# Patient Record
Sex: FEMALE | Race: WHITE | NOT HISPANIC OR LATINO | Employment: OTHER | URBAN - METROPOLITAN AREA
[De-identification: names, ages, dates, MRNs, and addresses within clinical notes are randomized per-mention and may not be internally consistent; named-entity substitution may affect disease eponyms.]

---

## 2018-10-31 ENCOUNTER — OFFICE VISIT (OUTPATIENT)
Dept: OBGYN CLINIC | Age: 68
End: 2018-10-31
Payer: MEDICARE

## 2018-10-31 VITALS
WEIGHT: 254.13 LBS | SYSTOLIC BLOOD PRESSURE: 120 MMHG | DIASTOLIC BLOOD PRESSURE: 76 MMHG | HEIGHT: 71 IN | BODY MASS INDEX: 35.58 KG/M2

## 2018-10-31 DIAGNOSIS — Z12.31 ENCOUNTER FOR SCREENING MAMMOGRAM FOR MALIGNANT NEOPLASM OF BREAST: ICD-10-CM

## 2018-10-31 DIAGNOSIS — Z85.3 HISTORY OF BREAST CANCER: ICD-10-CM

## 2018-10-31 DIAGNOSIS — N76.2 ACUTE VULVITIS: ICD-10-CM

## 2018-10-31 DIAGNOSIS — Z01.419 ENCOUNTER FOR GYNECOLOGICAL EXAMINATION WITHOUT ABNORMAL FINDING: ICD-10-CM

## 2018-10-31 DIAGNOSIS — Z91.89 GYN EXAM FOR HIGH-RISK MEDICARE PATIENT: Primary | ICD-10-CM

## 2018-10-31 DIAGNOSIS — M81.0 OSTEOPOROSIS, UNSPECIFIED OSTEOPOROSIS TYPE, UNSPECIFIED PATHOLOGICAL FRACTURE PRESENCE: ICD-10-CM

## 2018-10-31 PROCEDURE — G0101 CA SCREEN;PELVIC/BREAST EXAM: HCPCS | Performed by: OBSTETRICS & GYNECOLOGY

## 2018-10-31 RX ORDER — VENLAFAXINE HYDROCHLORIDE 150 MG/1
150 TABLET, EXTENDED RELEASE ORAL
Refills: 0 | COMMUNITY
Start: 2018-10-16

## 2018-10-31 RX ORDER — TRIMETHOPRIM 100 MG/1
100 TABLET ORAL DAILY
Refills: 0 | COMMUNITY
Start: 2018-10-07

## 2018-10-31 RX ORDER — SIMVASTATIN 20 MG
20 TABLET ORAL DAILY
Refills: 0 | COMMUNITY
Start: 2018-10-23

## 2018-10-31 RX ORDER — ZOLPIDEM TARTRATE 10 MG/1
TABLET ORAL
COMMUNITY
Start: 2006-12-28 | End: 2019-12-30 | Stop reason: SDUPTHER

## 2018-10-31 RX ORDER — LOSARTAN POTASSIUM AND HYDROCHLOROTHIAZIDE 12.5; 5 MG/1; MG/1
1 TABLET ORAL DAILY
Refills: 0 | COMMUNITY
Start: 2018-10-27

## 2018-10-31 RX ORDER — CLOBETASOL PROPIONATE 0.5 MG/G
OINTMENT TOPICAL ONCE
Qty: 15 G | Refills: 0 | Status: SHIPPED | OUTPATIENT
Start: 2018-10-31 | End: 2020-01-10 | Stop reason: ALTCHOICE

## 2018-10-31 RX ORDER — ZOLPIDEM TARTRATE 12.5 MG/1
12.5 TABLET, FILM COATED, EXTENDED RELEASE ORAL
Refills: 0 | COMMUNITY
Start: 2018-10-09

## 2018-10-31 RX ORDER — ESTRADIOL 0.1 MG/G
CREAM VAGINAL
Refills: 0 | COMMUNITY
Start: 2018-09-07

## 2018-10-31 NOTE — PATIENT INSTRUCTIONS
Contact Dermatitis   AMBULATORY CARE:   Contact dermatitis  is a rash  It develops when you touch something that irritates your skin or causes an allergic reaction  Common signs and symptoms include the following:   · Red, swollen, painful rash           · Skin that itches, stings, or burns    · Dry, scaly, or crusty skin patches    · Bumps or blisters    · Fluid draining from blisters  Call 911 for any of the following:   · You have sudden trouble breathing  · Your throat swells and you have trouble eating  · Your face is swollen  Contact your healthcare provider if:   · You have a fever  · Your blisters are draining pus  · Your rash spreads or does not get better, even after treatment  · You have questions or concerns about your condition or care  Treatment for contact dermatitis  involves removing any irritants or allergens that cause your rash  You may also need medicines to decrease itching and swelling  They will be given as a topical medicine to apply to your rash or as a pill  Manage contact dermatitis:   · Take short baths or showers in cool water  Use mild soap or soap-free cleansers  Add oatmeal, baking soda, or cornstarch to the bath water to help decrease skin irritation  · Avoid skin irritants , such as makeup, hair products, soaps, and cleansers  Use products that do not contain perfume or dye  · Apply a cool compress to your rash  This will help soothe your skin  · Keep your skin moist   Rub unscented cream or lotion on your skin to prevent dryness and itching  Do this right after a bath or shower when your skin is still damp  Follow up with your healthcare provider as directed:  Write down your questions so you remember to ask them during your visits  © 2017 [employer] Information is for End User's use only and may not be sold, redistributed or otherwise used for commercial purposes   All illustrations and images included in CareNotes® are the copyrighted property of [employer]  or Durga Schmidt  The above information is an  only  It is not intended as medical advice for individual conditions or treatments  Talk to your doctor, nurse or pharmacist before following any medical regimen to see if it is safe and effective for you

## 2018-10-31 NOTE — ASSESSMENT & PLAN NOTE
Pap/HPV not indicated  Mammogram not indicated, h/o mastectomy  Colonoscopy current due 2028    DEXA scheduled for tomorrow  Encourage healthy diet, exercise, Calcium 1200mg per day and at least 800 iu Vitamin D daily

## 2018-10-31 NOTE — PROGRESS NOTES
Assessment/Plan:    Encounter for gynecological examination without abnormal finding  Pap/HPV not indicated  Mammogram not indicated, h/o mastectomy  Colonoscopy current due     DEXA scheduled for tomorrow  Encourage healthy diet, exercise, Calcium 1200mg per day and at least 800 iu Vitamin D daily  Diagnoses and all orders for this visit:    GYN exam for high-risk Medicare patient    Encounter for gynecological examination without abnormal finding    Encounter for screening mammogram for malignant neoplasm of breast    Osteoporosis, unspecified osteoporosis type, unspecified pathological fracture presence  -     DXA bone density spine hip and pelvis; Future    Acute vulvitis  -     clobetasol (TEMOVATE) 0 05 % ointment; Apply topically once for 1 dose    History of breast cancer    Other orders  -     zolpidem (AMBIEN) 10 mg tablet; Take by mouth  -     estradiol (ESTRACE) 0 1 mg/g vaginal cream; insert 1 gram vaginally two times a week  -     losartan-hydrochlorothiazide (HYZAAR) 50-12 5 mg per tablet;   -     metoprolol tartrate (LOPRESSOR) 25 mg tablet; Take by mouth  -     simvastatin (ZOCOR) 20 mg tablet;   -     trimethoprim (PROLOPRIM) 100 mg tablet;   -     venlafaxine 150 MG TB24;   -     zolpidem (AMBIEN CR) 12 5 MG CR tablet;           Subjective:      Patient ID: Piedad Eller is a 79 y o  female  Patient here for new patient yearly exam  No current complaints at this time  Age of first period: 17years old    Lmp: patient is   Last pap: 17 negative,hpv-  Last mammo: patient had bilateral mastectomy, follows up with Dorothy Brown  Colonoscopy: 2018 (due )  Dexa: has scheduled for 18 slip printed  Patient is a former smoker  Quit 31 years ago  Patient is a social drinker  Patient does water aerobics       Recent treatment with difulcan- still having some burning no discharge    On bactrim daily x few months due to recurrent UTI      Gynecologic Exam   The patient's primary symptoms include genital itching  The patient's pertinent negatives include no genital lesions, genital odor, genital rash, pelvic pain, vaginal bleeding or vaginal discharge  The patient is experiencing no pain  Associated symptoms include urgency  Pertinent negatives include no chills, constipation, diarrhea, fever, frequency, nausea, painful intercourse, sore throat or vomiting  She is not sexually active  She is postmenopausal        The following portions of the patient's history were reviewed and updated as appropriate:   She  has a past medical history of Breast cancer ([facility] [address] ); Chicken pox; Frequent UTI; Heart disease; High cholesterol; History of blood transfusion; and Hypertension  She   Patient Active Problem List    Diagnosis Date Noted    Encounter for gynecological examination without abnormal finding 10/31/2018    History of squamous cell carcinoma of skin 06/19/2014    GERD (gastroesophageal reflux disease) 09/21/2011    HTN, goal below 130/80 09/21/2011    Obesity, Class I, BMI 30 0-34 9 (see actual BMI) 09/21/2011    Obstructive sleep apnea 09/21/2011     She  has a past surgical history that includes Repair knee ligament (Bilateral); Rotator cuff repair; Mastectomy (Bilateral); Appendectomy; and Breast cyst excision  Her family history is not on file  She  reports that she quit smoking about 31 years ago  Her smoking use included Cigarettes  She has never used smokeless tobacco  She reports that she drinks alcohol  She reports that she does not use drugs    Current Outpatient Prescriptions   Medication Sig Dispense Refill    estradiol (ESTRACE) 0 1 mg/g vaginal cream insert 1 gram vaginally two times a week  0    losartan-hydrochlorothiazide (HYZAAR) 50-12 5 mg per tablet   0    metoprolol tartrate (LOPRESSOR) 25 mg tablet Take by mouth      simvastatin (ZOCOR) 20 mg tablet   0    trimethoprim (PROLOPRIM) 100 mg tablet   0    venlafaxine 150 MG TB24   0    zolpidem (AMBIEN CR) 12 5 MG CR tablet   0    zolpidem (AMBIEN) 10 mg tablet Take by mouth      clobetasol (TEMOVATE) 0 05 % ointment Apply topically once for 1 dose 15 g 0     No current facility-administered medications for this visit  No current outpatient prescriptions on file prior to visit  No current facility-administered medications on file prior to visit  She has No Known Allergies       Review of Systems   Constitutional: Negative for activity change, appetite change, chills, fatigue and fever  HENT: Negative for rhinorrhea, sneezing and sore throat  Eyes: Negative for visual disturbance  Respiratory: Negative for cough, shortness of breath and wheezing  Cardiovascular: Negative for chest pain, palpitations and leg swelling  Gastrointestinal: Negative for abdominal distention, constipation, diarrhea, nausea and vomiting  Genitourinary: Positive for urgency  Negative for difficulty urinating, frequency, pelvic pain and vaginal discharge  Neurological: Negative for syncope and light-headedness  Objective:      /76 (BP Location: Left arm, Patient Position: Sitting, Cuff Size: Standard)   Ht 5' 11" (1 803 m)   Wt 115 kg (254 lb 2 oz)   LMP  (LMP Unknown)   Breastfeeding? No   BMI 35 44 kg/m²          Physical Exam   Constitutional: She is oriented to person, place, and time  Genitourinary: Vagina normal and uterus normal  No breast swelling, tenderness, discharge or bleeding  There is no rash, tenderness, lesion or injury on the right labia  There is no rash, tenderness, lesion or injury on the left labia  Uterus is not deviated, not enlarged, not fixed and not tender  Cervix exhibits no motion tenderness, no discharge and no friability  Right adnexum displays no mass, no tenderness and no fullness  Left adnexum displays no mass, no tenderness and no fullness  No tenderness or bleeding in the vagina  No vaginal discharge found     Genitourinary Comments: Bilateral mastectomy        vulva- no erythema, vaginal mucosa slightly erythematous at the introitus no discharge noted   Neurological: She is alert and oriented to person, place, and time

## 2019-11-04 ENCOUNTER — ANNUAL EXAM (OUTPATIENT)
Dept: OBGYN CLINIC | Facility: CLINIC | Age: 69
End: 2019-11-04
Payer: MEDICARE

## 2019-11-04 VITALS — DIASTOLIC BLOOD PRESSURE: 84 MMHG | SYSTOLIC BLOOD PRESSURE: 126 MMHG

## 2019-11-04 DIAGNOSIS — Z91.89 GYN EXAM FOR HIGH-RISK MEDICARE PATIENT: Primary | ICD-10-CM

## 2019-11-04 DIAGNOSIS — Z12.31 ENCOUNTER FOR SCREENING MAMMOGRAM FOR MALIGNANT NEOPLASM OF BREAST: ICD-10-CM

## 2019-11-04 DIAGNOSIS — Z85.3 HISTORY OF BREAST CANCER: ICD-10-CM

## 2019-11-04 DIAGNOSIS — Z01.419 ENCOUNTER FOR GYNECOLOGICAL EXAMINATION WITHOUT ABNORMAL FINDING: ICD-10-CM

## 2019-11-04 PROBLEM — H52.13 MYOPIA, BILATERAL: Status: ACTIVE | Noted: 2017-12-18

## 2019-11-04 PROCEDURE — G0145 SCR C/V CYTO,THINLAYER,RESCR: HCPCS | Performed by: OBSTETRICS & GYNECOLOGY

## 2019-11-04 PROCEDURE — G0101 CA SCREEN;PELVIC/BREAST EXAM: HCPCS | Performed by: OBSTETRICS & GYNECOLOGY

## 2019-11-04 PROCEDURE — 87624 HPV HI-RISK TYP POOLED RSLT: CPT | Performed by: OBSTETRICS & GYNECOLOGY

## 2019-11-04 NOTE — PROGRESS NOTES
Assessment/Plan:    Encounter for gynecological examination without abnormal finding  Pap/HPV not indicated - requested by patient  Mammogram: h/o bilateral mastectomy, h/o breast cancer  Colonoscopy current  DEXA through PCP as per patient    Encourage healthy diet, exercise, Calcium 1200mg per day and at least 800 iu Vitamin D daily  Diagnoses and all orders for this visit:    GYN exam for high-risk Medicare patient    Encounter for gynecological examination without abnormal finding  -     Liquid-based pap, screening    Encounter for screening mammogram for malignant neoplasm of breast    History of breast cancer          Subjective:      Patient ID: Rakesh Wood is a 81 y o  female  Patient here for yearly exam  No current complaints at this time  Age of first period: 17years old    Lmp: patient is   Last mammo: patient had bilateral mastectomy, follows up with Danay Banerjee  Colonoscopy: 2018 (due )  Dexa: per pt  Had done   Patient is a former smoker  Quit 32 years ago  Patient is a social drinker  Patient does water aerobics  Doing well, no complaints  Would like a pap test to be done  Daughter lives in [place]  PT clinical coordinator position  Newly     Gynecologic Exam   The patient's pertinent negatives include no genital itching, genital lesions, genital odor, genital rash, pelvic pain, vaginal bleeding or vaginal discharge  The patient is experiencing no pain  Pertinent negatives include no chills, constipation, diarrhea, fever, frequency, nausea, painful intercourse, sore throat, urgency or vomiting  She is not sexually active  She is postmenopausal        The following portions of the patient's history were reviewed and updated as appropriate:   She  has a past medical history of Breast cancer ([address] ), Chicken pox, Frequent UTI, Heart disease, High cholesterol, History of blood transfusion, and Hypertension    She   Patient Active Problem List    Diagnosis Date Noted    Encounter for gynecological examination without abnormal finding 10/31/2018    Myopia, bilateral 12/18/2017    History of squamous cell carcinoma of skin 06/19/2014    GERD (gastroesophageal reflux disease) 09/21/2011    HTN, goal below 130/80 09/21/2011    Obesity, Class I, BMI 30 0-34 9 (see actual BMI) 09/21/2011    Obstructive sleep apnea 09/21/2011     She  has a past surgical history that includes Repair knee ligament (Bilateral); Rotator cuff repair; Mastectomy (Bilateral); Appendectomy; and Breast cyst excision  Her family history is not on file  She  reports that she quit smoking about 32 years ago  Her smoking use included cigarettes  She has never used smokeless tobacco  She reports that she drinks alcohol  She reports that she does not use drugs  Current Outpatient Medications   Medication Sig Dispense Refill    estradiol (ESTRACE) 0 1 mg/g vaginal cream insert 1 gram vaginally two times a week  0    losartan-hydrochlorothiazide (HYZAAR) 50-12 5 mg per tablet   0    metoprolol tartrate (LOPRESSOR) 25 mg tablet Take by mouth      simvastatin (ZOCOR) 20 mg tablet   0    trimethoprim (PROLOPRIM) 100 mg tablet   0    venlafaxine 150 MG TB24   0    zolpidem (AMBIEN CR) 12 5 MG CR tablet   0    zolpidem (AMBIEN) 10 mg tablet Take by mouth      clobetasol (TEMOVATE) 0 05 % ointment Apply topically once for 1 dose 15 g 0     No current facility-administered medications for this visit        Current Outpatient Medications on File Prior to Visit   Medication Sig    estradiol (ESTRACE) 0 1 mg/g vaginal cream insert 1 gram vaginally two times a week    losartan-hydrochlorothiazide (HYZAAR) 50-12 5 mg per tablet     metoprolol tartrate (LOPRESSOR) 25 mg tablet Take by mouth    simvastatin (ZOCOR) 20 mg tablet     trimethoprim (PROLOPRIM) 100 mg tablet     venlafaxine 150 MG TB24     zolpidem (AMBIEN CR) 12 5 MG CR tablet     zolpidem (AMBIEN) 10 mg tablet Take by mouth    clobetasol (TEMOVATE) 0 05 % ointment Apply topically once for 1 dose     No current facility-administered medications on file prior to visit  She has No Known Allergies       Review of Systems   Constitutional: Negative for activity change, appetite change, chills, fatigue and fever  HENT: Negative for rhinorrhea, sneezing and sore throat  Eyes: Negative for visual disturbance  Respiratory: Negative for cough, shortness of breath and wheezing  Cardiovascular: Negative for chest pain, palpitations and leg swelling  Gastrointestinal: Negative for abdominal distention, constipation, diarrhea, nausea and vomiting  Genitourinary: Negative for difficulty urinating, frequency, pelvic pain, urgency and vaginal discharge  Neurological: Negative for syncope and light-headedness  Objective:      /84 (BP Location: Right arm, Patient Position: Sitting, Cuff Size: Standard)   LMP  (LMP Unknown)   Breastfeeding? No          Physical Exam   Constitutional: She appears well-developed and well-nourished  No distress  Pulmonary/Chest: Right breast exhibits no inverted nipple, no mass, no nipple discharge, no skin change and no tenderness  Left breast exhibits no inverted nipple, no mass, no nipple discharge, no skin change and no tenderness  No breast tenderness, discharge or bleeding  Breasts are symmetrical    Bilateral mastectomy     Abdominal: Soft  Bowel sounds are normal  She exhibits no distension and no mass  There is no tenderness  There is no rebound and no guarding  Genitourinary: No breast tenderness, discharge or bleeding  There is no rash, tenderness, lesion or injury on the right labia  There is no rash, tenderness, lesion or injury on the left labia  Uterus is not deviated, not enlarged, not fixed and not tender  Cervix exhibits no motion tenderness, no discharge and no friability  Right adnexum displays no mass, no tenderness and no fullness   Left adnexum displays no mass, no tenderness and no fullness  No bleeding in the vagina  No vaginal discharge found  Genitourinary Comments: Urethral meatus- no lesions, non tender  Urethra non tender  Bladder non tender  Thin, atrophic vaginal mucosa   Skin: Skin is warm and dry  No rash noted  She is not diaphoretic  No erythema  No pallor  Psychiatric: She has a normal mood and affect

## 2019-11-04 NOTE — PATIENT INSTRUCTIONS
Osteoporosis   WHAT YOU NEED TO KNOW:   What is osteoporosis? Osteoporosis is a long-term medical condition that causes your bones to become weak, brittle, and more likely to fracture  Osteoporosis occurs when your body absorbs more bone than it makes  It is also caused by a lack of calcium and estrogen (female hormone)  What increases my risk for osteoporosis? · Age older than 28    · Low estrogen levels    · Female gender    · Alcohol, tobacco, or caffeine    · Lack of calcium and vitamin D in your foods    · Lack of exercise    · Some illnesses, such as thyroid diseases, bone cancer, and long-term lung diseases    · Certain medicines, such as steroids, anticonvulsants, and blood-thinners  What are the signs and symptoms of osteoporosis? You may not have any signs or symptoms  You may break a bone after a muscle strain, bump, or fall  A break usually occurs in the hip, spine, or wrist  A collapsed vertebra (bone in your spine) may cause severe back pain or loss of height from bent posture  How is osteoporosis diagnosed? · Blood and urine tests  measure your calcium, vitamin D, and estrogen levels  · An x-ray or CT  may show thinned bones or a fracture  You may be given contrast liquid to help the bones show up better in the pictures  Tell the healthcare provider if you have ever had an allergic reaction to contrast liquid  Do not enter the MRI room with anything metal  Metal can cause serious injury  Tell the healthcare provider if you have any metal in or on your body  · A bone density test  compares your bone thickness with what is expected for someone of your age, gender, and ethnicity  How is osteoporosis treated? Medicines may be given to prevent bone loss, build new bone, and increase estrogen  These medicines help prevent fractures and may be given as a pill or injection  Ask your healthcare provider for more information on these medicines  How can I help prevent bone loss?    · Eat healthy foods that are high in calcium  This helps keep your bones strong  Good sources of calcium are milk, cheese, broccoli, tofu, almonds, and canned salmon and sardines  · Increase your vitamin D intake  Vitamin D is in fish oils, some vegetables, and fortified milk, cereal, and bread  Vitamin D is also formed in the skin when it is exposed to the sun  Ask your healthcare provider how much sunlight is safe for you  · Drink liquids as directed  Ask your healthcare provider how much liquid to drink each day and which liquids are best for you  Do not have alcohol or caffeine  They decrease bone mineral density, which can weaken your bones  · Exercise regularly  Ask your healthcare provider about the best exercise plan for you  Weight bearing exercise for 30 minutes, 3 times a week can help build and strengthen bone  · Do not smoke  Nicotine and other chemicals in cigarettes and cigars can cause lung damage  Ask your healthcare provider for information if you currently smoke and need help to quit  E-cigarettes or smokeless tobacco still contain nicotine  Talk to your healthcare provider before you use these products  · Go to physical therapy as directed  A physical therapist teaches you exercises to help improve movement and muscle strength  When should I seek immediate care? · You have severe pain  When should I contact my healthcare provider? · You have increasing pain after a fall  · You have pain when you do your daily activities  · You have questions or concerns about your condition or care  CARE AGREEMENT:   You have the right to help plan your care  Learn about your health condition and how it may be treated  Discuss treatment options with your caregivers to decide what care you want to receive  You always have the right to refuse treatment  The above information is an  only  It is not intended as medical advice for individual conditions or treatments  Talk to your doctor, nurse or pharmacist before following any medical regimen to see if it is safe and effective for you  © 2017 [employer] [employer] Information is for End User's use only and may not be sold, redistributed or otherwise used for commercial purposes  All illustrations and images included in CareNotes® are the copyrighted property of [employer]  or Durga Schmidt

## 2019-11-04 NOTE — ASSESSMENT & PLAN NOTE
Pap/HPV not indicated - requested by patient  Mammogram: h/o bilateral mastectomy, h/o breast cancer  Colonoscopy current  DEXA through PCP as per patient    Encourage healthy diet, exercise, Calcium 1200mg per day and at least 800 iu Vitamin D daily

## 2019-11-05 LAB
HPV HR 12 DNA CVX QL NAA+PROBE: NEGATIVE
HPV16 DNA CVX QL NAA+PROBE: NEGATIVE
HPV18 DNA CVX QL NAA+PROBE: NEGATIVE

## 2019-11-07 LAB
LAB AP GYN PRIMARY INTERPRETATION: NORMAL
Lab: NORMAL

## 2019-11-12 ENCOUNTER — TELEPHONE (OUTPATIENT)
Dept: OBGYN CLINIC | Facility: CLINIC | Age: 69
End: 2019-11-12

## 2019-11-12 NOTE — TELEPHONE ENCOUNTER
----- Message from Jared Harris MD sent at 11/8/2019  2:11 PM EST -----  Please call patient  Pap and HPV are negative

## 2019-12-19 ENCOUNTER — TELEPHONE (OUTPATIENT)
Dept: OBGYN CLINIC | Facility: CLINIC | Age: 69
End: 2019-12-19

## 2019-12-19 NOTE — TELEPHONE ENCOUNTER
Patient is concerned because when she wipes there is brownish discharge  Patient was seen yesterday by her urologist for a possible UTI but thinks the discharge is unrelated  Patient also reports having cramps  Please advise

## 2019-12-19 NOTE — TELEPHONE ENCOUNTER
I spoke with [facility], She saw urology yesterday for a UTI, labs are pending  She has cramping and says a brown mucous discharge since Saturday She has put a pad on and nothing on it  I said it may be from the urine if she has an infection but she insists it is coming from her vagina  No odor, no irritation  I made her an appointment, she will monitor to see if it goes away after UTI treatment

## 2019-12-30 ENCOUNTER — OFFICE VISIT (OUTPATIENT)
Dept: OBGYN CLINIC | Age: 69
End: 2019-12-30
Payer: MEDICARE

## 2019-12-30 DIAGNOSIS — R10.2 PELVIC PAIN: Primary | ICD-10-CM

## 2019-12-30 DIAGNOSIS — N89.8 VAGINAL ITCHING: ICD-10-CM

## 2019-12-30 PROCEDURE — 99213 OFFICE O/P EST LOW 20 MIN: CPT | Performed by: NURSE PRACTITIONER

## 2019-12-30 RX ORDER — FLUCONAZOLE 150 MG/1
TABLET ORAL
Qty: 2 TABLET | Refills: 0 | Status: SHIPPED | OUTPATIENT
Start: 2019-12-30 | End: 2020-01-01

## 2019-12-30 NOTE — PROGRESS NOTES
Diagnoses and all orders for this visit:    1  Pelvic pain  -     US pelvis complete w transvaginal; Future  Will eval US, then will plan for EMB, reviewed with pt what to expect with procedure  Then may need to F/U with a physician based on results  2  Vaginal itching  -     fluconazole (DIFLUCAN) 150 mg tablet; Take one tablet today and in 3 days take 2nd tablet  Reviewed vaginal infections can occur due to recent ABX treatment  Continue with estrace as ordered  Watch symptoms and call if do not improve  All questions and concerns answered  Call with any questions  Pleasant 81 y o  female presents as a new patient today with c/o bright red vaginal spotting with cramps and brown discharge for 2 weeks  During that time she was treated for a UTI, just completed her medication  Now reports discharge/bleeding has stopped but continues to have pelvic pain and cramping  She is applying estrace vaginally twice weekly, to reduce occurrence of UTI and she states there is improvement along with taking abx daily, she is down to having 1-2 uti's a year  She is also in remission from having 2 occurrences of breast cancer  She is rarely sexually active about once every 3 months she states  Denies F/C/N/V  Vitals: There is no height or weight on file to calculate BMI  No Known Allergies    Past Medical History:   Diagnosis Date    Breast cancer ([facility] [address] )     Chicken pox     Frequent UTI     Heart disease     High cholesterol     History of blood transfusion     Hypertension      Past Surgical History:   Procedure Laterality Date    APPENDECTOMY      BREAST CYST EXCISION      MASTECTOMY Bilateral     REPAIR KNEE LIGAMENT Bilateral     ROTATOR CUFF REPAIR       History reviewed  No pertinent family history    Social History     Tobacco Use    Smoking status: Former Smoker     Types: Cigarettes     Last attempt to quit: 10/31/1987     Years since quittin 1    Smokeless tobacco: Never Used   Substance Use Topics    Alcohol use: Yes     Comment: socially     Drug use: No       Current Outpatient Medications:     losartan-hydrochlorothiazide (HYZAAR) 50-12 5 mg per tablet, , Disp: , Rfl: 0    metoprolol tartrate (LOPRESSOR) 25 mg tablet, Take by mouth, Disp: , Rfl:     simvastatin (ZOCOR) 20 mg tablet, , Disp: , Rfl: 0    trimethoprim (PROLOPRIM) 100 mg tablet, , Disp: , Rfl: 0    venlafaxine 150 MG TB24, , Disp: , Rfl: 0    zolpidem (AMBIEN CR) 12 5 MG CR tablet, , Disp: , Rfl: 0    clobetasol (TEMOVATE) 0 05 % ointment, Apply topically once for 1 dose, Disp: 15 g, Rfl: 0    estradiol (ESTRACE) 0 1 mg/g vaginal cream, insert 1 gram vaginally two times a week, Disp: , Rfl: 0    fluconazole (DIFLUCAN) 150 mg tablet, Take one tablet today and in 3 days take 2nd tablet , Disp: 2 tablet, Rfl: 0    OB History    Para Term  AB Living   2 2 0 0 0 0   SAB TAB Ectopic Multiple Live Births   0 0 0 0 0      # Outcome Date GA Lbr Marquez/2nd Weight Sex Delivery Anes PTL Lv   2 Para            1 Para               Obstetric Comments   Menarche: 17 y/o        Review of Systems     Review of Systems   Constitutional: Negative for chills, fatigue, fever and unexpected weight change  Respiratory: Negative for shortness of breath  Gastrointestinal: Negative for anal bleeding, blood in stool, constipation and diarrhea  Genitourinary: Negative for difficulty urinating, dysuria and hematuria  OBGyn Exam     Physical Exam   Constitutional: She appears well-developed and well-nourished  No distress  Head: Normocephalic  Neck: Normal range of motion  Neck supple  Pulmonary: Effort normal  Lung sounds clear  Abdominal: Soft  Non-tender  Pelvic exam was performed with patient supine  No labial fusion, thinning or leukoplakia  There is no rash, tenderness, lesion or injury on the right labia  There is no rash, tenderness, lesion or injury on the left labia   Uterus is not deviated, not enlarged, not fixed and ++ tender  Cervix exhibits no motion tenderness, no discharge and no friability  Right adnexum displays no mass, no tenderness and no fullness  Left adnexum displays no mass, no tenderness and no fullness  ++ erythema ++ tenderness in the vagina, R/T vaginal dryness  Mild signs of atrophy, w/o erosion, shortening and/or tightening of vaginal canal No foreign body in the vagina  No signs of injury around the vagina  Small amount of thick, white vaginal discharge found  Prolapse: None    Lymphadenopathy:          Right: No inguinal adenopathy present  Left: No inguinal adenopathy present

## 2019-12-30 NOTE — PATIENT INSTRUCTIONS
Dyspareunia in Women   WHAT YOU NEED TO KNOW:   What is dyspareunia? Dyspareunia is pain during intercourse (sex)  You may have pain before, during, or after sex  You may have pain every time you have sex, or only certain times  You may have had pain since the first time you had sex, or the pain might start suddenly  Dyspareunia may cause you to feel embarrassed or cause problems in your relationship with your partner  Many causes of dyspareunia can be treated  It is important for you to talk with your healthcare provider about your symptoms  What causes or increases my risk for dyspareunia? · Hormone changes that happen just before or during menopause, or because of breastfeeding    · Stress, anxiety, or emotional problems    · An episiotomy scar after childbirth, irritation in or around your vagina, or an abnormal vaginal structure    · Irritation from a lack of lubrication during sex    · An infection, trauma, or tumor    · Endometriosis, or surgery in your lower abdomen    · Pregnancy, or a recent vaginal delivery    · Constipation, or irritable bowel syndrome     · Use of drugs, alcohol, or medicines such as antihistamines  What are the signs and symptoms of dyspareunia? Signs and symptoms will depend on the cause  You may have any of the following:  · Pain anywhere from the opening of your vagina to your abdomen    · Pain with penetration, including when you insert a sex toy    · A feeling of pressure or burning anywhere in your vagina    · Less interest in having sex, trouble becoming aroused, or trouble having an orgasm    · A watery discharge from your vagina  How is dyspareunia diagnosed? Your healthcare provider will examine your vagina for possible causes of your pain  Your bladder, rectum, or other parts of your lower abdomen may also need to be examined  · Blood or urine tests  may be used to check for an infection       · A vaginal exam  will help check for problems at the opening or inside your vagina that can cause dyspareunia  Your healthcare provider may insert a finger into your vagina  A cotton swab may be used to check for pain or other symptoms  Talk to your healthcare provider if you have any anxiety or concerns about an exam      · A colposcopy  is a procedure used to check your vagina and cervix (opening to your uterus)  Your healthcare provider will insert a scope into your vagina for the exam  A sample of tissue may be taken during the colposcopy  · Laparoscopy  is surgery used to check the uterus, fallopian tubes, and ovaries  Your healthcare provider will make an incision in your lower abdomen  A thin tube with a light on the end is put through the incision  Laparoscopy is used to check for a tumor, fibroid, or other problem causing your pain  Your healthcare provider may also take a tissue sample  How is dyspareunia treated? · Medicine  may be given to treat an infection or to relieve pain  Pain medicine may be given as a pill or as a cream you can apply to your vagina  · Estrogen  may be given as a cream, a pill, or a ring that fits in your vagina  Estrogen may help relieve your symptoms if they are caused by low estrogen levels  · Lubricant  can help make sex more comfortable  Lubricants are available without a prescription  Talk to your healthcare provider about which kind to use if you are using condoms for birth control  Oil-based lubricants can damage condoms  Choose a silicone-based or water-based lubricant  · Surgery  may be needed to remove a tumor or fibroid  Surgery can also be used to remove extra tissue from your vagina, or to widen your vagina  You may need surgery to fix a problem with a structure in your lower abdomen  What can I do to manage dyspareunia? · A sitz bath  may help reduce inflammation  To make a sitz bath, fill the bathtub with warm water until it is at about the level of your belly button  Stay in the bath for about 15 to 20 minutes   A sitz bath is also available as a small tub that will fit under your toilet seat  You will fill the tub with water as directed once it is under the toilet seat  Your healthcare provider can tell you how often to use a sitz bath  · Kegel exercises  may be recommended to help strengthen your pelvic muscles  Pelvic muscles hold your pelvic organs, such as your bladder and uterus, in place  To do Kegel exercises, tighten your pelvic muscles slowly  It should feel like you are trying to hold back urine  Hold these muscles and count to 3  Relax, tighten them quickly, and release  Repeat the cycle 10 times  · A change of position  can help make sex more comfortable  You might also want to try having sex at different times of the month if you have monthly periods  This will help you find a time of the month that is most comfortable for you  · Therapy  with a mental health counselor may help you feel less anxious about sex  You can talk to a counselor by yourself or with your partner  When should I contact my healthcare provider? · You have new or worsening symptoms  · You have questions or concerns about your condition or care  CARE AGREEMENT:   You have the right to help plan your care  Learn about your health condition and how it may be treated  Discuss treatment options with your caregivers to decide what care you want to receive  You always have the right to refuse treatment  The above information is an  only  It is not intended as medical advice for individual conditions or treatments  Talk to your doctor, nurse or pharmacist before following any medical regimen to see if it is safe and effective for you  © 2017 [employer]  Information is for End User's use only and may not be sold, redistributed or otherwise used for commercial purposes  All illustrations and images included in CareNotes® are the copyrighted property of [employer]  or Durga Schmidt

## 2020-01-03 DIAGNOSIS — R93.89 ENDOMETRIAL THICKENING ON ULTRASOUND: Primary | ICD-10-CM

## 2020-01-03 DIAGNOSIS — N83.292 COMPLEX CYST OF LEFT OVARY: ICD-10-CM

## 2020-01-03 RX ORDER — MISOPROSTOL 200 UG/1
TABLET ORAL
Qty: 1 TABLET | Refills: 0 | Status: SHIPPED | OUTPATIENT
Start: 2020-01-03 | End: 2020-01-07 | Stop reason: ALTCHOICE

## 2020-01-06 ENCOUNTER — PROCEDURE VISIT (OUTPATIENT)
Dept: OBGYN CLINIC | Facility: CLINIC | Age: 70
End: 2020-01-06
Payer: MEDICARE

## 2020-01-06 ENCOUNTER — APPOINTMENT (OUTPATIENT)
Dept: LAB | Facility: HOSPITAL | Age: 70
End: 2020-01-06
Attending: OBSTETRICS & GYNECOLOGY
Payer: MEDICARE

## 2020-01-06 VITALS
HEIGHT: 71 IN | DIASTOLIC BLOOD PRESSURE: 96 MMHG | SYSTOLIC BLOOD PRESSURE: 154 MMHG | WEIGHT: 259 LBS | BODY MASS INDEX: 36.26 KG/M2 | HEART RATE: 82 BPM

## 2020-01-06 DIAGNOSIS — N83.202 OVARIAN CYST, LEFT: ICD-10-CM

## 2020-01-06 DIAGNOSIS — R93.89 ENDOMETRIAL THICKENING ON ULTRASOUND: Primary | ICD-10-CM

## 2020-01-06 DIAGNOSIS — Z01.419 ENCOUNTER FOR GYNECOLOGICAL EXAMINATION WITHOUT ABNORMAL FINDING: ICD-10-CM

## 2020-01-06 PROBLEM — N89.8 VAGINAL ITCHING: Status: RESOLVED | Noted: 2019-12-30 | Resolved: 2020-01-06

## 2020-01-06 PROCEDURE — 88305 TISSUE EXAM BY PATHOLOGIST: CPT | Performed by: PATHOLOGY

## 2020-01-06 PROCEDURE — 86304 IMMUNOASSAY TUMOR CA 125: CPT

## 2020-01-06 PROCEDURE — 36415 COLL VENOUS BLD VENIPUNCTURE: CPT

## 2020-01-06 PROCEDURE — 58100 BIOPSY OF UTERUS LINING: CPT | Performed by: OBSTETRICS & GYNECOLOGY

## 2020-01-06 PROCEDURE — 99214 OFFICE O/P EST MOD 30 MIN: CPT | Performed by: OBSTETRICS & GYNECOLOGY

## 2020-01-06 RX ORDER — MELATONIN
1000 DAILY
COMMUNITY
End: 2020-01-10 | Stop reason: ALTCHOICE

## 2020-01-06 NOTE — ASSESSMENT & PLAN NOTE
ordered will call with results    Options: if  is wnl - surgery or repeat ultrasound in 6 weeks  If elevated referral to gyn onc

## 2020-01-06 NOTE — PROGRESS NOTES
Assessment/Plan:    Complex cyst of left ovary   ordered will call with results    Options: if  is wnl - surgery or repeat ultrasound in 6 weeks  If elevated referral to gyn onc        Endometrial thickening on ultrasound  + vaginal bleeding  Lining 9mm    EB collected today will call with results       Diagnoses and all orders for this visit:    Endometrial thickening on ultrasound  -     Tissue Exam    Encounter for gynecological examination without abnormal finding    Ovarian cyst, left  -     ; Future    Other orders  -     cholecalciferol (VITAMIN D3) 1,000 units tablet; Take 1,000 Units by mouth daily  -     Endometrial biopsy          Subjective:      Patient ID: Renée Carbajal is a 81 y o  female  Here today for her EMB Procedure  Current complaints   Lynsey Brown        Bleeding started about one month ago - initially bright red with clots, now has decreased to light brown  Also had back pain, bloating with sudden onset during trip to [place]  Pain has lessened but is still present  Was treated for a UTI  Bowels: regular  Bladder  - no pain, no increase in frequency or urgency  Not sexually active    + bloating    H/o breast cancer x 2  Bilateral mastectomy, treated with Arimdex x 5 years  Gynecologic Exam   The patient's primary symptoms include pelvic pain and vaginal bleeding  This is a new problem  The current episode started 1 to 4 weeks ago  The problem occurs daily  The problem has been gradually improving  The pain is moderate  The problem affects both sides  She is not pregnant  Associated symptoms include back pain  Pertinent negatives include no chills, constipation, diarrhea, dysuria, fever, frequency, nausea, sore throat, urgency or vomiting  The vaginal discharge was brown  The vaginal bleeding is spotting  She has not been passing clots  She has not been passing tissue  The symptoms are aggravated by activity   She has tried acetaminophen and NSAIDs for the symptoms  The treatment provided mild relief  She is not sexually active  She is postmenopausal        The following portions of the patient's history were reviewed and updated as appropriate:   She  has a past medical history of Breast cancer ([address] ), Chicken pox, Frequent UTI, Heart disease, High cholesterol, History of blood transfusion, and Hypertension  She   Patient Active Problem List    Diagnosis Date Noted    Complex cyst of left ovary 01/03/2020    Endometrial thickening on ultrasound 01/03/2020    Pelvic pain 12/30/2019    Encounter for gynecological examination without abnormal finding 10/31/2018    Myopia, bilateral 12/18/2017    History of squamous cell carcinoma of skin 06/19/2014    GERD (gastroesophageal reflux disease) 09/21/2011    HTN, goal below 130/80 09/21/2011    Obesity, Class I, BMI 30 0-34 9 (see actual BMI) 09/21/2011    Obstructive sleep apnea 09/21/2011     She  has a past surgical history that includes Repair knee ligament (Bilateral); Rotator cuff repair; Appendectomy; Breast cyst excision; and Mastectomy (Bilateral, 2009)  Her family history includes Breast cancer (age of onset: 50) in her mother; Cancer in her father  She  reports that she quit smoking about 32 years ago  Her smoking use included cigarettes  She has never used smokeless tobacco  She reports that she drinks alcohol  She reports that she does not use drugs  Current Outpatient Medications   Medication Sig Dispense Refill    cholecalciferol (VITAMIN D3) 1,000 units tablet Take 1,000 Units by mouth daily      estradiol (ESTRACE) 0 1 mg/g vaginal cream insert 1 gram vaginally two times a week  0    losartan-hydrochlorothiazide (HYZAAR) 50-12 5 mg per tablet   0    metoprolol tartrate (LOPRESSOR) 25 mg tablet Take by mouth      misoprostol (CYTOTEC) 200 mcg tablet Take 1 tablet night before procedure   1 tablet 0    simvastatin (ZOCOR) 20 mg tablet   0    trimethoprim (PROLOPRIM) 100 mg tablet   0    venlafaxine 150 MG TB24   0    zolpidem (AMBIEN CR) 12 5 MG CR tablet   0    clobetasol (TEMOVATE) 0 05 % ointment Apply topically once for 1 dose 15 g 0     No current facility-administered medications for this visit  Current Outpatient Medications on File Prior to Visit   Medication Sig    cholecalciferol (VITAMIN D3) 1,000 units tablet Take 1,000 Units by mouth daily    estradiol (ESTRACE) 0 1 mg/g vaginal cream insert 1 gram vaginally two times a week    losartan-hydrochlorothiazide (HYZAAR) 50-12 5 mg per tablet     metoprolol tartrate (LOPRESSOR) 25 mg tablet Take by mouth    misoprostol (CYTOTEC) 200 mcg tablet Take 1 tablet night before procedure   simvastatin (ZOCOR) 20 mg tablet     trimethoprim (PROLOPRIM) 100 mg tablet     venlafaxine 150 MG TB24     zolpidem (AMBIEN CR) 12 5 MG CR tablet     clobetasol (TEMOVATE) 0 05 % ointment Apply topically once for 1 dose     No current facility-administered medications on file prior to visit  She has No Known Allergies       Review of Systems   Constitutional: Negative for activity change, appetite change, chills, fatigue and fever  HENT: Negative for rhinorrhea, sneezing and sore throat  Eyes: Negative for visual disturbance  Respiratory: Negative for cough, shortness of breath and wheezing  Cardiovascular: Negative for chest pain, palpitations and leg swelling  Gastrointestinal: Negative for abdominal distention, constipation, diarrhea, nausea and vomiting  Genitourinary: Positive for pelvic pain  Negative for difficulty urinating, dysuria, frequency and urgency  Musculoskeletal: Positive for back pain  Neurological: Negative for syncope and light-headedness           Objective:      /96 (BP Location: Right arm, Patient Position: Sitting, Cuff Size: Large)   Pulse 82   Ht 5' 11" (1 803 m)   Wt 117 kg (259 lb)   LMP  (LMP Unknown)   BMI 36 12 kg/m²            Physical Exam   Genitourinary: Vagina normal  There is no rash, tenderness or lesion on the right labia  There is no rash, tenderness or lesion on the left labia  Uterus is not deviated, not enlarged, not fixed and not tender  Cervix exhibits no motion tenderness, no discharge and no friability  Right adnexum displays no mass, no tenderness and no fullness  Left adnexum displays no mass, no tenderness and no fullness  No erythema or tenderness in the vagina  No vaginal discharge found  Endometrial biopsy  Date/Time: 1/6/2020 5:52 PM  Performed by: Suzi Pleitez MD  Authorized by: Suzi Pleitez MD     Consent:     Consent obtained:  Verbal    Consent given by:  Patient    Procedural risks discussed:  Bleeding, infection, possible continued pain and repeat procedure    Patient questions answered: yes      Patient agrees, verbalizes understanding, and wants to proceed: yes    Indication:     Indications: Post-menopausal bleeding      Chronicity of post-menopausal bleeding:  New    Progression of post-menopausal bleeding:  Partially resolved  Pre-procedure:     Premeds:  Acetaminophen  Procedure:     Procedure: endometrial biopsy with Pipelle      A bivalve speculum was placed in the vagina: yes      Cervix cleaned and prepped: yes      The cervix was dilated: yes      Uterus sounded: yes      Uterus sound depth (cm):  9    Specimen collected: specimen collected and sent to pathology    Comments:       Three passes to the fundus, minimal tissue obtained

## 2020-01-06 NOTE — PATIENT INSTRUCTIONS
Postmenopausal Bleeding   WHAT YOU NEED TO KNOW:   What do I need to know about postmenopausal bleeding? Postmenopausal bleeding is bleeding that occurs after menopause  A woman who has not had a period for a full year after the age of 36 is considered to be in menopause  Postmenopausal bleeding may range from spotting to very heavy bleeding  What causes postmenopausal bleeding? · Thinning of the endometrium (lining of the uterus) called endometrial atrophy    · Polyps (noncancerous growths) that develop on the inner wall of your uterus or cervix    · Hormone replacement therapy    · Abnormal thickening of the endometrium called endometrial hyperplasia    · Tamoxifen (medicine used to treat breast cancer)    · Cervical, endometrial, or uterine cancer  How is postmenopausal bleeding diagnosed? Your healthcare provider will ask about medical conditions that you have, and that run in your family  He will also do a pelvic exam to check for problems with your cervix, uterus, and ovaries  You may also need any of the following:  · An ultrasound  uses sound waves to show pictures of your uterus on a monitor  Your healthcare provider may place saline fluid into your uterus with a catheter  The fluid helps show more detail in the ultrasound pictures of your uterus  · Endometrial biopsy  is used to collect a sample of cells from the inside of your uterus to be tested for cancer  · Hysteroscopy  is a procedure that is done to look inside your uterus  A small scope with a light and camera is placed into your vagina and cervix  Liquid or gas may be put through the scope to help healthcare providers see better  · Dilation and curettage (D&C)  is a procedure to remove tissue from the lining of your uterus  The tissue is sent to a lab for tests  How is postmenopausal bleeding treated? Treatment depends on the cause of your postmenopausal bleeding  If you have polyps, you may need surgery to remove them  Endometrial atrophy can be treated with medicines  Endometrial hyperplasia may be treated with progestin hormone therapy  Surgery to remove your uterus will be needed if you have endometrial or uterine cancer  Your fallopian tubes, ovaries, and nearby lymph nodes may also be removed  When should I contact my healthcare provider? · You continue to have vaginal bleeding, even with treatment  · You have pain in your abdomen or pelvis  · You have questions or concerns about your condition or care  CARE AGREEMENT:   You have the right to help plan your care  Learn about your health condition and how it may be treated  Discuss treatment options with your caregivers to decide what care you want to receive  You always have the right to refuse treatment  The above information is an  only  It is not intended as medical advice for individual conditions or treatments  Talk to your doctor, nurse or pharmacist before following any medical regimen to see if it is safe and effective for you  © 2017 [employer] [employer] Information is for End User's use only and may not be sold, redistributed or otherwise used for commercial purposes  All illustrations and images included in CareNotes® are the copyrighted property of [employer]  or Durga Schmidt

## 2020-01-07 DIAGNOSIS — C56.9 MALIGNANT NEOPLASM OF OVARY, UNSPECIFIED LATERALITY (HCC): Primary | ICD-10-CM

## 2020-01-07 LAB — CANCER AG125 SERPL-ACNC: 4086.7 U/ML (ref 0–30)

## 2020-01-07 NOTE — PROGRESS NOTES
Able to talk with [facility]  High concern for cancer  Referral for gyn onc placed into [facility] was also given phone number if she wishes to call herself  Need referral to gyn onc for evaluation for possible ovarian cancer

## 2020-01-08 ENCOUNTER — HOSPITAL ENCOUNTER (OUTPATIENT)
Dept: RADIOLOGY | Facility: HOSPITAL | Age: 70
Discharge: HOME | End: 2020-01-08
Payer: MEDICARE

## 2020-01-08 ENCOUNTER — TRANSCRIBE ORDERS (OUTPATIENT)
Dept: RADIOLOGY | Facility: HOSPITAL | Age: 70
End: 2020-01-08

## 2020-01-08 ENCOUNTER — APPOINTMENT (OUTPATIENT)
Dept: LAB | Facility: HOSPITAL | Age: 70
End: 2020-01-08
Attending: OBSTETRICS & GYNECOLOGY
Payer: MEDICARE

## 2020-01-08 ENCOUNTER — OFFICE VISIT (OUTPATIENT)
Dept: GYNECOLOGIC ONCOLOGY | Facility: CLINIC | Age: 70
End: 2020-01-08
Payer: MEDICARE

## 2020-01-08 VITALS
SYSTOLIC BLOOD PRESSURE: 150 MMHG | BODY MASS INDEX: 36.26 KG/M2 | HEART RATE: 96 BPM | DIASTOLIC BLOOD PRESSURE: 90 MMHG | TEMPERATURE: 98.8 F | HEIGHT: 71 IN | WEIGHT: 259 LBS

## 2020-01-08 DIAGNOSIS — C56.9 MALIGNANT NEOPLASM OF OVARY, UNSPECIFIED LATERALITY (HCC): ICD-10-CM

## 2020-01-08 DIAGNOSIS — R97.1 ELEVATED CA-125: ICD-10-CM

## 2020-01-08 DIAGNOSIS — R10.2 PELVIC PAIN: ICD-10-CM

## 2020-01-08 DIAGNOSIS — Z85.3 HISTORY OF BREAST CANCER: ICD-10-CM

## 2020-01-08 DIAGNOSIS — C56.9 MALIGNANT NEOPLASM OF OVARY, UNSPECIFIED LATERALITY (HCC): Primary | ICD-10-CM

## 2020-01-08 DIAGNOSIS — N83.292 COMPLEX CYST OF LEFT OVARY: ICD-10-CM

## 2020-01-08 DIAGNOSIS — R93.89 ENDOMETRIAL THICKENING ON ULTRASOUND: ICD-10-CM

## 2020-01-08 DIAGNOSIS — R97.1 ELEVATED CA-125: Primary | ICD-10-CM

## 2020-01-08 PROBLEM — Z01.419 ENCOUNTER FOR GYNECOLOGICAL EXAMINATION WITHOUT ABNORMAL FINDING: Status: RESOLVED | Noted: 2018-10-31 | Resolved: 2020-01-08

## 2020-01-08 LAB
BUN SERPL-MCNC: 21 MG/DL (ref 5–25)
CREAT SERPL-MCNC: 1.23 MG/DL (ref 0.6–1.3)
GFR SERPL CREATININE-BSD FRML MDRD: 45 ML/MIN/1.73SQ M

## 2020-01-08 PROCEDURE — 88305 TISSUE EXAM BY PATHOLOGIST: CPT | Performed by: PATHOLOGY

## 2020-01-08 PROCEDURE — 71260 CT THORAX DX C+: CPT

## 2020-01-08 PROCEDURE — 74177 CT ABD & PELVIS W/CONTRAST: CPT

## 2020-01-08 PROCEDURE — 36415 COLL VENOUS BLD VENIPUNCTURE: CPT

## 2020-01-08 PROCEDURE — 99203 OFFICE O/P NEW LOW 30 MIN: CPT | Performed by: OBSTETRICS & GYNECOLOGY

## 2020-01-08 PROCEDURE — 84520 ASSAY OF UREA NITROGEN: CPT

## 2020-01-08 PROCEDURE — 58100 BIOPSY OF UTERUS LINING: CPT | Performed by: OBSTETRICS & GYNECOLOGY

## 2020-01-08 PROCEDURE — 82565 ASSAY OF CREATININE: CPT

## 2020-01-08 RX ADMIN — IOHEXOL 100 ML: 350 INJECTION, SOLUTION INTRAVENOUS at 17:38

## 2020-01-08 NOTE — LETTER
January 9, 2020     Rashmi Dumont, [address] [place] [address]    Patient: Camelia Momin   YOB: 1950   Date of Visit: 1/8/2020       Dear Dr Callie Tate: Thank you for referring Arya Cobos to me for evaluation  Below are my notes for this consultation  If you have questions, please do not hesitate to call me  I look forward to following your patient along with you  Sincerely,        Veda Lopes MD        CC: MD Pancho Enriquez CRNP Maurene Ceo, MD  1/9/2020  3:46 PM  Sign at close encounter  Assessment/Plan:    Problem List Items Addressed This Visit        Endocrine    Complex cyst of left ovary     I discussed with patient differential diagnosis  Given elevated  and concern about possibility of malignancy  CT scan ordered  She will return to the office to discuss results and treatment recommendations will follow  Relevant Orders    Tissue Exam    BUN (Completed)    Creatinine, serum (Completed)       Other    Pelvic pain    Endometrial thickening on ultrasound     Recent endometrial biopsy in office by Dr Callie Tate negative for malignancy  Scant amount of tissue obtained  Biopsy repeated today  Relevant Orders    Tissue Exam    BUN (Completed)    Creatinine, serum (Completed)    Elevated CA-125 - Primary     Greater than 4000  Highly concerning for gynecologic malignancy  Obtain CT scan to further evaluate  Further treatment recommendations to follow  Relevant Orders    Tissue Exam    BUN (Completed)    Creatinine, serum (Completed)    History of breast cancer     Now with concern for possible ovarian malignancy  I did review results of genetic testing obtained by her primary oncologist in that include only BRCA1/BRCA2  She will certainly benefit from panel testing once/if ovarian malignancy is diagnosed           Relevant Orders    Tissue Exam    BUN (Completed) Creatinine, serum (Completed)      Other Visit Diagnoses     Malignant neoplasm of ovary, unspecified laterality ([facility] [address] )                  CHIEF COMPLAINT:   Ovarian cyst, thickened endometrium,  greater than 4000  Patient ID: Geetha Medina is a 81 y o  female  HPI  68-year-old with hypertension, insomnia and menopausal symptoms and also remote history of metachronous breast cancers  First cancer was treated conservatively with lumpectomy, radiotherapy and chemotherapy  Second cancer in 2009 was hormone receptor positive she underwent bilateral mastectomies followed by 5 years of Arimidex  She was in her usual state of health until approximately 2-3 weeks ago  At that time, started experiencing rare episodes of scant vaginal spotting with associated pelvic pressure and dysuria suggestive of UTI  She contact her Urology as she has prior history of recurrent urinary tract infections  Given vaginal spotting she was referred to gynecology  She saw Dacia Early and then Dr Yoni Moore  Evaluation included endometrial biopsy, pelvic ultrasound and   Ultrasound demonstrated thickened endometrium and adnexal mass (images not available for review as this was obtained in [place])  was greater than 4000  Patient presents today for evaluation management recommendations  Review of Systems  Twelve point review of systems obtained and scanned into patient's chart    Current Outpatient Medications   Medication Sig Dispense Refill    cholecalciferol (VITAMIN D3) 1,000 units tablet Take 1,000 Units by mouth daily      estradiol (ESTRACE) 0 1 mg/g vaginal cream insert 1 gram vaginally two times a week  0    losartan-hydrochlorothiazide (HYZAAR) 50-12 5 mg per tablet   0    metoprolol tartrate (LOPRESSOR) 25 mg tablet Take by mouth      simvastatin (ZOCOR) 20 mg tablet   0    trimethoprim (PROLOPRIM) 100 mg tablet   0    venlafaxine 150 MG TB24   0    zolpidem (AMBIEN CR) 12 5 MG CR tablet   0    clobetasol (TEMOVATE) 0 05 % ointment Apply topically once for 1 dose 15 g 0     No current facility-administered medications for this visit  No Known Allergies    Past Medical History:   Diagnosis Date    Breast cancer ([address] )      and then     Chicken pox     Frequent UTI     Heart disease     High cholesterol     History of blood transfusion     Hypertension        Past Surgical History:   Procedure Laterality Date    APPENDECTOMY      BREAST CYST EXCISION      MASTECTOMY Bilateral 2009    REPAIR KNEE LIGAMENT Bilateral     ROTATOR CUFF REPAIR         OB History        2    Para   2    Term   0       0    AB   0    Living   0       SAB   0    TAB   0    Ectopic   0    Multiple   0    Live Births   0           Obstetric Comments   Menarche: 17 y/o   Both vag deliveries              Family History   Problem Relation Age of Onset    Breast cancer Mother 50    Lung cancer Father     Prostate cancer Brother        The following portions of the patient's history were reviewed and updated as appropriate: allergies, current medications, past family history, past medical history, past social history, past surgical history and problem list       Objective:    Blood pressure 150/90, pulse 96, temperature 98 8 °F (37 1 °C), temperature source Tympanic, height 5' 11" (1 803 m), weight 117 kg (259 lb), not currently breastfeeding  Body mass index is 36 12 kg/m²  Physical Exam   Constitutional: She is oriented to person, place, and time  She appears well-developed and well-nourished  HENT:   Head: Normocephalic and atraumatic  Neck: Normal range of motion  Neck supple  No thyromegaly present  Pulmonary/Chest: Effort normal    Abdominal: Soft  She exhibits no distension and no mass  There is no rebound  Genitourinary:   Genitourinary Comments:  The external female genitalia is normal  The bartholin's, uretheral and skenes glands are normal  The urethral meatus is normal (midline with no lesions)  Anus without fissure or lesion  Speculum exam reveals a grossly normal vagina  No masses, lesions, discharge or bleeding  No significant cystocele or rectocele noted  Unable to palpate adnexae given body habitus and patient's voluntary guarding  Bladder is without fullness, mass or tenderness  Musculoskeletal: Normal range of motion  She exhibits no edema  Lymphadenopathy:     She has no cervical adenopathy  Neurological: She is alert and oriented to person, place, and time  Skin: Skin is warm and dry  No rash noted  Psychiatric: She has a normal mood and affect  Her behavior is normal    Vitals reviewed  Lab Results   Component Value Date     4,086 7 (H) 01/06/2020     No results found for: WBC, HGB, HCT, MCV, PLT  Lab Results   Component Value Date    BUN 21 01/08/2020    CREATININE 1 23 01/08/2020    EGFR 45 01/08/2020     Late entry/completion of note    Patient seen on January 8, 2020 at approximately 2-2:30 p m     MD Abdelrahman, [facility]  1/9/2020  3:46 PM

## 2020-01-09 NOTE — ASSESSMENT & PLAN NOTE
Now with concern for possible ovarian malignancy  I did review results of genetic testing obtained by her primary oncologist in that include only BRCA1/BRCA2  She will certainly benefit from panel testing once/if ovarian malignancy is diagnosed

## 2020-01-09 NOTE — ASSESSMENT & PLAN NOTE
Greater than 4000  Highly concerning for gynecologic malignancy  Obtain CT scan to further evaluate  Further treatment recommendations to follow

## 2020-01-09 NOTE — PROGRESS NOTES
Assessment/Plan:    Problem List Items Addressed This Visit        Endocrine    Complex cyst of left ovary     I discussed with patient differential diagnosis  Given elevated  and concern about possibility of malignancy  CT scan ordered  She will return to the office to discuss results and treatment recommendations will follow  Relevant Orders    Tissue Exam    BUN (Completed)    Creatinine, serum (Completed)       Other    Pelvic pain    Endometrial thickening on ultrasound     Recent endometrial biopsy in office by Dr Ronald Lipscomb negative for malignancy  Scant amount of tissue obtained  Biopsy repeated today  Relevant Orders    Tissue Exam    BUN (Completed)    Creatinine, serum (Completed)    Elevated CA-125 - Primary     Greater than 4000  Highly concerning for gynecologic malignancy  Obtain CT scan to further evaluate  Further treatment recommendations to follow  Relevant Orders    Tissue Exam    BUN (Completed)    Creatinine, serum (Completed)    History of breast cancer     Now with concern for possible ovarian malignancy  I did review results of genetic testing obtained by her primary oncologist in that include only BRCA1/BRCA2  She will certainly benefit from panel testing once/if ovarian malignancy is diagnosed  Relevant Orders    Tissue Exam    BUN (Completed)    Creatinine, serum (Completed)      Other Visit Diagnoses     Malignant neoplasm of ovary, unspecified laterality ([facility] [address] )                  CHIEF COMPLAINT:   Ovarian cyst, thickened endometrium,  greater than 4000  Patient ID: Roxanna Ortiz is a 81 y o  female  HPI  79-year-old with hypertension, insomnia and menopausal symptoms and also remote history of metachronous breast cancers  First cancer was treated conservatively with lumpectomy, radiotherapy and chemotherapy    Second cancer in 2009 was hormone receptor positive she underwent bilateral mastectomies followed by 5 years of Arimidex  She was in her usual state of health until approximately 2-3 weeks ago  At that time, started experiencing rare episodes of scant vaginal spotting with associated pelvic pressure and dysuria suggestive of UTI  She contact her Urology as she has prior history of recurrent urinary tract infections  Given vaginal spotting she was referred to gynecology  She saw Marcy Summers and then Dr Rosemary Wharton  Evaluation included endometrial biopsy, pelvic ultrasound and   Ultrasound demonstrated thickened endometrium and adnexal mass (images not available for review as this was obtained in [place])  was greater than 4000  Patient presents today for evaluation management recommendations  Review of Systems  Twelve point review of systems obtained and scanned into patient's chart  Current Outpatient Medications   Medication Sig Dispense Refill    cholecalciferol (VITAMIN D3) 1,000 units tablet Take 1,000 Units by mouth daily      estradiol (ESTRACE) 0 1 mg/g vaginal cream insert 1 gram vaginally two times a week  0    losartan-hydrochlorothiazide (HYZAAR) 50-12 5 mg per tablet   0    metoprolol tartrate (LOPRESSOR) 25 mg tablet Take by mouth      simvastatin (ZOCOR) 20 mg tablet   0    trimethoprim (PROLOPRIM) 100 mg tablet   0    venlafaxine 150 MG TB24   0    zolpidem (AMBIEN CR) 12 5 MG CR tablet   0    clobetasol (TEMOVATE) 0 05 % ointment Apply topically once for 1 dose 15 g 0     No current facility-administered medications for this visit          No Known Allergies    Past Medical History:   Diagnosis Date    Breast cancer ([facility] [address] )     2004 and then 2009    Chicken pox     Frequent UTI     Heart disease     High cholesterol     History of blood transfusion     Hypertension        Past Surgical History:   Procedure Laterality Date    APPENDECTOMY      BREAST CYST EXCISION      MASTECTOMY Bilateral 2009    REPAIR KNEE LIGAMENT Bilateral     ROTATOR CUFF REPAIR OB History        2    Para   2    Term   0       0    AB   0    Living   0       SAB   0    TAB   0    Ectopic   0    Multiple   0    Live Births   0           Obstetric Comments   Menarche: 17 y/o   Both vag deliveries              Family History   Problem Relation Age of Onset    Breast cancer Mother 50    Lung cancer Father     Prostate cancer Brother        The following portions of the patient's history were reviewed and updated as appropriate: allergies, current medications, past family history, past medical history, past social history, past surgical history and problem list       Objective:    Blood pressure 150/90, pulse 96, temperature 98 8 °F (37 1 °C), temperature source Tympanic, height 5' 11" (1 803 m), weight 117 kg (259 lb), not currently breastfeeding  Body mass index is 36 12 kg/m²  Physical Exam   Constitutional: She is oriented to person, place, and time  She appears well-developed and well-nourished  HENT:   Head: Normocephalic and atraumatic  Neck: Normal range of motion  Neck supple  No thyromegaly present  Pulmonary/Chest: Effort normal    Abdominal: Soft  She exhibits no distension and no mass  There is no rebound  Genitourinary:   Genitourinary Comments: The external female genitalia is normal  The bartholin's, uretheral and skenes glands are normal  The urethral meatus is normal (midline with no lesions)  Anus without fissure or lesion  Speculum exam reveals a grossly normal vagina  No masses, lesions, discharge or bleeding  No significant cystocele or rectocele noted  Unable to palpate adnexae given body habitus and patient's voluntary guarding  Bladder is without fullness, mass or tenderness  Musculoskeletal: Normal range of motion  She exhibits no edema  Lymphadenopathy:     She has no cervical adenopathy  Neurological: She is alert and oriented to person, place, and time  Skin: Skin is warm and dry  No rash noted     Psychiatric: She has a normal mood and affect  Her behavior is normal    Vitals reviewed  Lab Results   Component Value Date     4,086 7 (H) 01/06/2020     No results found for: WBC, HGB, HCT, MCV, PLT  Lab Results   Component Value Date    BUN 21 01/08/2020    CREATININE 1 23 01/08/2020    EGFR 45 01/08/2020     Endometrial biopsy  Date/Time: 1/10/2020 11:00 AM  Performed by: Leonela Sandoval MD  Authorized by: Leonela Sandoval MD     Consent:     Consent obtained:  Verbal  Indication:     Indications: Post-menopausal bleeding    Procedure:     Procedure: endometrial biopsy with [place]      A bivalve speculum was placed in the vagina: yes      The cervix was dilated: no      Uterus sounded: yes      Uterus sound depth (cm):  9    Specimen collected: specimen collected and sent to pathology      Patient tolerated procedure well with no complications: yes    Findings:     Uterus size:  9-10 weeks    Cervix: normal            Late entry/completion of note    Patient seen on January 8, 2020 at approximately 2-2:30 p m     Leonela Sandoval MD, [facility]  1/9/2020  3:46 PM

## 2020-01-09 NOTE — ASSESSMENT & PLAN NOTE
I discussed with patient differential diagnosis  Given elevated  and concern about possibility of malignancy  CT scan ordered  She will return to the office to discuss results and treatment recommendations will follow

## 2020-01-09 NOTE — ASSESSMENT & PLAN NOTE
Recent endometrial biopsy in office by Dr Yuan Soni negative for malignancy  Scant amount of tissue obtained  Biopsy repeated today

## 2020-01-10 ENCOUNTER — APPOINTMENT (OUTPATIENT)
Dept: LAB | Facility: HOSPITAL | Age: 70
DRG: 737 | End: 2020-01-10
Attending: OBSTETRICS & GYNECOLOGY
Payer: MEDICARE

## 2020-01-10 ENCOUNTER — HOSPITAL ENCOUNTER (OUTPATIENT)
Dept: RADIOLOGY | Facility: HOSPITAL | Age: 70
Discharge: HOME | DRG: 737 | End: 2020-01-10
Attending: OBSTETRICS & GYNECOLOGY
Payer: MEDICARE

## 2020-01-10 ENCOUNTER — OFFICE VISIT (OUTPATIENT)
Dept: GYNECOLOGIC ONCOLOGY | Facility: CLINIC | Age: 70
End: 2020-01-10
Payer: MEDICARE

## 2020-01-10 VITALS
WEIGHT: 259 LBS | SYSTOLIC BLOOD PRESSURE: 130 MMHG | DIASTOLIC BLOOD PRESSURE: 78 MMHG | HEART RATE: 70 BPM | BODY MASS INDEX: 36.26 KG/M2 | HEIGHT: 71 IN

## 2020-01-10 DIAGNOSIS — R19.00 PELVIC MASS: Primary | ICD-10-CM

## 2020-01-10 DIAGNOSIS — Z71.83 ENCOUNTER FOR NONPROCREATIVE GENETIC COUNSELING: ICD-10-CM

## 2020-01-10 DIAGNOSIS — R19.00 PELVIC MASS: ICD-10-CM

## 2020-01-10 DIAGNOSIS — R97.1 ELEVATED CA-125: ICD-10-CM

## 2020-01-10 LAB
ABO GROUP BLD: NORMAL
ANION GAP SERPL CALCULATED.3IONS-SCNC: 9 MMOL/L (ref 4–13)
ATRIAL RATE: 78 BPM
BLD GP AB SCN SERPL QL: NEGATIVE
BUN SERPL-MCNC: 17 MG/DL (ref 5–25)
CALCIUM SERPL-MCNC: 9.3 MG/DL (ref 8.3–10.1)
CHLORIDE SERPL-SCNC: 104 MMOL/L (ref 100–108)
CO2 SERPL-SCNC: 25 MMOL/L (ref 21–32)
CREAT SERPL-MCNC: 0.91 MG/DL (ref 0.6–1.3)
ERYTHROCYTE [DISTWIDTH] IN BLOOD BY AUTOMATED COUNT: 13.4 % (ref 11.6–15.1)
EST. AVERAGE GLUCOSE BLD GHB EST-MCNC: 105 MG/DL
GFR SERPL CREATININE-BSD FRML MDRD: 65 ML/MIN/1.73SQ M
GLUCOSE SERPL-MCNC: 106 MG/DL (ref 65–140)
HBA1C MFR BLD: 5.3 % (ref 4.2–6.3)
HCT VFR BLD AUTO: 39.4 % (ref 34.8–46.1)
HGB BLD-MCNC: 12.4 G/DL (ref 11.5–15.4)
MCH RBC QN AUTO: 29.6 PG (ref 26.8–34.3)
MCHC RBC AUTO-ENTMCNC: 31.5 G/DL (ref 31.4–37.4)
MCV RBC AUTO: 94 FL (ref 82–98)
P AXIS: 58 DEGREES
PLATELET # BLD AUTO: 236 THOUSANDS/UL (ref 149–390)
PMV BLD AUTO: 9.6 FL (ref 8.9–12.7)
POTASSIUM SERPL-SCNC: 3.6 MMOL/L (ref 3.5–5.3)
PR INTERVAL: 174 MS
QRS AXIS: 25 DEGREES
QRSD INTERVAL: 86 MS
QT INTERVAL: 404 MS
QTC INTERVAL: 460 MS
RBC # BLD AUTO: 4.19 MILLION/UL (ref 3.81–5.12)
RH BLD: POSITIVE
SODIUM SERPL-SCNC: 138 MMOL/L (ref 136–145)
SPECIMEN EXPIRATION DATE: NORMAL
T WAVE AXIS: 42 DEGREES
VENTRICULAR RATE: 78 BPM
WBC # BLD AUTO: 6.04 THOUSAND/UL (ref 4.31–10.16)

## 2020-01-10 PROCEDURE — 83036 HEMOGLOBIN GLYCOSYLATED A1C: CPT

## 2020-01-10 PROCEDURE — 85027 COMPLETE CBC AUTOMATED: CPT

## 2020-01-10 PROCEDURE — 93005 ELECTROCARDIOGRAM TRACING: CPT

## 2020-01-10 PROCEDURE — 99215 OFFICE O/P EST HI 40 MIN: CPT | Performed by: OBSTETRICS & GYNECOLOGY

## 2020-01-10 PROCEDURE — 71046 X-RAY EXAM CHEST 2 VIEWS: CPT

## 2020-01-10 PROCEDURE — 1124F ACP DISCUSS-NO DSCNMKR DOCD: CPT | Performed by: SURGERY

## 2020-01-10 PROCEDURE — 93010 ELECTROCARDIOGRAM REPORT: CPT | Performed by: INTERNAL MEDICINE

## 2020-01-10 PROCEDURE — 86901 BLOOD TYPING SEROLOGIC RH(D): CPT

## 2020-01-10 PROCEDURE — 80048 BASIC METABOLIC PNL TOTAL CA: CPT

## 2020-01-10 PROCEDURE — 86900 BLOOD TYPING SEROLOGIC ABO: CPT

## 2020-01-10 PROCEDURE — 86850 RBC ANTIBODY SCREEN: CPT

## 2020-01-10 PROCEDURE — 36415 COLL VENOUS BLD VENIPUNCTURE: CPT

## 2020-01-10 RX ORDER — ACETAMINOPHEN 325 MG/1
975 TABLET ORAL ONCE
Status: CANCELLED | OUTPATIENT
Start: 2020-01-10 | End: 2020-01-10

## 2020-01-10 RX ORDER — GABAPENTIN 100 MG/1
100 CAPSULE ORAL ONCE
Status: CANCELLED | OUTPATIENT
Start: 2020-01-10 | End: 2020-01-10

## 2020-01-10 RX ORDER — CEFAZOLIN SODIUM 2 G/50ML
2000 SOLUTION INTRAVENOUS ONCE
Status: CANCELLED | OUTPATIENT
Start: 2020-01-10 | End: 2020-01-10

## 2020-01-10 NOTE — H&P
Assessment/Plan:    Problem List Items Addressed This Visit        Other    Elevated CA-125    Pelvic mass - Primary     CT scan images reviewed personally and with patient and   Findings in the context of elevated  greater than 4000 highly concerning for malignancy  I discussed with her differential diagnosis including benign, borderline and malignant pathology  Her case was discussed at multidisciplinary tumor conference this morning  Based on consensus, I have recommended and she has agreed to proceed with surgical intervention  We will proceed with cystoscopy with perioperative ureteral catheter/stents placement, diagnostic laparoscopy and in the absence of unresectable disease/carcinomatosis exploratory laparotomy, total hysterectomy, bilateral salpingo-oophorectomy, tumor debulking with possible colonic and or bowel resection and all other indicated procedures  She understands how carcinomatosis are obviously unresectable disease with lead us to obtain biopsies only and proceeding with neoadjuvant chemotherapy  Patient has excellent exercise tolerance  No additional cardiovascular workup is indicated  Will obtain chest x-ray, EKG, CBC, BMP, hemoglobin A1c as per surgical site infection reduction protocol  I discussed with patient indication, risks, benefits and alternatives of surgical exploration  We discussed possibility of bleeding requiring blood transfusion, life-threatening infections requiring additional procedures, injuries to surrounding organs such as bladder, ureters, gastrointestinal tract and or neurovascular structures  Additionally, we discussed general risks associated to stress of surgery such as venous thromboembolism, acute myocardial events and stroke  All questions answered to patient's satisfaction  We discussed the possibility of ileostomy/colostomy  She agrees and wants to proceed  Informed consent form signed              Encounter for nonprocreative genetic counseling     Patient with prior history of 2 breast cancers  Maternal history of early-onset breast cancer  She was previously tested only for BRCA1/2  Based on current findings and new technologies available, I have recommended proceeding with panel testing  I discussed with her the implications of abnormal results, normal results and the possibility of variant of undetermined significance  All questions were answered to her satisfaction  She agrees to proceed  Blood was collected for genotyping  Will proceed with insurance pre verification as per our office usual process  CHIEF COMPLAINT:   Here to discuss CT scan results and treatment for pelvic mass concerning for ovarian malignancy  Patient ID: Adrianna Castañeda is a 81 y o  female  HPI  56-year-old  with well controlled hypertension and menopausal atrophic vaginitis  She has remote history of gallstone pancreatitis with subsequent cholecystectomy as well as bilateral tubal ligation  Also history of with was interpreted as recurrent urinary tract infections for 45 years  Now with worsening pelvic pressure, discomfort, dysuria and episode of postmenopausal bleeding  Pelvic ultrasound at outside facility demonstrated pelvic mass and thickened endometrium  Primary gynecologist obtain endometrial biopsy which was unremarkable   was noted to be greater than 4000  I saw her the day before yesterday in order CT scan of the chest, abdomen and pelvis which demonstrates the presence of a complex pelvic mass  She is here to discuss results and treatment recommendations  Symptoms have not changed  Review of Systems  As above  Twelve point review of systems otherwise unremarkable    Current Outpatient Medications   Medication Sig Dispense Refill    cholecalciferol (VITAMIN D3) 1,000 units tablet Take 1,000 Units by mouth daily      estradiol (ESTRACE) 0 1 mg/g vaginal cream insert 1 gram vaginally two times a week  0    losartan-hydrochlorothiazide (HYZAAR) 50-12 5 mg per tablet   0    metoprolol tartrate (LOPRESSOR) 25 mg tablet Take by mouth      simvastatin (ZOCOR) 20 mg tablet   0    trimethoprim (PROLOPRIM) 100 mg tablet   0    venlafaxine 150 MG TB24   0    zolpidem (AMBIEN CR) 12 5 MG CR tablet   0    clobetasol (TEMOVATE) 0 05 % ointment Apply topically once for 1 dose 15 g 0     No current facility-administered medications for this visit  No Known Allergies    Past Medical History:   Diagnosis Date    Breast cancer ([facility] [address] )      and then     Chicken pox     Frequent UTI     Heart disease     High cholesterol     History of blood transfusion     Hypertension        Past Surgical History:   Procedure Laterality Date    APPENDECTOMY      BREAST CYST EXCISION      MASTECTOMY Bilateral 2009    REPAIR KNEE LIGAMENT Bilateral     ROTATOR CUFF REPAIR         OB History        2    Para   2    Term   0       0    AB   0    Living   0       SAB   0    TAB   0    Ectopic   0    Multiple   0    Live Births   0           Obstetric Comments   Menarche: 17 y/o   Both vag deliveries              Family History   Problem Relation Age of Onset    Breast cancer Mother 50    Lung cancer Father     Prostate cancer Brother        The following portions of the patient's history were reviewed and updated as appropriate: allergies, current medications, past family history, past medical history, past social history, past surgical history and problem list       Objective:    Blood pressure 130/78, pulse 70, height 5' 11" (1 803 m), weight 117 kg (259 lb), not currently breastfeeding  Body mass index is 36 12 kg/m²  Physical Exam   Constitutional: She is oriented to person, place, and time  She appears well-developed and well-nourished  No distress  HENT:   Head: Atraumatic  Mouth/Throat: No oropharyngeal exudate  Eyes: No scleral icterus  Neck: Normal range of motion   Neck supple  No thyromegaly present  Cardiovascular: Normal rate and regular rhythm  No murmur heard  Pulmonary/Chest: Effort normal and breath sounds normal  No respiratory distress  Abdominal: Soft  Bowel sounds are normal  She exhibits no distension  Genitourinary:   Genitourinary Comments: See pelvic exam from January 8, 2020  Lymphadenopathy:     She has no cervical adenopathy  Neurological: She is alert and oriented to person, place, and time  Skin: Skin is warm and dry  No rash noted  No erythema  Lab Results   Component Value Date     4,086 7 (H) 01/06/2020     No results found for: WBC, HGB, HCT, MCV, PLT  Lab Results   Component Value Date    BUN 21 01/08/2020    CREATININE 1 23 01/08/2020    EGFR 45 01/08/2020 1/8/2020 CT chest abdomen pelvis w contrast   Status: Final result   PACS Images      Show images for CT chest abdomen pelvis w contrast   Study Result     CT CHEST, ABDOMEN AND PELVIS WITH IV CONTRAST     INDICATION:   C56 9: Malignant neoplasm of unspecified ovary  R97 1: Elevated cancer antigen 125 ()      COMPARISON:  None      TECHNIQUE: CT examination of the chest, abdomen and pelvis was performed  Axial, sagittal, and coronal 2D reformatted images were created from the source data and submitted for interpretation      Radiation dose length product (DLP) for this visit:  1529 67 mGy-cm   This examination, like all CT scans performed in the [facility], was performed utilizing techniques to minimize radiation dose exposure, including the use of   iterative reconstruction and automated exposure control      IV Contrast:  100 mL of iohexol (OMNIPAQUE)  Enteric Contrast: Enteric contrast was administered      FINDINGS:     CHEST     LUNGS:  Lungs are clear  There is no tracheal or endobronchial lesion      PLEURA:  Unremarkable      HEART/GREAT VESSELS:  Normal heart size    Ascending thoracic aortic aneurysm measuring 4 0 cm      MEDIASTINUM AND LISA:  Unremarkable      CHEST WALL AND LOWER NECK:   Bilateral mastectomy  No axillary adenopathy      ABDOMEN     LIVER/BILIARY TREE:  Unremarkable      GALLBLADDER:  Removed      SPLEEN:  Unremarkable      PANCREAS:  Unremarkable      ADRENAL GLANDS:  Unremarkable      KIDNEYS/URETERS:  Mild right hydroureteronephrosis resulting from obstruction of the distal right ureter by a large pelvic mass  Left kidney within normal limits      STOMACH AND BOWEL:  There is colonic diverticulosis without evidence of acute diverticulitis      APPENDIX:  A normal appendix was visualized      ABDOMINOPELVIC CAVITY:  Minimal perihepatic ascites and free fluid in the pelvis      VESSELS:  Unremarkable for patient's age      PELVIS     REPRODUCTIVE ORGANS:  Large heterogeneous partially necrotic mass located above the uterus consistent with the provided history of ovarian cancer measuring 11 0 x 11 3 cm      URINARY BLADDER:  Unremarkable      ABDOMINAL WALL/INGUINAL REGIONS:  Unremarkable      OSSEOUS STRUCTURES: Old healed right rib fractures  Degenerative changes of the lumbar spine most severely at L4-L5  No acute fracture or destructive osseous lesion      IMPRESSION:     Large heterogeneous partially necrotic mass located above the uterus consistent with the provided history of ovarian cancer measuring 11 0 x 11 3 cm      The pelvic mass results in mild right hydroureteronephrosis      Minimal ascites in the perihepatic area and in the pelvis      Ascending thoracic aortic aneurysm measuring 4 0 cm    Follow-up recommended in one year      The study was marked in [facility] for immediate notification and follow-up      Workstation performed: [id number]       Jostin Shankar MD, [address] [place] [address]  1/10/2020  10:56 AM

## 2020-01-10 NOTE — LETTER
January 10, 2020     Magali Gardiner, [address] [facility]  [address] [place] [id number]    Patient: Enrique Sanchez   YOB: 1950   Date of Visit: 1/10/2020       Dear colleagues: Thank you for referring Martine Vides to me for evaluation  Below are the relevant portions of my H&P  If you have questions, please do not hesitate to call me  I look forward to following Khurram Tung along with you  Sincerely,        Ayesha Cordova MD        CC: Xochitl Orozco, MD Ayesha Gr MD  1/10/2020 10:56 AM  Signed  Assessment/Plan:    Problem List Items Addressed This Visit        Other    Elevated CA-125    Pelvic mass - Primary     CT scan images reviewed personally and with patient and   Findings in the context of elevated  greater than 4000 highly concerning for malignancy  I discussed with her differential diagnosis including benign, borderline and malignant pathology  Her case was discussed at multidisciplinary tumor conference this morning  Based on consensus, I have recommended and she has agreed to proceed with surgical intervention  We will proceed with cystoscopy with perioperative ureteral catheter/stents placement, diagnostic laparoscopy and in the absence of unresectable disease/carcinomatosis exploratory laparotomy, total hysterectomy, bilateral salpingo-oophorectomy, tumor debulking with possible colonic and or bowel resection and all other indicated procedures  She understands how carcinomatosis are obviously unresectable disease with lead us to obtain biopsies only and proceeding with neoadjuvant chemotherapy  Patient has excellent exercise tolerance  No additional cardiovascular workup is indicated  Will obtain chest x-ray, EKG, CBC, BMP, hemoglobin A1c as per surgical site infection reduction protocol  I discussed with patient indication, risks, benefits and alternatives of surgical exploration    We discussed possibility of bleeding requiring blood transfusion, life-threatening infections requiring additional procedures, injuries to surrounding organs such as bladder, ureters, gastrointestinal tract and or neurovascular structures  Additionally, we discussed general risks associated to stress of surgery such as venous thromboembolism, acute myocardial events and stroke  All questions answered to patient's satisfaction  We discussed the possibility of ileostomy/colostomy  She agrees and wants to proceed  Informed consent form signed  Encounter for nonprocreative genetic counseling     Patient with prior history of 2 breast cancers  Maternal history of early-onset breast cancer  She was previously tested only for BRCA1/2  Based on current findings and new technologies available, I have recommended proceeding with panel testing  I discussed with her the implications of abnormal results, normal results and the possibility of variant of undetermined significance  All questions were answered to her satisfaction  She agrees to proceed  Blood was collected for genotyping  Will proceed with insurance pre verification as per our office usual process  CHIEF COMPLAINT:   Here to discuss CT scan results and treatment for pelvic mass concerning for ovarian malignancy  Patient ID: Rakesh Wood is a 81 y o  female  HPI  44-year-old  with well controlled hypertension and menopausal atrophic vaginitis  She has remote history of gallstone pancreatitis with subsequent cholecystectomy as well as bilateral tubal ligation  Also history of with was interpreted as recurrent urinary tract infections for 45 years  Now with worsening pelvic pressure, discomfort, dysuria and episode of postmenopausal bleeding  Pelvic ultrasound at outside facility demonstrated pelvic mass and thickened endometrium  Primary gynecologist obtain endometrial biopsy which was unremarkable     was noted to be greater than 4000  I saw her the day before yesterday in order CT scan of the chest, abdomen and pelvis which demonstrates the presence of a complex pelvic mass  She is here to discuss results and treatment recommendations  Symptoms have not changed  Review of Systems  As above  Twelve point review of systems otherwise unremarkable  Current Outpatient Medications   Medication Sig Dispense Refill    cholecalciferol (VITAMIN D3) 1,000 units tablet Take 1,000 Units by mouth daily      estradiol (ESTRACE) 0 1 mg/g vaginal cream insert 1 gram vaginally two times a week  0    losartan-hydrochlorothiazide (HYZAAR) 50-12 5 mg per tablet   0    metoprolol tartrate (LOPRESSOR) 25 mg tablet Take by mouth      simvastatin (ZOCOR) 20 mg tablet   0    trimethoprim (PROLOPRIM) 100 mg tablet   0    venlafaxine 150 MG TB24   0    zolpidem (AMBIEN CR) 12 5 MG CR tablet   0    clobetasol (TEMOVATE) 0 05 % ointment Apply topically once for 1 dose 15 g 0     No current facility-administered medications for this visit          No Known Allergies    Past Medical History:   Diagnosis Date    Breast cancer ([facility] [address] )      and then     Chicken pox     Frequent UTI     Heart disease     High cholesterol     History of blood transfusion     Hypertension        Past Surgical History:   Procedure Laterality Date    APPENDECTOMY      BREAST CYST EXCISION      MASTECTOMY Bilateral 2009    REPAIR KNEE LIGAMENT Bilateral     ROTATOR CUFF REPAIR         OB History        2    Para   2    Term   0       0    AB   0    Living   0       SAB   0    TAB   0    Ectopic   0    Multiple   0    Live Births   0           Obstetric Comments   Menarche: 17 y/o   Both vag deliveries              Family History   Problem Relation Age of Onset   [place] Breast cancer Mother 50    Lung cancer Father     Prostate cancer Brother        The following portions of the patient's history were reviewed and updated as appropriate: allergies, current medications, past family history, past medical history, past social history, past surgical history and problem list       Objective:    Blood pressure 130/78, pulse 70, height 5' 11" (1 803 m), weight 117 kg (259 lb), not currently breastfeeding  Body mass index is 36 12 kg/m²  Physical Exam   Constitutional: She is oriented to person, place, and time  She appears well-developed and well-nourished  No distress  HENT:   Head: Atraumatic  Mouth/Throat: No oropharyngeal exudate  Eyes: No scleral icterus  Neck: Normal range of motion  Neck supple  No thyromegaly present  Cardiovascular: Normal rate and regular rhythm  No murmur heard  Pulmonary/Chest: Effort normal and breath sounds normal  No respiratory distress  Abdominal: Soft  Bowel sounds are normal  She exhibits no distension  Genitourinary:   Genitourinary Comments: See pelvic exam from January 8, 2020  Lymphadenopathy:     She has no cervical adenopathy  Neurological: She is alert and oriented to person, place, and time  Skin: Skin is warm and dry  No rash noted  No erythema  Lab Results   Component Value Date     4,086 7 (H) 01/06/2020     No results found for: WBC, HGB, HCT, MCV, PLT  Lab Results   Component Value Date    BUN 21 01/08/2020    CREATININE 1 23 01/08/2020    EGFR 45 01/08/2020 1/8/2020 CT chest abdomen pelvis w contrast   Status: Final result   PACS Images      Show images for CT chest abdomen pelvis w contrast   Study Result     CT CHEST, ABDOMEN AND PELVIS WITH IV CONTRAST     INDICATION:   C56 9: Malignant neoplasm of unspecified ovary  R97 1: Elevated cancer antigen 125 ()      COMPARISON:  None      TECHNIQUE: CT examination of the chest, abdomen and pelvis was performed  Axial, sagittal, and coronal 2D reformatted images were created from the source data and submitted for interpretation      Radiation dose length product (DLP) for this visit:  1529 67 mGy-cm     This examination, like all CT scans performed in the [facility], was performed utilizing techniques to minimize radiation dose exposure, including the use of   iterative reconstruction and automated exposure control      IV Contrast:  100 mL of iohexol (OMNIPAQUE)  Enteric Contrast: Enteric contrast was administered      FINDINGS:     CHEST     LUNGS:  Lungs are clear  There is no tracheal or endobronchial lesion      PLEURA:  Unremarkable      HEART/GREAT VESSELS:  Normal heart size  Ascending thoracic aortic aneurysm measuring 4 0 cm      MEDIASTINUM AND LISA:  Unremarkable      CHEST WALL AND LOWER NECK:   Bilateral mastectomy  No axillary adenopathy      ABDOMEN     LIVER/BILIARY TREE:  Unremarkable      GALLBLADDER:  Removed      SPLEEN:  Unremarkable      PANCREAS:  Unremarkable      ADRENAL GLANDS:  Unremarkable      KIDNEYS/URETERS:  Mild right hydroureteronephrosis resulting from obstruction of the distal right ureter by a large pelvic mass  Left kidney within normal limits      STOMACH AND BOWEL:  There is colonic diverticulosis without evidence of acute diverticulitis      APPENDIX:  A normal appendix was visualized      ABDOMINOPELVIC CAVITY:  Minimal perihepatic ascites and free fluid in the pelvis      VESSELS:  Unremarkable for patient's age      PELVIS     REPRODUCTIVE ORGANS:  Large heterogeneous partially necrotic mass located above the uterus consistent with the provided history of ovarian cancer measuring 11 0 x 11 3 cm      URINARY BLADDER:  Unremarkable      ABDOMINAL WALL/INGUINAL REGIONS:  Unremarkable      OSSEOUS STRUCTURES: Old healed right rib fractures  Degenerative changes of the lumbar spine most severely at L4-L5    No acute fracture or destructive osseous lesion      IMPRESSION:     Large heterogeneous partially necrotic mass located above the uterus consistent with the provided history of ovarian cancer measuring 11 0 x 11 3 cm      The pelvic mass results in mild right hydroureteronephrosis      Minimal ascites in the perihepatic area and in the pelvis      Ascending thoracic aortic aneurysm measuring 4 0 cm    Follow-up recommended in one year      The study was marked in [facility] for immediate notification and follow-up      Workstation performed: [id number]       Trinh Ro MD, [address] [place] [address]  1/10/2020  10:56 AM

## 2020-01-10 NOTE — ASSESSMENT & PLAN NOTE
Patient with prior history of 2 breast cancers  Maternal history of early-onset breast cancer  She was previously tested only for BRCA1/2  Based on current findings and new technologies available, I have recommended proceeding with panel testing  I discussed with her the implications of abnormal results, normal results and the possibility of variant of undetermined significance  All questions were answered to her satisfaction  She agrees to proceed  Blood was collected for genotyping  Will proceed with insurance pre verification as per our office usual process

## 2020-01-10 NOTE — H&P (VIEW-ONLY)
Assessment/Plan:    Problem List Items Addressed This Visit        Other    Elevated CA-125    Pelvic mass - Primary     CT scan images reviewed personally and with patient and   Findings in the context of elevated  greater than 4000 highly concerning for malignancy  I discussed with her differential diagnosis including benign, borderline and malignant pathology  Her case was discussed at multidisciplinary tumor conference this morning  Based on consensus, I have recommended and she has agreed to proceed with surgical intervention  We will proceed with cystoscopy with perioperative ureteral catheter/stents placement, diagnostic laparoscopy and in the absence of unresectable disease/carcinomatosis exploratory laparotomy, total hysterectomy, bilateral salpingo-oophorectomy, tumor debulking with possible colonic and or bowel resection and all other indicated procedures  She understands how carcinomatosis are obviously unresectable disease with lead us to obtain biopsies only and proceeding with neoadjuvant chemotherapy  Patient has excellent exercise tolerance  No additional cardiovascular workup is indicated  Will obtain chest x-ray, EKG, CBC, BMP, hemoglobin A1c as per surgical site infection reduction protocol  I discussed with patient indication, risks, benefits and alternatives of surgical exploration  We discussed possibility of bleeding requiring blood transfusion, life-threatening infections requiring additional procedures, injuries to surrounding organs such as bladder, ureters, gastrointestinal tract and or neurovascular structures  Additionally, we discussed general risks associated to stress of surgery such as venous thromboembolism, acute myocardial events and stroke  All questions answered to patient's satisfaction  We discussed the possibility of ileostomy/colostomy  She agrees and wants to proceed  Informed consent form signed              Encounter for nonprocreative genetic counseling     Patient with prior history of 2 breast cancers  Maternal history of early-onset breast cancer  She was previously tested only for BRCA1/2  Based on current findings and new technologies available, I have recommended proceeding with panel testing  I discussed with her the implications of abnormal results, normal results and the possibility of variant of undetermined significance  All questions were answered to her satisfaction  She agrees to proceed  Blood was collected for genotyping  Will proceed with insurance pre verification as per our office usual process  CHIEF COMPLAINT:   Here to discuss CT scan results and treatment for pelvic mass concerning for ovarian malignancy  Patient ID: Enrique Sanchez is a 81 y o  female  HPI  61-year-old  with well controlled hypertension and menopausal atrophic vaginitis  She has remote history of gallstone pancreatitis with subsequent cholecystectomy as well as bilateral tubal ligation  Also history of with was interpreted as recurrent urinary tract infections for 45 years  Now with worsening pelvic pressure, discomfort, dysuria and episode of postmenopausal bleeding  Pelvic ultrasound at outside facility demonstrated pelvic mass and thickened endometrium  Primary gynecologist obtain endometrial biopsy which was unremarkable   was noted to be greater than 4000  I saw her the day before yesterday in order CT scan of the chest, abdomen and pelvis which demonstrates the presence of a complex pelvic mass  She is here to discuss results and treatment recommendations  Symptoms have not changed  Review of Systems  As above  Twelve point review of systems otherwise unremarkable    Current Outpatient Medications   Medication Sig Dispense Refill    cholecalciferol (VITAMIN D3) 1,000 units tablet Take 1,000 Units by mouth daily      estradiol (ESTRACE) 0 1 mg/g vaginal cream insert 1 gram vaginally two times a week  0    losartan-hydrochlorothiazide (HYZAAR) 50-12 5 mg per tablet   0    metoprolol tartrate (LOPRESSOR) 25 mg tablet Take by mouth      simvastatin (ZOCOR) 20 mg tablet   0    trimethoprim (PROLOPRIM) 100 mg tablet   0    venlafaxine 150 MG TB24   0    zolpidem (AMBIEN CR) 12 5 MG CR tablet   0    clobetasol (TEMOVATE) 0 05 % ointment Apply topically once for 1 dose 15 g 0     No current facility-administered medications for this visit  No Known Allergies    Past Medical History:   Diagnosis Date    Breast cancer ([facility] [address] )      and then     Chicken pox     Frequent UTI     Heart disease     High cholesterol     History of blood transfusion     Hypertension        Past Surgical History:   Procedure Laterality Date    APPENDECTOMY      BREAST CYST EXCISION      MASTECTOMY Bilateral 2009    REPAIR KNEE LIGAMENT Bilateral     ROTATOR CUFF REPAIR         OB History        2    Para   2    Term   0       0    AB   0    Living   0       SAB   0    TAB   0    Ectopic   0    Multiple   0    Live Births   0           Obstetric Comments   Menarche: 17 y/o   Both vag deliveries              Family History   Problem Relation Age of Onset    Breast cancer Mother 50    Lung cancer Father     Prostate cancer Brother        The following portions of the patient's history were reviewed and updated as appropriate: allergies, current medications, past family history, past medical history, past social history, past surgical history and problem list       Objective:    Blood pressure 130/78, pulse 70, height 5' 11" (1 803 m), weight 117 kg (259 lb), not currently breastfeeding  Body mass index is 36 12 kg/m²  Physical Exam   Constitutional: She is oriented to person, place, and time  She appears well-developed and well-nourished  No distress  HENT:   Head: Atraumatic  Mouth/Throat: No oropharyngeal exudate  Eyes: No scleral icterus  Neck: Normal range of motion   Neck supple  No thyromegaly present  Cardiovascular: Normal rate and regular rhythm  No murmur heard  Pulmonary/Chest: Effort normal and breath sounds normal  No respiratory distress  Abdominal: Soft  Bowel sounds are normal  She exhibits no distension  Genitourinary:   Genitourinary Comments: See pelvic exam from January 8, 2020  Lymphadenopathy:     She has no cervical adenopathy  Neurological: She is alert and oriented to person, place, and time  Skin: Skin is warm and dry  No rash noted  No erythema  Lab Results   Component Value Date     4,086 7 (H) 01/06/2020     No results found for: WBC, HGB, HCT, MCV, PLT  Lab Results   Component Value Date    BUN 21 01/08/2020    CREATININE 1 23 01/08/2020    EGFR 45 01/08/2020 1/8/2020 CT chest abdomen pelvis w contrast   Status: Final result   PACS Images      Show images for CT chest abdomen pelvis w contrast   Study Result     CT CHEST, ABDOMEN AND PELVIS WITH IV CONTRAST     INDICATION:   C56 9: Malignant neoplasm of unspecified ovary  R97 1: Elevated cancer antigen 125 ()      COMPARISON:  None      TECHNIQUE: CT examination of the chest, abdomen and pelvis was performed  Axial, sagittal, and coronal 2D reformatted images were created from the source data and submitted for interpretation      Radiation dose length product (DLP) for this visit:  1529 67 mGy-cm   This examination, like all CT scans performed in the [facility], was performed utilizing techniques to minimize radiation dose exposure, including the use of   iterative reconstruction and automated exposure control      IV Contrast:  100 mL of iohexol (OMNIPAQUE)  Enteric Contrast: Enteric contrast was administered      FINDINGS:     CHEST     LUNGS:  Lungs are clear  There is no tracheal or endobronchial lesion      PLEURA:  Unremarkable      HEART/GREAT VESSELS:  Normal heart size    Ascending thoracic aortic aneurysm measuring 4 0 cm      MEDIASTINUM AND LISA:  Unremarkable      CHEST WALL AND LOWER NECK:   Bilateral mastectomy  No axillary adenopathy      ABDOMEN     LIVER/BILIARY TREE:  Unremarkable      GALLBLADDER:  Removed      SPLEEN:  Unremarkable      PANCREAS:  Unremarkable      ADRENAL GLANDS:  Unremarkable      KIDNEYS/URETERS:  Mild right hydroureteronephrosis resulting from obstruction of the distal right ureter by a large pelvic mass  Left kidney within normal limits      STOMACH AND BOWEL:  There is colonic diverticulosis without evidence of acute diverticulitis      APPENDIX:  A normal appendix was visualized      ABDOMINOPELVIC CAVITY:  Minimal perihepatic ascites and free fluid in the pelvis      VESSELS:  Unremarkable for patient's age      PELVIS     REPRODUCTIVE ORGANS:  Large heterogeneous partially necrotic mass located above the uterus consistent with the provided history of ovarian cancer measuring 11 0 x 11 3 cm      URINARY BLADDER:  Unremarkable      ABDOMINAL WALL/INGUINAL REGIONS:  Unremarkable      OSSEOUS STRUCTURES: Old healed right rib fractures  Degenerative changes of the lumbar spine most severely at L4-L5  No acute fracture or destructive osseous lesion      IMPRESSION:     Large heterogeneous partially necrotic mass located above the uterus consistent with the provided history of ovarian cancer measuring 11 0 x 11 3 cm      The pelvic mass results in mild right hydroureteronephrosis      Minimal ascites in the perihepatic area and in the pelvis      Ascending thoracic aortic aneurysm measuring 4 0 cm    Follow-up recommended in one year      The study was marked in [facility] for immediate notification and follow-up      Workstation performed: [id number]       Dana Mehta MD, [address]  1/10/2020  10:56 AM

## 2020-01-10 NOTE — ASSESSMENT & PLAN NOTE
CT scan images reviewed personally and with patient and   Findings in the context of elevated  greater than 4000 highly concerning for malignancy  I discussed with her differential diagnosis including benign, borderline and malignant pathology  Her case was discussed at multidisciplinary tumor conference this morning  Based on consensus, I have recommended and she has agreed to proceed with surgical intervention  We will proceed with cystoscopy with perioperative ureteral catheter/stents placement, diagnostic laparoscopy and in the absence of unresectable disease/carcinomatosis exploratory laparotomy, total hysterectomy, bilateral salpingo-oophorectomy, tumor debulking with possible colonic and or bowel resection and all other indicated procedures  She understands how carcinomatosis are obviously unresectable disease with lead us to obtain biopsies only and proceeding with neoadjuvant chemotherapy  Patient has excellent exercise tolerance  No additional cardiovascular workup is indicated  Will obtain chest x-ray, EKG, CBC, BMP, hemoglobin A1c as per surgical site infection reduction protocol  I discussed with patient indication, risks, benefits and alternatives of surgical exploration  We discussed possibility of bleeding requiring blood transfusion, life-threatening infections requiring additional procedures, injuries to surrounding organs such as bladder, ureters, gastrointestinal tract and or neurovascular structures  Additionally, we discussed general risks associated to stress of surgery such as venous thromboembolism, acute myocardial events and stroke  All questions answered to patient's satisfaction  We discussed the possibility of ileostomy/colostomy  She agrees and wants to proceed  Informed consent form signed

## 2020-01-10 NOTE — PRE-PROCEDURE INSTRUCTIONS
Pre-Surgery Instructions:   Medication Instructions    estradiol (ESTRACE) 0 1 mg/g vaginal cream Instructed patient per Anesthesia Guidelines   losartan-hydrochlorothiazide (HYZAAR) 50-12 5 mg per tablet Instructed patient per Anesthesia Guidelines   metoprolol tartrate (LOPRESSOR) 25 mg tablet Instructed patient per Anesthesia Guidelines   psyllium (METAMUCIL) 58 6 % packet Instructed patient per Anesthesia Guidelines   simvastatin (ZOCOR) 20 mg tablet Instructed patient per Anesthesia Guidelines   trimethoprim (PROLOPRIM) 100 mg tablet Instructed patient per Anesthesia Guidelines   venlafaxine 150 MG TB24 Instructed patient per Anesthesia Guidelines   zolpidem (AMBIEN CR) 12 5 MG CR tablet Instructed patient per Anesthesia Guidelines  ACE/ARB Med Class - FOR FRANK   Do not take this medication the day before and the morning of the day of surgery/procedure  Antidepressant Med Class   Continue to take this medication on your normal schedule  If this is an oral medication and you take it in the morning, then you may take this medicine with a sip of water  Beta blocker Med Class   Continue to take this heart medication on your normal schedule  If this is an oral medication and you take it in the morning, then you may take this medicine with a sip of water  HRT Med Class   Continue to take this medication on your normal schedule  If this is an oral medication and you take it in the morning, then you may take this medicine with a sip of water  This medication may need to be discontinued for a least 4 weeks prior to your surgery if the surgical procedure is associated with a high risk of blod clots as in hip or knee replacement for example  Please consult with your Surgeon to discuss discontinuing this medication before your surgery  Statin Med Class   Continue to take this medication on your normal schedule    If this is an oral medication and you take it in the morning, then you may take this medicine with a sip of water  Zolpidem Med Class   Continue this medication up to the evening before surgery    Reviewed with Pt medication insts, showering insts  Ambrose Fitzgerald called Pt already with final DOS insts

## 2020-01-12 ENCOUNTER — DOCUMENTATION (OUTPATIENT)
Dept: GYNECOLOGIC ONCOLOGY | Facility: CLINIC | Age: 70
End: 2020-01-12

## 2020-01-12 ENCOUNTER — ANESTHESIA EVENT (OUTPATIENT)
Dept: PERIOP | Facility: HOSPITAL | Age: 70
DRG: 737 | End: 2020-01-12
Payer: MEDICARE

## 2020-01-12 NOTE — PROGRESS NOTES
Her case was presented at the multidisciplinary Gynecologic Tumor Conference on 1/10/20  and the consensus recommendation is: cytoreductive surgery

## 2020-01-13 ENCOUNTER — ANESTHESIA (OUTPATIENT)
Dept: PERIOP | Facility: HOSPITAL | Age: 70
DRG: 737 | End: 2020-01-13
Payer: MEDICARE

## 2020-01-13 ENCOUNTER — HOSPITAL ENCOUNTER (INPATIENT)
Facility: HOSPITAL | Age: 70
LOS: 6 days | Discharge: HOME | DRG: 737 | End: 2020-01-19
Attending: OBSTETRICS & GYNECOLOGY | Admitting: OBSTETRICS & GYNECOLOGY
Payer: MEDICARE

## 2020-01-13 DIAGNOSIS — N28.9 RENAL INSUFFICIENCY: ICD-10-CM

## 2020-01-13 DIAGNOSIS — C56.2 OVARIAN CANCER ON LEFT (HCC): Primary | ICD-10-CM

## 2020-01-13 DIAGNOSIS — R19.00 PELVIC MASS: ICD-10-CM

## 2020-01-13 DIAGNOSIS — R97.1 ELEVATED CA-125: ICD-10-CM

## 2020-01-13 DIAGNOSIS — Z90.710 S/P ABDOMINAL HYSTERECTOMY: ICD-10-CM

## 2020-01-13 LAB
ABO GROUP BLD: NORMAL
INR PPP: 1.05 (ref 0.84–1.19)
PROTHROMBIN TIME: 13.3 SECONDS (ref 11.6–14.5)
RH BLD: POSITIVE

## 2020-01-13 PROCEDURE — 88307 TISSUE EXAM BY PATHOLOGIST: CPT | Performed by: PATHOLOGY

## 2020-01-13 PROCEDURE — 86901 BLOOD TYPING SEROLOGIC RH(D): CPT | Performed by: OBSTETRICS & GYNECOLOGY

## 2020-01-13 PROCEDURE — 88305 TISSUE EXAM BY PATHOLOGIST: CPT | Performed by: PATHOLOGY

## 2020-01-13 PROCEDURE — 0UT70ZZ RESECTION OF BILATERAL FALLOPIAN TUBES, OPEN APPROACH: ICD-10-PCS | Performed by: OBSTETRICS & GYNECOLOGY

## 2020-01-13 PROCEDURE — 86920 COMPATIBILITY TEST SPIN: CPT

## 2020-01-13 PROCEDURE — 0DTJ0ZZ RESECTION OF APPENDIX, OPEN APPROACH: ICD-10-PCS | Performed by: OBSTETRICS & GYNECOLOGY

## 2020-01-13 PROCEDURE — 86900 BLOOD TYPING SEROLOGIC ABO: CPT | Performed by: OBSTETRICS & GYNECOLOGY

## 2020-01-13 PROCEDURE — 0UT90ZZ RESECTION OF UTERUS, OPEN APPROACH: ICD-10-PCS | Performed by: OBSTETRICS & GYNECOLOGY

## 2020-01-13 PROCEDURE — 0UTC0ZZ RESECTION OF CERVIX, OPEN APPROACH: ICD-10-PCS | Performed by: OBSTETRICS & GYNECOLOGY

## 2020-01-13 PROCEDURE — 88302 TISSUE EXAM BY PATHOLOGIST: CPT | Performed by: PATHOLOGY

## 2020-01-13 PROCEDURE — 88341 IMHCHEM/IMCYTCHM EA ADD ANTB: CPT | Performed by: PATHOLOGY

## 2020-01-13 PROCEDURE — 88342 IMHCHEM/IMCYTCHM 1ST ANTB: CPT | Performed by: PATHOLOGY

## 2020-01-13 PROCEDURE — 88112 CYTOPATH CELL ENHANCE TECH: CPT | Performed by: PATHOLOGY

## 2020-01-13 PROCEDURE — 0UT20ZZ RESECTION OF BILATERAL OVARIES, OPEN APPROACH: ICD-10-PCS | Performed by: OBSTETRICS & GYNECOLOGY

## 2020-01-13 PROCEDURE — 44955 APPENDECTOMY ADD-ON: CPT | Performed by: OBSTETRICS & GYNECOLOGY

## 2020-01-13 PROCEDURE — 85610 PROTHROMBIN TIME: CPT | Performed by: ANESTHESIOLOGY

## 2020-01-13 PROCEDURE — 52005 CYSTO W/URTRL CATHJ: CPT | Performed by: OBSTETRICS & GYNECOLOGY

## 2020-01-13 PROCEDURE — 0WJF4ZZ INSPECTION OF ABDOMINAL WALL, PERCUTANEOUS ENDOSCOPIC APPROACH: ICD-10-PCS | Performed by: OBSTETRICS & GYNECOLOGY

## 2020-01-13 PROCEDURE — 0W9G3ZX DRAINAGE OF PERITONEAL CAVITY, PERCUTANEOUS APPROACH, DIAGNOSTIC: ICD-10-PCS | Performed by: OBSTETRICS & GYNECOLOGY

## 2020-01-13 PROCEDURE — 88331 PATH CONSLTJ SURG 1 BLK 1SPC: CPT | Performed by: PATHOLOGY

## 2020-01-13 PROCEDURE — 0T9880Z DRAINAGE OF BILATERAL URETERS WITH DRAINAGE DEVICE, VIA NATURAL OR ARTIFICIAL OPENING ENDOSCOPIC: ICD-10-PCS | Performed by: OBSTETRICS & GYNECOLOGY

## 2020-01-13 PROCEDURE — 0DBW0ZZ EXCISION OF PERITONEUM, OPEN APPROACH: ICD-10-PCS | Performed by: OBSTETRICS & GYNECOLOGY

## 2020-01-13 PROCEDURE — 58951 RESECT OVARIAN MALIGNANCY: CPT | Performed by: OBSTETRICS & GYNECOLOGY

## 2020-01-13 PROCEDURE — 07BC0ZX EXCISION OF PELVIS LYMPHATIC, OPEN APPROACH, DIAGNOSTIC: ICD-10-PCS | Performed by: OBSTETRICS & GYNECOLOGY

## 2020-01-13 PROCEDURE — 0DBU0ZZ EXCISION OF OMENTUM, OPEN APPROACH: ICD-10-PCS | Performed by: OBSTETRICS & GYNECOLOGY

## 2020-01-13 PROCEDURE — 07BD0ZX EXCISION OF AORTIC LYMPHATIC, OPEN APPROACH, DIAGNOSTIC: ICD-10-PCS | Performed by: OBSTETRICS & GYNECOLOGY

## 2020-01-13 RX ORDER — SODIUM CHLORIDE 9 MG/ML
INJECTION, SOLUTION INTRAVENOUS CONTINUOUS PRN
Status: DISCONTINUED | OUTPATIENT
Start: 2020-01-13 | End: 2020-01-13 | Stop reason: SURG

## 2020-01-13 RX ORDER — CEFAZOLIN SODIUM 2 G/50ML
2000 SOLUTION INTRAVENOUS ONCE
Status: DISCONTINUED | OUTPATIENT
Start: 2020-01-13 | End: 2020-01-13 | Stop reason: HOSPADM

## 2020-01-13 RX ORDER — OMEGA-3S/DHA/EPA/FISH OIL/D3 300MG-1000
400 CAPSULE ORAL DAILY
COMMUNITY

## 2020-01-13 RX ORDER — ONDANSETRON 2 MG/ML
4 INJECTION INTRAMUSCULAR; INTRAVENOUS ONCE AS NEEDED
Status: DISCONTINUED | OUTPATIENT
Start: 2020-01-13 | End: 2020-01-13 | Stop reason: HOSPADM

## 2020-01-13 RX ORDER — ZOLPIDEM TARTRATE 5 MG/1
5 TABLET ORAL
Status: DISCONTINUED | OUTPATIENT
Start: 2020-01-13 | End: 2020-01-19 | Stop reason: HOSPADM

## 2020-01-13 RX ORDER — ALBUMIN, HUMAN INJ 5% 5 %
SOLUTION INTRAVENOUS CONTINUOUS PRN
Status: DISCONTINUED | OUTPATIENT
Start: 2020-01-13 | End: 2020-01-13 | Stop reason: SURG

## 2020-01-13 RX ORDER — METOCLOPRAMIDE HYDROCHLORIDE 5 MG/ML
10 INJECTION INTRAMUSCULAR; INTRAVENOUS ONCE AS NEEDED
Status: DISCONTINUED | OUTPATIENT
Start: 2020-01-13 | End: 2020-01-13 | Stop reason: HOSPADM

## 2020-01-13 RX ORDER — ACETAMINOPHEN 325 MG/1
975 TABLET ORAL ONCE
Status: COMPLETED | OUTPATIENT
Start: 2020-01-13 | End: 2020-01-13

## 2020-01-13 RX ORDER — FENTANYL CITRATE 50 UG/ML
INJECTION, SOLUTION INTRAMUSCULAR; INTRAVENOUS AS NEEDED
Status: DISCONTINUED | OUTPATIENT
Start: 2020-01-13 | End: 2020-01-13 | Stop reason: SURG

## 2020-01-13 RX ORDER — FENTANYL CITRATE/PF 50 MCG/ML
50 SYRINGE (ML) INJECTION
Status: DISCONTINUED | OUTPATIENT
Start: 2020-01-13 | End: 2020-01-13 | Stop reason: HOSPADM

## 2020-01-13 RX ORDER — HEPARIN SODIUM 5000 [USP'U]/ML
INJECTION, SOLUTION INTRAVENOUS; SUBCUTANEOUS AS NEEDED
Status: DISCONTINUED | OUTPATIENT
Start: 2020-01-13 | End: 2020-01-13 | Stop reason: SURG

## 2020-01-13 RX ORDER — MIDAZOLAM HYDROCHLORIDE 2 MG/2ML
INJECTION, SOLUTION INTRAMUSCULAR; INTRAVENOUS AS NEEDED
Status: DISCONTINUED | OUTPATIENT
Start: 2020-01-13 | End: 2020-01-13 | Stop reason: SURG

## 2020-01-13 RX ORDER — ROCURONIUM BROMIDE 10 MG/ML
INJECTION, SOLUTION INTRAVENOUS AS NEEDED
Status: DISCONTINUED | OUTPATIENT
Start: 2020-01-13 | End: 2020-01-13 | Stop reason: SURG

## 2020-01-13 RX ORDER — HYDROMORPHONE HCL/PF 1 MG/ML
SYRINGE (ML) INJECTION AS NEEDED
Status: DISCONTINUED | OUTPATIENT
Start: 2020-01-13 | End: 2020-01-13 | Stop reason: SURG

## 2020-01-13 RX ORDER — HEPARIN SODIUM 5000 [USP'U]/ML
5000 INJECTION, SOLUTION INTRAVENOUS; SUBCUTANEOUS EVERY 8 HOURS SCHEDULED
Status: DISPENSED | OUTPATIENT
Start: 2020-01-13 | End: 2020-01-15

## 2020-01-13 RX ORDER — METOCLOPRAMIDE HYDROCHLORIDE 5 MG/ML
INJECTION INTRAMUSCULAR; INTRAVENOUS AS NEEDED
Status: DISCONTINUED | OUTPATIENT
Start: 2020-01-13 | End: 2020-01-13 | Stop reason: SURG

## 2020-01-13 RX ORDER — FENTANYL CITRATE 50 UG/ML
INJECTION, SOLUTION INTRAMUSCULAR; INTRAVENOUS
Status: COMPLETED | OUTPATIENT
Start: 2020-01-13 | End: 2020-01-13

## 2020-01-13 RX ORDER — ONDANSETRON 2 MG/ML
INJECTION INTRAMUSCULAR; INTRAVENOUS AS NEEDED
Status: DISCONTINUED | OUTPATIENT
Start: 2020-01-13 | End: 2020-01-13 | Stop reason: SURG

## 2020-01-13 RX ORDER — CEFAZOLIN SODIUM 2 G/50ML
SOLUTION INTRAVENOUS AS NEEDED
Status: DISCONTINUED | OUTPATIENT
Start: 2020-01-13 | End: 2020-01-13 | Stop reason: SURG

## 2020-01-13 RX ORDER — ONDANSETRON 2 MG/ML
4 INJECTION INTRAMUSCULAR; INTRAVENOUS EVERY 6 HOURS PRN
Status: DISCONTINUED | OUTPATIENT
Start: 2020-01-13 | End: 2020-01-19 | Stop reason: HOSPADM

## 2020-01-13 RX ORDER — EPHEDRINE SULFATE 50 MG/ML
INJECTION INTRAVENOUS AS NEEDED
Status: DISCONTINUED | OUTPATIENT
Start: 2020-01-13 | End: 2020-01-13 | Stop reason: SURG

## 2020-01-13 RX ORDER — SCOLOPAMINE TRANSDERMAL SYSTEM 1 MG/1
1 PATCH, EXTENDED RELEASE TRANSDERMAL ONCE
Status: DISCONTINUED | OUTPATIENT
Start: 2020-01-13 | End: 2020-01-13

## 2020-01-13 RX ORDER — LANSOPRAZOLE 15 MG/1
15 CAPSULE, DELAYED RELEASE ORAL DAILY
COMMUNITY
End: 2021-04-02

## 2020-01-13 RX ORDER — METOPROLOL TARTRATE 5 MG/5ML
5 INJECTION INTRAVENOUS EVERY 6 HOURS PRN
Status: DISCONTINUED | OUTPATIENT
Start: 2020-01-13 | End: 2020-01-19 | Stop reason: HOSPADM

## 2020-01-13 RX ORDER — SODIUM CHLORIDE, SODIUM LACTATE, POTASSIUM CHLORIDE, CALCIUM CHLORIDE 600; 310; 30; 20 MG/100ML; MG/100ML; MG/100ML; MG/100ML
50 INJECTION, SOLUTION INTRAVENOUS CONTINUOUS
Status: DISCONTINUED | OUTPATIENT
Start: 2020-01-13 | End: 2020-01-14

## 2020-01-13 RX ORDER — GLYCOPYRROLATE 0.2 MG/ML
INJECTION INTRAMUSCULAR; INTRAVENOUS AS NEEDED
Status: DISCONTINUED | OUTPATIENT
Start: 2020-01-13 | End: 2020-01-13 | Stop reason: SURG

## 2020-01-13 RX ORDER — ROPIVACAINE HYDROCHLORIDE 2 MG/ML
INJECTION, SOLUTION EPIDURAL; INFILTRATION; PERINEURAL CONTINUOUS PRN
Status: DISCONTINUED | OUTPATIENT
Start: 2020-01-13 | End: 2020-01-13 | Stop reason: SURG

## 2020-01-13 RX ORDER — CEFAZOLIN SODIUM 1 G/3ML
INJECTION, POWDER, FOR SOLUTION INTRAMUSCULAR; INTRAVENOUS AS NEEDED
Status: DISCONTINUED | OUTPATIENT
Start: 2020-01-13 | End: 2020-01-13 | Stop reason: SURG

## 2020-01-13 RX ORDER — DEXAMETHASONE SODIUM PHOSPHATE 10 MG/ML
INJECTION, SOLUTION INTRAMUSCULAR; INTRAVENOUS AS NEEDED
Status: DISCONTINUED | OUTPATIENT
Start: 2020-01-13 | End: 2020-01-13 | Stop reason: SURG

## 2020-01-13 RX ORDER — HYDROMORPHONE HCL/PF 1 MG/ML
0.5 SYRINGE (ML) INJECTION
Status: DISCONTINUED | OUTPATIENT
Start: 2020-01-13 | End: 2020-01-13 | Stop reason: HOSPADM

## 2020-01-13 RX ORDER — NEOSTIGMINE METHYLSULFATE 1 MG/ML
INJECTION INTRAVENOUS AS NEEDED
Status: DISCONTINUED | OUTPATIENT
Start: 2020-01-13 | End: 2020-01-13 | Stop reason: SURG

## 2020-01-13 RX ORDER — PROPOFOL 10 MG/ML
INJECTION, EMULSION INTRAVENOUS AS NEEDED
Status: DISCONTINUED | OUTPATIENT
Start: 2020-01-13 | End: 2020-01-13 | Stop reason: SURG

## 2020-01-13 RX ORDER — SODIUM CHLORIDE, SODIUM LACTATE, POTASSIUM CHLORIDE, CALCIUM CHLORIDE 600; 310; 30; 20 MG/100ML; MG/100ML; MG/100ML; MG/100ML
INJECTION, SOLUTION INTRAVENOUS CONTINUOUS PRN
Status: DISCONTINUED | OUTPATIENT
Start: 2020-01-13 | End: 2020-01-13

## 2020-01-13 RX ORDER — VENLAFAXINE HYDROCHLORIDE 150 MG/1
150 CAPSULE, EXTENDED RELEASE ORAL
Status: DISCONTINUED | OUTPATIENT
Start: 2020-01-14 | End: 2020-01-19 | Stop reason: HOSPADM

## 2020-01-13 RX ORDER — DEXTROSE, SODIUM CHLORIDE, AND POTASSIUM CHLORIDE 5; .45; .15 G/100ML; G/100ML; G/100ML
125 INJECTION INTRAVENOUS CONTINUOUS
Status: DISCONTINUED | OUTPATIENT
Start: 2020-01-13 | End: 2020-01-14

## 2020-01-13 RX ORDER — DIPHENHYDRAMINE HYDROCHLORIDE 50 MG/ML
INJECTION INTRAMUSCULAR; INTRAVENOUS AS NEEDED
Status: DISCONTINUED | OUTPATIENT
Start: 2020-01-13 | End: 2020-01-13 | Stop reason: SURG

## 2020-01-13 RX ORDER — MIDAZOLAM HYDROCHLORIDE 2 MG/2ML
INJECTION, SOLUTION INTRAMUSCULAR; INTRAVENOUS
Status: COMPLETED | OUTPATIENT
Start: 2020-01-13 | End: 2020-01-13

## 2020-01-13 RX ORDER — GABAPENTIN 100 MG/1
100 CAPSULE ORAL ONCE
Status: COMPLETED | OUTPATIENT
Start: 2020-01-13 | End: 2020-01-13

## 2020-01-13 RX ORDER — LIDOCAINE HYDROCHLORIDE 10 MG/ML
0.5 INJECTION, SOLUTION EPIDURAL; INFILTRATION; INTRACAUDAL; PERINEURAL ONCE AS NEEDED
Status: COMPLETED | OUTPATIENT
Start: 2020-01-13 | End: 2020-01-13

## 2020-01-13 RX ORDER — LIDOCAINE HYDROCHLORIDE AND EPINEPHRINE 15; 5 MG/ML; UG/ML
INJECTION, SOLUTION EPIDURAL
Status: COMPLETED | OUTPATIENT
Start: 2020-01-13 | End: 2020-01-13

## 2020-01-13 RX ORDER — LIDOCAINE HYDROCHLORIDE 10 MG/ML
INJECTION, SOLUTION EPIDURAL; INFILTRATION; INTRACAUDAL; PERINEURAL AS NEEDED
Status: DISCONTINUED | OUTPATIENT
Start: 2020-01-13 | End: 2020-01-13 | Stop reason: SURG

## 2020-01-13 RX ADMIN — ROCURONIUM BROMIDE 50 MG: 50 INJECTION, SOLUTION INTRAVENOUS at 12:06

## 2020-01-13 RX ADMIN — GABAPENTIN 100 MG: 100 CAPSULE ORAL at 09:49

## 2020-01-13 RX ADMIN — MIDAZOLAM 2 MG: 1 INJECTION INTRAMUSCULAR; INTRAVENOUS at 11:32

## 2020-01-13 RX ADMIN — ROCURONIUM BROMIDE 20 MG: 50 INJECTION, SOLUTION INTRAVENOUS at 13:21

## 2020-01-13 RX ADMIN — ROCURONIUM BROMIDE 20 MG: 50 INJECTION, SOLUTION INTRAVENOUS at 14:14

## 2020-01-13 RX ADMIN — MIDAZOLAM 2 MG: 1 INJECTION INTRAMUSCULAR; INTRAVENOUS at 11:19

## 2020-01-13 RX ADMIN — NEOSTIGMINE METHYLSULFATE 3 MG: 1 INJECTION INTRAVENOUS at 15:35

## 2020-01-13 RX ADMIN — ROCURONIUM BROMIDE 10 MG: 50 INJECTION, SOLUTION INTRAVENOUS at 15:01

## 2020-01-13 RX ADMIN — HYDROMORPHONE HYDROCHLORIDE 0.25 MG: 1 INJECTION, SOLUTION INTRAMUSCULAR; INTRAVENOUS; SUBCUTANEOUS at 13:11

## 2020-01-13 RX ADMIN — HYDROMORPHONE HYDROCHLORIDE 0.25 MG: 1 INJECTION, SOLUTION INTRAMUSCULAR; INTRAVENOUS; SUBCUTANEOUS at 13:25

## 2020-01-13 RX ADMIN — ROPIVACAINE HYDROCHLORIDE: 5 INJECTION, SOLUTION EPIDURAL; INFILTRATION; PERINEURAL at 16:15

## 2020-01-13 RX ADMIN — HYDROMORPHONE HYDROCHLORIDE 0.5 MG: 1 INJECTION, SOLUTION INTRAMUSCULAR; INTRAVENOUS; SUBCUTANEOUS at 16:14

## 2020-01-13 RX ADMIN — EPHEDRINE SULFATE 5 MG: 50 INJECTION, SOLUTION INTRAVENOUS at 12:43

## 2020-01-13 RX ADMIN — ROCURONIUM BROMIDE 10 MG: 50 INJECTION, SOLUTION INTRAVENOUS at 13:42

## 2020-01-13 RX ADMIN — HEPARIN SODIUM 5000 UNITS: 5000 INJECTION INTRAVENOUS; SUBCUTANEOUS at 21:25

## 2020-01-13 RX ADMIN — FENTANYL CITRATE 50 MCG: 50 INJECTION, SOLUTION INTRAMUSCULAR; INTRAVENOUS at 16:09

## 2020-01-13 RX ADMIN — HEPARIN SODIUM 5000 UNITS: 5000 INJECTION INTRAVENOUS; SUBCUTANEOUS at 13:02

## 2020-01-13 RX ADMIN — SODIUM CHLORIDE: 0.9 INJECTION, SOLUTION INTRAVENOUS at 12:20

## 2020-01-13 RX ADMIN — GLYCOPYRROLATE 0.6 MG: 0.2 INJECTION, SOLUTION INTRAMUSCULAR; INTRAVENOUS at 15:35

## 2020-01-13 RX ADMIN — ROPIVACAINE HYDROCHLORIDE 6 ML/HR: 2 INJECTION, SOLUTION EPIDURAL; INFILTRATION at 14:00

## 2020-01-13 RX ADMIN — FENTANYL CITRATE 25 MCG: 50 INJECTION, SOLUTION INTRAMUSCULAR; INTRAVENOUS at 11:32

## 2020-01-13 RX ADMIN — PROPOFOL 50 MG: 10 INJECTION, EMULSION INTRAVENOUS at 12:05

## 2020-01-13 RX ADMIN — ROCURONIUM BROMIDE 10 MG: 50 INJECTION, SOLUTION INTRAVENOUS at 14:44

## 2020-01-13 RX ADMIN — ROCURONIUM BROMIDE 30 MG: 50 INJECTION, SOLUTION INTRAVENOUS at 12:43

## 2020-01-13 RX ADMIN — ALBUMIN (HUMAN): 12.5 SOLUTION INTRAVENOUS at 13:11

## 2020-01-13 RX ADMIN — EPHEDRINE SULFATE 5 MG: 50 INJECTION, SOLUTION INTRAVENOUS at 12:34

## 2020-01-13 RX ADMIN — EPHEDRINE SULFATE 5 MG: 50 INJECTION, SOLUTION INTRAVENOUS at 12:29

## 2020-01-13 RX ADMIN — PHENYLEPHRINE HYDROCHLORIDE 30 MCG/MIN: 10 INJECTION INTRAVENOUS at 12:14

## 2020-01-13 RX ADMIN — DIPHENHYDRAMINE HYDROCHLORIDE 12.5 MG: 50 INJECTION, SOLUTION INTRAMUSCULAR; INTRAVENOUS at 12:38

## 2020-01-13 RX ADMIN — LIDOCAINE HYDROCHLORIDE 0.5 ML: 10 INJECTION, SOLUTION EPIDURAL; INFILTRATION; INTRACAUDAL; PERINEURAL at 10:10

## 2020-01-13 RX ADMIN — CEFAZOLIN SODIUM 2000 MG: 2 SOLUTION INTRAVENOUS at 11:50

## 2020-01-13 RX ADMIN — SODIUM CHLORIDE, SODIUM LACTATE, POTASSIUM CHLORIDE, AND CALCIUM CHLORIDE 50 ML/HR: .6; .31; .03; .02 INJECTION, SOLUTION INTRAVENOUS at 10:08

## 2020-01-13 RX ADMIN — METRONIDAZOLE 500 MG: 500 INJECTION, SOLUTION INTRAVENOUS at 11:55

## 2020-01-13 RX ADMIN — FENTANYL CITRATE 50 MCG: 50 INJECTION, SOLUTION INTRAMUSCULAR; INTRAVENOUS at 15:23

## 2020-01-13 RX ADMIN — ZOLPIDEM TARTRATE 5 MG: 5 TABLET, COATED ORAL at 21:25

## 2020-01-13 RX ADMIN — PROPOFOL 20 MG: 10 INJECTION, EMULSION INTRAVENOUS at 15:23

## 2020-01-13 RX ADMIN — EPHEDRINE SULFATE 5 MG: 50 INJECTION, SOLUTION INTRAVENOUS at 12:25

## 2020-01-13 RX ADMIN — SCOPALAMINE 1 PATCH: 1 PATCH, EXTENDED RELEASE TRANSDERMAL at 11:14

## 2020-01-13 RX ADMIN — ALBUMIN (HUMAN): 12.5 SOLUTION INTRAVENOUS at 12:28

## 2020-01-13 RX ADMIN — LIDOCAINE HYDROCHLORIDE 50 MG: 10 INJECTION, SOLUTION EPIDURAL; INFILTRATION; INTRACAUDAL; PERINEURAL at 12:04

## 2020-01-13 RX ADMIN — DEXAMETHASONE SODIUM PHOSPHATE 10 MG: 10 INJECTION, SOLUTION INTRAMUSCULAR; INTRAVENOUS at 12:59

## 2020-01-13 RX ADMIN — FENTANYL CITRATE 50 MCG: 50 INJECTION, SOLUTION INTRAMUSCULAR; INTRAVENOUS at 11:19

## 2020-01-13 RX ADMIN — LIDOCAINE HYDROCHLORIDE AND EPINEPHRINE 3 ML: 15; 5 INJECTION, SOLUTION EPIDURAL at 11:19

## 2020-01-13 RX ADMIN — ONDANSETRON 4 MG: 2 INJECTION INTRAMUSCULAR; INTRAVENOUS at 15:31

## 2020-01-13 RX ADMIN — SODIUM CHLORIDE, SODIUM LACTATE, POTASSIUM CHLORIDE, AND CALCIUM CHLORIDE: .6; .31; .03; .02 INJECTION, SOLUTION INTRAVENOUS at 12:45

## 2020-01-13 RX ADMIN — CEFAZOLIN 2000 MG: 1 INJECTION, POWDER, FOR SOLUTION INTRAVENOUS at 15:20

## 2020-01-13 RX ADMIN — FENTANYL CITRATE 75 MCG: 50 INJECTION, SOLUTION INTRAMUSCULAR; INTRAVENOUS at 12:02

## 2020-01-13 RX ADMIN — ALBUMIN (HUMAN): 12.5 SOLUTION INTRAVENOUS at 14:42

## 2020-01-13 RX ADMIN — PROPOFOL 250 MG: 10 INJECTION, EMULSION INTRAVENOUS at 12:04

## 2020-01-13 RX ADMIN — ACETAMINOPHEN 975 MG: 325 TABLET ORAL at 09:49

## 2020-01-13 RX ADMIN — FENTANYL CITRATE 50 MCG: 50 INJECTION, SOLUTION INTRAMUSCULAR; INTRAVENOUS at 16:28

## 2020-01-13 RX ADMIN — EPHEDRINE SULFATE 5 MG: 50 INJECTION, SOLUTION INTRAVENOUS at 12:38

## 2020-01-13 RX ADMIN — METOCLOPRAMIDE 10 MG: 5 INJECTION, SOLUTION INTRAMUSCULAR; INTRAVENOUS at 15:31

## 2020-01-13 NOTE — ANESTHESIA POSTPROCEDURE EVALUATION
Post-Op Assessment Note    CV Status:  Stable  Pain Score: 8    Pain management: adequate     Mental Status:  Awake and alert   Hydration Status:  Stable   PONV Controlled:  Controlled   Airway Patency:  Patent   Post Op Vitals Reviewed: Yes      Staff: CRNA           BP (P) 115/65 (01/13/20 1612)    Temp (!) (P) 97 3 °F (36 3 °C) (01/13/20 1612)    Pulse (P) 83 (01/13/20 1612)   Resp (P) 20 (01/13/20 1612)    SpO2   98        Patient c/o 8/10 in PACU  0 5mg Hydromorphone given  Javi Nichols RN aware  Patient asking for ice chips

## 2020-01-13 NOTE — OP NOTE
OPERATIVE REPORT  PATIENT NAME: Domenic Osborne    :  1950  MRN: [id number]  Pt Location: [facility] OR ROOM 05    SURGERY DATE: 2020    Surgeon(s) and Role:     * Catherine Fabian MD - Primary     * Jeanne Mahan MD - Assisting     * Jean Pierre Brower DO - Assisting    Preop Diagnosis:  Elevated CA-125 [R97 1]  Pelvic mass [R19 00]    Post-Op Diagnosis Codes:     * Elevated CA-125 [R97 1]     * Pelvic mass [R19 00]    Procedure(s) (LRB):  LAPAROSCOPY DIAGNOSTIC (N/A)  CYSTOSCOPY: CYSTOSCOPY WITH PERIOPERATIVE URETERAL CATHETERS  PLACEMENT (Bilateral)  EXPLORATORY LAPAROTOMY, OVARIAN CANCER STAGING WITH TOTAL HYSTERECTOMY, BILATERAL SALPINGO-OOPHORECTOMY, BILATERAL PELVIC AND PERIAORTIC LYMPHADENECTOMY, INDICATED APPENDECTOMY, OMENTECTOMY, STAGING PERITONEAL BIOPSIES   (N/A)    Specimen(s):  ID Type Source Tests Collected by Time Destination   1 : abdominal ascites Body Fluid Ascites NON-GYNECOLOGIC CYTOLOGY Catherine Fabian MD 2020 1253    2 : gastro colic omentum Tissue Abdominal TISSUE EXAM Catherine Fabian MD 2020 1316    3 : left ovarian mass Tissue Ovary, Left TISSUE EXAM Catherine Fabian MD 2020 1331    4 : uterus, cervix, right tube and ovary Tissue Uterus TISSUE Agatha Collins MD 2020 1403    5 : pelvic peritoneal biopsy Tissue Pelvic TISSUE EXAM Catherine Fabian MD 2020 1409    6 : pelvic peritoneal biopsy #2 Tissue Pelvic TISSUE EXAM Catherine Fabian MD 2020 1414    7 :  Tissue Appendix TISSUE EXAM Catherine Fabian MD 2020 1416    8 : left periaortic lymph node Tissue Lymph Node TISSUE EXAM Catherine Fabian MD 2020 1439    9 : left pelvic lymph node Tissue Lymph Node TISSUE EXAM Catherine Fabian MD 2020 1459    10 : right periaortic lymph node Tissue Lymph Node TISSUE EXAM Catherine Fabian MD 2020 1501    11 : right pelvic lymph node Tissue Lymph Node TISSUE EXAM Catherine Fabian MD 2020 1503    12 : upper abdominal peritoneal biopsy Tissue Abdominal TISSUE EXAM MD Abdelrahman 1/13/2020 1507        Estimated Blood Loss:   350 mL    Drains:  Urethral Catheter Non-latex 16 Fr  (Active)   Site Assessment Clean;Skin intact 1/13/2020  2:42 PM   Collection Container Standard drainage bag 1/13/2020  2:42 PM   Securement Method Securing device (Describe) 1/13/2020  2:42 PM   Number of days: 0       [REMOVED] Open Drain Right Other (Comment) (Removed)   Dressing Status Clean;Dry; Intact 1/13/2020  3:48 PM   Number of days: 0       [REMOVED] Open Drain Left  (Removed)   Dressing Status Clean;Dry; Intact 1/13/2020  3:48 PM   Number of days: 0       Anesthesia Type:   General w/ Epidural    Operative Indications:  Patient with approximately 10-12 cm complex pelvic mass and elevated  greater than 4000 units/milliliter  After counseling, she elected to undergo definitive surgical treatment  Operative Findings:  1  Approximately 200 mL of hemorrhagic ascites  2  Approximately 10-12 cm irregular complex left ovarian mass  Frozen section revealed at least borderline tumor with gross features highly concerning for invasive carcinoma  There was no evidence of extra ovarian disease and the mass was removed intact without rupture or spillage  3  Grossly normal right fallopian tube and ovary  4  Approximately 10-12 week size uterus  5  Matted but not enlarged pelvic and periaortic lymph nodes    6  Preoperative cystoscopy demonstrated normal bladder mucosa and easy retrograde passage of bilateral ureteral catheters to approximately 20 cm    7  Optimal cytoreductive surgery: At the end of procedure there was no evidence of any gross visible residual disease  Complications:   None    Procedure and Technique:  Prior to surgery, a thoracic epidural was placed by the anesthesia team for perioperative analgesia  The patient was taken to the operating room where general endotracheal anesthesia was induced without complications  Intravenous fluids were running  The patient received antibiotic prophylaxis and medical DVT prophylaxis per hospital protocol  Sequential compression devices were applied to the lower extremities and activated prior to induction of anesthesia  Single dose of 5000 units of subcutaneous heparin was given approximately 1 hour after epidural catheter placement  The patient was placed in the dorsal lithotomy position in [address] [place] [address] and her abdomen, perineum and vagina were prepped and draped in the usual sterile fashion  Cystoscopy was 1st performed using a 22 Ukrainian 30 degree cystoscope  Above-mentioned findings were noted  Bilateral ureteral catheters were advanced in a retrograde fashion to approximately 20 cm without difficulties  A Hogue catheter was placed and the ureteral catheters were connected to the drainage bag  Sponge stick was placed in the vagina  First, a diagnostic laparoscopy was performed  A 5 mm incision was made in the left upper quadrant  Under direct visualization a 5 mm laparoscopic was introduced under direct visualization and the above-mentioned findings were noted  There was no evidence of carcinomatosis to preclude optimal cytoreductive surgery  Therefore, I decided to proceed with laparotomy  A midline supra-and infraumbilical laparotomy was made sharply and the peritoneal cavity was entered without difficulties  An Jeremy retractor was placed to protect the abdominal wall and the Bookwalter retractor was placed with careful attention taken to prevent compression of neurovascular structures with the retractor blades  Ascites was collected for cytology  Given findings, I 1st performed a gastrocolic omentectomy  The lesser sac was entered without difficulties and the attachments from the omentum to the greater curvature of the stomach were taken down with an Ensure Super jaw device without difficulties    Then, attachments from the omentum to the transverse colon were divided sharply and the omentum was sent for permanent  Upper abdominal peritoneal biopsies were obtained sharply and sent for permanent  Moist laparotomy sponges were used to pack the bowel away and pelvic exposure was maximized  The uterus was grasped with Millie clamps and kept under traction throughout the procedure  The ovarian vessels where skeletonized clamped divided and doubly suture ligated using zero Vicryl after clear identification of the ureters bilaterally  On the left side, utero-ovarian ligament and fallopian tube were amputated at the level of the cornual region and the mass was sent for frozen section with the above-mentioned findings noted  The vesicouterine peritoneal reflection was divided using electrocautery and the bladder was mobilized anteriorly  The uterine vessels were skeletonized, clamped divided and suture ligated at the level of the internal os on both sides  The cardinal ligaments were serially clamped, divided and suture ligated using 0 Vicryl  Once below the level of the external cervical os curved clamps were applied and the specimen was amputated  Both vaginal angles were secured using Rosenda stitches of 0 Vicryl  The midportion of the vaginal cuff was closed using several figure-of-eight stitches of 0 Vicryl  Copious irrigation was used and excellent hemostasis was demonstrated  A bilateral pelvic and periaortic lymphadenectomy was performed  The pararectal and paravesical spaces were opened  All lymph node bearing tissue located between the level of the bifurcation of the common iliac vessels and the deep circumflex iliac vein distally in between the expected location of the genitofemoral nerves laterally and the superior vesical artery medially was excised using a combination of sharp and blunt dissection on both sides    Lymph nodes were matted and the genitofemoral nerves could not be readily identified within the rosio pads or in their expected anatomic position  Pelvic lymphadenectomy was performed on both sides  The bottom of the dissection was given by the obturator nerve which was clearly identified and avoided  Specimens were labeled right and left pelvic lymph nodes  Then, the lower most periaortic region was exposed in each side by retracting the colon medially  The ureters were also mobilized out of harm's ways  Lymph nodes overlying the distal aorta and inferior vena cava were excised sharply and sent for permanent labeled as right and left periaortic lymph nodes  The lymph nodes on the left periaortic area appeared slightly enlarged and matted to the underlying vascular structures  Inherently, small perforators needed to be divided and bleeding easily controlled using 2 figure-of-eight stitches of 5 0 Prolene at the side of expected perforators  Excellent hemostasis was noted throughout  An indicated appendectomy was performed  The base of the appendix was skeletonized double clamped and divided with the knife  The mesentery cauterized and divided with the EnSeal device  The appendiceal base was doubly ligated with 3 0 Vicryl and imbricated with a pursestring stitch of 3 O Vicryl  Excellent hemostasis was obtained and there was no evidence of leakage of any colonic contents  Peritoneal biopsies were obtained from bilateral pelvic sidewalls, anterior and posterior cul-de-sac peritoneum  Those were labeled and sent for permanent  The entire bowel was run from terminal ileum to the ligament of Treitz  No gross abnormalities were noted  Palpation of all surfaces demonstrated no evidence of extrapelvic tumor  At this point all operators changed gowns and gloves per hospital protocol and we proceeded with closure of the abdominal wall  All retractors and sponges were removed and first count was reported as correct    Using a separate closure tray per protocol, the abdominal wall was closed using a mass technique with two #1 looped PDS sutures  Sutures were started at the superior and inferior aspects of the incision and tied together in the midportion of the incision  The subcutaneous fat was copiously irrigated and excellent hemostasis was obtained with electrocautery  The subcutaneous fat was reapproximated in 2 layers with 2-0 plain gut  The skin was closed using a subcuticular stitch of 4-0 Monocryl Stratafix and histoacryl was applied  The laparoscopic port site was closed using a subcuticular suture of 4 Monocryl and  histoacryl was applied  The patient tolerated the procedure well, sponge, instrument and needle counts were all reported as correct ×2  At the time of this dictation the patient is awaiting extubation in the operating room and she has remained stable throughout the procedure  I was present for the entire procedure except completion of skin closure which was performed by Crow Lott (OBGYN R3) and Yehuda (R2 OBGYN)      Patient Disposition:  PACU     SIGNATURE: Debi Vale MD, Darnell Collazo  DATE: January 13, 2020  TIME: 4:08 PM

## 2020-01-13 NOTE — ANESTHESIA PREPROCEDURE EVALUATION
Review of Systems/Medical History  Patient summary reviewed  Chart reviewed  History of anesthetic complications PONV    Cardiovascular  EKG reviewed, Exercise tolerance (METS): >4,  Hyperlipidemia, Hypertension ,    Pulmonary  Smoker ex-smoker  , Sleep apnea CPAP,        GI/Hepatic    GERD well controlled,          Comment: Frequent UTIs     Endo/Other    Obesity    GYN    Breast cancer (s/p bilateral masectomy and chemo)   Comment: Complex pelvic mass     Hematology  Negative hematology ROS      Musculoskeletal  Negative musculoskeletal ROS        Neurology  Negative neurology ROS      Psychology   Depression ,              Physical Exam    Airway    Mallampati score: II  TM Distance: >3 FB  Neck ROM: full     Dental   Comment: Implants,     Cardiovascular  Cardiovascular exam normal    Pulmonary  Pulmonary exam normal     Other Findings        Anesthesia Plan  ASA Score- 3     Anesthesia Type- general and epidural with ASA Monitors  Additional Monitors:   Airway Plan: ETT  Plan Factors-  Patient did not smoke on day of surgery  Induction- intravenous  Postoperative Plan- Plan for postoperative opioid use  Planned trial extubation    Informed Consent- Anesthetic plan and risks discussed with patient and spouse  I personally reviewed this patient with the CRNA  Discussed and agreed on the Anesthesia Plan with the CRNA  Shaheed Clarke

## 2020-01-13 NOTE — INTERVAL H&P NOTE
H&P reviewed  After examining the patient I find no changes in the patients condition since the H&P was written  Vitals:    01/13/20 0908   BP: 129/70   Pulse: 88   Resp: 20   Temp: 98 6 °F (37 °C)   SpO2: 95%     Preop tests' results noted      Nohemy Hartmann MD, Lucita Herman 132  1/13/2020  11:25 AM

## 2020-01-13 NOTE — DISCHARGE INSTRUCTIONS
Abdominal Hysterectomy   WHAT YOU SHOULD KNOW:   An abdominal hysterectomy (AH) is surgery to remove your uterus  Your uterus will be removed through an incision in your abdomen  You may need an AH if you have a tumor in your uterus or other reproductive organs  You may also need an AH if you have an infection, pain, or bleeding  AFTER YOU LEAVE:   Medicines:   · Pain medicine: You may be given medicine to take away or decrease pain  Do not wait until the pain is severe before you take your medicine  · Antinausea medicine: This medicine may be given to calm your stomach and prevent vomiting  · Blood thinners  help prevent blood clots  Blood thinners may be given before, during, and after a surgery or procedure  Blood thinners make it more likely for you to bleed or bruise  · Take your medicine as directed  Call your healthcare provider if you think your medicine is not helping or if you have side effects  Tell him if you are allergic to any medicine  Keep a list of the medicines, vitamins, and herbs you take  Include the amounts, and when and why you take them  Bring the list or the pill bottles to follow-up visits  Carry your medicine list with you in case of an emergency  Follow up with your primary healthcare provider or gynecologist as directed:  Ask how to care for your wound  You may need blood tests, x-rays, or ultrasounds at your follow-up visits  Write down your questions so you remember to ask them during your visits  Limit activity until you have fully recovered from surgery:   · Ask when it is safe for you to drive, walk up stairs, lift heavy objects, and have sex  · Ask when it is okay to exercise, and what types of exercise to do  Start slowly and do more as you get stronger  Contact your primary healthcare provider or gynecologist if:   · You have heavy vaginal bleeding that fills 1 or more sanitary pads in 1 hour  · Your wound opens      · You have a fever, and your wound is red and swollen  · You have yellow, green, or bad-smelling discharge coming from your vagina  · You feel new pain or fullness in your vagina  · You have questions or concerns about your surgery, medicine, or care  Seek care immediately or call 911 if:   · You have new or more blood from your vagina or your wound  · Your arm or leg feels warm, tender, and painful  It may look swollen and red  · You suddenly feel lightheaded and have trouble breathing  · You have chest pain  You may have more pain when you take a deep breath or cough  You may cough up blood  © 2014 [address] is for End User's use only and may not be sold, redistributed or otherwise used for commercial purposes  All illustrations and images included in CareNotes® are the copyrighted property of [employer]  or Durga Schmidt  The above information is an  only  It is not intended as medical advice for individual conditions or treatments  Talk to your doctor, nurse or pharmacist before following any medical regimen to see if it is safe and effective for you

## 2020-01-13 NOTE — ANESTHESIA PROCEDURE NOTES
Epidural Block    Patient location during procedure: holding area  Start time: 1/13/2020 11:18 AM  Reason for block: at surgeon's request and post-op pain management  Staffing  Anesthesiologist: Sharon Galan MD  Performed: anesthesiologist   Preanesthetic Checklist  Completed: patient identified, site marked, surgical consent, pre-op evaluation, timeout performed, IV checked, risks and benefits discussed and monitors and equipment checked  Epidural  Patient position: sitting  Prep: ChloraPrep  Patient monitoring: heart rate, cardiac monitor, continuous pulse ox and frequent blood pressure checks  Approach: midline  Location: thoracic (1-12)  Injection technique: REECE saline  Needle  Needle type: Tuohy   Needle gauge: 18 G  Catheter type: end hole  Catheter size: 20 G  Catheter at skin depth: 15 cm  Test dose: negativefentanyl 50 mcg/mL IV, 50 mcg  midazolam (VERSED) 2 mg/2 mL IV, 2 mg  lidocaine 1 5% with epinephrine 1:200,000 test dose, 3 mLnegative aspiration for CSF, negative aspiration for heme and no paresthesia on injection  patient tolerated the procedure well with no immediate complications

## 2020-01-14 ENCOUNTER — APPOINTMENT (INPATIENT)
Dept: RADIOLOGY | Facility: HOSPITAL | Age: 70
DRG: 737 | End: 2020-01-14
Payer: MEDICARE

## 2020-01-14 LAB
ABO GROUP BLD BPU: NORMAL
ANION GAP SERPL CALCULATED.3IONS-SCNC: 5 MMOL/L (ref 4–13)
ANION GAP SERPL CALCULATED.3IONS-SCNC: 6 MMOL/L (ref 4–13)
BACTERIA UR QL AUTO: ABNORMAL /HPF
BASE EX.OXY STD BLDV CALC-SCNC: 87.7 % (ref 60–80)
BASE EXCESS BLDV CALC-SCNC: -3.1 MMOL/L
BASOPHILS # BLD AUTO: 0.04 THOUSANDS/ΜL (ref 0–0.1)
BASOPHILS NFR BLD AUTO: 0 % (ref 0–1)
BILIRUB UR QL STRIP: NEGATIVE
BPU ID: NORMAL
BUN SERPL-MCNC: 11 MG/DL (ref 5–25)
BUN SERPL-MCNC: 9 MG/DL (ref 5–25)
CALCIUM SERPL-MCNC: 7.7 MG/DL (ref 8.3–10.1)
CALCIUM SERPL-MCNC: 7.7 MG/DL (ref 8.3–10.1)
CHLORIDE SERPL-SCNC: 106 MMOL/L (ref 100–108)
CHLORIDE SERPL-SCNC: 109 MMOL/L (ref 100–108)
CLARITY UR: CLEAR
CO2 SERPL-SCNC: 23 MMOL/L (ref 21–32)
CO2 SERPL-SCNC: 23 MMOL/L (ref 21–32)
COLOR UR: YELLOW
CREAT SERPL-MCNC: 0.82 MG/DL (ref 0.6–1.3)
CREAT SERPL-MCNC: 1.18 MG/DL (ref 0.6–1.3)
CROSSMATCH: NORMAL
EOSINOPHIL # BLD AUTO: 0.06 THOUSAND/ΜL (ref 0–0.61)
EOSINOPHIL NFR BLD AUTO: 1 % (ref 0–6)
ERYTHROCYTE [DISTWIDTH] IN BLOOD BY AUTOMATED COUNT: 13.8 % (ref 11.6–15.1)
ERYTHROCYTE [DISTWIDTH] IN BLOOD BY AUTOMATED COUNT: 14 % (ref 11.6–15.1)
GFR SERPL CREATININE-BSD FRML MDRD: 48 ML/MIN/1.73SQ M
GFR SERPL CREATININE-BSD FRML MDRD: 74 ML/MIN/1.73SQ M
GLUCOSE SERPL-MCNC: 109 MG/DL (ref 65–140)
GLUCOSE SERPL-MCNC: 113 MG/DL (ref 65–140)
GLUCOSE SERPL-MCNC: 113 MG/DL (ref 65–140)
GLUCOSE UR STRIP-MCNC: NEGATIVE MG/DL
HCO3 BLDV-SCNC: 21.7 MMOL/L (ref 24–30)
HCT VFR BLD AUTO: 29.4 % (ref 34.8–46.1)
HCT VFR BLD AUTO: 30.3 % (ref 34.8–46.1)
HGB BLD-MCNC: 9.4 G/DL (ref 11.5–15.4)
HGB BLD-MCNC: 9.7 G/DL (ref 11.5–15.4)
HGB UR QL STRIP.AUTO: ABNORMAL
IMM GRANULOCYTES # BLD AUTO: 0.04 THOUSAND/UL (ref 0–0.2)
IMM GRANULOCYTES NFR BLD AUTO: 0 % (ref 0–2)
KETONES UR STRIP-MCNC: NEGATIVE MG/DL
LEUKOCYTE ESTERASE UR QL STRIP: ABNORMAL
LYMPHOCYTES # BLD AUTO: 1.41 THOUSANDS/ΜL (ref 0.6–4.47)
LYMPHOCYTES NFR BLD AUTO: 15 % (ref 14–44)
MCH RBC QN AUTO: 30.8 PG (ref 26.8–34.3)
MCH RBC QN AUTO: 30.8 PG (ref 26.8–34.3)
MCHC RBC AUTO-ENTMCNC: 32 G/DL (ref 31.4–37.4)
MCHC RBC AUTO-ENTMCNC: 32 G/DL (ref 31.4–37.4)
MCV RBC AUTO: 96 FL (ref 82–98)
MCV RBC AUTO: 96 FL (ref 82–98)
MONOCYTES # BLD AUTO: 0.77 THOUSAND/ΜL (ref 0.17–1.22)
MONOCYTES NFR BLD AUTO: 8 % (ref 4–12)
NEUTROPHILS # BLD AUTO: 6.83 THOUSANDS/ΜL (ref 1.85–7.62)
NEUTS SEG NFR BLD AUTO: 76 % (ref 43–75)
NITRITE UR QL STRIP: NEGATIVE
NON VENT ROOM AIR: ABNORMAL %
NON-SQ EPI CELLS URNS QL MICRO: ABNORMAL /HPF
NRBC BLD AUTO-RTO: 0 /100 WBCS
O2 CT BLDV-SCNC: 13.5 ML/DL
PCO2 BLDV: 37.9 MM HG (ref 42–50)
PH BLDV: 7.38 [PH] (ref 7.3–7.4)
PH UR STRIP.AUTO: 6 [PH]
PLATELET # BLD AUTO: 197 THOUSANDS/UL (ref 149–390)
PLATELET # BLD AUTO: 205 THOUSANDS/UL (ref 149–390)
PMV BLD AUTO: 9.4 FL (ref 8.9–12.7)
PMV BLD AUTO: 9.9 FL (ref 8.9–12.7)
PO2 BLDV: 55.9 MM HG (ref 35–45)
POTASSIUM SERPL-SCNC: 3.8 MMOL/L (ref 3.5–5.3)
POTASSIUM SERPL-SCNC: 3.9 MMOL/L (ref 3.5–5.3)
PROT UR STRIP-MCNC: NEGATIVE MG/DL
RBC # BLD AUTO: 3.05 MILLION/UL (ref 3.81–5.12)
RBC # BLD AUTO: 3.15 MILLION/UL (ref 3.81–5.12)
RBC #/AREA URNS AUTO: ABNORMAL /HPF
SODIUM SERPL-SCNC: 135 MMOL/L (ref 136–145)
SODIUM SERPL-SCNC: 137 MMOL/L (ref 136–145)
SP GR UR STRIP.AUTO: 1 (ref 1–1.03)
UNIT DISPENSE STATUS: NORMAL
UNIT PRODUCT CODE: NORMAL
UNIT RH: NORMAL
UROBILINOGEN UR QL STRIP.AUTO: 0.2 E.U./DL
WBC # BLD AUTO: 7.5 THOUSAND/UL (ref 4.31–10.16)
WBC # BLD AUTO: 9.15 THOUSAND/UL (ref 4.31–10.16)
WBC #/AREA URNS AUTO: ABNORMAL /HPF

## 2020-01-14 PROCEDURE — 81001 URINALYSIS AUTO W/SCOPE: CPT | Performed by: INTERNAL MEDICINE

## 2020-01-14 PROCEDURE — 82805 BLOOD GASES W/O2 SATURATION: CPT | Performed by: NURSE PRACTITIONER

## 2020-01-14 PROCEDURE — 99223 1ST HOSP IP/OBS HIGH 75: CPT | Performed by: INTERNAL MEDICINE

## 2020-01-14 PROCEDURE — 99231 SBSQ HOSP IP/OBS SF/LOW 25: CPT | Performed by: ANESTHESIOLOGY

## 2020-01-14 PROCEDURE — 85027 COMPLETE CBC AUTOMATED: CPT | Performed by: OBSTETRICS & GYNECOLOGY

## 2020-01-14 PROCEDURE — 71045 X-RAY EXAM CHEST 1 VIEW: CPT

## 2020-01-14 PROCEDURE — 80048 BASIC METABOLIC PNL TOTAL CA: CPT | Performed by: NURSE PRACTITIONER

## 2020-01-14 PROCEDURE — 99024 POSTOP FOLLOW-UP VISIT: CPT | Performed by: OBSTETRICS & GYNECOLOGY

## 2020-01-14 PROCEDURE — 85025 COMPLETE CBC W/AUTO DIFF WBC: CPT | Performed by: OBSTETRICS & GYNECOLOGY

## 2020-01-14 PROCEDURE — NC001 PR NO CHARGE: Performed by: ANESTHESIOLOGY

## 2020-01-14 PROCEDURE — 80048 BASIC METABOLIC PNL TOTAL CA: CPT | Performed by: OBSTETRICS & GYNECOLOGY

## 2020-01-14 PROCEDURE — 99223 1ST HOSP IP/OBS HIGH 75: CPT | Performed by: SURGERY

## 2020-01-14 PROCEDURE — 82948 REAGENT STRIP/BLOOD GLUCOSE: CPT

## 2020-01-14 RX ORDER — SODIUM CHLORIDE, SODIUM LACTATE, POTASSIUM CHLORIDE, CALCIUM CHLORIDE 600; 310; 30; 20 MG/100ML; MG/100ML; MG/100ML; MG/100ML
125 INJECTION, SOLUTION INTRAVENOUS CONTINUOUS
Status: DISCONTINUED | OUTPATIENT
Start: 2020-01-14 | End: 2020-01-15

## 2020-01-14 RX ORDER — OXYCODONE HYDROCHLORIDE 5 MG/1
5 TABLET ORAL EVERY 4 HOURS PRN
Status: DISCONTINUED | OUTPATIENT
Start: 2020-01-14 | End: 2020-01-14

## 2020-01-14 RX ORDER — POTASSIUM CHLORIDE 750 MG/1
10 TABLET, EXTENDED RELEASE ORAL ONCE
Status: DISCONTINUED | OUTPATIENT
Start: 2020-01-14 | End: 2020-01-14

## 2020-01-14 RX ORDER — HYDROMORPHONE HCL/PF 1 MG/ML
0.5 SYRINGE (ML) INJECTION
Status: DISPENSED | OUTPATIENT
Start: 2020-01-14 | End: 2020-01-18

## 2020-01-14 RX ORDER — METOCLOPRAMIDE HYDROCHLORIDE 5 MG/ML
10 INJECTION INTRAMUSCULAR; INTRAVENOUS ONCE
Status: COMPLETED | OUTPATIENT
Start: 2020-01-14 | End: 2020-01-14

## 2020-01-14 RX ORDER — NALBUPHINE HCL 10 MG/ML
5 AMPUL (ML) INJECTION
Status: DISPENSED | OUTPATIENT
Start: 2020-01-14 | End: 2020-01-17

## 2020-01-14 RX ORDER — OXYCODONE HYDROCHLORIDE 10 MG/1
10 TABLET ORAL EVERY 4 HOURS PRN
Status: DISCONTINUED | OUTPATIENT
Start: 2020-01-14 | End: 2020-01-14

## 2020-01-14 RX ORDER — SODIUM CHLORIDE, SODIUM LACTATE, POTASSIUM CHLORIDE, CALCIUM CHLORIDE 600; 310; 30; 20 MG/100ML; MG/100ML; MG/100ML; MG/100ML
100 INJECTION, SOLUTION INTRAVENOUS CONTINUOUS
Status: DISCONTINUED | OUTPATIENT
Start: 2020-01-14 | End: 2020-01-14

## 2020-01-14 RX ORDER — ACETAMINOPHEN 325 MG/1
975 TABLET ORAL EVERY 8 HOURS PRN
Status: DISCONTINUED | OUTPATIENT
Start: 2020-01-14 | End: 2020-01-16

## 2020-01-14 RX ORDER — POTASSIUM CHLORIDE 20 MEQ/1
40 TABLET, EXTENDED RELEASE ORAL ONCE
Status: COMPLETED | OUTPATIENT
Start: 2020-01-14 | End: 2020-01-14

## 2020-01-14 RX ADMIN — ONDANSETRON 4 MG: 2 INJECTION INTRAMUSCULAR; INTRAVENOUS at 15:38

## 2020-01-14 RX ADMIN — VENLAFAXINE HYDROCHLORIDE 150 MG: 150 CAPSULE, EXTENDED RELEASE ORAL at 11:08

## 2020-01-14 RX ADMIN — METOCLOPRAMIDE 10 MG: 5 INJECTION, SOLUTION INTRAMUSCULAR; INTRAVENOUS at 22:24

## 2020-01-14 RX ADMIN — ZOLPIDEM TARTRATE 5 MG: 5 TABLET, COATED ORAL at 23:30

## 2020-01-14 RX ADMIN — ROPIVACAINE HYDROCHLORIDE: 5 INJECTION, SOLUTION EPIDURAL; INFILTRATION; PERINEURAL at 16:30

## 2020-01-14 RX ADMIN — OXYCODONE HYDROCHLORIDE 5 MG: 5 TABLET ORAL at 14:41

## 2020-01-14 RX ADMIN — SODIUM CHLORIDE, SODIUM LACTATE, POTASSIUM CHLORIDE, AND CALCIUM CHLORIDE 125 ML/HR: .6; .31; .03; .02 INJECTION, SOLUTION INTRAVENOUS at 17:54

## 2020-01-14 RX ADMIN — SODIUM CHLORIDE, SODIUM LACTATE, POTASSIUM CHLORIDE, AND CALCIUM CHLORIDE 50 ML/HR: .6; .31; .03; .02 INJECTION, SOLUTION INTRAVENOUS at 02:53

## 2020-01-14 RX ADMIN — POTASSIUM CHLORIDE 40 MEQ: 1500 TABLET, EXTENDED RELEASE ORAL at 17:53

## 2020-01-14 RX ADMIN — SODIUM CHLORIDE, SODIUM LACTATE, POTASSIUM CHLORIDE, AND CALCIUM CHLORIDE 125 ML/HR: .6; .31; .03; .02 INJECTION, SOLUTION INTRAVENOUS at 23:33

## 2020-01-14 RX ADMIN — ROPIVACAINE HYDROCHLORIDE: 5 INJECTION, SOLUTION EPIDURAL; INFILTRATION; PERINEURAL at 09:02

## 2020-01-14 RX ADMIN — ONDANSETRON 4 MG: 2 INJECTION INTRAMUSCULAR; INTRAVENOUS at 21:05

## 2020-01-14 RX ADMIN — HEPARIN SODIUM 5000 UNITS: 5000 INJECTION INTRAVENOUS; SUBCUTANEOUS at 05:40

## 2020-01-14 RX ADMIN — SODIUM CHLORIDE, SODIUM LACTATE, POTASSIUM CHLORIDE, AND CALCIUM CHLORIDE 100 ML/HR: .6; .31; .03; .02 INJECTION, SOLUTION INTRAVENOUS at 15:33

## 2020-01-14 RX ADMIN — HYDROMORPHONE HYDROCHLORIDE 0.5 MG: 1 INJECTION, SOLUTION INTRAMUSCULAR; INTRAVENOUS; SUBCUTANEOUS at 15:38

## 2020-01-14 RX ADMIN — HEPARIN SODIUM 5000 UNITS: 5000 INJECTION INTRAVENOUS; SUBCUTANEOUS at 22:24

## 2020-01-14 RX ADMIN — LOSARTAN POTASSIUM: 50 TABLET, FILM COATED ORAL at 08:54

## 2020-01-14 RX ADMIN — SODIUM CHLORIDE 1000 ML: 0.9 INJECTION, SOLUTION INTRAVENOUS at 13:38

## 2020-01-14 RX ADMIN — METOPROLOL TARTRATE 25 MG: 25 TABLET ORAL at 08:55

## 2020-01-14 NOTE — CONSULTS
Consultation - Nephrology   Ekaterina Range 81 y o  female MRN: [id number]  Unit/Bed#: [facility] 820-01 Encounter: [id number]    ASSESSMENT and PLAN:  Perioperative optimization  -renal function is stable postop today at creatinine 0 8  Preop creatinine from 01/10 was 0 9  Most likely baseline renal function is at creatinine 0 9  Did have slight increase in creatinine on 01/08 to creatinine 1 23, EGFR 45  Not sure about her previous baseline as she follows with her PCP at [facility] and previous lab results are not available    -she is at risk of worsening renal function considering that she had EGFR of 45 on 01/08  At this time her renal function is stable at creatinine 0 8-0 9  Will continue to monitor renal function  Recommend avoiding hypotension  Patient was on Edgar-Synephrine on 01/13 which was stopped around 4:00 p m     -she did receive losartan hydrochlorothiazide today morning, I have stopped it as blood pressure is on the lower side  If she develops hypotension she can receive IV fluid bolus with normal saline   -will monitor blood pressure, renal function and decide if losartan hydrochlorothiazide and can be resumed tomorrow   -avoid nephrotoxins like NSAIDs and IV contrast  -check BMP tomorrow morning  Continue current IV fluid-D5W with half NS with KCl 20 mEq at 75 mL/hour  -check UA with urine microscopy    Primary hypertension  -blood pressure currently on lower side  Stop losartan hydrochlorothiazide  Okay to continue metoprolol with holding parameters  Recommend avoiding hypotension due to risk of worsening renal function    Pelvic mass status post exploratory laparotomy, total abdominal hysterectomy, bilateral salpingo-oophorectomy, appendectomy, peritoneal biopsy on 01/14-continue postop care per OBGYN    Anemia:  Hemoglobin dropped to 9 4 from 12 4  Could be a component of hemodilution   Continue to monitor per primary team     Discussed with primary team -dr Stacia Hernandez- tiger text sent       HISTORY OF PRESENT ILLNESS:  Requesting Physician: Aren Palmer MD  Reason for Consult:  Perioperative optimization    Roxanna Ortiz is a 81 y o  female with history of breast cancer, hypertension , elevated /pelvic mass who was admitted to [facility] and underwent surgery on 2020-had cystoscopy with bilateral ureteral catheter placement  Exploratory laparotomy, total hysterectomy, bilateral salpingo-oophorectomy, bilateral pelvic and periaortic lymphadenectomy, appendectomy, omentectomy and staging peritoneal biopsies  Nephrology consulted for perioperative optimization and decreasing the incidence of acute kidney injury  Cr 0 82 on   Creatinine was 0 9 on 1/10  Baseline creatinine likely around 0 9 repeat hemoglobin A1c 5 3  Creatinine from 2020 was 1 23  Patient has PCP at [facility]  Denies any history of kidney disease in the past   She denies any nausea vomiting postop    Denies any shortness of breath      PAST MEDICAL HISTORY:  Past Medical History:   Diagnosis Date    Breast cancer ([address] )      and then     Chicken pox     Frequent UTI     Heart disease     High cholesterol     History of blood transfusion     Hypertension     PONV (postoperative nausea and vomiting)        PAST SURGICAL HISTORY:  Past Surgical History:   Procedure Laterality Date    APPENDECTOMY      BREAST CYST EXCISION      COLONOSCOPY      MASTECTOMY Bilateral 2009    REPAIR KNEE LIGAMENT Bilateral     ROTATOR CUFF REPAIR         SOCIAL HISTORY:  Social History     Substance and Sexual Activity   Alcohol Use Not Currently    Frequency: 2-3 times a week    Comment: socially      Social History     Substance and Sexual Activity   Drug Use Never     Social History     Tobacco Use   Smoking Status Former Smoker    Types: Cigarettes    Last attempt to quit: 10/31/1987    Years since quittin 2   Smokeless Tobacco Never Used       FAMILY HISTORY:  Family History   Problem Relation Age of Onset    Breast cancer Mother 50    Lung cancer Father     Prostate cancer Brother        ALLERGIES:  No Known Allergies    MEDICATIONS:    Current Facility-Administered Medications:     dextrose 5 % and sodium chloride 0 45 % with KCl 20 mEq/L infusion, 75 mL/hr, Intravenous, Continuous, [facility] jarvis    heparin (porcine) subcutaneous injection 5,000 Units, 5,000 Units, Subcutaneous, Q8H [address], [facility]vis, , 5,000 Units at 01/14/20 0540    lactated ringers infusion, 50 mL/hr, Intravenous, Continuous, [facility], Last Rate: 50 mL/hr at 01/14/20 0253, 50 mL/hr at 01/14/20 0253    lactated ringers infusion, 50 mL/hr, Intravenous, Continuous, Lance Bhardwaj CRNA    losartan potassium-hydrochlorothiazide (HYZAAR 50/12  5) combination, , Oral, Daily, [facility],     metoprolol (LOPRESSOR) injection 5 mg, 5 mg, Intravenous, Q6H PRN, [facility],     metoprolol tartrate (LOPRESSOR) tablet 25 mg, 25 mg, Oral, Daily, [facility], , 25 mg at 01/14/20 0855    ondansetron (ZOFRAN) injection 4 mg, 4 mg, Intravenous, Q6H PRN, [facility],     ropivacaine 0 1% and fentaNYL 2 mcg/mL PCEA, , Epidural, Continuous, [facility],     venlafaxine (EFFEXOR-XR) 24 hr capsule 150 mg, 150 mg, Oral, Daily With Breakfast, [facility],     zolpidem (AMBIEN) tablet 5 mg, 5 mg, Oral, HS, Deborah Graham MD, 5 mg at 01/13/20 2125    REVIEW OF SYSTEMS:   Review of Systems   Constitutional: Negative for activity change, appetite change, chills, diaphoresis, fatigue and fever  HENT: Negative for congestion, facial swelling and nosebleeds  Eyes: Negative for pain and visual disturbance  Respiratory: Negative for cough, chest tightness and shortness of breath  Cardiovascular: Negative for chest pain and palpitations  Gastrointestinal: Negative for abdominal distention, abdominal pain, diarrhea, nausea and vomiting     Genitourinary: Negative for difficulty urinating, dysuria, flank pain, frequency and hematuria  Musculoskeletal: Negative for arthralgias, back pain and joint swelling  Skin: Negative for rash  Neurological: Negative for dizziness, seizures, syncope, weakness and headaches  Psychiatric/Behavioral: Negative for agitation and confusion  The patient is not nervous/anxious  All the systems were reviewed and were negative except as documented on the HPI  PHYSICAL EXAM:  Current Weight: Weight - Scale: 117 kg (259 lb)  First Weight: Weight - Scale: 117 kg (259 lb)  Vitals:    01/13/20 2105 01/13/20 2202 01/14/20 0245 01/14/20 0703   BP: 108/78  106/63 126/72   Pulse: 89 91 80 78   Resp: 16 18 18 18   Temp: 98 1 °F (36 7 °C) 97 6 °F (36 4 °C) 98 1 °F (36 7 °C) 98 °F (36 7 °C)   TempSrc:       SpO2: 97% 93% 94% 96%   Weight:       Height:           Intake/Output Summary (Last 24 hours) at 1/14/2020 5356  Last data filed at 1/14/2020 0703  Gross per 24 hour   Intake 3573 5 ml   Output 1950 ml   Net 1623 5 ml     Physical Exam   Constitutional: She is oriented to person, place, and time  Vital signs are normal  She appears well-developed  No distress  HENT:   Head: Normocephalic and atraumatic  Mouth/Throat: Oropharynx is clear and moist    Eyes: Pupils are equal, round, and reactive to light  Conjunctivae are normal  No scleral icterus  Neck: Neck supple  No thyromegaly present  Cardiovascular: Normal rate, regular rhythm and normal heart sounds  Exam reveals no friction rub  No murmur heard  Pulmonary/Chest: Effort normal and breath sounds normal  No respiratory distress  She has no wheezes  She has no rales  Abdominal: Soft  Bowel sounds are normal  She exhibits no distension  There is no tenderness  Surgical scars   Musculoskeletal: She exhibits no edema or deformity  Lymphadenopathy:     She has no cervical adenopathy  Neurological: She is alert and oriented to person, place, and time  She is not disoriented     Skin: She is not diaphoretic  No cyanosis  No pallor  Nails show no clubbing  Psychiatric: She has a normal mood and affect  Her mood appears not anxious  Her affect is not inappropriate  Invasive Devices:   Urethral Catheter Non-latex 16 Fr  (Active)   Site Assessment Clean;Skin intact 1/14/2020  7:10 AM   Collection Container Standard drainage bag 1/14/2020  7:10 AM   Securement Method Securing device (Describe) 1/14/2020  7:10 AM   Output (mL) 350 mL 1/14/2020  7:03 AM       Lab Results:   Results from last 7 days   Lab Units 01/14/20  0540 01/10/20  1122 01/08/20  1535   WBC Thousand/uL 7 50 6 04  --    HEMOGLOBIN g/dL 9 4* 12 4  --    HEMATOCRIT % 29 4* 39 4  --    PLATELETS Thousands/uL 205 236  --    POTASSIUM mmol/L 3 9 3 6  --    CHLORIDE mmol/L 109* 104  --    CO2 mmol/L 23 25  --    BUN mg/dL 9 17 21   CREATININE mg/dL 0 82 0 91 1 23   CALCIUM mg/dL 7 7* 9 3  --        Other Studies: CT CHEST, ABDOMEN AND PELVIS WITH IV CONTRAST     INDICATION:   C56 9: Malignant neoplasm of unspecified ovary  R97 1: Elevated cancer antigen 125 ()      COMPARISON:  None      TECHNIQUE: CT examination of the chest, abdomen and pelvis was performed  Axial, sagittal, and coronal 2D reformatted images were created from the source data and submitted for interpretation      Radiation dose length product (DLP) for this visit:  1529 67 mGy-cm   This examination, like all CT scans performed in the [facility], was performed utilizing techniques to minimize radiation dose exposure, including the use of   iterative reconstruction and automated exposure control      IV Contrast:  100 mL of iohexol (OMNIPAQUE)  Enteric Contrast: Enteric contrast was administered      FINDINGS:     CHEST     LUNGS:  Lungs are clear  There is no tracheal or endobronchial lesion      PLEURA:  Unremarkable      HEART/GREAT VESSELS:  Normal heart size    Ascending thoracic aortic aneurysm measuring 4 0 cm      MEDIASTINUM AND LISA: Unremarkable      CHEST WALL AND LOWER NECK:   Bilateral mastectomy  No axillary adenopathy      ABDOMEN     LIVER/BILIARY TREE:  Unremarkable      GALLBLADDER:  Removed      SPLEEN:  Unremarkable      PANCREAS:  Unremarkable      ADRENAL GLANDS:  Unremarkable      KIDNEYS/URETERS:  Mild right hydroureteronephrosis resulting from obstruction of the distal right ureter by a large pelvic mass  Left kidney within normal limits      STOMACH AND BOWEL:  There is colonic diverticulosis without evidence of acute diverticulitis      APPENDIX:  A normal appendix was visualized      ABDOMINOPELVIC CAVITY:  Minimal perihepatic ascites and free fluid in the pelvis      VESSELS:  Unremarkable for patient's age      PELVIS     REPRODUCTIVE ORGANS:  Large heterogeneous partially necrotic mass located above the uterus consistent with the provided history of ovarian cancer measuring 11 0 x 11 3 cm      URINARY BLADDER:  Unremarkable      ABDOMINAL WALL/INGUINAL REGIONS:  Unremarkable      OSSEOUS STRUCTURES: Old healed right rib fractures  Degenerative changes of the lumbar spine most severely at L4-L5  No acute fracture or destructive osseous lesion      IMPRESSION:     Large heterogeneous partially necrotic mass located above the uterus consistent with the provided history of ovarian cancer measuring 11 0 x 11 3 cm      The pelvic mass results in mild right hydroureteronephrosis      Minimal ascites in the perihepatic area and in the pelvis      Ascending thoracic aortic aneurysm measuring 4 0 cm  Follow-up recommended in one year      The study was marked in [facility] for immediate notification and follow-up      Workstation performed: [id number]    CHEST      INDICATION:   R19 00: Intra-abdominal and pelvic swelling, mass and lump, unspecified site    Preop      COMPARISON:  Chest CT from 1/8/2020      EXAM PERFORMED/VIEWS:  XR CHEST PA & LATERAL        FINDINGS:     Cardiomediastinal silhouette appears unremarkable      The lungs are clear  No pneumothorax or pleural effusion      Osseous structures appear within normal limits for patient age      IMPRESSION:     No acute cardiopulmonary disease      Portions of the record may have been created with voice recognition software  Occasional wrong word or "sound a like" substitutions may have occurred due to the inherent limitations of voice recognition software  Read the chart carefully and recognize, using context, where substitutions have occurred  If you have any questions, please contact the dictating provider

## 2020-01-14 NOTE — QUICK NOTE
Patient reports improvement in blurry vision now that she is supine  Endorsing some abdominal discomfort now that epidural turned off  Vitals:    01/14/20 1353   BP: (!) 87/51   Pulse:    Resp:    Temp:    SpO2:      CBC and differential   Order: [id number]   Status:  Final result   Visible to patient:  No (Not Released) Next appt:  01/28/2020 at 11:30 AM in Gynecologic Oncology Debo Leal MD)    Ref Range & Units 1/14/20 1331 1/14/20 0540   WBC 4 31 - 10 16 Thousand/uL 9 15  7 50    RBC 3 81 - 5 12 Million/uL 3 15Low   3 05Low     Hemoglobin 11 5 - 15 4 g/dL 9 7Low   9 4Low     Hematocrit 34 8 - 46 1 % 30 3Low   29 4Low     MCV 82 - 98 fL 96  96    MCH 26 8 - 34 3 pg 30 8  30 8    MCHC 31 4 - 37 4 g/dL 32 0  32 0    RDW 11 6 - 15 1 % 14 0  13 8    MPV 8 9 - 12 7 fL 9 4  9 9    Platelets 208 - 334 Thousands/uL 197  205    nRBC /100 WBCs 0     Neutrophils Relative 43 - 75 % 76High      Immat GRANS % 0 - 2 % 0     Lymphocytes Relative 14 - 44 % 15     Monocytes Relative 4 - 12 % 8     Eosinophils Relative 0 - 6 % 1     Basophils Relative 0 - 1 % 0     Neutrophils Absolute 1 85 - 7 62 Thousands/µL 6 83     Immature Grans Absolute 0 00 - 0 20 Thousand/uL 0 04     Lymphocytes Absolute 0 60 - 4 47 Thousands/µL 1 41     Monocytes Absolute 0 17 - 1 22 Thousand/µL 0 77     Eosinophils Absolute 0 00 - 0 61 Thousand/µL 0 06     Basophils Absolute 0 00 - 0 10 Thousands/µL 0 04               Hypotension improving with stable hemoglobin, continue fluid bolus, awaiting critical care evaluation       Dr Delia Horne aware     Kadeem Robertson,

## 2020-01-14 NOTE — PROGRESS NOTES
Epidural has been turned off at this time  Pt complained of blurry vision and BPs low  Currently receiving an IV fluid bolus with improvement of BPs  Discussed with patient plan  If pt starts to have pain at surgical site, will restart epidural but at a lower rate until BP improves

## 2020-01-14 NOTE — CONSULTS
Consultation - Acute Pain Service   Karina Aquino 81 y o  female MRN: [id number]  Unit/Bed#: [facility] 820-01 Encounter: [id number]               Assessment/Plan     Assessment:   Is 53-year-old female postop day 1 status post ex lap, with thoracic epidural in place for postop pain control     I have reviewed the patient's controlled substance dispensing history in the Prescription Drug Monitoring Program before prescribing any controlled substances :  Opioid naive patient    Pt is doing well from a pain standpoint  Her epidural is working, and she is aware that she may press the PCEA button for breakthrough pain  She did have pruritis, and nubain IV PRN will be ordered  Patient also requests tylenol for pain and HA symptoms  Upon inspection of epidural site, the area is clean, dry, intact, and well secured    Plan:   1  Continue epidural at current settings  May anticipate epidural placed for 3-5 days time, will discuss with [facility] service regarding epidural use while inpatient  2  Will order Dilaudid 0 5 mg IV q 3 hours p r n  severe breakthrough pain  3  Order nubain IV PRN pruritis  4  Will order tylenol scheduled (patient request)     recs communicated to [facility] service  [facility] will continue to follow; please contact [facility]PS ( [phone]) with any further questions    History of Present Illness    Admit Date:  1/13/2020  Hospital Day:  1 day  Primary Service:  GYN Oncology  Attending Provider:  Avel Ledesma MD  Reason for Consult / Principal Problem:  Acute postoperative pain, opioid naive patient, with thoracic epidural for postop pain control  Hx and PE limited by:  None  HPI: Karina Aquino is a 81y o  year old female who presents with (see assessment section above)  Inpatient consult to Acute Pain Service  Consult performed by:  Cat Urbano MD  Consult ordered by: Hansel Hester DO          Review of Systems    Historical Information   Past Medical History:   Diagnosis Date    [address] [address]      and then 2009    Chicken pox     Frequent UTI     Heart disease     High cholesterol     History of blood transfusion     Hypertension     PONV (postoperative nausea and vomiting)      Past Surgical History:   Procedure Laterality Date    APPENDECTOMY      BREAST CYST EXCISION      COLONOSCOPY      MASTECTOMY Bilateral 2009    REPAIR KNEE LIGAMENT Bilateral     ROTATOR CUFF REPAIR       Social History   Social History     Substance and Sexual Activity   Alcohol Use Not Currently    Frequency: 2-3 times a week    Comment: socially      Social History     Substance and Sexual Activity   Drug Use Never     Social History     Tobacco Use   Smoking Status Former Smoker    Types: Cigarettes    Last attempt to quit: 10/31/1987    Years since quittin 2   Smokeless Tobacco Never Used     Family History: non-contributory    Meds/Allergies   all current active meds have been reviewed, current meds:   Current Facility-Administered Medications   Medication Dose Route Frequency    dextrose 5 % and sodium chloride 0 45 % with KCl 20 mEq/L infusion  75 mL/hr Intravenous Continuous    heparin (porcine) subcutaneous injection 5,000 Units  5,000 Units Subcutaneous Q8H [facility]    lactated ringers infusion  50 mL/hr Intravenous Continuous    lactated ringers infusion  50 mL/hr Intravenous Continuous    losartan potassium-hydrochlorothiazide (HYZAAR 50/12  5) combination   Oral Daily    metoprolol (LOPRESSOR) injection 5 mg  5 mg Intravenous Q6H PRN    metoprolol tartrate (LOPRESSOR) tablet 25 mg  25 mg Oral Daily    ondansetron (ZOFRAN) injection 4 mg  4 mg Intravenous Q6H PRN    ropivacaine 0 1% and fentaNYL 2 mcg/mL PCEA   Epidural Continuous    venlafaxine (EFFEXOR-XR) 24 hr capsule 150 mg  150 mg Oral Daily With Breakfast    zolpidem (AMBIEN) tablet 5 mg  5 mg Oral HS    and PTA meds:   Prior to Admission Medications   Prescriptions Last Dose Informant Patient Reported? Taking? cholecalciferol (VITAMIN D3) 400 units tablet 1/6/2020  Yes Yes   Sig: Take 400 Units by mouth daily   estradiol (ESTRACE) 0 1 mg/g vaginal cream 1/10/2020 Self Yes Yes   Sig: insert 1 gram vaginally two times a week   lansoprazole (PREVACID) 15 mg capsule 1/8/2020  Yes Yes   Sig: Take 15 mg by mouth daily   losartan-hydrochlorothiazide (HYZAAR) 50-12 5 mg per tablet 1/11/2020 Self Yes Yes   metoprolol tartrate (LOPRESSOR) 25 mg tablet 1/12/2020 at Unknown time Self Yes Yes   Sig: Take by mouth   psyllium (METAMUCIL) 58 6 % packet 1/12/2020 at Unknown time  Yes Yes   Sig: Take 1 packet by mouth daily   simvastatin (ZOCOR) 20 mg tablet 1/11/2020 Self Yes Yes   trimethoprim (PROLOPRIM) 100 mg tablet 1/12/2020 at Unknown time Self Yes Yes   venlafaxine 150 MG TB24 1/12/2020 at Unknown time Self Yes Yes   zolpidem (AMBIEN CR) 12 5 MG CR tablet 1/12/2020 at Unknown time Self Yes Yes      Facility-Administered Medications: None       No Known Allergies    Objective   Temp:  [97 3 °F (36 3 °C)-98 6 °F (37 °C)] 98 °F (36 7 °C)  HR:  [78-91] 78  Resp:  [12-20] 18  BP: ()/(55-78) 126/72    Intake/Output Summary (Last 24 hours) at 1/14/2020 0810  Last data filed at 1/14/2020 0703  Gross per 24 hour   Intake 3573 5 ml   Output 1950 ml   Net 1623 5 ml       Physical Exam    Lab Results:   I have personally reviewed pertinent labs  , CBC:   Lab Results   Component Value Date    WBC 7 50 01/14/2020    HGB 9 4 (L) 01/14/2020    HCT 29 4 (L) 01/14/2020    MCV 96 01/14/2020     01/14/2020    MCH 30 8 01/14/2020    MCHC 32 0 01/14/2020    RDW 13 8 01/14/2020    MPV 9 9 01/14/2020   , CMP:   Lab Results   Component Value Date    SODIUM 137 01/14/2020    K 3 9 01/14/2020     (H) 01/14/2020    CO2 23 01/14/2020    BUN 9 01/14/2020    CREATININE 0 82 01/14/2020    CALCIUM 7 7 (L) 01/14/2020    EGFR 74 01/14/2020     Imaging Studies: I have personally reviewed pertinent reports      EKG, Pathology, and Other Studies: I have personally reviewed pertinent reports  Counseling / Coordination of Care  Total floor / unit time spent today Level 1 = 20 minutes  Greater than 50% of total time was spent with the patient and / or family counseling and / or coordination of care  A description of the counseling / coordination of care: visit with patient and  in room, discussed goals and expectations for pain and pain control

## 2020-01-14 NOTE — PROGRESS NOTES
Called by nursing staff, alerted regardign persistent hypotension, lowest 50/30s  Patient reported feeling lightheaded and dizzy, with blurry vision  Denies chest pain, shortness of breath, vaginal bleeding      Vitals:    01/14/20 1342   BP: (!) 82/47   Pulse: 75   Resp:    Temp:    SpO2:      Stat CBC  1L bolus  Stop oral antihypertensives  Escalate to critical care if hypotension unresolved for pressors    Ifeoma Garg, DO

## 2020-01-14 NOTE — RESTORATIVE TECHNICIAN NOTE
Restorative Specialist Mobility Note       Activity: Ambulate in lynch, Chair     Assistive Device: Other (Comment)(Pushed IV pole)        Repositioned: Up in chair, Sitting              Anti-Embolism Device On:  Bilateral, Sequential compression devices, below knee

## 2020-01-14 NOTE — NURSING NOTE
BP 80/52 manually  Pt asymptomatic sitting up in chair  OB/GYN pg'd and notified  Hyzaar discontinued after morning dose was administered per order, resident aware  Will continue to monitor

## 2020-01-14 NOTE — PROGRESS NOTES
Pt seen at bedside in ICU  She endorses pain at surgical site at this time, her BPs are 152A systolic, HR 33-39N  She received IVF bolus since her episode of hypotension  She is currently nauseus and flushed, having received oxycodone, dilaudid, and zofran to relieve nausea  Discuss[facility] with [facility] and critical care service  Given she had an extensive surgery, epidural can be restarted with hopes that it will provide pain control for next 3-5 days  Will restart epidural at a lower rate and modify settings  Will continue to monitor  Plan:  - restart epidural at this time given patient is in pain   0 1% ropiv with 2 mcg/cc fentanyl at 6 cc/hr, 3 cc bolus, 10 min lockout, 4 doses/max an hour    - please discontinue oxycodone at this time until epidural removed  - continue IV dilaudid PRN severe breakthrough pain  - continue tylenol PRN  - continue nubain PRN pruritis

## 2020-01-14 NOTE — DISCHARGE SUMMARY
Care:  See After Visit Summary for information related to follow-up care and any pertinent home health orders  Disposition: See After Visit Summary for discharge disposition information  Planned Readmission: No    Discharge Medications: For a complete list of the patient's medications, please refer to her med rec      Tanner Lr DO  OB/GYN, PGY3  1/20/2020, 10:41 AM

## 2020-01-14 NOTE — PLAN OF CARE
Problem: PAIN - ADULT  Goal: Verbalizes/displays adequate comfort level or baseline comfort level  Description  Interventions:  - Encourage patient to monitor pain and request assistance  - Assess pain using appropriate pain scale  - Administer analgesics based on type and severity of pain and evaluate response  - Implement non-pharmacological measures as appropriate and evaluate response  - Consider cultural and social influences on pain and pain management  - Notify physician/advanced practitioner if interventions unsuccessful or patient reports new pain  Outcome: Progressing     Problem: DISCHARGE PLANNING  Goal: Discharge to home or other facility with appropriate resources  Description  INTERVENTIONS:  - Identify barriers to discharge w/patient and caregiver  - Arrange for needed discharge resources and transportation as appropriate  - Identify discharge learning needs (meds, wound care, etc )  - Arrange for interpretive services to assist at discharge as needed  - Refer to Case Management Department for coordinating discharge planning if the patient needs post-hospital services based on physician/advanced practitioner order or complex needs related to functional status, cognitive ability, or social support system  Outcome: Progressing     Problem: Knowledge Deficit  Goal: Patient/family/caregiver demonstrates understanding of disease process, treatment plan, medications, and discharge instructions  Description  Complete learning assessment and assess knowledge base  Interventions:  - Provide teaching at level of understanding  - Provide teaching via preferred learning methods  Outcome: Progressing     Problem: Potential for Falls  Goal: Patient will remain free of falls  Description  INTERVENTIONS:  - Assess patient frequently for physical needs  -  Identify cognitive and physical deficits and behaviors that affect risk of falls    -  [place] fall precautions as indicated by assessment   - Educate patient/family on patient safety including physical limitations  - Instruct patient to call for assistance with activity based on assessment  - Modify environment to reduce risk of injury  - Consider OT/PT consult to assist with strengthening/mobility  Outcome: Progressing

## 2020-01-14 NOTE — CONSULTS
Michael [age] y o  female MRN: [id number]  Unit/Bed#: [facility] 820-01 Encounter: [id number]      -------------------------------------------------------------------------------------------------------------  Chief Complaint: hypotension     History of Present Illness     Lissa Post is a 81 y o  female who presents with a pelvic mass, now POD1 s/p radical hysterectomy with tumor staging/biospy  She has a past medical history of breast cancer in 2004 and 2009 s/p mastectomy, gallstone pancreatitis s/p cholecystectomy, tubal ligation, HTN and HL  She also has a history of abdominal/pelvic discomfort and pressure for which she was recurrently treated for UTI  She had an episode of post-menopausal bleeding which prompted ultrasound evaluation which revealed a pelvic mass  She was seen by Dr Pau Salazar in the office and elected to proceed with radial hysterectomy for tumor staging and biopsy  ICU is consulted for hypotension experienced today, POD1, which is likely multifactorial in the setting of mild hypovolemia, epidural analgesia, and home antihypertensive regimen  She received 1L IVF bolus today and epidural is on hold and her blood pressure is improved presently  History obtained from chart review and the patient   -------------------------------------------------------------------------------------------------------------  Assessment and Plan:    Neuro:    Diagnosis: acute post-op pain  o Plan: epidural on hold - was running at 8-10, plan to resume at low dose, likely half previous dose or less per anesthesia  o Will hold off on PO regimen per anesthesia  o Would recommend adjuncts to minimize narcotic use when able including scheduled tylenol, robaxin, gabapentin, motrin, etc   But will hold off for now     CV:    Diagnosis: hypotension  o Plan: likely multifactorial, hypovolemia, antihypertensives, and epidural analgesia contributing  o 1 L IVF received with adequate response  o Trend endpoints - q 2 hour I&O's  o VBG, may benefit from additional volume   o LR at 100 ml/hr for maintenance fluid  o DC home antihypertensives   o Goal MAP > 65    Pulm:   Diagnosis:   o Plan: pulm toilet, IS, OOB to chair  o O2 via NC PRN for saturations > 92%   o F/u CXR today    GI:    Diagnosis: s/p radical hysterectomy, bilateral salpingo-oophorectomy, appendectomy, omentectomy, peritoneal biopsies, pelvic and periaortic lymphadenectomy, and tumor staging of presumed ovarian cancer   o Plan: abdominal is distended, patient is passing flatus and denies nausea  o Cont diet per primary team but monitor tolerance  o High risk for ileus given surgery and epidural/narcotics  o No signs of peritonitis     :    Diagnosis: presumed ovarian cancer   o Plan: biopsy/staging per primary team  o Continue cuellar catheter for strict I&O q 2 hours  o Creat peaked at 1 23 pre-op, appears to be improved to baseline 0 82 now  Nephrology following as consulted by primary team   o Optimize volume status  o Follow-up BMP now     F/E/N:    Plan: regular diet   Maintenance D5 1/2 KCL changed to LR for resuscitation, rate 100 ml/hr   Follow-up BMP and VBG now   Replete lytes as needed      Heme/Onc:    Diagnosis:   o Plan: no evidence of active bleeding, HGB stable this afternoon at 9 7 from 9 4  o Can continue SQ Heparin for DVT prophylaxis    Diagnosis: history of breast cancer, s/p mastectomy  Presumed ovarian cancer    o Plan: tumor staging and lymph node biopsies taken during OR, follow-up pathology results       Endo:    Diagnosis: no issues  o Plan: no hypoglycemia or hyperglycemia  o No evidence of adrenal insufficiency       ID:    Diagnosis:   o Plan: prophylactic post-op antibiotics   o No evidence of acute infection       MSK/Skin:    Diagnosis:   o Plan: PT/OT, OOB as tolerates   o Local skin care     Disposition: Transfer to Stepdown Level 1   Code Status: Level 1 - Full Code  --------------------------------------------------------------------------------------------------------------  Review of Systems   Constitutional: Negative for chills and fever  HENT: Positive for sore throat  Negative for facial swelling and trouble swallowing  Eyes: Negative for visual disturbance  Admits to blurry vision when BP was low but none since BP has recovered    Respiratory: Negative for cough, choking, chest tightness, wheezing and stridor  Cardiovascular: Negative for chest pain and palpitations  Gastrointestinal: Positive for abdominal distention and abdominal pain (lower abdomen over incision site )  Negative for nausea and vomiting  Genitourinary: Negative for flank pain and hematuria  Musculoskeletal: Negative for arthralgias, back pain, myalgias and neck pain  Neurological: Positive for dizziness (when BP was low, denies dizziness presently )  Negative for facial asymmetry, speech difficulty, weakness, numbness and headaches  Psychiatric/Behavioral: Negative for confusion  The patient is not nervous/anxious  A 12-point, complete review of systems was reviewed and negative except as stated above     Physical Exam   Constitutional: She is oriented to person, place, and time  No distress  HENT:   Head: Normocephalic and atraumatic  Eyes: Pupils are equal, round, and reactive to light  Conjunctivae and EOM are normal  Right eye exhibits no discharge  Left eye exhibits no discharge  Neck: Normal range of motion  No JVD present  No tracheal deviation present  Cardiovascular: Normal rate, regular rhythm, normal heart sounds and intact distal pulses  Pulmonary/Chest: Effort normal and breath sounds normal  No stridor  No respiratory distress  She has no wheezes  She has no rales  She exhibits no tenderness  Abdominal: Bowel sounds are normal  She exhibits distension (tympanic, mostly upper quadrants )  She exhibits no mass   There is tenderness (around lower abdomen/incision site)  There is no rebound and no guarding  Lower abdomen incision CDI, well approximated   Laparotomy incisions CDI, closed with surgical glue    Genitourinary:   Genitourinary Comments: Indwelling cuellar with clear yellow urine    Musculoskeletal: She exhibits no edema or deformity  Neurological: She is alert and oriented to person, place, and time  Moves all extremities with equal strength    Skin: Skin is warm and dry  Capillary refill takes less than 2 seconds  She is not diaphoretic     Psychiatric: She has a normal mood and affect      --------------------------------------------------------------------------------------------------------------  Historical Information   Past Medical History:   Diagnosis Date    Breast cancer ([facility] [address] )     2004 and then 2009    Chicken pox     Frequent UTI     Heart disease     High cholesterol     History of blood transfusion     Hypertension     PONV (postoperative nausea and vomiting)      Past Surgical History:   Procedure Laterality Date    APPENDECTOMY      BREAST CYST EXCISION      COLONOSCOPY      CYSTOSCOPY Bilateral 1/13/2020    Procedure: CYSTOSCOPY: CYSTOSCOPY WITH PERIOPERATIVE URETERAL CATHETERS  PLACEMENT;  Surgeon: Parth Anderson MD;  [facility]: [facility];  Service: Gynecology Oncology    HYSTERECTOMY N/A 1/13/2020    Procedure: EXPLORATORY LAPAROTOMY, OVARIAN CANCER STAGING WITH TOTAL HYSTERECTOMY, BILATERAL SALPINGO-OOPHORECTOMY, BILATERAL PELVIC AND PERIAORTIC LYMPHADENECTOMY, INDICATED APPENDECTOMY, OMENTECTOMY, STAGING PERITONEAL BIOPSIES ;  Surgeon: Parth Anderson MD;  [facility]: [facility];  Service: Gynecology Oncology    MASTECTOMY Bilateral 2009    TX LAP,DIAGNOSTIC ABDOMEN N/A 1/13/2020    Procedure: LAPAROSCOPY DIAGNOSTIC;  Surgeon: Parth Anderson MD;  [facility];  Service: Gynecology Oncology    REPAIR KNEE LIGAMENT Bilateral     ROTATOR CUFF REPAIR       Social History   Social History Substance and Sexual Activity   Alcohol Use Not Currently    Frequency: 2-3 times a week    Comment: socially      Social History     Substance and Sexual Activity   Drug Use Never     Social History     Tobacco Use   Smoking Status Former Smoker    Types: Cigarettes    Last attempt to quit: 10/31/1987    Years since quittin 2   Smokeless Tobacco Never Used     Exercise History: independent   Family History:   Family History   Problem Relation Age of Onset    Breast cancer Mother 50    Lung cancer Father     Prostate cancer Brother      I have reviewed this patient's family history and commented on sigificant items within the HPI    Vitals:   Vitals:    20 1414 20 1424 20 1438 20 1500   BP: 92/52 90/60 109/59 116/62   BP Location: Left arm Left arm Left arm Left arm   Pulse: 70 68 75 81   Resp:       Temp:       TempSrc:       SpO2:       Weight:       Height:         Temp  Min: 97 3 °F (36 3 °C)  Max: 98 6 °F (37 °C)  IBW: 70 8 kg  Height: 5' 11" (180 3 cm)  Body mass index is 36 12 kg/m²    N/A    Medications:  Current Facility-Administered Medications   Medication Dose Route Frequency    acetaminophen (TYLENOL) tablet 975 mg  975 mg Oral Q8H PRN    heparin (porcine) subcutaneous injection 5,000 Units  5,000 Units Subcutaneous Q8H [address]    HYDROmorphone (DILAUDID) injection 0 5 mg  0 5 mg Intravenous Q3H PRN    lactated ringers infusion  100 mL/hr Intravenous Continuous    metoprolol (LOPRESSOR) injection 5 mg  5 mg Intravenous Q6H PRN    nalbuphine (NUBAIN) injection 5 mg  5 mg Intravenous Q3H PRN    ondansetron (ZOFRAN) injection 4 mg  4 mg Intravenous Q6H PRN    oxyCODONE (ROXICODONE) immediate release tablet 10 mg  10 mg Oral Q4H PRN    oxyCODONE (ROXICODONE) IR tablet 5 mg  5 mg Oral Q4H PRN    ropivacaine 0 1% and fentaNYL 2 mcg/mL PCEA   Epidural Continuous    venlafaxine (EFFEXOR-XR) 24 hr capsule 150 mg  150 mg Oral Daily With Breakfast    zolpidem (AMBIEN) tablet 5 mg  5 mg Oral HS     Home medications:  Prior to Admission Medications   Prescriptions Last Dose Informant Patient Reported? Taking? cholecalciferol (VITAMIN D3) 400 units tablet 1/6/2020  Yes Yes   Sig: Take 400 Units by mouth daily   estradiol (ESTRACE) 0 1 mg/g vaginal cream 1/10/2020 Self Yes Yes   Sig: insert 1 gram vaginally two times a week   lansoprazole (PREVACID) 15 mg capsule 1/8/2020  Yes Yes   Sig: Take 15 mg by mouth daily   losartan-hydrochlorothiazide (HYZAAR) 50-12 5 mg per tablet 1/11/2020 Self Yes Yes   metoprolol tartrate (LOPRESSOR) 25 mg tablet 1/12/2020 at Unknown time Self Yes Yes   Sig: Take by mouth   psyllium (METAMUCIL) 58 6 % packet 1/12/2020 at Unknown time  Yes Yes   Sig: Take 1 packet by mouth daily   simvastatin (ZOCOR) 20 mg tablet 1/11/2020 Self Yes Yes   trimethoprim (PROLOPRIM) 100 mg tablet 1/12/2020 at Unknown time Self Yes Yes   venlafaxine 150 MG TB24 1/12/2020 at Unknown time Self Yes Yes   zolpidem (AMBIEN CR) 12 5 MG CR tablet 1/12/2020 at Unknown time Self Yes Yes      Facility-Administered Medications: None     Allergies:  No Known Allergies      Laboratory and Diagnostics:  Results from last 7 days   Lab Units 01/14/20  1331 01/14/20  0540 01/10/20  1122   WBC Thousand/uL 9 15 7 50 6 04   HEMOGLOBIN g/dL 9 7* 9 4* 12 4   HEMATOCRIT % 30 3* 29 4* 39 4   PLATELETS Thousands/uL 197 205 236   NEUTROS PCT % 76*  --   --    MONOS PCT % 8  --   --      Results from last 7 days   Lab Units 01/14/20  0540 01/10/20  1122 01/08/20  1535   SODIUM mmol/L 137 138  --    POTASSIUM mmol/L 3 9 3 6  --    CHLORIDE mmol/L 109* 104  --    CO2 mmol/L 23 25  --    ANION GAP mmol/L 5 9  --    BUN mg/dL 9 17 21   CREATININE mg/dL 0 82 0 91 1 23   CALCIUM mg/dL 7 7* 9 3  --    GLUCOSE RANDOM mg/dL 113 106  --           Results from last 7 days   Lab Units 01/13/20  0922   INR  1 05              ABG:    VBG:          Micro:        Imaging: I have personally reviewed pertinent reports  ------------------------------------------------------------------------------------------------------------  Advance Directive and Living Will:      Power of :    POLST:    ------------------------------------------------------------------------------------------------------------  Anticipated Length of Stay is > 2 midnights    Counseling / Coordination of Care  Total Critical Care time spent 22 minutes excluding procedures, teaching and family updates  YOMAIRA Wang        Portions of the record may have been created with voice recognition software  Occasional wrong word or "sound a like" substitutions may have occurred due to the inherent limitations of voice recognition software    Read the chart carefully and recognize, using context, where substitutions have occurred

## 2020-01-14 NOTE — PROGRESS NOTES
GYN-ONC Progress Note  Margi Olsen  [id number]  PP[facility] 820/[facility]HP 820-01  1/14/2020  5:12 AM    ASSESS: 81year old with likely ovarian cancer, s/p cystoscopy with perioperative ureteral catheter placement, diagnostic laparoscopy, exploratory laparotomy, total abdominal hysterectomy, bilateral salpingoophorectomy, bilateral pelvic and periaortic lymphadenectomy, indicated appendectomy, omentectomy, staging peritoneal biopsies    PLAN:    1  Ovarian cancer: s/p surgery, awaiting final pathology    2  Hypertension: BP q24h /60-70s, to restart home losartan-hctz and metoprolol today    3  Depression: home effexor    4  Insomnia: home ambien    Dispo: inpatient     PPx: SCDs, Heparin 5000 units TID   FEN: regular diet, replete lytes prn, D5 1/2NS + 20KCL  Pain mgmnt: Epidural per APS  Invasive lines: cuellar catheter  Code status: full  ____________________    SUBJECTIVE  History of Present Illness:  Overnight: no acute events  Pain: most recently rated 4/10 focused at abdominal incision, using epidural  Tolerating Oral Intake: tolerated jello yesterday evening, drinking  Voiding: cuellar catheter in place, 0 96 cc/kg/hr urine output overnight   Flatus: yes  Bowel Movement: no  Ambulating: no as epidural remains in place  Vaginal Bleeding: no  Pelvic Pain: no  Chest Pain: no  Shortness of Breath: no  Leg Pain/Discomfort: no    OBJECTIVE  Vitals  Temp:  [97 3 °F (36 3 °C)-98 6 °F (37 °C)] 98 1 °F (36 7 °C)  HR:  [79-91] 80  Resp:  [12-20] 18  BP: ()/(55-78) 106/63       Intake/Output Summary (Last 24 hours) at 1/14/2020 0512  Last data filed at 1/14/2020 0300  Gross per 24 hour   Intake 3460 45 ml   Output 1600 ml   Net 1860 45 ml       Physical Exam:   Physical Exam   Constitutional: She is oriented to person, place, and time  She appears well-developed and well-nourished  No distress  Cardiovascular: Normal rate, regular rhythm, normal heart sounds and intact distal pulses     Pulmonary/Chest: Effort normal and breath sounds normal  No stridor  No respiratory distress  Abdominal: Soft  Bowel sounds are normal  She exhibits no distension  There is no tenderness  Abdominal incision clean, dry, and intact    Neurological: She is alert and oriented to person, place, and time  Skin: Skin is warm and dry  She is not diaphoretic  Psychiatric: She has a normal mood and affect  Her behavior is normal          Labs:   Recent Results (from the past 72 hour(s))   Protime-INR    Collection Time: 01/13/20  9:22 AM   Result Value Ref Range    Protime 13 3 11 6 - 14 5 seconds    INR 1 05 0 84 - 1 19   ABO/Rh    Collection Time: 01/13/20  9:22 AM   Result Value Ref Range    ABO Grouping O     Rh Factor Positive    Tissue Exam    Collection Time: 01/13/20  1:16 PM   Result Value Ref Range    Case Report       Surgical Pathology Report                         Case: [id number]                                   Authorizing Provider:  Rai Rivers MD      Collected:           01/13/2020 1316              Ordering Location:     [address] [place] [address]      Received:            01/13/2020 8050 [facility] Operating Room                                                      Pathologist:           Genna Sanchez MD                                                        Intraop:               Genna Sanchez MD                                                        Specimens:   A) - Abdominal, gastro colic omentum                                                                B) - Ovary, Left, left ovarian mass                                                        Intraoperative Consultation       BF1  Ovary, Left:  At least borderline tumor on representative section  Concerning for carcinoma on gross exam  Makenna LIRA  1/13/20 1410  Prepare Leukoreduced RBC:Has consent been obtained? Yes; Where is the Surgery Scheduled? [facility];  Date of Surgery: 1/13/2020; Date of Infusion: 1/13/2020, 3 Units    Collection Time: 01/13/20  3:49 PM   Result Value Ref Range    Unit Product Code S7760E39     Unit Number [id number]     Unit ABO O     Unit [facility] POS     Crossmatch Compatible     Unit Dispense Status Crossmatched     Unit Product Code I6052J44     Unit Number [id number]     Unit ABO O     Unit [facility] POS     Crossmatch Compatible     Unit Dispense Status Crossmatched     Unit Product Code D7951J96     Unit Number [id number]     Unit ABO O     Unit [facility] POS     Crossmatch Compatible     Unit Dispense Status Crossmatched        Meds:  Scheduled Meds:  Current Facility-Administered Medications:  dextrose 5 % and sodium chloride 0 45 % with KCl 20 mEq/L 75 mL/hr Intravenous Continuous [facility], DO    heparin (porcine) 5,000 Units Subcutaneous Q8H [address] [facility], DO    lactated ringers 50 mL/hr Intravenous Continuous [facility], DO Last Rate: 50 mL/hr (01/14/20 0253)   lactated ringers 50 mL/hr Intravenous Continuous Liya Cue, CRNA    losartan potassium-hydrochlorothiazide (HYZAAR 50/12  5) combination  Oral Daily [facility], DO    metoprolol 5 mg Intravenous Q6H PRN [facility], DO    metoprolol tartrate 25 mg Oral Daily [facility], DO    ondansetron 4 mg Intravenous Q6H PRN [facility], DO    ropivacaine 0 1% and fentaNYL 2 mcg/mL  Epidural Continuous [facility], DO    venlafaxine 150 mg Oral Daily With Breakfast [facility], DO    zolpidem 5 mg Oral HS Elmer Lagos MD      Continuous Infusions:  dextrose 5 % and sodium chloride 0 45 % with KCl 20 mEq/L 75 mL/hr    lactated ringers 50 mL/hr Last Rate: 50 mL/hr (01/14/20 0253)   lactated ringers 50 mL/hr    ropivacaine 0 1% and fentaNYL 2 mcg/mL       PRN Meds: metoprolol    ondansetron    Imaging Studies: I have personally reviewed pertinent reports  EKG, Pathology, Microbiology, and Other Studies: I have personally reviewed pertinent reports        __________________    Signature / Title: Catrina Boykin Eloisa Bobo, Ob/Gyn, PGY-3  Date: 1/14/2020  Time: 5:12 AM

## 2020-01-14 NOTE — PROGRESS NOTES
Gyn Oncology Progress note   Flavio Davalos 81 y o  female MRN: [id number]  Unit/Bed#: [facility] 820-01 Encounter: [id number]    Assessment: 81 y o  POD# 0 from diagnostic laparoscopy, exploratory laparotomy, total abdominal hysterectomy, bilateral salpingo-oophorectomy, bilateral pelvic and periaortic lymphadenectomy, appendectomy, omentectomy, peritoneal biopsies, cystoscopy, bilateral stent placement    Plan:    1) Postoperative care  Follow up final pathology  Analgesia: epidural in place, working well  Urinary output: 0 85cc/kg/hr over last 4 hours, blood tinged urine in cuellar  FEN: regular diet, D5 1/2NS + 20KCl at 75cc/hr  DVT ppx: heparin 5000U TID, SCDs    2) Hypertension  Metoprolol 5mg every 6 hours prn  Home meds to start in the morning    3) Depression  Continue home effexor      Subjective: Flavio Davalos has no current complaints  Pain is well controlled with her epidural   Patient is currently voiding through her cuellar  She is not ambulating  Patient is not currently passing flatus and has had no bowel movement  She is tolerating PO, and denies nausea or vomitting  Patient denies fever, chills, chest pain, shortness of breath, or calf tenderness  /55   Pulse 82   Temp 97 8 °F (36 6 °C)   Resp 16   Ht 5' 11" (1 803 m)   Wt 117 kg (259 lb)   LMP  (LMP Unknown)   SpO2 96%   BMI 36 12 kg/m²     I/O last 3 completed shifts: In: 2950 [I V :2200; IV Piggyback:750]  Out: 700 [Urine:350; Blood:350]  No intake/output data recorded  Lab Results   Component Value Date    WBC 6 04 01/10/2020    HGB 12 4 01/10/2020    HCT 39 4 01/10/2020    MCV 94 01/10/2020     01/10/2020       Lab Results   Component Value Date    CALCIUM 9 3 01/10/2020    K 3 6 01/10/2020    CO2 25 01/10/2020     01/10/2020    BUN 17 01/10/2020    CREATININE 0 91 01/10/2020           Physical Exam   Constitutional: She is oriented to person, place, and time  She appears well-developed and well-nourished  Cardiovascular: Normal rate, regular rhythm and normal heart sounds  Exam reveals no gallop and no friction rub  No murmur heard  Pulmonary/Chest: Effort normal and breath sounds normal  No stridor  No respiratory distress  She has no wheezes  She has no rales  She exhibits no tenderness  Abdominal: Soft  Bowel sounds are normal  She exhibits no distension and no mass  There is no tenderness  There is no guarding  Vertical incision clean, dry, intact    Genitourinary:   Genitourinary Comments: 400cc of blood tinged urine in cuellar   Neurological: She is alert and oriented to person, place, and time  Vitals reviewed               Zina Christina MD  1/13/2020  7:59 PM

## 2020-01-14 NOTE — NURSING NOTE
Patient complaining of blurry vision and lightheadedness  Sitting upright in chair  BP 58/37  OBGYN service messaged on Amion  Call received from [facility], DO  Proceeded to call anesthesia  Spoke with Dr Lynda Laurent now present at bedside  Stopped Epidural  [facility] DO now present at bedside  1L bolus ordered and started  Patient now in supine position  BPs slowly trending up, symptoms resolving  Assessing need for upgraded level of care  CBC drawn, Hg 9 7   present at bedside, updated on plan of care along with patient  Will continue to monitor  CC consult placed

## 2020-01-15 ENCOUNTER — APPOINTMENT (INPATIENT)
Dept: RADIOLOGY | Facility: HOSPITAL | Age: 70
DRG: 737 | End: 2020-01-15
Payer: MEDICARE

## 2020-01-15 LAB
ANION GAP SERPL CALCULATED.3IONS-SCNC: 5 MMOL/L (ref 4–13)
BASOPHILS # BLD AUTO: 0.02 THOUSANDS/ΜL (ref 0–0.1)
BASOPHILS NFR BLD AUTO: 0 % (ref 0–1)
BUN SERPL-MCNC: 9 MG/DL (ref 5–25)
CA-I BLD-SCNC: 1.04 MMOL/L (ref 1.12–1.32)
CALCIUM SERPL-MCNC: 8.2 MG/DL (ref 8.3–10.1)
CHLORIDE SERPL-SCNC: 104 MMOL/L (ref 100–108)
CO2 SERPL-SCNC: 26 MMOL/L (ref 21–32)
CREAT SERPL-MCNC: 0.68 MG/DL (ref 0.6–1.3)
EOSINOPHIL # BLD AUTO: 0.1 THOUSAND/ΜL (ref 0–0.61)
EOSINOPHIL NFR BLD AUTO: 1 % (ref 0–6)
ERYTHROCYTE [DISTWIDTH] IN BLOOD BY AUTOMATED COUNT: 13.7 % (ref 11.6–15.1)
GFR SERPL CREATININE-BSD FRML MDRD: 91 ML/MIN/1.73SQ M
GLUCOSE SERPL-MCNC: 126 MG/DL (ref 65–140)
HCT VFR BLD AUTO: 35.1 % (ref 34.8–46.1)
HCV AB SER QL: NORMAL
HGB BLD-MCNC: 11.1 G/DL (ref 11.5–15.4)
IMM GRANULOCYTES # BLD AUTO: 0.04 THOUSAND/UL (ref 0–0.2)
IMM GRANULOCYTES NFR BLD AUTO: 0 % (ref 0–2)
LYMPHOCYTES # BLD AUTO: 0.86 THOUSANDS/ΜL (ref 0.6–4.47)
LYMPHOCYTES NFR BLD AUTO: 9 % (ref 14–44)
MAGNESIUM SERPL-MCNC: 2.1 MG/DL (ref 1.6–2.6)
MCH RBC QN AUTO: 30.2 PG (ref 26.8–34.3)
MCHC RBC AUTO-ENTMCNC: 31.6 G/DL (ref 31.4–37.4)
MCV RBC AUTO: 95 FL (ref 82–98)
MONOCYTES # BLD AUTO: 0.87 THOUSAND/ΜL (ref 0.17–1.22)
MONOCYTES NFR BLD AUTO: 9 % (ref 4–12)
NEUTROPHILS # BLD AUTO: 7.99 THOUSANDS/ΜL (ref 1.85–7.62)
NEUTS SEG NFR BLD AUTO: 81 % (ref 43–75)
NRBC BLD AUTO-RTO: 0 /100 WBCS
PLATELET # BLD AUTO: 230 THOUSANDS/UL (ref 149–390)
PMV BLD AUTO: 9.8 FL (ref 8.9–12.7)
POTASSIUM SERPL-SCNC: 3.7 MMOL/L (ref 3.5–5.3)
RBC # BLD AUTO: 3.68 MILLION/UL (ref 3.81–5.12)
SODIUM SERPL-SCNC: 135 MMOL/L (ref 136–145)
WBC # BLD AUTO: 9.88 THOUSAND/UL (ref 4.31–10.16)

## 2020-01-15 PROCEDURE — 99232 SBSQ HOSP IP/OBS MODERATE 35: CPT | Performed by: INTERNAL MEDICINE

## 2020-01-15 PROCEDURE — 82330 ASSAY OF CALCIUM: CPT | Performed by: NURSE PRACTITIONER

## 2020-01-15 PROCEDURE — 80048 BASIC METABOLIC PNL TOTAL CA: CPT | Performed by: NURSE PRACTITIONER

## 2020-01-15 PROCEDURE — 83735 ASSAY OF MAGNESIUM: CPT | Performed by: NURSE PRACTITIONER

## 2020-01-15 PROCEDURE — 99232 SBSQ HOSP IP/OBS MODERATE 35: CPT | Performed by: ANESTHESIOLOGY

## 2020-01-15 PROCEDURE — 86803 HEPATITIS C AB TEST: CPT | Performed by: NURSE PRACTITIONER

## 2020-01-15 PROCEDURE — 74018 RADEX ABDOMEN 1 VIEW: CPT

## 2020-01-15 PROCEDURE — 99024 POSTOP FOLLOW-UP VISIT: CPT | Performed by: OBSTETRICS & GYNECOLOGY

## 2020-01-15 PROCEDURE — 99232 SBSQ HOSP IP/OBS MODERATE 35: CPT | Performed by: SURGERY

## 2020-01-15 PROCEDURE — 85025 COMPLETE CBC W/AUTO DIFF WBC: CPT | Performed by: NURSE PRACTITIONER

## 2020-01-15 RX ORDER — CALCIUM GLUCONATE 20 MG/ML
2 INJECTION, SOLUTION INTRAVENOUS ONCE
Status: COMPLETED | OUTPATIENT
Start: 2020-01-15 | End: 2020-01-15

## 2020-01-15 RX ORDER — DEXTROSE, SODIUM CHLORIDE, AND POTASSIUM CHLORIDE 5; .9; .15 G/100ML; G/100ML; G/100ML
50 INJECTION INTRAVENOUS CONTINUOUS
Status: DISCONTINUED | OUTPATIENT
Start: 2020-01-15 | End: 2020-01-19

## 2020-01-15 RX ORDER — METOCLOPRAMIDE HYDROCHLORIDE 5 MG/ML
10 INJECTION INTRAMUSCULAR; INTRAVENOUS EVERY 6 HOURS PRN
Status: DISCONTINUED | OUTPATIENT
Start: 2020-01-15 | End: 2020-01-15

## 2020-01-15 RX ORDER — LORAZEPAM 2 MG/ML
1 INJECTION INTRAMUSCULAR ONCE
Status: COMPLETED | OUTPATIENT
Start: 2020-01-15 | End: 2020-01-15

## 2020-01-15 RX ORDER — METOCLOPRAMIDE HYDROCHLORIDE 5 MG/ML
10 INJECTION INTRAMUSCULAR; INTRAVENOUS EVERY 6 HOURS PRN
Status: DISCONTINUED | OUTPATIENT
Start: 2020-01-15 | End: 2020-01-19 | Stop reason: HOSPADM

## 2020-01-15 RX ADMIN — DEXTROSE, SODIUM CHLORIDE, AND POTASSIUM CHLORIDE 125 ML/HR: 5; .9; .15 INJECTION INTRAVENOUS at 08:40

## 2020-01-15 RX ADMIN — METOCLOPRAMIDE 10 MG: 5 INJECTION, SOLUTION INTRAMUSCULAR; INTRAVENOUS at 12:58

## 2020-01-15 RX ADMIN — Medication 1 SPRAY: at 22:50

## 2020-01-15 RX ADMIN — ROPIVACAINE HYDROCHLORIDE: 5 INJECTION, SOLUTION EPIDURAL; INFILTRATION; PERINEURAL at 07:23

## 2020-01-15 RX ADMIN — DEXTROSE, SODIUM CHLORIDE, AND POTASSIUM CHLORIDE 125 ML/HR: 5; .9; .15 INJECTION INTRAVENOUS at 17:42

## 2020-01-15 RX ADMIN — ONDANSETRON 4 MG: 2 INJECTION INTRAMUSCULAR; INTRAVENOUS at 16:54

## 2020-01-15 RX ADMIN — HEPARIN SODIUM 5000 UNITS: 5000 INJECTION INTRAVENOUS; SUBCUTANEOUS at 14:14

## 2020-01-15 RX ADMIN — ONDANSETRON 4 MG: 2 INJECTION INTRAMUSCULAR; INTRAVENOUS at 03:02

## 2020-01-15 RX ADMIN — METOCLOPRAMIDE 10 MG: 5 INJECTION, SOLUTION INTRAMUSCULAR; INTRAVENOUS at 18:29

## 2020-01-15 RX ADMIN — CALCIUM GLUCONATE 2 G: 20 INJECTION, SOLUTION INTRAVENOUS at 08:43

## 2020-01-15 RX ADMIN — HEPARIN SODIUM 5000 UNITS: 5000 INJECTION INTRAVENOUS; SUBCUTANEOUS at 06:12

## 2020-01-15 RX ADMIN — ONDANSETRON 4 MG: 2 INJECTION INTRAMUSCULAR; INTRAVENOUS at 10:58

## 2020-01-15 RX ADMIN — VENLAFAXINE HYDROCHLORIDE 150 MG: 150 CAPSULE, EXTENDED RELEASE ORAL at 10:56

## 2020-01-15 RX ADMIN — METOCLOPRAMIDE 10 MG: 5 INJECTION, SOLUTION INTRAMUSCULAR; INTRAVENOUS at 06:12

## 2020-01-15 RX ADMIN — HEPARIN SODIUM 5000 UNITS: 5000 INJECTION INTRAVENOUS; SUBCUTANEOUS at 22:42

## 2020-01-15 RX ADMIN — LORAZEPAM 1 MG: 2 INJECTION INTRAMUSCULAR; INTRAVENOUS at 22:42

## 2020-01-15 NOTE — PROGRESS NOTES
Pt continually having c/o nausea, prn zofran given without any improvement  Elmer Lagos MD called and 10mg metoclopramide (reglan) was ordered and given  All po meds being held at this time 2/2 nausea  VSS, will continue to monitor

## 2020-01-15 NOTE — PROGRESS NOTES
YN-ONC Progress Note  Miki Scale  [id number]  PPHP 518/PPHP 431-02  1/15/2020  5:12 AM    ASSESS:  73-year-old with likely ovarian cancer, now POD #2 status post total abdominal hysterectomy, bilateral salpingo oophorectomy, staging procedure, stable     PLAN:    1  Ovarian cancer:  status post surgery, follow-up final pathology    2  Hypotension:  Improved since yesterday, likely due to combination of epidural anesthesia and oral antihypertensive mediations, blood pressures overnight 100-150/70-90s, continue to hold home antihypertensive medications    3  Acute kidney injury: in the setting of #2, Cr 0 82-->1 18-->follow-up AM BMP, avoid nephrotoxic medications, nephrology following, recommendations appreciated    4  Depression: home effexor    5   Insomnia: home effexor    Dispo: inpatient, anticipate transfer to [facility] surg floor from step-down 1    PPx: SCDs, Heparin 5000 units TID  FEN: regular diet, replete lytes prn, LR at 125 cc/hr   Pain mgmnt: epidural  Invasive lines: cuellar catheter  Code status: full   ____________________    SUBJECTIVE  History of Present Illness:  Overnight: reporting nausea, improved with dose of reglan and then able to sleep  Pain: most recently 4/10, focused at the abdomen  Tolerating Oral Intake: yes   Voiding: cuellar catheter remains in place, 1 40 cc/kg/hr noted overnight   Flatus: yes  Bowel Movement: no  Ambulating: walked around room yesterday, has not ambulated since hypotensive episode  Vaginal Bleeding: no  Pelvic Pain: no  Chest Pain: no  Shortness of Breath: no  Leg Pain/Discomfort: no    OBJECTIVE  Vitals  Temp:  [97 8 °F (36 6 °C)-98 6 °F (37 °C)] 97 8 °F (36 6 °C)  HR:  [68-94] 92  Resp:  [16-24] 17  BP: ()/(37-90) 154/90       Intake/Output Summary (Last 24 hours) at 1/15/2020 0512  Last data filed at 1/15/2020 0200  Gross per 24 hour   Intake 3027 65 ml   Output 2725 ml   Net 302 65 ml       Physical Exam:   Physical Exam   Constitutional: She is oriented to person, place, and time  She appears well-developed and well-nourished  No distress  Cardiovascular: Normal rate, regular rhythm, normal heart sounds and intact distal pulses  Pulmonary/Chest: Effort normal and breath sounds normal  No stridor  No respiratory distress  Abdominal: Soft  Bowel sounds are normal  She exhibits no distension  There is no tenderness  Incision clean, dry, and intact    Neurological: She is alert and oriented to person, place, and time  Skin: Skin is warm and dry  She is not diaphoretic  Psychiatric: She has a normal mood and affect  Her behavior is normal          Labs:   Recent Results (from the past 72 hour(s))   Protime-INR    Collection Time: 01/13/20  9:22 AM   Result Value Ref Range    Protime 13 3 11 6 - 14 5 seconds    INR 1 05 0 84 - 1 19   ABO/Rh    Collection Time: 01/13/20  9:22 AM   Result Value Ref Range    ABO Grouping O     Rh Factor Positive    Tissue Exam    Collection Time: 01/13/20  1:16 PM   Result Value Ref Range    Case Report       Surgical Pathology Report                         Case: [id number]                                   Authorizing Provider:  Brisa Aldana MD      Collected:           01/13/2020 1316              Ordering Location:     [address] [place] [address]      Received:            01/13/2020 [address] [facility] Operating Room                                                      Pathologist:           Darryl Redmond MD                                                        Intraop:               Darryl Redmond MD                                                        Specimens:   A) - Abdominal, gastro colic omentum                                                                B) - Ovary, Left, left ovarian mass                                                        Intraoperative Consultation       BF1  Ovary, Left:  At least borderline tumor on representative section   Concerning for carcinoma on gross exam  Fatessie Matt M D  1/13/20 1410  CBC (with platelets)    Collection Time: 01/14/20  5:40 AM   Result Value Ref Range    WBC 7 50 4 31 - 10 16 Thousand/uL    RBC 3 05 (L) 3 81 - 5 12 Million/uL    Hemoglobin 9 4 (L) 11 5 - 15 4 g/dL    Hematocrit 29 4 (L) 34 8 - 46 1 %    MCV 96 82 - 98 fL    MCH 30 8 26 8 - 34 3 pg    MCHC 32 0 31 4 - 37 4 g/dL    RDW 13 8 11 6 - 15 1 %    Platelets 558 337 - 998 Thousands/uL    MPV 9 9 8 9 - 12 7 fL   Basic metabolic panel    Collection Time: 01/14/20  5:40 AM   Result Value Ref Range    Sodium 137 136 - 145 mmol/L    Potassium 3 9 3 5 - 5 3 mmol/L    Chloride 109 (H) 100 - 108 mmol/L    CO2 23 21 - 32 mmol/L    ANION GAP 5 4 - 13 mmol/L    BUN 9 5 - 25 mg/dL    Creatinine 0 82 0 60 - 1 30 mg/dL    Glucose 113 65 - 140 mg/dL    Calcium 7 7 (L) 8 3 - 10 1 mg/dL    eGFR 74 ml/min/1 73sq m   Prepare Leukoreduced RBC:Has consent been obtained? Yes; Where is the Surgery Scheduled? [facility];  Date of Surgery: 1/13/2020; Date of Infusion: 1/13/2020, 3 Units    Collection Time: 01/14/20  7:12 AM   Result Value Ref Range    Unit Product Code W0996V55     Unit Number [id number]     Unit ABO O     Unit RH POS     Crossmatch Compatible     Unit Dispense Status Return to [facility]     Unit Product Code F1387N02     Unit Number [id number]     Unit ABO O     Unit RH POS     Crossmatch Compatible     Unit Dispense Status Return to [facility]     Unit Product Code B2293D35     Unit Number [id number]     Unit ABO O     Unit [facility] POS     Crossmatch Compatible     Unit Dispense Status Return to [facility]    CBC and differential    Collection Time: 01/14/20  1:31 PM   Result Value Ref Range    WBC 9 15 4 31 - 10 16 Thousand/uL    RBC 3 15 (L) 3 81 - 5 12 Million/uL    Hemoglobin 9 7 (L) 11 5 - 15 4 g/dL    Hematocrit 30 3 (L) 34 8 - 46 1 %    MCV 96 82 - 98 fL    MCH 30 8 26 8 - 34 3 pg    MCHC 32 0 31 4 - 37 4 g/dL    RDW 14 0 11 6 - 15 1 %    MPV 9 4 8 9 - 12 7 fL    Platelets 197 149 - 390 Thousands/uL    nRBC 0 /100 WBCs    Neutrophils Relative 76 (H) 43 - 75 %    Immat GRANS % 0 0 - 2 %    Lymphocytes Relative 15 14 - 44 %    Monocytes Relative 8 4 - 12 %    Eosinophils Relative 1 0 - 6 %    Basophils Relative 0 0 - 1 %    Neutrophils Absolute 6 83 1 85 - 7 62 Thousands/µL    Immature Grans Absolute 0 04 0 00 - 0 20 Thousand/uL    Lymphocytes Absolute 1 41 0 60 - 4 47 Thousands/µL    Monocytes Absolute 0 77 0 17 - 1 22 Thousand/µL    Eosinophils Absolute 0 06 0 00 - 0 61 Thousand/µL    Basophils Absolute 0 04 0 00 - 0 10 Thousands/µL   Urinalysis with microscopic    Collection Time: 01/14/20  1:35 PM   Result Value Ref Range    Clarity, UA Clear     Color, UA Yellow     Specific [place], UA 1 005 1 003 - 1 030    pH, UA 6 0 4 5, 5 0, 5 5, 6 0, 6 5, 7 0, 7 5, 8 0    Glucose, UA Negative Negative mg/dl    Ketones, UA Negative Negative mg/dl    Blood, UA Large (A) Negative    Protein, UA Negative Negative mg/dl    Nitrite, UA Negative Negative    Bilirubin, UA Negative Negative    Urobilinogen, UA 0 2 0 2, 1 0 E U /dl E U /dl    Leukocytes, UA Trace (A) Negative    WBC, UA 2-4 (A) None Seen, 0-5, 5-55, 5-65 /hpf    RBC, UA 2-4 (A) None Seen, 0-5 /hpf    Bacteria, UA Occasional None Seen, Occasional /hpf    Epithelial Cells Occasional None Seen, Occasional /hpf   Blood gas, venous    Collection Time: 01/14/20  3:48 PM   Result Value Ref Range    pH, Yovani 7 376 7 300 - 7 400    pCO2, Yovani 37 9 (L) 42 0 - 50 0 mm Hg    pO2, Yovani 55 9 (H) 35 0 - 45 0 mm Hg    HCO3, Yovani 21 7 (L) 24 - 30 mmol/L    Base Excess, Yovani -3 1 mmol/L    O2 Content, Yovani 13 5 ml/dL    O2 HGB, VENOUS 87 7 (H) 60 0 - 80 0 %    ROOM AIR FIO2 ra %   Basic metabolic panel    Collection Time: 01/14/20  3:48 PM   Result Value Ref Range    Sodium 135 (L) 136 - 145 mmol/L    Potassium 3 8 3 5 - 5 3 mmol/L    Chloride 106 100 - 108 mmol/L    CO2 23 21 - 32 mmol/L    ANION GAP 6 4 - 13 mmol/L    BUN 11 5 - 25 mg/dL    Creatinine 1 18 0 60 - 1 30 mg/dL    Glucose 109 65 - 140 mg/dL    Calcium 7 7 (L) 8 3 - 10 1 mg/dL    eGFR 48 ml/min/1 73sq m   Fingerstick Glucose (POCT)    Collection Time: 01/14/20  4:00 PM   Result Value Ref Range    POC Glucose 113 65 - 140 mg/dl       Meds:  Scheduled Meds:  Current Facility-Administered Medications:  acetaminophen 975 mg Oral Q8H PRN YOMAIRA Anderson    heparin (porcine) 5,000 Units Subcutaneous Q8H [address] ORQUIDE[address] Marquez, YOMAIRA    HYDROmorphone 0 5 mg Intravenous Q3H PRN YOMAIRA Moreno    lactated ringers 125 mL/hr Intravenous Continuous Allison Goel MD Last Rate: 125 mL/hr (01/14/20 2333)   metoprolol 5 mg Intravenous Q6H PRN YOMAIRA Anderson    nalbuphine 5 mg Intravenous Q3H PRN YOMAIRA Anderson    ondansetron 4 mg Intravenous Q6H PRN YOMAIRA Anderson    ropivacaine 0 1% and fentaNYL 2 mcg/mL  Epidural Continuous Katerinelakisha Segovia MD    venlafaxine 150 mg Oral Daily With Breakfast YOMAIRA Moreno    zolpidem 5 mg Oral HS YOMAIRA Anderson      Continuous Infusions:  lactated ringers 125 mL/hr Last Rate: 125 mL/hr (01/14/20 2333)   ropivacaine 0 1% and fentaNYL 2 mcg/mL       PRN Meds:   acetaminophen    HYDROmorphone    metoprolol    nalbuphine    ondansetron    Imaging Studies: I have personally reviewed pertinent reports  EKG, Pathology, Microbiology, and Other Studies: I have personally reviewed pertinent reports        __________________    Signature / Title: Nicolas Young DO, Ob/Gyn  Date: 1/15/2020  Time: 5:12 AM

## 2020-01-15 NOTE — ASSESSMENT & PLAN NOTE
S/p radical hysterectomy and tumor staging POD 2  Care per OBGYN  Hypotension yesterday responded to volume resuscitation  Remains hemodynamically normal  Appears euvolemic on exam    Plan to transfer back to [facility] level  Recommend replete Calcium, remaining care per primary service  Critical care will sign-off  Please call with any questions or concerns

## 2020-01-15 NOTE — PROGRESS NOTES
NEPHROLOGY PROGRESS NOTE   Flavio Davalos 81 y o  female MRN: [id number]  Unit/Bed#: [facility] 866-00 Encounter: [id number]    ASSESSMENT & PLAN:  Perioperative optimization  -renal function is stable postop today at creatinine 0 6  Preop creatinine from 01/10 was 0 9  Most likely baseline renal function is at creatinine 0 9  Did have slight increase in creatinine on 01/08 to creatinine 1 23, EGFR 45  Not sure about her previous baseline as she follows with her PCP at [facility] and previous lab results are not available    -she is at risk of worsening renal function considering that she had EGFR of 45 on 01/08  At this time her renal function is stable at creatinine 0 68- could have a component of dilutional as she received IV fluids yesterday and currently on iv fluids      -Recommend avoiding hypotension     -she did receive losartan hydrochlorothiazide on 1/14 am and then was stopped, would recommend continue to hold losartan hydrochlorothiazide for now as blood pressure acceptable  If blood pressure is elevated in future to more than 755 systolic and if renal function is stable, can restart losartan hydrochlorothiazide at that point   -continue current IV fluids, can stop once oral intake is better   -avoid nephrotoxins like NSAIDs and IV contrast  --check UA with urine microscopy- showed 2-4 RBC and wbc with trace leukocytes       Primary hypertension  -blood pressure acceptable  Had hypotension on 01/14 which is improved with IV fluids  - continue to hold losartan hydrochlorothiazide  Also off metoprolol  Recommend avoiding hypotension due to risk of worsening renal function     Pelvic mass status post exploratory laparotomy, total abdominal hysterectomy, bilateral salpingo-oophorectomy, appendectomy, peritoneal biopsy on 01/14-continue postop care per OBGYN     Anemia:  Hemoglobin 11 1   Continue to monitor per primary team      Discussed with Dr Arreola Settle  Nothing further to add from a renal standpoint  Will sign off  Feel free to call us back for any questions or concerns  SUBJECTIVE:  No new complaints  Patient was hypotensive yesterday but blood pressure improved with IV fluids  Currently NPO due to ileus    OBJECTIVE:  Current Weight: Weight - Scale: 117 kg (259 lb)  Vitals:    01/15/20 1513   BP: 118/76   Pulse: 88   Resp: 18   Temp: 99 1 °F (37 3 °C)   SpO2: 93%       Intake/Output Summary (Last 24 hours) at 1/15/2020 1544  Last data filed at 1/15/2020 1500  Gross per 24 hour   Intake 2671 45 ml   Output 2300 ml   Net 371 45 ml       Physical Exam  General:  Ill looking, awake  Eyes: Conjunctivae pink,  Sclera anicteric  ENT: lips and mucous membranes moist  Neck: supple   Chest: Clear to Auscultation both lungs,  no crackles, ronchus or wheezing  CVS: S1 & S2 present, normal rate, regular rhythm, no murmur    Abdomen: soft, non-tender, non-distended, Bowel sounds normoactive  Extremities: no edema of  legs  Skin: no rash  Neuro: awake, alert, oriented      Medications:    Current Facility-Administered Medications:     acetaminophen (TYLENOL) tablet 975 mg, 975 mg, Oral, Q8H PRN, Ryan Mar MD    dextrose 5 % and sodium chloride 0 9 % with KCl 20 mEq/L infusion (premix), 125 mL/hr, Intravenous, Continuous, France Blackman MD, Last Rate: 125 mL/hr at 01/15/20 0840, 125 mL/hr at 01/15/20 0840    heparin (porcine) subcutaneous injection 5,000 Units, 5,000 Units, Subcutaneous, Q8H [facility], Ryan Mar MD, 5,000 Units at 01/15/20 1414    HYDROmorphone (DILAUDID) injection 0 5 mg, 0 5 mg, Intravenous, Q3H PRN, Ryan Mar MD, 0 5 mg at 01/14/20 1538    metoclopramide (REGLAN) injection 10 mg, 10 mg, Intravenous, Q6H PRN, Rosa Elena Lott DO, 10 mg at 01/15/20 1258    metoprolol (LOPRESSOR) injection 5 mg, 5 mg, Intravenous, Q6H PRN, Ryan Mar MD    nalbuphine (NUBAIN) injection 5 mg, 5 mg, Intravenous, Q3H PRN, Ryan Mar MD    ondansetron (ZOFRAN) injection 4 mg, 4 mg, Intravenous, Q6H PRN, Leonor Loja MD, 4 mg at 01/15/20 1058    ropivacaine 0 1% and fentaNYL 2 mcg/mL PCEA, , Epidural, Continuous, Gabriel Coronado MD    venlafaxine [facility] ) 24 hr capsule 150 mg, 150 mg, Oral, Daily With Breakfast, Leonor Loja MD, 150 mg at 01/15/20 1056    zolpidem (AMBIEN) tablet 5 mg, 5 mg, Oral, HS, Leonor Loja MD, 5 mg at 01/14/20 2330    Invasive Devices:   Urethral Catheter Non-latex 16 Fr  (Active)   Reasons to continue Urinary Catheter  Post-operative urological requirements 1/15/2020  3:01 PM   Goal for Removal Other (Comment) 1/15/2020  3:01 PM   Site Assessment Clean;Skin intact 1/15/2020  3:01 PM   Collection Container Standard drainage bag 1/15/2020  3:01 PM   Securement Method Securing device (Describe) 1/15/2020  3:01 PM   Output (mL) 200 mL 1/15/2020  2:08 PM       Lab Results:   Results from last 7 days   Lab Units 01/15/20  0619 01/14/20  1548 01/14/20  1331 01/14/20  0540   WBC Thousand/uL 9 88  --  9 15 7 50   HEMOGLOBIN g/dL 11 1*  --  9 7* 9 4*   HEMATOCRIT % 35 1  --  30 3* 29 4*   PLATELETS Thousands/uL 230  --  197 205   POTASSIUM mmol/L 3 7 3 8  --  3 9   CHLORIDE mmol/L 104 106  --  109*   CO2 mmol/L 26 23  --  23   BUN mg/dL 9 11  --  9   CREATININE mg/dL 0 68 1 18  --  0 82   CALCIUM mg/dL 8 2* 7 7*  --  7 7*   MAGNESIUM mg/dL 2 1  --   --   --        Previous work up:        Portions of the record may have been created with voice recognition software  Occasional wrong word or "sound a like" substitutions may have occurred due to the inherent limitations of voice recognition software  Read the chart carefully and recognize, using context, where substitutions have occurred  If you have any questions, please contact the dictating provider

## 2020-01-15 NOTE — PROGRESS NOTES
Progress Note - Lissa Post 1950, 81 y o  female MRN: [id number]    Unit/Bed#: [facility] 633-59 Encounter: [id number]    Primary Care Provider: Becky Prasad MD   Date and time admitted to hospital: 1/13/2020  8:23 AM         Pelvic mass  Assessment & Plan  S/p radical hysterectomy and tumor staging POD 2  Possible ileus developing with distended abdomen and nausea, no vomiting and continues to pass flatus  NPO per primary team  Fluids for hydration  Remaining care per OBGYN  Hypotension yesterday responded to volume resuscitation  Remains hemodynamically normal  Appears euvolemic on exam    Plan to transfer back to med-surg level  Recommend replete Calcium, remaining care per primary service  Critical care will sign-off  Please call with any questions or concerns  ----------------------------------------------------------------------------------------  HPI/24hr events: transient hypotension responded to volume resuscitation     Disposition: Transfer to [facility]   Code Status: Level 1 - Full Code  ---------------------------------------------------------------------------------------  SUBJECTIVE      Review of Systems   Constitutional: Negative for chills, diaphoresis and fever  HENT: Negative for trouble swallowing  Eyes: Negative for visual disturbance  Respiratory: Negative for cough, choking, chest tightness, shortness of breath, wheezing and stridor  Cardiovascular: Negative for chest pain and palpitations  Gastrointestinal: Positive for abdominal distention, abdominal pain and nausea  Negative for vomiting  Genitourinary: Negative for flank pain and hematuria  Musculoskeletal: Negative for back pain and neck pain  Neurological: Negative for dizziness, light-headedness, numbness and headaches  Psychiatric/Behavioral: Negative for confusion       Review of systems was reviewed and negative unless stated above in HPI/24-hour events ---------------------------------------------------------------------------------------  OBJECTIVE    Physical Exam   Constitutional: She is oriented to person, place, and time  No distress  HENT:   Head: Normocephalic and atraumatic  Eyes: Pupils are equal, round, and reactive to light  Conjunctivae are normal  Right eye exhibits no discharge  Left eye exhibits no discharge  Neck: Normal range of motion  No tracheal deviation present  Cardiovascular: Normal rate, regular rhythm, normal heart sounds and intact distal pulses  Pulmonary/Chest: Effort normal and breath sounds normal  No stridor  No respiratory distress  She has no wheezes  She has no rales  She exhibits no tenderness  Abdominal: Soft  Bowel sounds are normal  She exhibits distension  She exhibits no mass  There is tenderness  There is no rebound and no guarding  Incision is well approximated, intact, no drainage, PATY   Musculoskeletal: She exhibits no deformity  Neurological: She is alert and oriented to person, place, and time  Moves all extremities with equal strength bilaterally    Skin: Skin is warm and dry  Capillary refill takes less than 2 seconds  She is not diaphoretic  Psychiatric: She has a normal mood and affect  Vitals   Vitals:    01/15/20 0400 01/15/20 0500 01/15/20 0700 01/15/20 0752   BP: 137/84 159/96 105/75    Pulse: 88 (!) 110 88    Resp: 20 16 20    Temp:    98 2 °F (36 8 °C)   TempSrc:    Axillary   SpO2: 98% 97% 99%    Weight:       Height:         Temp (24hrs), Av 1 °F (36 7 °C), Min:97 8 °F (36 6 °C), Max:98 6 °F (37 °C)  Current: Temperature: 98 2 °F (36 8 °C)          SpO2: SpO2: 99 %  O2 Flow Rate (L/min): 3 L/min    Invasive/non-invasive ventilation settings   Respiratory    Lab Data (Last 4 hours)    None         O2/Vent Data (Last 4 hours)    None                Height and Weights   Height: 5' 11" (180 3 cm)  IBW: 70 8 kg  Body mass index is 36 12 kg/m²    Weight (last 2 days)     Date/Time Weight    01/13/20 0931   117 (259)              Intake and Output  I/O       01/13 0701 - 01/14 0700 01/14 0701 - 01/15 0700 01/15 0701 - 01/16 0700    P  O   540     I V  (mL/kg) 2823 5 (24 1) 1565 1 (13 4) 679 9 (5 8)    IV Piggyback 750 1000     Total Intake(mL/kg) 3573 5 (30 5) 3105 1 (26 5) 679 9 (5 8)    Urine (mL/kg/hr) 1250 2725 (1)     Blood 350      Total Output 1600 2725     Net +1973 5 +380 1 +679 9                 Nutrition       Diet Orders   (From admission, onward)             Start     Ordered    01/15/20 0650  Diet NPO  Diet effective now     Question Answer Comment   Diet Type NPO    RD to adjust diet per protocol? Yes        01/15/20 0650                Laboratory and Diagnostics:  Results from last 7 days   Lab Units 01/15/20  0619 01/14/20  1331 01/14/20  0540 01/10/20  1122   WBC Thousand/uL 9 88 9 15 7 50 6 04   HEMOGLOBIN g/dL 11 1* 9 7* 9 4* 12 4   HEMATOCRIT % 35 1 30 3* 29 4* 39 4   PLATELETS Thousands/uL 230 197 205 236   NEUTROS PCT % 81* 76*  --   --    MONOS PCT % 9 8  --   --      Results from last 7 days   Lab Units 01/15/20  0619 01/14/20  1548 01/14/20  0540 01/10/20  1122 01/08/20  1535   SODIUM mmol/L 135* 135* 137 138  --    POTASSIUM mmol/L 3 7 3 8 3 9 3 6  --    CHLORIDE mmol/L 104 106 109* 104  --    CO2 mmol/L 26 23 23 25  --    ANION GAP mmol/L 5 6 5 9  --    BUN mg/dL 9 11 9 17 21   CREATININE mg/dL 0 68 1 18 0 82 0 91 1 23   CALCIUM mg/dL 8 2* 7 7* 7 7* 9 3  --    GLUCOSE RANDOM mg/dL 126 109 113 106  --      Results from last 7 days   Lab Units 01/15/20  0619   MAGNESIUM mg/dL 2 1      Results from last 7 days   Lab Units 01/13/20  0922   INR  1 05              ABG:    VBG:  Results from last 7 days   Lab Units 01/14/20  1548   PH JEANNETTE  7 376   PCO2 JEANNETTE mm Hg 37 9*   PO2 JEANNETTE mm Hg 55 9*   HCO3 JEANNETTE mmol/L 21 7*   BASE EXC JEANNETTE mmol/L -3 1           Micro        EKG:   Imaging: I have personally reviewed pertinent reports        Active Medications  Scheduled Meds:  Current Facility-Administered Medications:  acetaminophen 975 mg Oral Q8H PRN Aviva R Marquez, CRNP   calcium gluconate 2 g Intravenous Once Aviva Marquez, URSZULANP   dextrose 5 % and sodium chloride 0 9 % with KCl 20 mEq/L 125 mL/hr Intravenous Continuous France Blackman MD   heparin (porcine) 5,000 Units Subcutaneous Q8H [address] Aviva ORQUIDEA ChurchMarquez, URSZULANP   HYDROmorphone 0 5 mg Intravenous Q3H PRN Justin Mcnulty, URSZULANP   metoclopramide 10 mg Intravenous Q6H PRN Melaia DILLON Lott DO   metoprolol 5 mg Intravenous Q6H PRN Aviva R Marquez, CRNP   nalbuphine 5 mg Intravenous Q3H PRN Aviva R Marquez, CRNP   ondansetron 4 mg Intravenous Q6H PRN Aviva R Marquez, CRNP   ropivacaine 0 1% and fentaNYL 2 mcg/mL  Epidural Continuous Jaime Kim MD   venlafaxine 150 mg Oral Daily With Breakfast Aviva R Marquez, YOMAIRA   zolpidem 5 mg Oral HS Aviva Marquez, URSZULANP     Continuous Infusions:    dextrose 5 % and sodium chloride 0 9 % with KCl 20 mEq/L 125 mL/hr   ropivacaine 0 1% and fentaNYL 2 mcg/mL      PRN Meds:     acetaminophen 975 mg Q8H PRN   HYDROmorphone 0 5 mg Q3H PRN   metoclopramide 10 mg Q6H PRN   metoprolol 5 mg Q6H PRN   nalbuphine 5 mg Q3H PRN   ondansetron 4 mg Q6H PRN       ---------------------------------------------------------------------------------------  Advance Directive and Living Will:      Power of :    POLST:    ---------------------------------------------------------------------------------------  Counseling / Coordination of Care  Total time spent today 15 minutes  Greater than 50% of total time was spent with the patient and / or family counseling and / or coordination of care  A description of the counseling / coordination of care: plan reviewed with nurse     YOMAIRA Perry      Portions of the record may have been created with voice recognition software  Occasional wrong word or "sound a like" substitutions may have occurred due to the inherent limitations of voice recognition software    Read the chart carefully and recognize, using context, where substitutions have occurred

## 2020-01-15 NOTE — PROGRESS NOTES
Anesthesia Progress Note - Epidural Pain Management    Morelia Gannon MRN: [id number]  Unit/Bed#: [facility] 918-33 Encounter: [id number]    SURGERY DATE: 1/13/2020  Post-Op Diagnosis Codes:     * Elevated CA-125 [R97 1]     * Pelvic mass [R19 00]    Assessment:   81 y o  female STATUS POST   Procedure(s):  LAPAROSCOPY DIAGNOSTIC  CYSTOSCOPY: CYSTOSCOPY WITH PERIOPERATIVE URETERAL CATHETERS  PLACEMENT  EXPLORATORY LAPAROTOMY, OVARIAN CANCER STAGING WITH TOTAL HYSTERECTOMY, BILATERAL SALPINGO-OOPHORECTOMY, BILATERAL PELVIC AND PERIAORTIC LYMPHADENECTOMY, INDICATED APPENDECTOMY, OMENTECTOMY, STAGING PERITONEAL BIOPSIES  POD# 2    Plan:   Patient is comfortable with current PCEA no changes at this time  She does not endorse any pain and states she has been able to rest comfortably in bed  She has not been pushing for demand boluses that  Blood pressure is have recovered to her normal range since yesterday after some adjustments to her continuous infusion rate      Please call  ( [facility] [phone]) with any questions    Subjective:  Current pain location(s): Abdomen  Pain Scale:   0/10    Meds/Allergies     No Known Allergies    Objective     Temp:  [97 8 °F (36 6 °C)-98 6 °F (37 °C)] 98 2 °F (36 8 °C)  HR:  [] 92  Resp:  [16-24] 16  BP: ()/(37-96) 124/84    Physical Exam:  Gen: Well appearing, NAD  CV: RRR  Pulm: CTAB  Neuro: No focal deficits  Epidural Site: Catheter in good position, not tender to palpation, site clean    SIGNATURE: Amanda Evans MD  DATE: January 15, 2020  TIME: 10:06 AM

## 2020-01-15 NOTE — SOCIAL WORK
Attempted to meet with patient to complete assessment  Patient has nausea and asked CM to return at a later time

## 2020-01-15 NOTE — PLAN OF CARE
Problem: PAIN - ADULT  Goal: Verbalizes/displays adequate comfort level or baseline comfort level  Description  Interventions:  - Encourage patient to monitor pain and request assistance  - Assess pain using appropriate pain scale  - Administer analgesics based on type and severity of pain and evaluate response  - Implement non-pharmacological measures as appropriate and evaluate response  - Consider cultural and social influences on pain and pain management  - Notify physician/advanced practitioner if interventions unsuccessful or patient reports new pain  Outcome: Progressing     Problem: DISCHARGE PLANNING  Goal: Discharge to home or other facility with appropriate resources  Description  INTERVENTIONS:  - Identify barriers to discharge w/patient and caregiver  - Arrange for needed discharge resources and transportation as appropriate  - Identify discharge learning needs (meds, wound care, etc )  - Arrange for interpretive services to assist at discharge as needed  - Refer to Case Management Department for coordinating discharge planning if the patient needs post-hospital services based on physician/advanced practitioner order or complex needs related to functional status, cognitive ability, or social support system  Outcome: Progressing     Problem: Knowledge Deficit  Goal: Patient/family/caregiver demonstrates understanding of disease process, treatment plan, medications, and discharge instructions  Description  Complete learning assessment and assess knowledge base  Interventions:  - Provide teaching at level of understanding  - Provide teaching via preferred learning methods  Outcome: Progressing     Problem: Potential for Falls  Goal: Patient will remain free of falls  Description  INTERVENTIONS:  - Assess patient frequently for physical needs  -  Identify cognitive and physical deficits and behaviors that affect risk of falls    -  [place] fall precautions as indicated by assessment   - Educate patient/family on patient safety including physical limitations  - Instruct patient to call for assistance with activity based on assessment  - Modify environment to reduce risk of injury  - Consider OT/PT consult to assist with strengthening/mobility  Outcome: Progressing

## 2020-01-16 LAB
ANION GAP SERPL CALCULATED.3IONS-SCNC: 3 MMOL/L (ref 4–13)
BASOPHILS # BLD AUTO: 0.04 THOUSANDS/ΜL (ref 0–0.1)
BASOPHILS NFR BLD AUTO: 1 % (ref 0–1)
BUN SERPL-MCNC: 10 MG/DL (ref 5–25)
CALCIUM SERPL-MCNC: 8 MG/DL (ref 8.3–10.1)
CHLORIDE SERPL-SCNC: 110 MMOL/L (ref 100–108)
CO2 SERPL-SCNC: 30 MMOL/L (ref 21–32)
CREAT SERPL-MCNC: 0.66 MG/DL (ref 0.6–1.3)
EOSINOPHIL # BLD AUTO: 0.27 THOUSAND/ΜL (ref 0–0.61)
EOSINOPHIL NFR BLD AUTO: 3 % (ref 0–6)
ERYTHROCYTE [DISTWIDTH] IN BLOOD BY AUTOMATED COUNT: 13.8 % (ref 11.6–15.1)
GFR SERPL CREATININE-BSD FRML MDRD: 92 ML/MIN/1.73SQ M
GLUCOSE SERPL-MCNC: 130 MG/DL (ref 65–140)
HCT VFR BLD AUTO: 30.4 % (ref 34.8–46.1)
HGB BLD-MCNC: 9.5 G/DL (ref 11.5–15.4)
IMM GRANULOCYTES # BLD AUTO: 0.03 THOUSAND/UL (ref 0–0.2)
IMM GRANULOCYTES NFR BLD AUTO: 0 % (ref 0–2)
LYMPHOCYTES # BLD AUTO: 1.23 THOUSANDS/ΜL (ref 0.6–4.47)
LYMPHOCYTES NFR BLD AUTO: 15 % (ref 14–44)
MCH RBC QN AUTO: 30.4 PG (ref 26.8–34.3)
MCHC RBC AUTO-ENTMCNC: 31.3 G/DL (ref 31.4–37.4)
MCV RBC AUTO: 97 FL (ref 82–98)
MONOCYTES # BLD AUTO: 0.9 THOUSAND/ΜL (ref 0.17–1.22)
MONOCYTES NFR BLD AUTO: 11 % (ref 4–12)
NEUTROPHILS # BLD AUTO: 5.51 THOUSANDS/ΜL (ref 1.85–7.62)
NEUTS SEG NFR BLD AUTO: 70 % (ref 43–75)
NRBC BLD AUTO-RTO: 0 /100 WBCS
PLATELET # BLD AUTO: 218 THOUSANDS/UL (ref 149–390)
PMV BLD AUTO: 9.8 FL (ref 8.9–12.7)
POTASSIUM SERPL-SCNC: 3.9 MMOL/L (ref 3.5–5.3)
RBC # BLD AUTO: 3.13 MILLION/UL (ref 3.81–5.12)
SODIUM SERPL-SCNC: 143 MMOL/L (ref 136–145)
WBC # BLD AUTO: 7.98 THOUSAND/UL (ref 4.31–10.16)

## 2020-01-16 PROCEDURE — 99024 POSTOP FOLLOW-UP VISIT: CPT | Performed by: OBSTETRICS & GYNECOLOGY

## 2020-01-16 PROCEDURE — 85025 COMPLETE CBC W/AUTO DIFF WBC: CPT | Performed by: OBSTETRICS & GYNECOLOGY

## 2020-01-16 PROCEDURE — 80048 BASIC METABOLIC PNL TOTAL CA: CPT | Performed by: OBSTETRICS & GYNECOLOGY

## 2020-01-16 RX ORDER — LORAZEPAM 2 MG/ML
1 INJECTION INTRAMUSCULAR ONCE
Status: COMPLETED | OUTPATIENT
Start: 2020-01-16 | End: 2020-01-16

## 2020-01-16 RX ORDER — HEPARIN SODIUM 5000 [USP'U]/ML
5000 INJECTION, SOLUTION INTRAVENOUS; SUBCUTANEOUS EVERY 8 HOURS SCHEDULED
Status: COMPLETED | OUTPATIENT
Start: 2020-01-16 | End: 2020-01-16

## 2020-01-16 RX ORDER — ACETAMINOPHEN 325 MG/1
975 TABLET ORAL EVERY 8 HOURS PRN
Status: DISPENSED | OUTPATIENT
Start: 2020-01-16 | End: 2020-01-18

## 2020-01-16 RX ADMIN — HEPARIN SODIUM 5000 UNITS: 5000 INJECTION INTRAVENOUS; SUBCUTANEOUS at 21:57

## 2020-01-16 RX ADMIN — SODIUM CHLORIDE 1000 ML: 0.9 INJECTION, SOLUTION INTRAVENOUS at 07:35

## 2020-01-16 RX ADMIN — DEXTROSE, SODIUM CHLORIDE, AND POTASSIUM CHLORIDE 125 ML/HR: 5; .9; .15 INJECTION INTRAVENOUS at 01:33

## 2020-01-16 RX ADMIN — DEXTROSE, SODIUM CHLORIDE, AND POTASSIUM CHLORIDE 125 ML/HR: 5; .9; .15 INJECTION INTRAVENOUS at 09:18

## 2020-01-16 RX ADMIN — DEXTROSE, SODIUM CHLORIDE, AND POTASSIUM CHLORIDE 125 ML/HR: 5; .9; .15 INJECTION INTRAVENOUS at 17:18

## 2020-01-16 RX ADMIN — LORAZEPAM 1 MG: 2 INJECTION INTRAMUSCULAR; INTRAVENOUS at 21:56

## 2020-01-16 RX ADMIN — ROPIVACAINE HYDROCHLORIDE: 5 INJECTION, SOLUTION EPIDURAL; INFILTRATION; PERINEURAL at 20:52

## 2020-01-16 RX ADMIN — ROPIVACAINE HYDROCHLORIDE: 5 INJECTION, SOLUTION EPIDURAL; INFILTRATION; PERINEURAL at 02:13

## 2020-01-16 RX ADMIN — HEPARIN SODIUM 5000 UNITS: 5000 INJECTION INTRAVENOUS; SUBCUTANEOUS at 14:25

## 2020-01-16 RX ADMIN — DEXTROSE, SODIUM CHLORIDE, AND POTASSIUM CHLORIDE 125 ML/HR: 5; .9; .15 INJECTION INTRAVENOUS at 19:21

## 2020-01-16 RX ADMIN — HEPARIN SODIUM 5000 UNITS: 5000 INJECTION INTRAVENOUS; SUBCUTANEOUS at 07:40

## 2020-01-16 NOTE — PROGRESS NOTES
Progress Note - Gynecologic Oncology   Enrique Sanchez 81 y o  female MRN: [id number]  Unit/Bed#: [facility] 811-01 Encounter: [id number]    Assessment / Plan:    1  Ovarian cancer:  Now postoperative day 3  Status post staging surgery with no gross evidence of extra ovarian disease, optimal cytoreduction  Await final pathology  Further treatment recommendations to follow  2  Postoperative hypertension, pre renal acute kidney injury:  Resolved as of laboratory studies from yesterday  A m  Labs today pending  Patient appears today somewhat dry, while L fluid bolus will be ordered  She is NPO with NG tube for postoperative ileus, see below  3  Postoperative ileus:  As evidence by persistent nausea and significant abdominal distension noted yesterday  Now with NG tube in place  I have reviewed abdominal x-ray which demonstrates adequate NG tube placement with tip projecting in the stomach  Film also demonstrates distended small bowel as expected and consistent with ileus  NG tube output 1 6 L overnight  Plan to continue today  Continue bowel rest/IV fluids  A m  Labs pending, will replete electrolytes as/if indicated  4  Postoperative care:  Given ongoing issues and need for NG tube, I plan to maintain epidural and [place] catheter until tomorrow  Heparin t i d  And SCDs for DVT prophylaxis  Encouraged ambulation which patient was able to do without difficulties yesterday  Subjective/Objective       Subjective:  Significant relief of abdominal distention nausea after NG tube placement  No nausea vomiting  Denies abdominal pain  Complains of throat discomfort from NG tube  No evidence of bowel function  Objective:     Blood pressure 131/83, pulse 91, temperature 97 6 °F (36 4 °C), resp  rate 16, height 5' 11" (1 803 m), weight 117 kg (259 lb), SpO2 97 %, not currently breastfeeding  ,Body mass index is 36 12 kg/m²        Intake/Output Summary (Last 24 hours) at 1/16/2020 8324  Last data filed at 1/16/2020 0556  Gross per 24 hour   Intake 2267 2 ml   Output 2600 ml   Net -332 8 ml       Invasive Devices     Peripheral Intravenous Line            Peripheral IV 01/13/20 Left Hand 2 days    Peripheral IV 01/13/20 Right Antecubital 2 days          Epidural Line            Epidural Catheter 01/13/20 2 days          Drain            Urethral Catheter Non-latex 16 Fr  2 days    NG/OG/Enteral Tube Nasogastric Right nares less than 1 day                Physical Exam: /83   Pulse 91   Temp 97 6 °F (36 4 °C)   Resp 16   Ht 5' 11" (1 803 m)   Wt 117 kg (259 lb)   LMP  (LMP Unknown)   SpO2 97%   BMI 36 12 kg/m²     General Appearance:    Alert, cooperative, no distress, appears stated age   Throat:   Lips, mucosa, and tongue appeared dry; teeth and gums normal   Lungs:     Clear to auscultation bilaterally, respirations unlabored   Chest Wall:    No tenderness or deformity    Heart:    Regular rate and rhythm, S1 and S2 normal, no murmur, rub   or gallop   Abdomen:     Soft, some distension but markedly improved compared to yesterday  Nontender  Incision clean, dry and intact     Extremities:   Extremities normal, atraumatic, no cyanosis or edema       Recent Results (from the past 24 hour(s))   CBC and differential    Collection Time: 01/16/20  5:27 AM   Result Value Ref Range    WBC 7 98 4 31 - 10 16 Thousand/uL    RBC 3 13 (L) 3 81 - 5 12 Million/uL    Hemoglobin 9 5 (L) 11 5 - 15 4 g/dL    Hematocrit 30 4 (L) 34 8 - 46 1 %    MCV 97 82 - 98 fL    MCH 30 4 26 8 - 34 3 pg    MCHC 31 3 (L) 31 4 - 37 4 g/dL    RDW 13 8 11 6 - 15 1 %    MPV 9 8 8 9 - 12 7 fL    Platelets 702 527 - 085 Thousands/uL    nRBC 0 /100 WBCs    Neutrophils Relative 70 43 - 75 %    Immat GRANS % 0 0 - 2 %    Lymphocytes Relative 15 14 - 44 %    Monocytes Relative 11 4 - 12 %    Eosinophils Relative 3 0 - 6 %    Basophils Relative 1 0 - 1 %    Neutrophils Absolute 5 51 1 85 - 7 62 Thousands/µL    Immature Grans Absolute 0 03 0 00 - 0 20 Thousand/uL    Lymphocytes Absolute 1 23 0 60 - 4 47 Thousands/µL    Monocytes Absolute 0 90 0 17 - 1 22 Thousand/µL    Eosinophils Absolute 0 27 0 00 - 0 61 Thousand/µL    Basophils Absolute 0 04 0 00 - 0 10 Thousands/µL       Lab, Imaging and other studies:I have personally reviewed pertinent lab results      VTE Pharmacologic Prophylaxis: Sequential compression device (Venodyne)  and Heparin        True Lynch MD, [facility]  1/16/2020  6:38 AM

## 2020-01-16 NOTE — PLAN OF CARE
Problem: PAIN - ADULT  Goal: Verbalizes/displays adequate comfort level or baseline comfort level  Description  Interventions:  - Encourage patient to monitor pain and request assistance  - Assess pain using appropriate pain scale  - Administer analgesics based on type and severity of pain and evaluate response  - Implement non-pharmacological measures as appropriate and evaluate response  - Consider cultural and social influences on pain and pain management  - Notify physician/advanced practitioner if interventions unsuccessful or patient reports new pain  Outcome: Progressing     Problem: DISCHARGE PLANNING  Goal: Discharge to home or other facility with appropriate resources  Description  INTERVENTIONS:  - Identify barriers to discharge w/patient and caregiver  - Arrange for needed discharge resources and transportation as appropriate  - Identify discharge learning needs (meds, wound care, etc )  - Arrange for interpretive services to assist at discharge as needed  - Refer to Case Management Department for coordinating discharge planning if the patient needs post-hospital services based on physician/advanced practitioner order or complex needs related to functional status, cognitive ability, or social support system  Outcome: Progressing     Problem: Knowledge Deficit  Goal: Patient/family/caregiver demonstrates understanding of disease process, treatment plan, medications, and discharge instructions  Description  Complete learning assessment and assess knowledge base  Interventions:  - Provide teaching at level of understanding  - Provide teaching via preferred learning methods  Outcome: Progressing     Problem: Potential for Falls  Goal: Patient will remain free of falls  Description  INTERVENTIONS:  - Assess patient frequently for physical needs  -  Identify cognitive and physical deficits and behaviors that affect risk of falls    -  [place] fall precautions as indicated by assessment   - Educate patient/family on patient safety including physical limitations  - Instruct patient to call for assistance with activity based on assessment  - Modify environment to reduce risk of injury  - Consider OT/PT consult to assist with strengthening/mobility  Outcome: Progressing

## 2020-01-16 NOTE — RESTORATIVE TECHNICIAN NOTE
Restorative Specialist Mobility Note       Activity: Ambulate in lynch, Chair     Assistive Device: Other (Comment)(Pushed IV pole)        Repositioned: Up in chair, Sitting

## 2020-01-16 NOTE — PROGRESS NOTES
Anesthesia Progress Note - Epidural Pain Management    Leslie Poe MRN: [id number]  Unit/Bed#: [facility] 811-01 Encounter: [id number]    SURGERY DATE: 1/13/2020  Post-Op Diagnosis Codes:     * Elevated CA-125 [R97 1]     * Pelvic mass [R19 00]    Assessment:   81 y o  female STATUS POST   Procedure(s):  LAPAROSCOPY DIAGNOSTIC  CYSTOSCOPY: CYSTOSCOPY WITH PERIOPERATIVE URETERAL CATHETERS  PLACEMENT  EXPLORATORY LAPAROTOMY, OVARIAN CANCER STAGING WITH TOTAL HYSTERECTOMY, BILATERAL SALPINGO-OOPHORECTOMY, BILATERAL PELVIC AND PERIAORTIC LYMPHADENECTOMY, INDICATED APPENDECTOMY, OMENTECTOMY, STAGING PERITONEAL BIOPSIES  POD# 3    Pt's pain well controlled after restarting epidural   She has developed a postop ileus and is s/p NGT placement  Will continue epidural today with IV breakthrough  Plan:   - Increase epidural rate to 8/4/10/3 running ropiv 0 1% + fentanyl 2mcg/ml  - Continue IV dilaudid for breakthrough  - Add oral multimodals when tolerating PO    Please call  ( [facility] [place] [phone]) with any questions    Subjective:  Pt comfortable with epidural   States that the bottom of her incision is not as well covered  Her PCEA works well when she uses it         Meds/Allergies     No Known Allergies    Objective     Temp:  [97 3 °F (36 3 °C)-99 1 °F (37 3 °C)] 97 3 °F (36 3 °C)  HR:  [] 93  Resp:  [16-21] 20  BP: (110-159)/() 137/79    Physical Exam:  Gen: Well appearing, NAD  CV: RRR  Pulm: CTAB  Neuro: No focal deficits  Epidural Site: Catheter in good position, not tender to palpation, site clean    SIGNATURE: Haroldo Mackey MD  DATE: January 16, 2020  TIME: 1:48 PM

## 2020-01-16 NOTE — RESTORATIVE TECHNICIAN NOTE
Restorative Specialist Mobility Note       Activity: Ambulate in lynch     Assistive Device: Other (Comment)(Pushed IV pole)        Repositioned: Supine              Anti-Embolism Device On:  Bilateral, Sequential compression devices, below knee

## 2020-01-16 NOTE — PROGRESS NOTES
Pt complaining of soreness from NG tube placement, RN at bedside pt also [facility]  RN contacted Dr Omari Olson due to NPO order and no order for NG tube clamping for meds  Dr Omari Olson gave verbal orders for chloraseptic spray for throat discomfort and 1mg of ativan IV for a one time dose

## 2020-01-17 LAB
ANION GAP SERPL CALCULATED.3IONS-SCNC: 2 MMOL/L (ref 4–13)
BASOPHILS # BLD AUTO: 0.02 THOUSANDS/ΜL (ref 0–0.1)
BASOPHILS NFR BLD AUTO: 0 % (ref 0–1)
BUN SERPL-MCNC: 9 MG/DL (ref 5–25)
CALCIUM SERPL-MCNC: 7.8 MG/DL (ref 8.3–10.1)
CHLORIDE SERPL-SCNC: 113 MMOL/L (ref 100–108)
CO2 SERPL-SCNC: 28 MMOL/L (ref 21–32)
CREAT SERPL-MCNC: 0.62 MG/DL (ref 0.6–1.3)
EOSINOPHIL # BLD AUTO: 0.37 THOUSAND/ΜL (ref 0–0.61)
EOSINOPHIL NFR BLD AUTO: 5 % (ref 0–6)
ERYTHROCYTE [DISTWIDTH] IN BLOOD BY AUTOMATED COUNT: 13.6 % (ref 11.6–15.1)
GFR SERPL CREATININE-BSD FRML MDRD: 94 ML/MIN/1.73SQ M
GLUCOSE SERPL-MCNC: 120 MG/DL (ref 65–140)
HCT VFR BLD AUTO: 32.6 % (ref 34.8–46.1)
HGB BLD-MCNC: 9.9 G/DL (ref 11.5–15.4)
IMM GRANULOCYTES # BLD AUTO: 0.02 THOUSAND/UL (ref 0–0.2)
IMM GRANULOCYTES NFR BLD AUTO: 0 % (ref 0–2)
LYMPHOCYTES # BLD AUTO: 1.02 THOUSANDS/ΜL (ref 0.6–4.47)
LYMPHOCYTES NFR BLD AUTO: 15 % (ref 14–44)
MCH RBC QN AUTO: 30 PG (ref 26.8–34.3)
MCHC RBC AUTO-ENTMCNC: 30.4 G/DL (ref 31.4–37.4)
MCV RBC AUTO: 99 FL (ref 82–98)
MONOCYTES # BLD AUTO: 0.68 THOUSAND/ΜL (ref 0.17–1.22)
MONOCYTES NFR BLD AUTO: 10 % (ref 4–12)
NEUTROPHILS # BLD AUTO: 4.82 THOUSANDS/ΜL (ref 1.85–7.62)
NEUTS SEG NFR BLD AUTO: 70 % (ref 43–75)
NRBC BLD AUTO-RTO: 0 /100 WBCS
PLATELET # BLD AUTO: 252 THOUSANDS/UL (ref 149–390)
PMV BLD AUTO: 9.5 FL (ref 8.9–12.7)
POTASSIUM SERPL-SCNC: 3.9 MMOL/L (ref 3.5–5.3)
RBC # BLD AUTO: 3.3 MILLION/UL (ref 3.81–5.12)
SODIUM SERPL-SCNC: 143 MMOL/L (ref 136–145)
WBC # BLD AUTO: 6.93 THOUSAND/UL (ref 4.31–10.16)

## 2020-01-17 PROCEDURE — 85025 COMPLETE CBC W/AUTO DIFF WBC: CPT | Performed by: OBSTETRICS & GYNECOLOGY

## 2020-01-17 PROCEDURE — 99024 POSTOP FOLLOW-UP VISIT: CPT | Performed by: OBSTETRICS & GYNECOLOGY

## 2020-01-17 PROCEDURE — 99232 SBSQ HOSP IP/OBS MODERATE 35: CPT | Performed by: ANESTHESIOLOGY

## 2020-01-17 PROCEDURE — 80048 BASIC METABOLIC PNL TOTAL CA: CPT | Performed by: OBSTETRICS & GYNECOLOGY

## 2020-01-17 RX ORDER — HEPARIN SODIUM 5000 [USP'U]/ML
5000 INJECTION, SOLUTION INTRAVENOUS; SUBCUTANEOUS EVERY 8 HOURS SCHEDULED
Status: COMPLETED | OUTPATIENT
Start: 2020-01-17 | End: 2020-01-17

## 2020-01-17 RX ORDER — HEPARIN SODIUM 5000 [USP'U]/ML
5000 INJECTION, SOLUTION INTRAVENOUS; SUBCUTANEOUS EVERY 8 HOURS SCHEDULED
Status: DISCONTINUED | OUTPATIENT
Start: 2020-01-17 | End: 2020-01-17

## 2020-01-17 RX ADMIN — DEXTROSE, SODIUM CHLORIDE, AND POTASSIUM CHLORIDE 125 ML/HR: 5; .9; .15 INJECTION INTRAVENOUS at 03:07

## 2020-01-17 RX ADMIN — DEXTROSE, SODIUM CHLORIDE, AND POTASSIUM CHLORIDE 125 ML/HR: 5; .9; .15 INJECTION INTRAVENOUS at 18:49

## 2020-01-17 RX ADMIN — DEXTROSE, SODIUM CHLORIDE, AND POTASSIUM CHLORIDE 125 ML/HR: 5; .9; .15 INJECTION INTRAVENOUS at 11:20

## 2020-01-17 RX ADMIN — HEPARIN SODIUM 5000 UNITS: 5000 INJECTION INTRAVENOUS; SUBCUTANEOUS at 21:29

## 2020-01-17 RX ADMIN — ZOLPIDEM TARTRATE 5 MG: 5 TABLET, COATED ORAL at 22:03

## 2020-01-17 RX ADMIN — ROPIVACAINE HYDROCHLORIDE: 5 INJECTION, SOLUTION EPIDURAL; INFILTRATION; PERINEURAL at 18:25

## 2020-01-17 NOTE — NURSING NOTE
Called anesthisa extention in regards to tube change due on epidural/PCA, spoke with Danisha Portillo with Pain who stated they do not replace tubing due to it being a closed system

## 2020-01-17 NOTE — PROGRESS NOTES
YN-ONC Progress Note  Jordan Childs  [id number]  PPHP 811/PPHP [id number]  1/17/2020  5:38 AM    ASSESS: 81year old with likely ovarian cancer, now POD #4 s/p NABILA, BSO, Staging Procedure, stab;e     PLAN:    1  Ovarian Cancer: s/p surgery, follow-up final pathology    2  Postoperative hypotension, pre renal acute kidney injury: resolved, await AM labs    3  Postoperative ileus: NG tube in place, nausea improving, given 275 cc output overnight can consider clamp trial today    Dispo:     PPx: SCDs, Heaprin 5000 units TID-held pending epidural removal later today  FEN: NPO consider advancing if NG tube removed , replete lytes prn, D5 1/2NS + 20KCL  Pain mgmnt: epidural per APS  Invasive lines: cuellar catheter, NG tube  Code status: full   ____________________    SUBJECTIVE  History of Present Illness:  Overnight: no acute complaints  Reports being able to sleep  Nausea improved  Hoping NG tube can be removed today  Pain: most recently rated 3/10, focused at abdominal incision site   Tolerating Oral Intake: NPO   Voiding: cuellar in place, 0 48 cc/kg/hr overnight  Flatus: yes  Bowel Movement: no  Ambulating: yes  Vaginal Bleeding: no  Pelvic Pain: no  Chest Pain: no  Shortness of Breath: no  Leg Pain/Discomfort: no      OBJECTIVE  Vitals  Temp:  [97 2 °F (36 2 °C)-100 1 °F (37 8 °C)] 97 2 °F (36 2 °C)  HR:  [87-98] 95  Resp:  [16-20] 17  BP: (121-159)/(63-91) 159/91       Intake/Output Summary (Last 24 hours) at 1/17/2020 0538  Last data filed at 1/17/2020 2835  Gross per 24 hour   Intake 4511 79 ml   Output 1675 ml   Net 2836 79 ml       Physical Exam:   Physical Exam   Constitutional: She appears well-developed and well-nourished  No distress  NG tube in place    Cardiovascular: Normal rate, regular rhythm, normal heart sounds and intact distal pulses  Pulmonary/Chest: Effort normal and breath sounds normal  No stridor  No respiratory distress  Abdominal: Soft  Bowel sounds are normal  She exhibits no distension  There is no tenderness  Abdominal incision clean, dry, and intact    Skin: She is not diaphoretic  Labs:   Recent Results (from the past 72 hour(s))   CBC (with platelets)    Collection Time: 01/14/20  5:40 AM   Result Value Ref Range    WBC 7 50 4 31 - 10 16 Thousand/uL    RBC 3 05 (L) 3 81 - 5 12 Million/uL    Hemoglobin 9 4 (L) 11 5 - 15 4 g/dL    Hematocrit 29 4 (L) 34 8 - 46 1 %    MCV 96 82 - 98 fL    MCH 30 8 26 8 - 34 3 pg    MCHC 32 0 31 4 - 37 4 g/dL    RDW 13 8 11 6 - 15 1 %    Platelets 260 067 - 086 Thousands/uL    MPV 9 9 8 9 - 12 7 fL   Basic metabolic panel    Collection Time: 01/14/20  5:40 AM   Result Value Ref Range    Sodium 137 136 - 145 mmol/L    Potassium 3 9 3 5 - 5 3 mmol/L    Chloride 109 (H) 100 - 108 mmol/L    CO2 23 21 - 32 mmol/L    ANION GAP 5 4 - 13 mmol/L    BUN 9 5 - 25 mg/dL    Creatinine 0 82 0 60 - 1 30 mg/dL    Glucose 113 65 - 140 mg/dL    Calcium 7 7 (L) 8 3 - 10 1 mg/dL    eGFR 74 ml/min/1 73sq m   Prepare Leukoreduced RBC:Has consent been obtained? Yes; Where is the Surgery Scheduled? [address] [place] [address],[address];  Date of Surgery: 1/13/2020; Date of Infusion: 1/13/2020, 3 Units    Collection Time: 01/14/20  7:12 AM   Result Value Ref Range    Unit Product Code X7175S49     Unit Number [id number]     Unit ABO O     Unit RH POS     Crossmatch Compatible     Unit Dispense Status Return to [facility]     Unit Product Code V4065H42     Unit Number [id number]     Unit ABO O     Unit RH POS     Crossmatch Compatible     Unit Dispense Status Return to [facility]     Unit Product Code E0694G81     Unit Number [id number]     Unit ABO O     Unit RH POS     Crossmatch Compatible     Unit Dispense Status Return to [facility]    CBC and differential    Collection Time: 01/14/20  1:31 PM   Result Value Ref Range    WBC 9 15 4 31 - 10 16 Thousand/uL    RBC 3 15 (L) 3 81 - 5 12 Million/uL    Hemoglobin 9 7 (L) 11 5 - 15 4 g/dL    Hematocrit 30 3 (L) 34 8 - 46 1 %    MCV 96 82 - 98 fL    MCH 30 8 26 8 - 34 3 pg    MCHC 32 0 31 4 - 37 4 g/dL    RDW 14 0 11 6 - 15 1 %    MPV 9 4 8 9 - 12 7 fL    Platelets 280 937 - 576 Thousands/uL    nRBC 0 /100 WBCs    Neutrophils Relative 76 (H) 43 - 75 %    Immat GRANS % 0 0 - 2 %    Lymphocytes Relative 15 14 - 44 %    Monocytes Relative 8 4 - 12 %    Eosinophils Relative 1 0 - 6 %    Basophils Relative 0 0 - 1 %    Neutrophils Absolute 6 83 1 85 - 7 62 Thousands/µL    Immature Grans Absolute 0 04 0 00 - 0 20 Thousand/uL    Lymphocytes Absolute 1 41 0 60 - 4 47 Thousands/µL    Monocytes Absolute 0 77 0 17 - 1 22 Thousand/µL    Eosinophils Absolute 0 06 0 00 - 0 61 Thousand/µL    Basophils Absolute 0 04 0 00 - 0 10 Thousands/µL   Urinalysis with microscopic    Collection Time: 01/14/20  1:35 PM   Result Value Ref Range    Clarity, UA Clear     Color, UA Yellow     Specific [place]iser, UA 1 005 1 003 - 1 030    pH, UA 6 0 4 5, 5 0, 5 5, 6 0, 6 5, 7 0, 7 5, 8 0    Glucose, UA Negative Negative mg/dl    Ketones, UA Negative Negative mg/dl    Blood, UA Large (A) Negative    Protein, UA Negative Negative mg/dl    Nitrite, UA Negative Negative    Bilirubin, UA Negative Negative    Urobilinogen, UA 0 2 0 2, 1 0 E U /dl E U /dl    Leukocytes, UA Trace (A) Negative    WBC, UA 2-4 (A) None Seen, 0-5, 5-55, 5-65 /hpf    RBC, UA 2-4 (A) None Seen, 0-5 /hpf    Bacteria, UA Occasional None Seen, Occasional /hpf    Epithelial Cells Occasional None Seen, Occasional /hpf   Blood gas, venous    Collection Time: 01/14/20  3:48 PM   Result Value Ref Range    pH, Yovani 7 376 7 300 - 7 400    pCO2, Yovani 37 9 (L) 42 0 - 50 0 mm Hg    pO2, Yovani 55 9 (H) 35 0 - 45 0 mm Hg    HCO3, Yovani 21 7 (L) 24 - 30 mmol/L    Base Excess, Yovani -3 1 mmol/L    O2 Content, Yovani 13 5 ml/dL    O2 HGB, VENOUS 87 7 (H) 60 0 - 80 0 %    ROOM AIR FIO2 ra %   Basic metabolic panel    Collection Time: 01/14/20  3:48 PM   Result Value Ref Range    Sodium 135 (L) 136 - 145 mmol/L    Potassium 3 8 3 5 - 5 3 mmol/L    Chloride 106 100 - 108 mmol/L    CO2 23 21 - 32 mmol/L    ANION GAP 6 4 - 13 mmol/L    BUN 11 5 - 25 mg/dL    Creatinine 1 18 0 60 - 1 30 mg/dL    Glucose 109 65 - 140 mg/dL    Calcium 7 7 (L) 8 3 - 10 1 mg/dL    eGFR 48 ml/min/1 73sq m   Fingerstick Glucose (POCT)    Collection Time: 01/14/20  4:00 PM   Result Value Ref Range    POC Glucose 113 65 - 140 mg/dl   Basic metabolic panel    Collection Time: 01/15/20  6:19 AM   Result Value Ref Range    Sodium 135 (L) 136 - 145 mmol/L    Potassium 3 7 3 5 - 5 3 mmol/L    Chloride 104 100 - 108 mmol/L    CO2 26 21 - 32 mmol/L    ANION GAP 5 4 - 13 mmol/L    BUN 9 5 - 25 mg/dL    Creatinine 0 68 0 60 - 1 30 mg/dL    Glucose 126 65 - 140 mg/dL    Calcium 8 2 (L) 8 3 - 10 1 mg/dL    eGFR 91 ml/min/1 73sq m   Magnesium    Collection Time: 01/15/20  6:19 AM   Result Value Ref Range    Magnesium 2 1 1 6 - 2 6 mg/dL   Hepatitis C antibody    Collection Time: 01/15/20  6:19 AM   Result Value Ref Range    Hepatitis C Ab Non-reactive Non-reactive   CBC and differential    Collection Time: 01/15/20  6:19 AM   Result Value Ref Range    WBC 9 88 4 31 - 10 16 Thousand/uL    RBC 3 68 (L) 3 81 - 5 12 Million/uL    Hemoglobin 11 1 (L) 11 5 - 15 4 g/dL    Hematocrit 35 1 34 8 - 46 1 %    MCV 95 82 - 98 fL    MCH 30 2 26 8 - 34 3 pg    MCHC 31 6 31 4 - 37 4 g/dL    RDW 13 7 11 6 - 15 1 %    MPV 9 8 8 9 - 12 7 fL    Platelets 642 321 - 624 Thousands/uL    nRBC 0 /100 WBCs    Neutrophils Relative 81 (H) 43 - 75 %    Immat GRANS % 0 0 - 2 %    Lymphocytes Relative 9 (L) 14 - 44 %    Monocytes Relative 9 4 - 12 %    Eosinophils Relative 1 0 - 6 %    Basophils Relative 0 0 - 1 %    Neutrophils Absolute 7 99 (H) 1 85 - 7 62 Thousands/µL    Immature Grans Absolute 0 04 0 00 - 0 20 Thousand/uL    Lymphocytes Absolute 0 86 0 60 - 4 47 Thousands/µL    Monocytes Absolute 0 87 0 17 - 1 22 Thousand/µL    Eosinophils Absolute 0 10 0 00 - 0 61 Thousand/µL    Basophils Absolute 0 02 0 00 - 0 10 Thousands/µL   Calcium, ionized    Collection Time: 01/15/20  6:19 AM   Result Value Ref Range    Calcium, Ionized 1 04 (L) 1 12 - 1 32 mmol/L   CBC and differential    Collection Time: 01/16/20  5:27 AM   Result Value Ref Range    WBC 7 98 4 31 - 10 16 Thousand/uL    RBC 3 13 (L) 3 81 - 5 12 Million/uL    Hemoglobin 9 5 (L) 11 5 - 15 4 g/dL    Hematocrit 30 4 (L) 34 8 - 46 1 %    MCV 97 82 - 98 fL    MCH 30 4 26 8 - 34 3 pg    MCHC 31 3 (L) 31 4 - 37 4 g/dL    RDW 13 8 11 6 - 15 1 %    MPV 9 8 8 9 - 12 7 fL    Platelets 541 649 - 088 Thousands/uL    nRBC 0 /100 WBCs    Neutrophils Relative 70 43 - 75 %    Immat GRANS % 0 0 - 2 %    Lymphocytes Relative 15 14 - 44 %    Monocytes Relative 11 4 - 12 %    Eosinophils Relative 3 0 - 6 %    Basophils Relative 1 0 - 1 %    Neutrophils Absolute 5 51 1 85 - 7 62 Thousands/µL    Immature Grans Absolute 0 03 0 00 - 0 20 Thousand/uL    Lymphocytes Absolute 1 23 0 60 - 4 47 Thousands/µL    Monocytes Absolute 0 90 0 17 - 1 22 Thousand/µL    Eosinophils Absolute 0 27 0 00 - 0 61 Thousand/µL    Basophils Absolute 0 04 0 00 - 0 10 Thousands/µL   Basic metabolic panel    Collection Time: 01/16/20  5:27 AM   Result Value Ref Range    Sodium 143 136 - 145 mmol/L    Potassium 3 9 3 5 - 5 3 mmol/L    Chloride 110 (H) 100 - 108 mmol/L    CO2 30 21 - 32 mmol/L    ANION GAP 3 (L) 4 - 13 mmol/L    BUN 10 5 - 25 mg/dL    Creatinine 0 66 0 60 - 1 30 mg/dL    Glucose 130 65 - 140 mg/dL    Calcium 8 0 (L) 8 3 - 10 1 mg/dL    eGFR 92 ml/min/1 73sq m       Meds:  Scheduled Meds:  Current Facility-Administered Medications:  acetaminophen 975 mg Oral Q8H PRN Angela Cook MD    dextrose 5 % and sodium chloride 0 9 % with KCl 20 mEq/L 125 mL/hr Intravenous Continuous Gertrudis Whipple MD Last Rate: 125 mL/hr (01/17/20 0307)   HYDROmorphone 0 5 mg Intravenous Q3H PRN Lilliana Rodriguez MD    metoclopramide 10 mg Intravenous Q6H PRN Rosa Elena Lott DO    metoprolol 5 mg Intravenous Q6H PRN Lilliana Rodriguez MD    nalbuphine 5 mg Intravenous Q3H PRN Ryan Mar MD    ondansetron 4 mg Intravenous Q6H PRN Ryan Mar MD    phenol 1 spray Mouth/Throat Q2H PRN Shabbir Fontanez MD    ropivacaine 0 1% and fentaNYL 2 mcg/mL  Epidural Continuous Charity Gallardo MD    venlafaxine 150 mg Oral Daily With Breakfast Ryan Mar MD    zolpidem 5 mg Oral HS Ryan Mar MD      Continuous Infusions:  dextrose 5 % and sodium chloride 0 9 % with KCl 20 mEq/L 125 mL/hr Last Rate: 125 mL/hr (01/17/20 0307)   ropivacaine 0 1% and fentaNYL 2 mcg/mL       PRN Meds:   acetaminophen    HYDROmorphone    metoclopramide    metoprolol    nalbuphine    ondansetron    phenol    Imaging Studies: I have personally reviewed pertinent reports  EKG, Pathology, Microbiology, and Other Studies: I have personally reviewed pertinent reports        __________________    Signature / Title: Sachin Rain DO, Ob/Gyn  Date: 1/17/2020  Time: 5:38 AM

## 2020-01-17 NOTE — PROGRESS NOTES
Progress Note - Acute Pain Service [facility] Follow Up  Margi Olsen 81 y o  female MRN: [id number]  Unit/Bed#: [facility] 811-01 Encounter: [id number]      SURGERY DATE: 1/13/2020  Post-Op Diagnosis Codes:     * Elevated CA-125 [R97 1]     * Pelvic mass [R19 00]    Assessment:   Patient is a 81year old with suspected ovarian CA now POD #4 s/p NABILA, BSO, Staging Procedure with excellent pain control via thoracic epidural  Given anticipated NGT removal today I discussed potential removal of epidural this afternoon or tomorrow based on her ability to tolerate diet in the setting of resolving postoperative ileus  I anticipate she will do well on a primarily oral regimen     Plan:   -Continue epidural infusion today at current settings of R1F2 at 8/4/10/3  Possible removal later today pending clinical improvement of ileus  Heparin should be held 4 hours prior to removal  - Continue Dilaudid 0 5 mg IV q3hrs for breakthrough pain  - Nubain 5 mg IV q3hrs for pruritis as written  - Bowel regimen when appropriate from surgical perspective    APS will continue to follow  Please call  / [phone] or [phone] [facility] ([phone] and on weekends) with questions or concerns    Subjective:  Patient states that her pain is much more controlled since increased in epidural infusion rate  Inferior portion of her incision now captured  She is complaining of minor pruritis not warranting medications  Is looking forward to NGT removal which she reports may happen later today   We discussed transition to PO analgesic regimen at that time    Meds/Allergies     all current active meds have been reviewed and current meds:   Current Facility-Administered Medications   Medication Dose Route Frequency    acetaminophen (TYLENOL) tablet 975 mg  975 mg Oral Q8H PRN    dextrose 5 % and sodium chloride 0 9 % with KCl 20 mEq/L infusion (premix)  125 mL/hr Intravenous Continuous    HYDROmorphone (DILAUDID) injection 0 5 mg  0 5 mg Intravenous Q3H PRN    metoclopramide (REGLAN) injection 10 mg  10 mg Intravenous Q6H PRN    metoprolol (LOPRESSOR) injection 5 mg  5 mg Intravenous Q6H PRN    ondansetron (ZOFRAN) injection 4 mg  4 mg Intravenous Q6H PRN    phenol (CHLORASEPTIC) 1 4 % mucosal liquid 1 spray  1 spray Mouth/Throat Q2H PRN    ropivacaine 0 1% and fentaNYL 2 mcg/mL PCEA   Epidural Continuous    venlafaxine (EFFEXOR-XR) 24 hr capsule 150 mg  150 mg Oral Daily With Breakfast    zolpidem (AMBIEN) tablet 5 mg  5 mg Oral HS       No Known Allergies    Objective     Temp:  [97 2 °F (36 2 °C)-100 1 °F (37 8 °C)] 98 4 °F (36 9 °C)  HR:  [88-98] 89  Resp:  [17-20] 18  BP: (121-159)/(63-91) 138/81    Physical Exam  Gen: NAD, sitting in chair  CV: RRR  Resp: CTAB, nonlabored  Abd: Minimally TTP around incision, soft, ND, NGT in place with decreased drainage  Ext: LE motor grossly intact bilaterally  Epidural site c/d/i

## 2020-01-17 NOTE — PLAN OF CARE
Problem: PAIN - ADULT  Goal: Verbalizes/displays adequate comfort level or baseline comfort level  Description  Interventions:  - Encourage patient to monitor pain and request assistance  - Assess pain using appropriate pain scale  - Administer analgesics based on type and severity of pain and evaluate response  - Implement non-pharmacological measures as appropriate and evaluate response  - Consider cultural and social influences on pain and pain management  - Notify physician/advanced practitioner if interventions unsuccessful or patient reports new pain  Outcome: Progressing     Problem: DISCHARGE PLANNING  Goal: Discharge to home or other facility with appropriate resources  Description  INTERVENTIONS:  - Identify barriers to discharge w/patient and caregiver  - Arrange for needed discharge resources and transportation as appropriate  - Identify discharge learning needs (meds, wound care, etc )  - Arrange for interpretive services to assist at discharge as needed  - Refer to Case Management Department for coordinating discharge planning if the patient needs post-hospital services based on physician/advanced practitioner order or complex needs related to functional status, cognitive ability, or social support system  Outcome: Progressing     Problem: Knowledge Deficit  Goal: Patient/family/caregiver demonstrates understanding of disease process, treatment plan, medications, and discharge instructions  Description  Complete learning assessment and assess knowledge base  Interventions:  - Provide teaching at level of understanding  - Provide teaching via preferred learning methods  Outcome: Progressing     Problem: Potential for Falls  Goal: Patient will remain free of falls  Description  INTERVENTIONS:  - Assess patient frequently for physical needs  -  Identify cognitive and physical deficits and behaviors that affect risk of falls    -  [place] fall precautions as indicated by assessment   - Educate patient/family on patient safety including physical limitations  - Instruct patient to call for assistance with activity based on assessment  - Modify environment to reduce risk of injury  - Consider OT/PT consult to assist with strengthening/mobility  Outcome: Progressing

## 2020-01-18 LAB
ANION GAP SERPL CALCULATED.3IONS-SCNC: 3 MMOL/L (ref 4–13)
BASOPHILS # BLD AUTO: 0.02 THOUSANDS/ΜL (ref 0–0.1)
BASOPHILS NFR BLD AUTO: 0 % (ref 0–1)
BUN SERPL-MCNC: 10 MG/DL (ref 5–25)
CALCIUM SERPL-MCNC: 7.6 MG/DL (ref 8.3–10.1)
CHLORIDE SERPL-SCNC: 117 MMOL/L (ref 100–108)
CO2 SERPL-SCNC: 27 MMOL/L (ref 21–32)
CREAT SERPL-MCNC: 0.54 MG/DL (ref 0.6–1.3)
EOSINOPHIL # BLD AUTO: 0.32 THOUSAND/ΜL (ref 0–0.61)
EOSINOPHIL NFR BLD AUTO: 7 % (ref 0–6)
ERYTHROCYTE [DISTWIDTH] IN BLOOD BY AUTOMATED COUNT: 13.5 % (ref 11.6–15.1)
GFR SERPL CREATININE-BSD FRML MDRD: 98 ML/MIN/1.73SQ M
GLUCOSE SERPL-MCNC: 108 MG/DL (ref 65–140)
HCT VFR BLD AUTO: 29.5 % (ref 34.8–46.1)
HGB BLD-MCNC: 9.1 G/DL (ref 11.5–15.4)
IMM GRANULOCYTES # BLD AUTO: 0.01 THOUSAND/UL (ref 0–0.2)
IMM GRANULOCYTES NFR BLD AUTO: 0 % (ref 0–2)
LYMPHOCYTES # BLD AUTO: 1.16 THOUSANDS/ΜL (ref 0.6–4.47)
LYMPHOCYTES NFR BLD AUTO: 24 % (ref 14–44)
MCH RBC QN AUTO: 30.3 PG (ref 26.8–34.3)
MCHC RBC AUTO-ENTMCNC: 30.8 G/DL (ref 31.4–37.4)
MCV RBC AUTO: 98 FL (ref 82–98)
MONOCYTES # BLD AUTO: 0.46 THOUSAND/ΜL (ref 0.17–1.22)
MONOCYTES NFR BLD AUTO: 9 % (ref 4–12)
NEUTROPHILS # BLD AUTO: 2.92 THOUSANDS/ΜL (ref 1.85–7.62)
NEUTS SEG NFR BLD AUTO: 60 % (ref 43–75)
NRBC BLD AUTO-RTO: 0 /100 WBCS
PLATELET # BLD AUTO: 245 THOUSANDS/UL (ref 149–390)
PMV BLD AUTO: 9.7 FL (ref 8.9–12.7)
POTASSIUM SERPL-SCNC: 3.9 MMOL/L (ref 3.5–5.3)
RBC # BLD AUTO: 3 MILLION/UL (ref 3.81–5.12)
SODIUM SERPL-SCNC: 147 MMOL/L (ref 136–145)
WBC # BLD AUTO: 4.89 THOUSAND/UL (ref 4.31–10.16)

## 2020-01-18 PROCEDURE — 85025 COMPLETE CBC W/AUTO DIFF WBC: CPT | Performed by: OBSTETRICS & GYNECOLOGY

## 2020-01-18 PROCEDURE — 80048 BASIC METABOLIC PNL TOTAL CA: CPT | Performed by: OBSTETRICS & GYNECOLOGY

## 2020-01-18 PROCEDURE — NC001 PR NO CHARGE: Performed by: ANESTHESIOLOGY

## 2020-01-18 PROCEDURE — 99024 POSTOP FOLLOW-UP VISIT: CPT | Performed by: OBSTETRICS & GYNECOLOGY

## 2020-01-18 RX ORDER — HYDROMORPHONE HCL/PF 1 MG/ML
0.5 SYRINGE (ML) INJECTION
Status: DISCONTINUED | OUTPATIENT
Start: 2020-01-18 | End: 2020-01-19 | Stop reason: HOSPADM

## 2020-01-18 RX ORDER — DIPHENHYDRAMINE HCL 25 MG
25 TABLET ORAL EVERY 6 HOURS PRN
Status: DISCONTINUED | OUTPATIENT
Start: 2020-01-18 | End: 2020-01-19 | Stop reason: HOSPADM

## 2020-01-18 RX ORDER — ACETAMINOPHEN 325 MG/1
975 TABLET ORAL EVERY 8 HOURS PRN
Status: DISCONTINUED | OUTPATIENT
Start: 2020-01-18 | End: 2020-01-19 | Stop reason: HOSPADM

## 2020-01-18 RX ORDER — OXYCODONE HYDROCHLORIDE 5 MG/1
5 TABLET ORAL EVERY 4 HOURS PRN
Status: DISCONTINUED | OUTPATIENT
Start: 2020-01-18 | End: 2020-01-19 | Stop reason: HOSPADM

## 2020-01-18 RX ADMIN — OXYCODONE HYDROCHLORIDE 5 MG: 5 TABLET ORAL at 17:17

## 2020-01-18 RX ADMIN — DIPHENHYDRAMINE HCL 25 MG: 25 TABLET, COATED ORAL at 11:04

## 2020-01-18 RX ADMIN — ACETAMINOPHEN 975 MG: 325 TABLET ORAL at 01:05

## 2020-01-18 RX ADMIN — HYDROMORPHONE HYDROCHLORIDE 0.5 MG: 1 INJECTION, SOLUTION INTRAMUSCULAR; INTRAVENOUS; SUBCUTANEOUS at 18:39

## 2020-01-18 RX ADMIN — DEXTROSE, SODIUM CHLORIDE, AND POTASSIUM CHLORIDE 125 ML/HR: 5; .9; .15 INJECTION INTRAVENOUS at 11:04

## 2020-01-18 RX ADMIN — ENOXAPARIN SODIUM 40 MG: 40 INJECTION SUBCUTANEOUS at 17:12

## 2020-01-18 RX ADMIN — DEXTROSE, SODIUM CHLORIDE, AND POTASSIUM CHLORIDE 125 ML/HR: 5; .9; .15 INJECTION INTRAVENOUS at 02:54

## 2020-01-18 RX ADMIN — OXYCODONE HYDROCHLORIDE 5 MG: 5 TABLET ORAL at 13:14

## 2020-01-18 RX ADMIN — HYDROMORPHONE HYDROCHLORIDE 0.5 MG: 1 INJECTION, SOLUTION INTRAMUSCULAR; INTRAVENOUS; SUBCUTANEOUS at 15:23

## 2020-01-18 RX ADMIN — OXYCODONE HYDROCHLORIDE 5 MG: 5 TABLET ORAL at 22:13

## 2020-01-18 RX ADMIN — ZOLPIDEM TARTRATE 5 MG: 5 TABLET, COATED ORAL at 22:59

## 2020-01-18 NOTE — PLAN OF CARE
Problem: PAIN - ADULT  Goal: Verbalizes/displays adequate comfort level or baseline comfort level  Description  Interventions:  - Encourage patient to monitor pain and request assistance  - Assess pain using appropriate pain scale  - Administer analgesics based on type and severity of pain and evaluate response  - Implement non-pharmacological measures as appropriate and evaluate response  - Consider cultural and social influences on pain and pain management  - Notify physician/advanced practitioner if interventions unsuccessful or patient reports new pain  Outcome: Progressing     Problem: DISCHARGE PLANNING  Goal: Discharge to home or other facility with appropriate resources  Description  INTERVENTIONS:  - Identify barriers to discharge w/patient and caregiver  - Arrange for needed discharge resources and transportation as appropriate  - Identify discharge learning needs (meds, wound care, etc )  - Arrange for interpretive services to assist at discharge as needed  - Refer to Case Management Department for coordinating discharge planning if the patient needs post-hospital services based on physician/advanced practitioner order or complex needs related to functional status, cognitive ability, or social support system  Outcome: Progressing     Problem: Knowledge Deficit  Goal: Patient/family/caregiver demonstrates understanding of disease process, treatment plan, medications, and discharge instructions  Description  Complete learning assessment and assess knowledge base  Interventions:  - Provide teaching at level of understanding  - Provide teaching via preferred learning methods  Outcome: Progressing     Problem: Potential for Falls  Goal: Patient will remain free of falls  Description  INTERVENTIONS:  - Assess patient frequently for physical needs  -  Identify cognitive and physical deficits and behaviors that affect risk of falls    -  [place] fall precautions as indicated by assessment   - Educate patient/family on patient safety including physical limitations  - Instruct patient to call for assistance with activity based on assessment  - Modify environment to reduce risk of injury  - Consider OT/PT consult to assist with strengthening/mobility  Outcome: Progressing     Problem: Nutrition/Hydration-ADULT  Goal: Nutrient/Hydration intake appropriate for improving, restoring or maintaining nutritional needs  Description  Monitor and assess patient's nutrition/hydration status for malnutrition  Collaborate with interdisciplinary team and initiate plan and interventions as ordered  Monitor patient's weight and dietary intake as ordered or per policy  Utilize nutrition screening tool and intervene as necessary  Determine patient's food preferences and provide high-protein, high-caloric foods as appropriate       INTERVENTIONS:  - Monitor oral intake, urinary output, labs, and treatment plans  - Assess nutrition and hydration status and recommend course of action  - Evaluate amount of meals eaten  - Assist patient with eating if necessary   - Allow adequate time for meals  - Recommend/ encourage appropriate diets, oral nutritional supplements, and vitamin/mineral supplements  - Order, calculate, and assess calorie counts as needed  - Recommend, monitor, and adjust tube feedings and TPN/PPN based on assessed needs  - Assess need for intravenous fluids  - Provide specific nutrition/hydration education as appropriate  - Include patient/family/caregiver in decisions related to nutrition  Outcome: Progressing

## 2020-01-18 NOTE — PROGRESS NOTES
Progress Note - OB/GYN   Ekaterina Range 81 y o  female MRN: [id number]  Unit/Bed#: [facility] 811-01 Encounter: [id number]    Assessment:  Patient is stable postoperatively  Her nausea and vomiting have resolved  She is passing gas  She has good pain control with the epidural   Her Hogue is in place  Plan:  Postoperative course:  Discontinue epidural  Discontinue Hogue catheter 4 hours after epidural removal  Start Lovenox 4 hours after  Epidural removal  Advanced to clear liquids    Perioperative renal dysfunction:  Resolved  Pain control:  Start p o  And IV pain medications    Subjective/Objective   Chief Complaint:  Postop day 4  TAHBSO staging    Subjective:  Patient continues to do better  She has no more nausea or vomiting  She is passing gas  She is ambulating without difficulty  Her pain control is good with epidural in place  Her urine output is adequate with Hogue in place    Objective:     Vitals: Blood pressure 148/87, pulse 75, temperature 97 5 °F (36 4 °C), resp  rate 16, height 5' 11" (1 803 m), weight 117 kg (259 lb), SpO2 99 %, not currently breastfeeding  ,Body mass index is 36 12 kg/m²  Intake/Output Summary (Last 24 hours) at 1/18/2020 1205  Last data filed at 1/18/2020 1100  Gross per 24 hour   Intake 3291 3 ml   Output 1245 ml   Net 2046 3 ml       Invasive Devices     Peripheral Intravenous Line            Peripheral IV 01/17/20 Distal;Right;Ventral (anterior) Forearm 1 day          Drain            Urethral Catheter Non-latex 16 Fr  4 days                Physical Exam:     HEENT normocephalic atraumatic   neck supple without thyromegaly lymphadenopathy  cardiac exam regular rate and rhythm  abdomen soft nontender positive bowel sounds no masses palpated incision clean dry intact  Extremities without cyanosis clubbing or edema no calf tenderness noted    Lab, Imaging and other studies: I have personally reviewed pertinent reports

## 2020-01-18 NOTE — SOCIAL WORK
Met with patient she lives with her   in a  "[place]"  [address]  First floor set up but full basement with approximately 12 to 15 steps to get there  Has raised toilet seat, BSC, and a walker  Currently not using any DME   will transport home  Uses [facility] in [place]  Hx of VNA but forgets the name of the agency  Denies MH or substance issues  CM reviewed d/c planning process including the following: identifying help at home, patient preference for d/c planning needs, Discharge Lounge, [facility] Meds to Bed program, availability of treatment team to discuss questions or concerns patient and/or family may have regarding understanding medications and recognizing signs and symptoms once discharged  CM also encouraged patient to follow up with all recommended appointments after discharge  Patient advised of importance for patient and family to participate in managing patients medical well being

## 2020-01-19 ENCOUNTER — DOCUMENTATION (OUTPATIENT)
Dept: GYNECOLOGIC ONCOLOGY | Facility: CLINIC | Age: 70
End: 2020-01-19

## 2020-01-19 VITALS
SYSTOLIC BLOOD PRESSURE: 144 MMHG | OXYGEN SATURATION: 99 % | RESPIRATION RATE: 18 BRPM | DIASTOLIC BLOOD PRESSURE: 84 MMHG | TEMPERATURE: 98 F | BODY MASS INDEX: 36.26 KG/M2 | HEART RATE: 95 BPM | WEIGHT: 259 LBS | HEIGHT: 71 IN

## 2020-01-19 LAB
ALBUMIN SERPL BCP-MCNC: 2.9 G/DL (ref 3.5–5)
ALP SERPL-CCNC: 60 U/L (ref 46–116)
ALT SERPL W P-5'-P-CCNC: 27 U/L (ref 12–78)
ANION GAP SERPL CALCULATED.3IONS-SCNC: 4 MMOL/L (ref 4–13)
AST SERPL W P-5'-P-CCNC: 25 U/L (ref 5–45)
BASOPHILS # BLD AUTO: 0.01 THOUSANDS/ΜL (ref 0–0.1)
BASOPHILS NFR BLD AUTO: 0 % (ref 0–1)
BILIRUB SERPL-MCNC: 0.46 MG/DL (ref 0.2–1)
BUN SERPL-MCNC: 7 MG/DL (ref 5–25)
CALCIUM SERPL-MCNC: 8.4 MG/DL (ref 8.3–10.1)
CHLORIDE SERPL-SCNC: 108 MMOL/L (ref 100–108)
CO2 SERPL-SCNC: 25 MMOL/L (ref 21–32)
CREAT SERPL-MCNC: 0.66 MG/DL (ref 0.6–1.3)
EOSINOPHIL # BLD AUTO: 0.31 THOUSAND/ΜL (ref 0–0.61)
EOSINOPHIL NFR BLD AUTO: 5 % (ref 0–6)
ERYTHROCYTE [DISTWIDTH] IN BLOOD BY AUTOMATED COUNT: 13.2 % (ref 11.6–15.1)
GFR SERPL CREATININE-BSD FRML MDRD: 92 ML/MIN/1.73SQ M
GLUCOSE SERPL-MCNC: 133 MG/DL (ref 65–140)
HCT VFR BLD AUTO: 33.4 % (ref 34.8–46.1)
HGB BLD-MCNC: 10.5 G/DL (ref 11.5–15.4)
IMM GRANULOCYTES # BLD AUTO: 0.02 THOUSAND/UL (ref 0–0.2)
IMM GRANULOCYTES NFR BLD AUTO: 0 % (ref 0–2)
LYMPHOCYTES # BLD AUTO: 1.08 THOUSANDS/ΜL (ref 0.6–4.47)
LYMPHOCYTES NFR BLD AUTO: 16 % (ref 14–44)
MCH RBC QN AUTO: 29.8 PG (ref 26.8–34.3)
MCHC RBC AUTO-ENTMCNC: 31.4 G/DL (ref 31.4–37.4)
MCV RBC AUTO: 95 FL (ref 82–98)
MONOCYTES # BLD AUTO: 0.52 THOUSAND/ΜL (ref 0.17–1.22)
MONOCYTES NFR BLD AUTO: 8 % (ref 4–12)
NEUTROPHILS # BLD AUTO: 5 THOUSANDS/ΜL (ref 1.85–7.62)
NEUTS SEG NFR BLD AUTO: 71 % (ref 43–75)
NRBC BLD AUTO-RTO: 0 /100 WBCS
PLATELET # BLD AUTO: 299 THOUSANDS/UL (ref 149–390)
PMV BLD AUTO: 9.3 FL (ref 8.9–12.7)
POTASSIUM SERPL-SCNC: 3.4 MMOL/L (ref 3.5–5.3)
PROT SERPL-MCNC: 6.3 G/DL (ref 6.4–8.2)
RBC # BLD AUTO: 3.52 MILLION/UL (ref 3.81–5.12)
SODIUM SERPL-SCNC: 137 MMOL/L (ref 136–145)
WBC # BLD AUTO: 6.94 THOUSAND/UL (ref 4.31–10.16)

## 2020-01-19 PROCEDURE — 80053 COMPREHEN METABOLIC PANEL: CPT | Performed by: OBSTETRICS & GYNECOLOGY

## 2020-01-19 PROCEDURE — 85025 COMPLETE CBC W/AUTO DIFF WBC: CPT | Performed by: OBSTETRICS & GYNECOLOGY

## 2020-01-19 PROCEDURE — 99024 POSTOP FOLLOW-UP VISIT: CPT | Performed by: OBSTETRICS & GYNECOLOGY

## 2020-01-19 RX ORDER — OXYCODONE HYDROCHLORIDE AND ACETAMINOPHEN 5; 325 MG/1; MG/1
1 TABLET ORAL EVERY 4 HOURS PRN
Qty: 10 TABLET | Refills: 0 | Status: SHIPPED | OUTPATIENT
Start: 2020-01-19 | End: 2020-01-29

## 2020-01-19 RX ORDER — OXYCODONE HYDROCHLORIDE AND ACETAMINOPHEN 5; 325 MG/1; MG/1
1 TABLET ORAL EVERY 4 HOURS PRN
Qty: 10 TABLET | Refills: 0
Start: 2020-01-19 | End: 2020-01-19 | Stop reason: SDUPTHER

## 2020-01-19 RX ADMIN — OXYCODONE HYDROCHLORIDE 5 MG: 5 TABLET ORAL at 11:18

## 2020-01-19 RX ADMIN — DEXTROSE, SODIUM CHLORIDE, AND POTASSIUM CHLORIDE 50 ML/HR: 5; .9; .15 INJECTION INTRAVENOUS at 04:58

## 2020-01-19 RX ADMIN — VENLAFAXINE HYDROCHLORIDE 150 MG: 150 CAPSULE, EXTENDED RELEASE ORAL at 09:00

## 2020-01-19 RX ADMIN — OXYCODONE HYDROCHLORIDE 5 MG: 5 TABLET ORAL at 02:14

## 2020-01-19 RX ADMIN — HYDROMORPHONE HYDROCHLORIDE 0.5 MG: 1 INJECTION, SOLUTION INTRAMUSCULAR; INTRAVENOUS; SUBCUTANEOUS at 00:20

## 2020-01-19 RX ADMIN — ENOXAPARIN SODIUM 40 MG: 40 INJECTION SUBCUTANEOUS at 08:59

## 2020-01-19 NOTE — PLAN OF CARE
Problem: PAIN - ADULT  Goal: Verbalizes/displays adequate comfort level or baseline comfort level  Description  Interventions:  - Encourage patient to monitor pain and request assistance  - Assess pain using appropriate pain scale  - Administer analgesics based on type and severity of pain and evaluate response  - Implement non-pharmacological measures as appropriate and evaluate response  - Consider cultural and social influences on pain and pain management  - Notify physician/advanced practitioner if interventions unsuccessful or patient reports new pain  Outcome: Progressing     Problem: DISCHARGE PLANNING  Goal: Discharge to home or other facility with appropriate resources  Description  INTERVENTIONS:  - Identify barriers to discharge w/patient and caregiver  - Arrange for needed discharge resources and transportation as appropriate  - Identify discharge learning needs (meds, wound care, etc )  - Arrange for interpretive services to assist at discharge as needed  - Refer to Case Management Department for coordinating discharge planning if the patient needs post-hospital services based on physician/advanced practitioner order or complex needs related to functional status, cognitive ability, or social support system  Outcome: Progressing     Problem: Knowledge Deficit  Goal: Patient/family/caregiver demonstrates understanding of disease process, treatment plan, medications, and discharge instructions  Description  Complete learning assessment and assess knowledge base  Interventions:  - Provide teaching at level of understanding  - Provide teaching via preferred learning methods  Outcome: Progressing     Problem: Potential for Falls  Goal: Patient will remain free of falls  Description  INTERVENTIONS:  - Assess patient frequently for physical needs  -  Identify cognitive and physical deficits and behaviors that affect risk of falls    -  [place] fall precautions as indicated by assessment   - Educate patient/family on patient safety including physical limitations  - Instruct patient to call for assistance with activity based on assessment  - Modify environment to reduce risk of injury  - Consider OT/PT consult to assist with strengthening/mobility  Outcome: Progressing     Problem: Nutrition/Hydration-ADULT  Goal: Nutrient/Hydration intake appropriate for improving, restoring or maintaining nutritional needs  Description  Monitor and assess patient's nutrition/hydration status for malnutrition  Collaborate with interdisciplinary team and initiate plan and interventions as ordered  Monitor patient's weight and dietary intake as ordered or per policy  Utilize nutrition screening tool and intervene as necessary  Determine patient's food preferences and provide high-protein, high-caloric foods as appropriate       INTERVENTIONS:  - Monitor oral intake, urinary output, labs, and treatment plans  - Assess nutrition and hydration status and recommend course of action  - Evaluate amount of meals eaten  - Assist patient with eating if necessary   - Allow adequate time for meals  - Recommend/ encourage appropriate diets, oral nutritional supplements, and vitamin/mineral supplements  - Order, calculate, and assess calorie counts as needed  - Recommend, monitor, and adjust tube feedings and TPN/PPN based on assessed needs  - Assess need for intravenous fluids  - Provide specific nutrition/hydration education as appropriate  - Include patient/family/caregiver in decisions related to nutrition  Outcome: Progressing

## 2020-01-19 NOTE — PROGRESS NOTES
Progress Note - OB/GYN   Eva Posada 81 y o  female MRN: [id number]  Unit/Bed#: [facility] 811-01 Encounter: [id number]    Assessment:  Patient appears stable postoperatively  She is having some new diarrhea however has tolerated liquids without difficulty  Her pain is well controlled without the epidural   She is ambulating without difficulty  She is interested in going home    Plan:  Postop:  DC IV fluids  Advanced to regular diet  Patient may shower  Recheck labs    Patient will be recheck this afternoon to see if she is stable for discharge  Subjective/Objective   Chief Complaint:  Postop day 5 exploratory laparotomy NABILA BSO staging    Subjective:  Patient is doing well  Her pain is well controlled on oral pain medicines  She is taking minimal   She is tolerating her clear liquids without difficulty  She has started diarrhea and has had 6 episodes over the past 24 hours  She is ambulating without difficulty  She is voiding without difficulty    Objective:     Vitals: Blood pressure 144/84, pulse 95, temperature 98 °F (36 7 °C), resp  rate 18, height 5' 11" (1 803 m), weight 117 kg (259 lb), SpO2 99 %, not currently breastfeeding  ,Body mass index is 36 12 kg/m²  Intake/Output Summary (Last 24 hours) at 1/19/2020 0954  Last data filed at 1/19/2020 0900  Gross per 24 hour   Intake 2311 09 ml   Output 1325 ml   Net 986 09 ml       Invasive Devices     Peripheral Intravenous Line            Peripheral IV 01/17/20 Distal;Right;Ventral (anterior) Forearm 2 days                Physical Exam:     Normocephalic atraumatic  Neck is supple without thyromegaly lymphadenopathy  Lungs clear to auscultation percussion    cardiac exam regular rate and rhythm  Abdomen soft nontender positive bowel sounds no masses palpated wound clean dry intact  Extremities without cyanosis, or edema    Lab, Imaging and other studies: I have personally reviewed pertinent reports

## 2020-01-19 NOTE — PLAN OF CARE
Problem: PAIN - ADULT  Goal: Verbalizes/displays adequate comfort level or baseline comfort level  Description  Interventions:  - Encourage patient to monitor pain and request assistance  - Assess pain using appropriate pain scale  - Administer analgesics based on type and severity of pain and evaluate response  - Implement non-pharmacological measures as appropriate and evaluate response  - Consider cultural and social influences on pain and pain management  - Notify physician/advanced practitioner if interventions unsuccessful or patient reports new pain  Outcome: Progressing     Problem: DISCHARGE PLANNING  Goal: Discharge to home or other facility with appropriate resources  Description  INTERVENTIONS:  - Identify barriers to discharge w/patient and caregiver  - Arrange for needed discharge resources and transportation as appropriate  - Identify discharge learning needs (meds, wound care, etc )  - Arrange for interpretive services to assist at discharge as needed  - Refer to Case Management Department for coordinating discharge planning if the patient needs post-hospital services based on physician/advanced practitioner order or complex needs related to functional status, cognitive ability, or social support system  Outcome: Progressing     Problem: Knowledge Deficit  Goal: Patient/family/caregiver demonstrates understanding of disease process, treatment plan, medications, and discharge instructions  Description  Complete learning assessment and assess knowledge base  Interventions:  - Provide teaching at level of understanding  - Provide teaching via preferred learning methods  Outcome: Progressing     Problem: Potential for Falls  Goal: Patient will remain free of falls  Description  INTERVENTIONS:  - Assess patient frequently for physical needs  -  Identify cognitive and physical deficits and behaviors that affect risk of falls    -  [place] fall precautions as indicated by assessment   - Educate patient/family on patient safety including physical limitations  - Instruct patient to call for assistance with activity based on assessment  - Modify environment to reduce risk of injury  - Consider OT/PT consult to assist with strengthening/mobility  Outcome: Progressing     Problem: Nutrition/Hydration-ADULT  Goal: Nutrient/Hydration intake appropriate for improving, restoring or maintaining nutritional needs  Description  Monitor and assess patient's nutrition/hydration status for malnutrition  Collaborate with interdisciplinary team and initiate plan and interventions as ordered  Monitor patient's weight and dietary intake as ordered or per policy  Utilize nutrition screening tool and intervene as necessary  Determine patient's food preferences and provide high-protein, high-caloric foods as appropriate       INTERVENTIONS:  - Monitor oral intake, urinary output, labs, and treatment plans  - Assess nutrition and hydration status and recommend course of action  - Evaluate amount of meals eaten  - Assist patient with eating if necessary   - Allow adequate time for meals  - Recommend/ encourage appropriate diets, oral nutritional supplements, and vitamin/mineral supplements  - Order, calculate, and assess calorie counts as needed  - Recommend, monitor, and adjust tube feedings and TPN/PPN based on assessed needs  - Assess need for intravenous fluids  - Provide specific nutrition/hydration education as appropriate  - Include patient/family/caregiver in decisions related to nutrition  Outcome: Progressing

## 2020-01-20 ENCOUNTER — TELEPHONE (OUTPATIENT)
Dept: GYNECOLOGIC ONCOLOGY | Facility: CLINIC | Age: 70
End: 2020-01-20

## 2020-01-20 DIAGNOSIS — R19.00 PELVIC MASS: Primary | ICD-10-CM

## 2020-01-21 ENCOUNTER — TELEPHONE (OUTPATIENT)
Dept: GYNECOLOGIC ONCOLOGY | Facility: CLINIC | Age: 70
End: 2020-01-21

## 2020-01-28 ENCOUNTER — OFFICE VISIT (OUTPATIENT)
Dept: GYNECOLOGIC ONCOLOGY | Facility: CLINIC | Age: 70
End: 2020-01-28

## 2020-01-28 VITALS
HEIGHT: 71 IN | HEART RATE: 88 BPM | BODY MASS INDEX: 34.44 KG/M2 | DIASTOLIC BLOOD PRESSURE: 80 MMHG | SYSTOLIC BLOOD PRESSURE: 150 MMHG | WEIGHT: 246 LBS

## 2020-01-28 DIAGNOSIS — Z15.09 GENETIC SUSCEPTIBILITY TO CANCER: ICD-10-CM

## 2020-01-28 DIAGNOSIS — Z85.3 HISTORY OF BREAST CANCER: ICD-10-CM

## 2020-01-28 DIAGNOSIS — C56.2 OVARIAN CANCER ON LEFT (HCC): Primary | ICD-10-CM

## 2020-01-28 PROBLEM — Z90.710 S/P ABDOMINAL HYSTERECTOMY: Status: RESOLVED | Noted: 2020-01-13 | Resolved: 2020-01-28

## 2020-01-28 PROBLEM — Z71.83 ENCOUNTER FOR NONPROCREATIVE GENETIC COUNSELING: Status: RESOLVED | Noted: 2020-01-10 | Resolved: 2020-01-28

## 2020-01-28 PROBLEM — R19.00 PELVIC MASS: Status: RESOLVED | Noted: 2020-01-10 | Resolved: 2020-01-28

## 2020-01-28 PROBLEM — R93.89 ENDOMETRIAL THICKENING ON ULTRASOUND: Status: RESOLVED | Noted: 2020-01-03 | Resolved: 2020-01-28

## 2020-01-28 PROBLEM — R97.1 ELEVATED CA-125: Status: RESOLVED | Noted: 2020-01-08 | Resolved: 2020-01-28

## 2020-01-28 PROBLEM — R10.2 PELVIC PAIN: Status: RESOLVED | Noted: 2019-12-30 | Resolved: 2020-01-28

## 2020-01-28 PROCEDURE — 99024 POSTOP FOLLOW-UP VISIT: CPT | Performed by: OBSTETRICS & GYNECOLOGY

## 2020-01-28 NOTE — ASSESSMENT & PLAN NOTE
Patient with stage IC2 high-grade serous ovarian cancer  On January 13 underwent comprehensive surgical staging  There is evidence of ovarian surface involvement by tumor but otherwise no evidence of extra ovarian disease  I discussed with patient and  findings and overall good prognosis associated with early stage ovarian cancer  I have recommended postoperative treatment as per NCCN guidelines  I suggest initiation of treatment soon using carboplatin AUC 6 and paclitaxel 135 mg/m2 (dose decreased from 175 mg/m2 given baseline grade 1 bilateral feet neuropathy)  I also discussed with her non-inferiority of Docetaxel substitution which could be considered if neuropathy was to worsen at all  However, this requires bone marrow support, steroids and has higher risk of neutropenic fever and other complications  We recommend treatment to include 6 cycles and to be followed by CT scan of the chest, abdomen and pelvis to evaluate response  We typically would follow  during chemotherapy and afterwards for subsequent surveillance  Patient plans to discuss the possibility of receiving treatment closer to home under direction of her breast oncologist Dr Juve Estrada  I will follow were all records to his attention  Patient will return to the office in 3-4 weeks for vaginal cuff check  Additionally, I have encouraged her to contact me if Dr Kelin Ruiz has any questions or hesitation is a regarding recommended treatment course

## 2020-01-28 NOTE — LETTER
January 28, 2020     Yael Moreno MD  [address] [facility]    Patient: Karina Aquino   YOB: 1950   Date of Visit: 1/28/2020       Dear colleagues[de-identified]    Thank you for referring Venus Sparks to me for evaluation  Below are my notes for this consultation  If you have questions, please do not hesitate to call me  I look forward to following your patient along with you  Sincerely,        Avel Ledesma MD        CC: MD Avel Lopez MD  1/28/2020 12:24 PM  Sign at close encounter  Assessment/Plan:    Problem List Items Addressed This Visit        Endocrine    Ovarian cancer on left [facility]) - Primary     Patient with stage IC2 high-grade serous ovarian cancer  On January 13 underwent comprehensive surgical staging  There is evidence of ovarian surface involvement by tumor but otherwise no evidence of extra ovarian disease  I discussed with patient and  findings and overall good prognosis associated with early stage ovarian cancer  I have recommended postoperative treatment as per NCCN guidelines  I suggest initiation of treatment soon using carboplatin AUC 6 and paclitaxel 135 mg/m2 (dose decreased from 175 mg/m2 given baseline grade 1 bilateral feet neuropathy)  I also discussed with her non-inferiority of Docetaxel substitution which could be considered if neuropathy was to worsen at all  However, this requires bone marrow support, steroids and has higher risk of neutropenic fever and other complications  We recommend treatment to include 6 cycles and to be followed by CT scan of the chest, abdomen and pelvis to evaluate response  We typically would follow  during chemotherapy and afterwards for subsequent surveillance  Patient plans to discuss the possibility of receiving treatment closer to home under direction of her breast oncologist Dr Katharine Borja  I will follow were all records to his attention      Patient will return to the office in 3-4 weeks for vaginal cuff check  Additionally, I have encouraged her to contact me if Dr Kelin Ruiz has any questions or hesitation is a regarding recommended treatment course  Other    History of breast cancer    Genetic susceptibility to cancer     Patient with prior history of metachronous breast cancers and now high-grade serous ovarian cancer  She had previously been tested for mutations and re-arrangements in BRCA1 and BRCA2  Now, I have performed panel testing through [facility] and there is no evidence of pathogenic mutations in 19 investigated genes  At this time, there is no indication for specific screening or extended family testing recommendations  Copy of genetic testing results given to patient today  Spent approximately 30 minutes in the care of this patient  More than 50% of the time was devoted to thorough review of surgical pathology and genetic testing results  Discussion of rationale for adjuvant therapy  Discussion of logistics and side effects associated with ovarian cancer treatment regimens  All questions were answered to patient and 's satisfaction  CHIEF COMPLAINT:   Postoperative follow-up and discussion of treatment recommendations for newly diagnosed ovarian cancer      Problem:  Cancer Staging  Ovarian cancer on left [facility])  Staging form: Ovary, Fallopian Tube, Primary Peritoneal, AJCC 8th Edition  - Pathologic stage from 1/13/2020: FIGO Stage IC3 (pN0, cM0) - Signed by Rinku Salcedo MD on 1/28/2020        Previous therapy:     Ovarian cancer on left [facility])    1/13/2020 Initial Diagnosis     Ovarian cancer on left ([address] )      1/13/2020 -  Cancer Staged     Staging form: Ovary, Fallopian Tube, Primary Peritoneal, AJCC 8th Edition  - Pathologic stage from 1/13/2020: FIGO Stage IC3 (pN0, cM0) - Signed by Rinku Salcedo MD on 1/28/2020  Histologic grade (G): G3  Histologic grading system: 4 grade system  Residual tumor (R): R0 - None  Histopathologic type: Serous cystadenocarcinoma, NOS  Diagnostic confirmation: Positive histology PLUS positive immunophenotyping and/or positive genetic studies  Specimen type: Excision  Staged by: Managing physician  Cancer antigen 125 () (U/mL): 4,000  Gross residual tumor after primary cyto-reductive surgery: Absent  Residual tumor volume after primary cytoreductive surgery: No gross tumor  National guidelines used in treatment planning: Yes  Type of national guideline used in treatment planning: NCCN             Patient ID: Ekaterina Deal is a 81 y o  female  HPI  Patient presents for follow-up  On January 13 underwent exploratory laparotomy, total hysterectomy, bilateral salpingo-oophorectomy, resection of ovarian mass with comprehensive surgical staging including pelvic and periaortic lymphadenectomy, omentectomy, appendectomy, peritoneal biopsies  Final pathology demonstrated high-grade serous ovarian cancer, stage IC2  Patient had some epidural associated postoperative hypertension with very transient rise in creatinine which subsided promptly with IV fluids and aggressive resuscitation  She also had postoperative ileus managed conservatively with bowel rest, NG tube and IV fluids     Other than that, her postoperative course was uncomplicated  Since discharge home she has recovered uneventfully  Denies pain  Denies vaginal bleeding, drainage or discharge  Normal bowel and bladder function  The following portions of the patient's history were reviewed and updated as appropriate: allergies, current medications, past family history, past medical history, past social history, past surgical history and problem list     Review of Systems  As above  Twelve point review of systems otherwise unremarkable    Current Outpatient Medications   Medication Sig Dispense Refill    apixaban (ELIQUIS) 2 5 mg Take 1 tablet (2 5 mg total) by mouth 2 (two) times a day for 28 days 56 tablet 0    cholecalciferol (VITAMIN D3) 400 units tablet Take 400 Units by mouth daily      estradiol (ESTRACE) 0 1 mg/g vaginal cream insert 1 gram vaginally two times a week  0    lansoprazole (PREVACID) 15 mg capsule Take 15 mg by mouth daily      losartan-hydrochlorothiazide (HYZAAR) 50-12 5 mg per tablet   0    metoprolol tartrate (LOPRESSOR) 25 mg tablet Take by mouth      oxyCODONE-acetaminophen (PERCOCET) 5-325 mg per tablet Take 1 tablet by mouth every 4 (four) hours as needed for moderate pain for up to 10 daysMax Daily Amount: 6 tablets 10 tablet 0    psyllium (METAMUCIL) 58 6 % packet Take 1 packet by mouth daily      simvastatin (ZOCOR) 20 mg tablet   0    trimethoprim (PROLOPRIM) 100 mg tablet   0    venlafaxine 150 MG TB24   0    zolpidem (AMBIEN CR) 12 5 MG CR tablet   0     No current facility-administered medications for this visit  Objective:    Blood pressure 150/80, pulse 88, height 5' 11" (1 803 m), weight 112 kg (246 lb), not currently breastfeeding  Body mass index is 34 31 kg/m²  Body surface area is 2 31 meters squared  Physical Exam  Abdomen:  Incision well-healed  No hernias  No dehiscence  No evidence of infectious complications      Lab Results   Component Value Date     4,086 7 (H) 01/06/2020       Component  Resulting Agency   Case Report   Surgical Pathology Report                         Case: [id number]                                    Authorizing Provider: Willa Morrison MD      Collected:           01/13/2020 1316               Ordering Location:     [address]      Received:            01/13/2020 1341                                      Hospital Operating Room                                                       Pathologist:           Kory Parra MD                                                         Intraop:               Kory Parra MD                                                         Specimens:   A) - Omentum, gastro colic omentum                                                                   B) - Ovary, Left, left ovarian mass                                                                  C) - Uterus, uterus, cervix, right tube and ovary                                                    D) - Peritoneum, pelvic peritoneal biopsy                                                            E) - Peritoneum, pelvic peritoneal biopsy #2                                                         F) - Appendix                                                                                        G) - Lymph Node, left periaortic lymph node                                                          H) - Lymph Node, left pelvic lymph node                                                              I) - Lymph Node, right periaortic lymph node                                                         J) - Lymph Node, right pelvic lymph node                                                             K) - Peritoneum, upper abdominal peritoneal biopsy                                         Final Diagnosis   A  Omentum, omentectomy:  -  Benign omental tissue, negative for metastatic carcinoma  -  Immunohistochemical stains performed with appropriate controls show reactive mesothelial elements staining positively for calretinin and WT-1 with no significant evidence of metastatic carcinoma by MOC-31 and Duncan-Ep4, supporting the diagnosis     B  Ovary, left, salpingo-oophorectomy  -  High-grade serous carcinoma (see synoptic report)  -  Portion of attached benign fallopian tube      C  Uterus, cervix, right fallopian tube and ovary; hysterectomy and salpingo-oophorectomy:  -  Benign inactive endometrium with features of cystic atrophy, negative for hyperplasia or carcinoma  -  Benign myometrium with one intramural benign leiomyoma  -  Benign cervix, negative for dysplasia or carcinoma    -  Benign right ovary with surface inclusion cysts, negative for atypia or malignancy  -  Benign right fallopian tube with paratubal cyst, negative for atypia or malignancy      D  Peritoneum, pelvic, biopsy:  -  Benign fibroadipose tissue, negative for atypia or malignancy      E  Peritoneum, pelvic #2, biopsy:  -  Benign fibroadipose tissue with features of atrophic endometritis, negative for atypia or malignancy      F  Appendix, appendectomy:  -  benign appendix, tissue for atypia or malignancy      G  Lymph node, left periaortic, excision:  -  3 benign lymph nodes negative for metastatic carcinoma      H  Lymph node, left pelvic, excision:  -  6 benign lymph nodes, negative for metastatic carcinoma      I  Lymph node, right periaortic, excision:  -  3 benign lymph nodes negative for metastatic carcinoma      J   Lymph node, right pelvic, excision:  -  15 benign lymph nodes negative for metastatic carcinoma      K  Peritoneum, upper abdominal, biopsy:  -  Benign fibroadipose tissue, negative for atypia or malignancy       Electronically signed by Maribel Be MD on 1/27/2020 at  7:46 AM   Comments: This is an appended report  These results have been appended to a previously preliminary verified report  Note  [facility]   The left ovarian tumor shows mixed morphologic features with high-grade morphology  Immunohistochemical stains performed with appropriate controls show the tumor to be positive for CK7, CA-125, p53 (strong and diffuse), ER, Vimentin, p16 (strong and diffuse), EMA, PAX-8 and WT-1 while negative for CK20, CDX-2, MAGNUS-3, Uroplakin, p40, and Napsin  Overall, the findings are not definitively specific and show features intermediate between high-grade endometrioid carcinoma and high-grade serous carcinoma  Comment: This is an appended report  These results have been appended to a previously preliminary verified report     Additional Information  [facility]   All controls performed with the immunohistochemical stains reported above reacted appropriately  These tests were developed and their performance characteristics determined by [facility] or [facility]  They may not be cleared or approved by the [place]  Food and Drug Administration  The FDA has determined that such clearance or approval is not necessary  These tests are used for clinical purposes  They should not be regarded as investigational or for research  This laboratory has been approved by [place] [id number], designated as a high-complexity laboratory and is qualified to perform these tests     Synoptic Checklist   OVARY or FALLOPIAN TUBE or PRIMARY PERITONEUM   Ovary FT Perit - All Specimens   SPECIMEN   Procedure  Total hysterectomy and bilateral salpingo-oophorectomy      Omentectomy      Peritoneal  biopsies    Hysterectomy Type  Laparoscopic         Specimen Integrity of Right Ovary  Capsule intact         Specimen Integrity of Left Ovary  Capsule intact         Specimen Integrity of the Right Fallopian Tube  Serosa intact         Specimen Integrity of the Left Fallopian Tube  Serosa intact    TUMOR   Tumor Site  Left ovary    Histologic Type  Serous carcinoma    Histologic Grade  High grade         Tumor Size  Greatest dimension in Centimeters (cm): 13 8 Centimeters (cm)   Tumor Extent     Ovarian Surface Involvement  Present    Laterality  Left    Fallopian Tube Surface Involvement  Absent    Other Tissue / Organ Involvement  Not applicable    Peritoneal Ascitic Fluid  Negative for malignancy (normal / benign)    Pleural Fluid  Not submitted / unknown    Accessory Findings     LYMPH NODES   Regional Lymph Nodes  All lymph nodes negative for tumor cells    Number of Lymph Nodes Examined  27    Site(s)  Right pelvic      Left pelvic      Right para-aortic      Left para-aortic    PATHOLOGIC STAGE CLASSIFICATION (pTNM, AJCC 8th Edition)   Primary Tumor (pT)  pT1c    Regional Lymph Nodes (pN)  pN0    FIGO STAGE   FIGO Stage  IC2    ADDITIONAL FINDINGS   Additional Pathologic Findings  Endometriosis    Comment(s)   Comment(s)  Final AJCC 8th ed Stage IC      Intraoperative Consultation  [facility]   BF1  Ovary, Left:  At least borderline tumor on representative section  Concerning for carcinoma on gross exam  Maribeth Berry M D  1/13/20 1410          Gross Description  [facility]   A  The specimen is received in formalin, labeled with the patient's name and hospital number, and is designated "gastrocolic omentum  The specimen consists of a tan-yellow purple fibromembranous and fibrofatty tissue fragment grossly consistent with omentum measuring 48 by 18 x 3 cm  Examination of the fragment reveals unremarkable yellow fatty cut surfaces  No lesions are seen or palpated  Representative sections  Three cassettes        B  The specimen is received fresh for frozen section, labeled with the patient's name and hospital number, and is designated "left ovarian mass  The specimen consists of a tan to purple enlarged solid and cystic ovary measuring 13 8 x 12 9 x 7 7 cm  On 1 surface is a tan to purple distally fimbriated fallopian tube segment measuring 8 6 cm in length by 0 5 cm in average diameter  Upon cut section the tube exhibits several small paratubal cystic structures but otherwise appears unremarkable  The outer surface of the ovary exhibits areas of bulging (inked blue) however no distinct surface lesional involvement is identified grossly  The ovary is sectioned revealing solid and cystic, tan pink gritty and fleshy appearing cut surfaces  Approximately 5% of the lesion appears necrotic  Gross photographs are taken  A representative section is submitted for frozen section analysis  Representative sections  15 cassettes  1:  Frozen section tissue, ovary  2-14:  Additional representative sections of ovary, 2 pieces in each cassette, sections from area of bulging in cassettes 2-4  15: Fallopian tube     C   The specimen is received in formalin, labeled with the patient's name and hospital number, and is designated "uterus, cervix, right tube and ovary  The specimen consists of a uterus with cervix with attached right adnexal tissues  The uterus with cervix measures 9 1 x 5 0 x 4 2 cm and weighs 117 g  the serosa appears unremarkable  A slit shaped 1 3 cm os is present  The uterus is bivalved and sectioned revealing a cervix exhibiting several small mucus filled cystic structures  The endometrial cavity is roughly triangular and is lined by glistening pink to red tissue/material averaging 1 mm or less in thickness  No discrete endometrial lesions are seen  The myometrium averages 1 7 cm in thickness and exhibits a single 0 5 cm subserosal whorled tan nodule  The right fallopian tube is tan to purple, distally fimbriated, measuring 2 8 cm in length by 0 5 cm in average diameter  Upon cut section the tube exhibits several paratubal cystic structures but otherwise appears unremarkable  The attached right ovary is tan purple, measuring 2 4 x 1 7 x 1 0 cm  The ovary is sectioned revealing several small smooth lined cystic structures measuring up to 0 5 cm each  Representative sections  10 cassettes  1:  Anterior cervix  2:  Posterior cervix  3-4:  Anterior endomyometrium, full-thickness section in each cassette  5-6:  Posterior endomyometrium, full-thickness section in each cassette  7:  Myometrial subserosal nodule  8:  Right fallopian tube  9-10:  Right ovary       D  The specimen is received in formalin, labeled with the patient's name and hospital number, and is designated "pelvic peritoneal biopsy  The specimen consists of 4 tan-yellow fibromembranous and fibrofatty tissue fragments ranging in size from 0 9-1 6 cm  Fragments are sectioned revealing unremarkable yellow cut surfaces  Representative sections  One cassette  5 pieces      E   The specimen is received in formalin, labeled with the patient's name and hospital number, and is designated "pelvic peritoneal biopsy 2  The specimen consists of a single tan purple soft tissue fragment measuring 0 7 cm  Entirely submitted  One cassette  Between sponges        F  The specimen is received in formalin, labeled with the patient's name and hospital number, and is designated "appendix  The specimen consists of a vermiform appendix measuring 5 2 cm in length by 0 5 cm in average diameter  The serosa is tan purple, smooth and glistening  No perforations are identified grossly  The margin of resection is inked blue  The lumen appears unremarkable  The appendiceal wall averages 1-2 mm in thickness  Appendix entirely submitted, sequentially  Four cassettes      G  The specimen is received in formalin, labeled with the patient's name and hospital number, and is designated "left periaortic lymph node  The specimen consists of multiple fibrofatty tissue fragments measuring in aggregate 4 x 2 5 x 1 cm  Examination of the fragments reveals 3 tan purple densities suggestive of lymph nodes measuring 0 6, 1 3 and 1 7 cm  The densities are entirely submitted for histologic examination in 3 cassettes, 1 bisected density in each cassette        H  The specimen is received in formalin, labeled with the patient's name and hospital number, and is designated "left pelvic lymph node  The specimen consists of multiple fibrofatty tissue fragments measuring in aggregate 6 x 5 x 2 cm  Examination of the fragments reveals 7 densities suggestive of lymph nodes ranging in size from 0 1-2 5 cm each  The densities are submitted for histologic examination in 8 cassettes as follows:  1-2:  1 bisected  3-4:  1 bisected  5:1 bisected  6:1 bisected  7:2  8:1, between sponges       I  The specimen is received in formalin, labeled with the patient's name and hospital number, and is designated "right periaortic lymph node  The specimen consists of a single fibrofatty tissue fragment measuring 5 3 cm    Examination of the fragment reveals 3 tan purple density suggestive of lymph nodes ranging in size from 1 3-2 2 cm each  The densities are submitted for histologic examination in 3 cassettes, 1 density in each cassette        J  The specimen is received in formalin, labeled with the patient's name and hospital number, and is designated "right pelvic lymph node  The specimen consists of multiple unoriented fibrofatty tissue fragments measuring in aggregate 6 x 5 x 1 5 cm  Examination of the fragments reveals 15 densities suggestive of lymph nodes ranging in size from 0 1-2 6 cm each  The densities are submitted for histologic examination in 8 cassettes as follows:  1:1 bisected  2:1 bisected  3:1 bisected  4:1  5:2  6:3  7:3  8:3, between sponges       K  The specimen is received in formalin, labeled with the patient's name and hospital number, and is designated "upper abdominal peritoneal biopsy  The specimen consists of 2 tan-yellow pink soft tissue fragments each measuring 0 6-0 7 cm  Entirely submitted  One cassette  Between sponges        Note: The estimated total formalin fixation time based upon information provided by the submitting clinician and the standard processing schedule is under 72 hours      [facility]                         Comment: This is an appended report  These results have been appended to a previously preliminary verified report       Tyler Diego MD, [address]  1/28/2020  12:23 PM

## 2020-01-28 NOTE — ASSESSMENT & PLAN NOTE
Patient with prior history of metachronous breast cancers and now high-grade serous ovarian cancer  She had previously been tested for mutations and re-arrangements in BRCA1 and BRCA2  Now, I have performed panel testing through [facility] and there is no evidence of pathogenic mutations in 19 investigated genes  At this time, there is no indication for specific screening or extended family testing recommendations  Copy of genetic testing results given to patient today

## 2020-01-31 ENCOUNTER — DOCUMENTATION (OUTPATIENT)
Dept: GYNECOLOGIC ONCOLOGY | Facility: CLINIC | Age: 70
End: 2020-01-31

## 2020-01-31 NOTE — PROGRESS NOTES
Case presented today at multidisciplinary tumor board  Recommendation for 6 cycles of carboplatin/paclitaxel consistent with NCCN guidelines      Shahida Bernard MD, [address] [facility], [facility]  1/31/2020  7:59 AM

## 2020-02-25 ENCOUNTER — OFFICE VISIT (OUTPATIENT)
Dept: GYNECOLOGIC ONCOLOGY | Facility: CLINIC | Age: 70
End: 2020-02-25

## 2020-02-25 VITALS
TEMPERATURE: 98 F | BODY MASS INDEX: 33.6 KG/M2 | HEIGHT: 71 IN | HEART RATE: 70 BPM | DIASTOLIC BLOOD PRESSURE: 76 MMHG | SYSTOLIC BLOOD PRESSURE: 126 MMHG | WEIGHT: 240 LBS

## 2020-02-25 DIAGNOSIS — C56.2 OVARIAN CANCER ON LEFT (HCC): Primary | ICD-10-CM

## 2020-02-25 PROCEDURE — 99024 POSTOP FOLLOW-UP VISIT: CPT | Performed by: OBSTETRICS & GYNECOLOGY

## 2020-02-25 NOTE — PROGRESS NOTES
Assessment/Plan:    Problem List Items Addressed This Visit        Endocrine    Ovarian cancer on left [facility]) - Primary     Patient has fully recovered from surgery  Vaginal cuff is well healed  She can resume all activities of daily living with no restrictions including vaginal intercourse  She has started postoperative adjuvant chemotherapy with carboplatin/Taxol under direction of Dr Jh Chapa  Jed Mehta had a hypersensitivity reaction with the 1st infusion but was able to complete treatment at a slower rate  I plan to see her back after completion of 6th cycle of chemotherapy  At that time, I would like to have a pre visit CT scan of the chest, abdomen and pelvis to document response/disease status at completion of treatment  She knows to contact me any time if she has any questions or problems related to surgery  I have been communicating with Dr Shauna Cordero and will continue to do so on an as-needed basis  Relevant Orders    BUN    Creatinine, serum    CT chest abdomen pelvis w contrast            CHIEF COMPLAINT:  Here for postoperative follow-up, vaginal cuff check       Problem:  Cancer Staging  Ovarian cancer on left [facility])  Staging form: Ovary, Fallopian Tube, Primary Peritoneal, AJCC 8th Edition  - Pathologic stage from 1/13/2020: FIGO Stage IC3 (pN0, cM0) - Signed by Nisa Alvarez MD on 1/28/2020        Previous therapy:     Ovarian cancer on left ([facility] [address] )    1/13/2020 Initial Diagnosis     Ovarian cancer on left ([facility] [address] )      1/13/2020 -  Cancer Staged     Staging form: Ovary, Fallopian Tube, Primary Peritoneal, AJCC 8th Edition  - Pathologic stage from 1/13/2020: FIGO Stage IC3 (pN0, cM0) - Signed by Nisa Alvarez MD on 1/28/2020  Histologic grade (G): G3  Histologic grading system: 4 grade system  Residual tumor (R): R0 - None  Histopathologic type: Serous cystadenocarcinoma, NOS  Diagnostic confirmation: Positive histology PLUS positive immunophenotyping and/or positive genetic studies  Specimen type: Excision  Staged by: Managing physician  Cancer antigen 125 () (U/mL): 4,000  Gross residual tumor after primary cyto-reductive surgery: Absent  Residual tumor volume after primary cytoreductive surgery: No gross tumor  National guidelines used in treatment planning: Yes  Type of national guideline used in treatment planning: NCCN             Patient ID: Sandra Kunz is a 81 y o  female  HPI  Patient with 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  She has also history of remote ovarian cancer  Multi gene panel testing demonstrated no evidence of pathogenic mutations  She is now approximately 6 weeks out from comprehensive surgical staging/optimal cytoreductive surgery  She has started chemotherapy locally under the direction of her medical oncologist Dr Kendra Bal  He and I have communicated and agreed on plan of care  Of interest, she recently so her urologic providers recommended topical vaginal estrogen and antibiotic suppression for her history of recurrent UTIs  However, she states all pre-existing symptoms of abnormal bowel movements, bladder pressure and discomfort have resolved since surgery  I explained how it is possible that some of her episodes that were diagnosed as clinical UTIs actually represented exacerbation of symptoms related to pelvic mass  She will consider whether to start treatment versus managed expectantly  We discussed pros and cons  The following portions of the patient's history were reviewed and updated as appropriate: allergies, current medications, past family history, past medical history, past social history, past surgical history and problem list     Review of Systems  As above  Reports baseline neuropathy grade 1 which was not worsen after recent infusion of Taxol  Denies vaginal bleeding, drainage or discharge  No alopecia yet  Normal bowel bladder function  Denies pain  Incision is well-healed    Twelve point review of systems otherwise unremarkable  Current Outpatient Medications:     cholecalciferol (VITAMIN D3) 400 units tablet, Take 400 Units by mouth daily, Disp: , Rfl:     estradiol (ESTRACE) 0 1 mg/g vaginal cream, insert 1 gram vaginally two times a week, Disp: , Rfl: 0    lansoprazole (PREVACID) 15 mg capsule, Take 15 mg by mouth daily, Disp: , Rfl:     losartan-hydrochlorothiazide (HYZAAR) 50-12 5 mg per tablet, , Disp: , Rfl: 0    metoprolol tartrate (LOPRESSOR) 25 mg tablet, Take 25 mg by mouth   5 tab daily, Disp: , Rfl:     psyllium (METAMUCIL) 58 6 % packet, Take 1 packet by mouth daily, Disp: , Rfl:     simvastatin (ZOCOR) 20 mg tablet, , Disp: , Rfl: 0    trimethoprim (PROLOPRIM) 100 mg tablet, , Disp: , Rfl: 0    venlafaxine 150 MG TB24, , Disp: , Rfl: 0    zolpidem (AMBIEN CR) 12 5 MG CR tablet, , Disp: , Rfl: 0    apixaban (ELIQUIS) 2 5 mg, Take 1 tablet (2 5 mg total) by mouth 2 (two) times a day for 28 days, Disp: 56 tablet, Rfl: 0    Objective:    Blood pressure 126/76, pulse 70, temperature 98 °F (36 7 °C), temperature source Tympanic, height 5' 11" (1 803 m), weight 109 kg (240 lb), not currently breastfeeding  Body mass index is 33 47 kg/m²  Body surface area is 2 28 meters squared  Physical Exam  Abdomen:  Incision well-healed  No dehiscence, no erythema, no hernias  Pelvic:  Normal external genitalia  Vulva and vagina with minimal atrophy  Urethra and urethral meatus without masses, discharge or gross abnormalities  No vaginal discharge or blood  Vaginal cuff well healed  No dehiscence  No granulation tissue  Cervix, uterus and bilateral fallopian tubes and ovaries are surgically absent  Bimanual exam is negative for fluid collections or masses      Jose Rosales MD, [address]  2/25/2020  12:24 PM

## 2020-02-25 NOTE — ASSESSMENT & PLAN NOTE
Patient has fully recovered from surgery  Vaginal cuff is well healed  She can resume all activities of daily living with no restrictions including vaginal intercourse  She has started postoperative adjuvant chemotherapy with carboplatin/Taxol under direction of Dr Daniele Maier had a hypersensitivity reaction with the 1st infusion but was able to complete treatment at a slower rate  I plan to see her back after completion of 6th cycle of chemotherapy  At that time, I would like to have a pre visit CT scan of the chest, abdomen and pelvis to document response/disease status at completion of treatment  She knows to contact me any time if she has any questions or problems related to surgery  I have been communicating with Dr Kasandra Zuñiga and will continue to do so on an as-needed basis

## 2020-02-25 NOTE — LETTER
February 25, 2020     Ann Kelley MD  [id number] [facility]    Patient: Masood Severino   YOB: 1950   Date of Visit: 2/25/2020       Dear Alexa Mcguire:    Below are my notes for this consultation  If you have questions, please do not hesitate to call me  I look forward to following your patient along with you  Sincerely,        Maryse Le MD        CC: MD Maryse Shah MD  2/25/2020 12:24 PM  Incomplete  Assessment/Plan:    Problem List Items Addressed This Visit        Endocrine    Ovarian cancer on [facility]) - Primary     Patient has fully recovered from surgery  Vaginal cuff is well healed  She can resume all activities of daily living with no restrictions including vaginal intercourse  She has started postoperative adjuvant chemotherapy with carboplatin/Taxol under direction of Dr Андрей Harveymarsha Lopez had a hypersensitivity reaction with the 1st infusion but was able to complete treatment at a slower rate  I plan to see her back after completion of 6th cycle of chemotherapy  At that time, I would like to have a pre visit CT scan of the chest, abdomen and pelvis to document response/disease status at completion of treatment  She knows to contact me any time if she has any questions or problems related to surgery  I have been communicating with Dr Magaly Vogel and will continue to do so on an as-needed basis  Relevant Orders    BUN    Creatinine, serum    CT chest abdomen pelvis w contrast            CHIEF COMPLAINT:  Here for postoperative follow-up, vaginal cuff check       Problem:  Cancer Staging  Ovarian cancer on [facility])  Staging form: Ovary, Fallopian Tube, Primary Peritoneal, AJCC 8th Edition  - Pathologic stage from 1/13/2020: FIGO Stage IC3 (pN0, cM0) - Signed by Maryse Le MD on 1/28/2020        Previous therapy:     Ovarian cancer on [facility])    1/13/2020 Initial Diagnosis     Ovarian cancer on left ([facility] [address] )      1/13/2020 -  Cancer Staged     Staging form: Ovary, Fallopian Tube, Primary Peritoneal, AJCC 8th Edition  - Pathologic stage from 1/13/2020: FIGO Stage IC3 (pN0, cM0) - Signed by Arcelia Han MD on 1/28/2020  Histologic grade (G): G3  Histologic grading system: 4 grade system  Residual tumor (R): R0 - None  Histopathologic type: Serous cystadenocarcinoma, NOS  Diagnostic confirmation: Positive histology PLUS positive immunophenotyping and/or positive genetic studies  Specimen type: Excision  Staged by: Managing physician  Cancer antigen 125 () (U/mL): 4,000  Gross residual tumor after primary cyto-reductive surgery: Absent  Residual tumor volume after primary cytoreductive surgery: No gross tumor  National guidelines used in treatment planning: Yes  Type of national guideline used in treatment planning: NCCN             Patient ID: Sonal Serna is a 81 y o  female  HPI  Patient with 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  She has also history of remote ovarian cancer  Multi gene panel testing demonstrated no evidence of pathogenic mutations  She is now approximately 6 weeks out from comprehensive surgical staging/optimal cytoreductive surgery  She has started chemotherapy locally under the direction of her medical oncologist Dr Marianna Garcia  He and I have communicated and agreed on plan of care  Of interest, she recently so her urologic providers recommended topical vaginal estrogen and antibiotic suppression for her history of recurrent UTIs  However, she states all pre-existing symptoms of abnormal bowel movements, bladder pressure and discomfort have resolved since surgery  I explained how it is possible that some of her episodes that were diagnosed as clinical UTIs actually represented exacerbation of symptoms related to pelvic mass  She will consider whether to start treatment versus managed expectantly  We discussed pros and cons    The following portions of the patient's history were reviewed and updated as appropriate: allergies, current medications, past family history, past medical history, past social history, past surgical history and problem list     Review of Systems  As above  Reports baseline neuropathy grade 1 which was not worsen after recent infusion of Taxol  Denies vaginal bleeding, drainage or discharge  No alopecia yet  Normal bowel bladder function  Denies pain  Incision is well-healed  Twelve point review of systems otherwise unremarkable  Current Outpatient Medications:     cholecalciferol (VITAMIN D3) 400 units tablet, Take 400 Units by mouth daily, Disp: , Rfl:     estradiol (ESTRACE) 0 1 mg/g vaginal cream, insert 1 gram vaginally two times a week, Disp: , Rfl: 0    lansoprazole (PREVACID) 15 mg capsule, Take 15 mg by mouth daily, Disp: , Rfl:     losartan-hydrochlorothiazide (HYZAAR) 50-12 5 mg per tablet, , Disp: , Rfl: 0    metoprolol tartrate (LOPRESSOR) 25 mg tablet, Take 25 mg by mouth   5 tab daily, Disp: , Rfl:     psyllium (METAMUCIL) 58 6 % packet, Take 1 packet by mouth daily, Disp: , Rfl:     simvastatin (ZOCOR) 20 mg tablet, , Disp: , Rfl: 0    trimethoprim (PROLOPRIM) 100 mg tablet, , Disp: , Rfl: 0    venlafaxine 150 MG TB24, , Disp: , Rfl: 0    zolpidem (AMBIEN CR) 12 5 MG CR tablet, , Disp: , Rfl: 0    apixaban (ELIQUIS) 2 5 mg, Take 1 tablet (2 5 mg total) by mouth 2 (two) times a day for 28 days, Disp: 56 tablet, Rfl: 0    Objective:    Blood pressure 126/76, pulse 70, temperature 98 °F (36 7 °C), temperature source Tympanic, height 5' 11" (1 803 m), weight 109 kg (240 lb), not currently breastfeeding  Body mass index is 33 47 kg/m²  Body surface area is 2 28 meters squared  Physical Exam  Abdomen:  Incision well-healed  No dehiscence, no erythema, no hernias  Pelvic:  Normal external genitalia  Vulva and vagina with minimal atrophy    Urethra and urethral meatus without masses, discharge or gross abnormalities  No vaginal discharge or blood  Vaginal cuff well healed  No dehiscence  No granulation tissue  Cervix, uterus and bilateral fallopian tubes and ovaries are surgically absent  Bimanual exam is negative for fluid collections or masses      Jostin Shankar MD, [address]  2/25/2020  12:24 PM

## 2020-03-10 ENCOUNTER — DOCUMENTATION (OUTPATIENT)
Dept: INFUSION CENTER | Facility: CLINIC | Age: 70
End: 2020-03-10

## 2020-03-10 NOTE — SOCIAL WORK
MSW s/w pt by phone today to review her DT, which pt self scored as a 4 and indicated fear, nervousness, and worry as her concerns  Pt tells me today that she is doing very well, she has started chemo closer to home and appreciates Dr Kristian Oliver coordinating her chemo with her physician near home  She denies any needs or concerns but was appreciative of the call to check in  No other needs at this time

## 2020-06-05 ENCOUNTER — TELEPHONE (OUTPATIENT)
Dept: GYNECOLOGIC ONCOLOGY | Facility: CLINIC | Age: 70
End: 2020-06-05

## 2020-06-08 ENCOUNTER — TELEPHONE (OUTPATIENT)
Dept: GYNECOLOGIC ONCOLOGY | Facility: CLINIC | Age: 70
End: 2020-06-08

## 2020-06-10 ENCOUNTER — OFFICE VISIT (OUTPATIENT)
Dept: GYNECOLOGIC ONCOLOGY | Facility: CLINIC | Age: 70
End: 2020-06-10
Payer: MEDICARE

## 2020-06-10 ENCOUNTER — HOSPITAL ENCOUNTER (OUTPATIENT)
Dept: RADIOLOGY | Facility: HOSPITAL | Age: 70
Discharge: HOME | End: 2020-06-10
Attending: OBSTETRICS & GYNECOLOGY
Payer: MEDICARE

## 2020-06-10 ENCOUNTER — TRANSCRIBE ORDERS (OUTPATIENT)
Dept: RADIOLOGY | Facility: HOSPITAL | Age: 70
End: 2020-06-10

## 2020-06-10 VITALS
WEIGHT: 239 LBS | HEIGHT: 69 IN | SYSTOLIC BLOOD PRESSURE: 118 MMHG | BODY MASS INDEX: 35.4 KG/M2 | DIASTOLIC BLOOD PRESSURE: 72 MMHG | TEMPERATURE: 98.9 F | HEART RATE: 100 BPM

## 2020-06-10 DIAGNOSIS — C56.2 OVARIAN CANCER ON LEFT (HCC): Primary | ICD-10-CM

## 2020-06-10 DIAGNOSIS — C56.2 OVARIAN CANCER ON LEFT (HCC): ICD-10-CM

## 2020-06-10 PROBLEM — Z15.09 GENETIC SUSCEPTIBILITY TO CANCER: Status: RESOLVED | Noted: 2020-01-28 | Resolved: 2020-06-10

## 2020-06-10 PROCEDURE — 74177 CT ABD & PELVIS W/CONTRAST: CPT

## 2020-06-10 PROCEDURE — 99214 OFFICE O/P EST MOD 30 MIN: CPT | Performed by: OBSTETRICS & GYNECOLOGY

## 2020-06-10 PROCEDURE — 71260 CT THORAX DX C+: CPT

## 2020-06-10 RX ADMIN — IOHEXOL 100 ML: 350 INJECTION, SOLUTION INTRAVENOUS at 11:15

## 2020-06-11 ENCOUNTER — TELEPHONE (OUTPATIENT)
Dept: INFUSION CENTER | Facility: HOSPITAL | Age: 70
End: 2020-06-11

## 2020-09-16 ENCOUNTER — OFFICE VISIT (OUTPATIENT)
Dept: GYNECOLOGIC ONCOLOGY | Facility: CLINIC | Age: 70
End: 2020-09-16
Payer: MEDICARE

## 2020-09-16 VITALS
DIASTOLIC BLOOD PRESSURE: 82 MMHG | SYSTOLIC BLOOD PRESSURE: 148 MMHG | BODY MASS INDEX: 35.25 KG/M2 | TEMPERATURE: 98.9 F | HEART RATE: 84 BPM | WEIGHT: 238 LBS | HEIGHT: 69 IN

## 2020-09-16 DIAGNOSIS — Z85.43 ENCOUNTER FOR FOLLOW-UP SURVEILLANCE OF OVARIAN CANCER: Primary | ICD-10-CM

## 2020-09-16 DIAGNOSIS — Z08 ENCOUNTER FOR FOLLOW-UP SURVEILLANCE OF OVARIAN CANCER: Primary | ICD-10-CM

## 2020-09-16 PROCEDURE — 99212 OFFICE O/P EST SF 10 MIN: CPT | Performed by: OBSTETRICS & GYNECOLOGY

## 2020-09-16 NOTE — PROGRESS NOTES
Assessment/Plan:    Problem List Items Addressed This Visit        Other    Encounter for follow-up surveillance of ovarian cancer - Primary     Patient has no clinical evidence of disease   remains low less than 6 units/milliliter  I plan to see her back in 3 months with repeat   History of recurrent UTIs/vaginal atrophy being managed by local urologists                     CHIEF COMPLAINT:   Surveillance for history of ovarian cancer    Problem:  Cancer Staging  Encounter for follow-up surveillance of ovarian cancer  Staging form: Ovary, Fallopian Tube, Primary Peritoneal, AJCC 8th Edition  - Pathologic stage from 1/13/2020: FIGO Stage IC3 (pN0, cM0) - Signed by Chely Fontana MD on 1/28/2020        Previous therapy:  Oncology History   Encounter for follow-up surveillance of ovarian cancer   1/13/2020 Initial Diagnosis    Ovarian cancer on left ([address] )     1/13/2020 -  Cancer Staged    Staging form: Ovary, Fallopian Tube, Primary Peritoneal, AJCC 8th Edition  - Pathologic stage from 1/13/2020: FIGO Stage IC3 (pN0, cM0) - Signed by Chely Fontana MD on 1/28/2020  Histologic grade (G): G3  Histologic grading system: 4 grade system  Residual tumor (R): R0 - None  Histopathologic type: Serous cystadenocarcinoma, NOS  Diagnostic confirmation: Positive histology PLUS positive immunophenotyping and/or positive genetic studies  Specimen type: Excision  Staged by: Managing physician  Cancer antigen 125 () (U/mL): 4,000  Gross residual tumor after primary cyto-reductive surgery: Absent  Residual tumor volume after primary cytoreductive surgery: No gross tumor  National guidelines used in treatment planning: Yes  Type of national guideline used in treatment planning: NCCN       1/13/2020 Surgery    Diagnostic laparoscopy, laparotomy, total hysterectomy, bilateral salpingo-oophorectomy with resection of pelvic mass, bilateral pelvic and periaortic lymphadenectomy, staging peritoneal biopsies, omentectomy, appendectomy  Final pathology consistent with 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  2/11/2020 - 5/26/2020 Chemotherapy    Six cycles of carboplatin/paclitaxel administered by Dr Jethro Rayo  Tolerated well  CT scan at completion of treatment demonstrates no evidence of measurable disease   amarilis near completion of chemotherapy 5 7 units/milliliter           Patient ID: Salinas Bender is a 81 y o  female  HPI  Patient with history of stage I C3 ovarian cancer  Completed adjuvant chemotherapy in the spring of 2020  Remains with no evidence of disease  Single episode of left-sided pain subsided after a couple of days  Otherwise no bloating, no changes in bowel or bladder function  History of recurrent UTIs now on suppression/topical vaginal estrogen  Being managed by local urologist   She had a single episode of UTI since completion of chemotherapy  No other complaints  The following portions of the patient's history were reviewed and updated as appropriate: allergies, current medications, past family history, past medical history, past social history, past surgical history and problem list     Review of Systems  As above  Twelve point review of systems otherwise unremarkable  Current Outpatient Medications   Medication Sig Dispense Refill    cholecalciferol (VITAMIN D3) 400 units tablet Take 400 Units by mouth daily      estradiol (ESTRACE) 0 1 mg/g vaginal cream insert 1 gram vaginally two times a week  0    lansoprazole (PREVACID) 15 mg capsule Take 15 mg by mouth daily      losartan-hydrochlorothiazide (HYZAAR) 50-12 5 mg per tablet   0    metoprolol tartrate (LOPRESSOR) 25 mg tablet Take 25 mg by mouth   5 tab daily      psyllium (METAMUCIL) 58 6 % packet Take 1 packet by mouth daily      simvastatin (ZOCOR) 20 mg tablet   0    trimethoprim (PROLOPRIM) 100 mg tablet   0    venlafaxine 150 MG TB24   0    zolpidem (AMBIEN CR) 12 5 MG CR tablet   0    apixaban (ELIQUIS) 2 5 mg Take 1 tablet (2 5 mg total) by mouth 2 (two) times a day for 28 days 56 tablet 0     No current facility-administered medications for this visit  Objective:    Blood pressure 148/82, pulse 84, temperature 98 9 °F (37 2 °C), temperature source Temporal, height 5' 9" (1 753 m), weight 108 kg (238 lb), not currently breastfeeding  Body mass index is 35 15 kg/m²  Body surface area is 2 22 meters squared  Physical Exam  Vitals signs reviewed  Constitutional:       General: She is not in acute distress  Appearance: Normal appearance  She is not ill-appearing  Pulmonary:      Effort: Pulmonary effort is normal    Abdominal:      General: Abdomen is flat  There is no distension  Palpations: Abdomen is soft  There is no mass  Genitourinary:     Comments: Normal external female genitalia  Normal Bartholin's and [place]'s glands  Normal urethral meatus and no evidence of urethral discharge or masses  Bladder without fullness mass or tenderness  Vagina with mild atrophy without lesion or discharge  No significant pelvic organ prolapse noted  Cervix and uterus are surgically absent  Bimanual exam demonstrates no evidence of pelvic masses  Anus without fissure of lesion  Neurological:      Mental Status: She is alert               Jose Waddell MD, [address] [facility]  9/16/2020  1:44 PM

## 2020-09-16 NOTE — LETTER
September 16, 2020     Thora Silence, [address] [address]    Patient: Dieudonne Alas   YOB: 1950   Date of Visit: 9/16/2020       Dear Dr Jose Antonio Gandhi: Thank you for referring Rosamaria Gaspar to me for evaluation  Below are my notes for this consultation  If you have questions, please do not hesitate to call me  I look forward to following your patient along with you  Sincerely,        Leela Sanders MD        CC: MD Leela Vasquez MD  9/16/2020  1:44 PM  Sign when Signing Visit  Assessment/Plan:    Problem List Items Addressed This Visit        Other    Encounter for follow-up surveillance of ovarian cancer - Primary     Patient has no clinical evidence of disease   remains low less than 6 units/milliliter  I plan to see her back in 3 months with repeat   History of recurrent UTIs/vaginal atrophy being managed by local urologists                     CHIEF COMPLAINT:   Surveillance for history of ovarian cancer    Problem:  Cancer Staging  Encounter for follow-up surveillance of ovarian cancer  Staging form: Ovary, Fallopian Tube, Primary Peritoneal, AJCC 8th Edition  - Pathologic stage from 1/13/2020: FIGO Stage IC3 (pN0, cM0) - Signed by Leela Sanders MD on 1/28/2020        Previous therapy:  Oncology History   Encounter for follow-up surveillance of ovarian cancer   1/13/2020 Initial Diagnosis    Ovarian cancer on left ([address] )     1/13/2020 -  Cancer Staged    Staging form: Ovary, Fallopian Tube, Primary Peritoneal, AJCC 8th Edition  - Pathologic stage from 1/13/2020: FIGO Stage IC3 (pN0, cM0) - Signed by Leela Sanders MD on 1/28/2020  Histologic grade (G): G3  Histologic grading system: 4 grade system  Residual tumor (R): R0 - None  Histopathologic type: Serous cystadenocarcinoma, NOS  Diagnostic confirmation: Positive histology PLUS positive immunophenotyping and/or positive genetic studies  Specimen type: Excision  Staged by: Managing physician  Cancer antigen 125 () (U/mL): 4,000  Gross residual tumor after primary cyto-reductive surgery: Absent  Residual tumor volume after primary cytoreductive surgery: No gross tumor  National guidelines used in treatment planning: Yes  Type of national guideline used in treatment planning: NCCN       1/13/2020 Surgery    Diagnostic laparoscopy, laparotomy, total hysterectomy, bilateral salpingo-oophorectomy with resection of pelvic mass, bilateral pelvic and periaortic lymphadenectomy, staging peritoneal biopsies, omentectomy, appendectomy  Final pathology consistent with 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  2/11/2020 - 5/26/2020 Chemotherapy    Six cycles of carboplatin/paclitaxel administered by Dr Ricardo Lawrence  Tolerated well  CT scan at completion of treatment demonstrates no evidence of measurable disease   amarilis near completion of chemotherapy 5 7 units/milliliter           Patient ID: Natalie Herrera is a 81 y o  female  HPI  Patient with history of stage I C3 ovarian cancer  Completed adjuvant chemotherapy in the spring of 2020  Remains with no evidence of disease  Single episode of left-sided pain subsided after a couple of days  Otherwise no bloating, no changes in bowel or bladder function  History of recurrent UTIs now on suppression/topical vaginal estrogen  Being managed by local urologist   She had a single episode of UTI since completion of chemotherapy  No other complaints  The following portions of the patient's history were reviewed and updated as appropriate: allergies, current medications, past family history, past medical history, past social history, past surgical history and problem list     Review of Systems  As above  Twelve point review of systems otherwise unremarkable    Current Outpatient Medications   Medication Sig Dispense Refill    cholecalciferol (VITAMIN D3) 400 units tablet Take 400 Units by mouth daily      estradiol (ESTRACE) 0 1 mg/g vaginal cream insert 1 gram vaginally two times a week  0    lansoprazole (PREVACID) 15 mg capsule Take 15 mg by mouth daily      losartan-hydrochlorothiazide (HYZAAR) 50-12 5 mg per tablet   0    metoprolol tartrate (LOPRESSOR) 25 mg tablet Take 25 mg by mouth   5 tab daily      psyllium (METAMUCIL) 58 6 % packet Take 1 packet by mouth daily      simvastatin (ZOCOR) 20 mg tablet   0    trimethoprim (PROLOPRIM) 100 mg tablet   0    venlafaxine 150 MG TB24   0    zolpidem (AMBIEN CR) 12 5 MG CR tablet   0    apixaban (ELIQUIS) 2 5 mg Take 1 tablet (2 5 mg total) by mouth 2 (two) times a day for 28 days 56 tablet 0     No current facility-administered medications for this visit  Objective:    Blood pressure 148/82, pulse 84, temperature 98 9 °F (37 2 °C), temperature source Temporal, height 5' 9" (1 753 m), weight 108 kg (238 lb), not currently breastfeeding  Body mass index is 35 15 kg/m²  Body surface area is 2 22 meters squared  Physical Exam  Vitals signs reviewed  Constitutional:       General: She is not in acute distress  Appearance: Normal appearance  She is not ill-appearing  Pulmonary:      Effort: Pulmonary effort is normal    Abdominal:      General: Abdomen is flat  There is no distension  Palpations: Abdomen is soft  There is no mass  Genitourinary:     Comments: Normal external female genitalia  Normal Bartholin's and [place]'s glands  Normal urethral meatus and no evidence of urethral discharge or masses  Bladder without fullness mass or tenderness  Vagina with mild atrophy without lesion or discharge  No significant pelvic organ prolapse noted  Cervix and uterus are surgically absent  Bimanual exam demonstrates no evidence of pelvic masses  Anus without fissure of lesion  Neurological:      Mental Status: She is alert               Bonnie Antoine MD, [address] [address]  9/16/2020  1:44 PM

## 2020-09-16 NOTE — ASSESSMENT & PLAN NOTE
Patient has no clinical evidence of disease   remains low less than 6 units/milliliter  I plan to see her back in 3 months with repeat   History of recurrent UTIs/vaginal atrophy being managed by local urologists

## 2020-11-09 ENCOUNTER — ANNUAL EXAM (OUTPATIENT)
Dept: OBGYN CLINIC | Facility: CLINIC | Age: 70
End: 2020-11-09
Payer: MEDICARE

## 2020-11-09 VITALS — DIASTOLIC BLOOD PRESSURE: 80 MMHG | TEMPERATURE: 98.9 F | SYSTOLIC BLOOD PRESSURE: 138 MMHG

## 2020-11-09 DIAGNOSIS — Z91.89 GYN EXAM FOR HIGH-RISK MEDICARE PATIENT: Primary | ICD-10-CM

## 2020-11-09 DIAGNOSIS — Z01.419 ENCOUNTER FOR GYNECOLOGICAL EXAMINATION WITHOUT ABNORMAL FINDING: ICD-10-CM

## 2020-11-09 DIAGNOSIS — Z12.31 ENCOUNTER FOR SCREENING MAMMOGRAM FOR MALIGNANT NEOPLASM OF BREAST: ICD-10-CM

## 2020-11-09 PROCEDURE — G0101 CA SCREEN;PELVIC/BREAST EXAM: HCPCS | Performed by: OBSTETRICS & GYNECOLOGY

## 2021-01-06 ENCOUNTER — OFFICE VISIT (OUTPATIENT)
Dept: GYNECOLOGIC ONCOLOGY | Facility: CLINIC | Age: 71
End: 2021-01-06
Payer: MEDICARE

## 2021-01-06 VITALS
TEMPERATURE: 98 F | RESPIRATION RATE: 16 BRPM | BODY MASS INDEX: 36.12 KG/M2 | HEART RATE: 85 BPM | HEIGHT: 71 IN | DIASTOLIC BLOOD PRESSURE: 78 MMHG | SYSTOLIC BLOOD PRESSURE: 128 MMHG | WEIGHT: 258 LBS

## 2021-01-06 DIAGNOSIS — Z85.43 ENCOUNTER FOR FOLLOW-UP SURVEILLANCE OF OVARIAN CANCER: Primary | ICD-10-CM

## 2021-01-06 DIAGNOSIS — Z08 ENCOUNTER FOR FOLLOW-UP SURVEILLANCE OF OVARIAN CANCER: Primary | ICD-10-CM

## 2021-01-06 PROCEDURE — 99212 OFFICE O/P EST SF 10 MIN: CPT | Performed by: OBSTETRICS & GYNECOLOGY

## 2021-01-06 RX ORDER — RIBOFLAVIN (VITAMIN B2) 100 MG
100 TABLET ORAL DAILY
COMMUNITY
End: 2021-06-16

## 2021-01-06 RX ORDER — DIPHENOXYLATE HYDROCHLORIDE AND ATROPINE SULFATE 2.5; .025 MG/1; MG/1
1 TABLET ORAL DAILY
COMMUNITY
End: 2022-06-16 | Stop reason: ALTCHOICE

## 2021-01-06 RX ORDER — VITAMIN B COMPLEX
1 CAPSULE ORAL DAILY
COMMUNITY

## 2021-01-06 NOTE — ASSESSMENT & PLAN NOTE
Patient has no clinical evidence of disease  Her  is less than 6 units/milliliter  Will plan to see her back in 3 months with pre visit   She knows to contact us if her recent onset abdominal bloating worsens or if she has any new symptoms  I discussed with her the possibility of obtaining CT scan of the abdomen and pelvis  At this time, given benign exam and reassuring , I have advocated to postpone that/monitor  She will contact us immediately if her symptoms persist/worsen

## 2021-01-06 NOTE — PROGRESS NOTES
Assessment/Plan:    Problem List Items Addressed This Visit        Other    Encounter for follow-up surveillance of ovarian cancer - Primary     Patient has no clinical evidence of disease  Her  is less than 6 units/milliliter  Will plan to see her back in 3 months with pre visit   She knows to contact us if her recent onset abdominal bloating worsens or if she has any new symptoms  I discussed with her the possibility of obtaining CT scan of the abdomen and pelvis  At this time, given benign exam and reassuring , I have advocated to postpone that/monitor  She will contact us immediately if her symptoms persist/worsen  Relevant Orders                CHIEF COMPLAINT:   Routine surveillance for ovarian cancer      Problem:  Cancer Staging  Encounter for follow-up surveillance of ovarian cancer  Staging form: Ovary, Fallopian Tube, Primary Peritoneal, AJCC 8th Edition  - Pathologic stage from 1/13/2020: FIGO Stage IC3 (pN0, cM0) - Signed by Shahida Bernard MD on 1/28/2020        Previous therapy:  Oncology History   Encounter for follow-up surveillance of ovarian cancer   1/13/2020 Initial Diagnosis    Ovarian cancer on left ([address] )     1/13/2020 -  Cancer Staged    Staging form: Ovary, Fallopian Tube, Primary Peritoneal, AJCC 8th Edition  - Pathologic stage from 1/13/2020: FIGO Stage IC3 (pN0, cM0) - Signed by Shahida Bernard MD on 1/28/2020  Histologic grade (G): G3  Histologic grading system: 4 grade system  Residual tumor (R): R0 - None  Histopathologic type: Serous cystadenocarcinoma, NOS  Diagnostic confirmation: Positive histology PLUS positive immunophenotyping and/or positive genetic studies  Specimen type: Excision  Staged by: Managing physician  Cancer antigen 125 () (U/mL): 4,000  Gross residual tumor after primary cyto-reductive surgery: Absent  Residual tumor volume after primary cytoreductive surgery: No gross tumor  National guidelines used in treatment planning: Yes  Type of national guideline used in treatment planning: NCCN       1/13/2020 Surgery    Diagnostic laparoscopy, laparotomy, total hysterectomy, bilateral salpingo-oophorectomy with resection of pelvic mass, bilateral pelvic and periaortic lymphadenectomy, staging peritoneal biopsies, omentectomy, appendectomy  Final pathology consistent with 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  2/11/2020 - 5/26/2020 Chemotherapy    Six cycles of carboplatin/paclitaxel administered by Dr Jose Uriarte  Tolerated well  CT scan at completion of treatment demonstrates no evidence of measurable disease   amarilis near completion of chemotherapy 5 7 units/milliliter           Patient ID: Pat Weiss is a 78 y o  female  HPI  Patient with stage I C3 ovarian cancer, high-grade serous histology  Completed chemotherapy in May of 2020  Presents for follow-up   prior to today's visit less than 6 units/milliliter  Reports recent onset abdominal bloating and some left-sided pelvic discomfort  She recognizes recent weight gain and excess caloric intake associated with recent holidays  No other complaints  The following portions of the patient's history were reviewed and updated as appropriate: allergies, current medications, past family history, past medical history, past social history, past surgical history and problem list     Review of Systems  As above  Twelve point review of systems otherwise unremarkable    Current Outpatient Medications   Medication Sig Dispense Refill    Ascorbic Acid (vitamin C) 100 MG tablet Take 100 mg by mouth daily      b complex vitamins capsule Take 1 capsule by mouth daily      cholecalciferol (VITAMIN D3) 400 units tablet Take 400 Units by mouth daily      estradiol (ESTRACE) 0 1 mg/g vaginal cream insert 1 gram vaginally two times a week  0    losartan-hydrochlorothiazide (HYZAAR) 50-12 5 mg per tablet   0    metoprolol tartrate (LOPRESSOR) 25 mg tablet Take 25 mg by mouth   5 tab daily      multivitamin (THERAGRAN) TABS Take 1 tablet by mouth daily      simvastatin (ZOCOR) 20 mg tablet   0    trimethoprim (PROLOPRIM) 100 mg tablet   0    venlafaxine 150 MG TB24   0    zolpidem (AMBIEN CR) 12 5 MG CR tablet   0    apixaban (ELIQUIS) 2 5 mg Take 1 tablet (2 5 mg total) by mouth 2 (two) times a day for 28 days 56 tablet 0    lansoprazole (PREVACID) 15 mg capsule Take 15 mg by mouth daily      psyllium (METAMUCIL) 58 6 % packet Take 1 packet by mouth daily       No current facility-administered medications for this visit  Objective:    Blood pressure 128/78, pulse 85, temperature 98 °F (36 7 °C), temperature source Temporal, resp  rate 16, height 5' 11" (1 803 m), weight 117 kg (258 lb), not currently breastfeeding  Body mass index is 35 98 kg/m²  Body surface area is 2 35 meters squared  Physical Exam  Vitals signs reviewed  Exam conducted with a chaperone present  Constitutional:       Appearance: Normal appearance  She is not toxic-appearing or diaphoretic  Eyes:      General: No scleral icterus  Right eye: No discharge  Left eye: No discharge  Conjunctiva/sclera: Conjunctivae normal    Neck:      Musculoskeletal: No neck rigidity  Cardiovascular:      Rate and Rhythm: Normal rate and regular rhythm  Heart sounds: Normal heart sounds  No murmur  Pulmonary:      Effort: Pulmonary effort is normal  No respiratory distress  Breath sounds: Normal breath sounds  No stridor  No wheezing  Abdominal:      General: Abdomen is flat  There is no distension  Palpations: Abdomen is soft  There is no mass  Tenderness: There is no abdominal tenderness  There is no guarding  Genitourinary:     Comments: Normal external female genitalia  Normal Bartholin's and [place]'s glands  Normal urethral meatus and no evidence of urethral discharge or masses  Bladder without fullness mass or tenderness   Vagina without lesion or discharge  No significant pelvic organ prolapse noted  Cervix and uterus are surgically absent  Bimanual exam demonstrates no evidence of pelvic masses  Anus without fissure of lesion  Lymphadenopathy:      Cervical: No cervical adenopathy  Neurological:      Mental Status: She is alert        Trend:  Lab Results   Component Value Date     4,086 7 (H) 01/06/2020       Willa Morrison MD, [place], [facility]  1/6/2021  2:19 PM

## 2021-01-10 ENCOUNTER — IMMUNIZATIONS (OUTPATIENT)
Dept: FAMILY MEDICINE CLINIC | Facility: HOSPITAL | Age: 71
End: 2021-01-10

## 2021-01-10 DIAGNOSIS — Z23 ENCOUNTER FOR IMMUNIZATION: ICD-10-CM

## 2021-01-10 PROCEDURE — 91301 SARS-COV-2 / COVID-19 MRNA VACCINE (MODERNA) 100 MCG: CPT

## 2021-01-10 PROCEDURE — 0011A SARS-COV-2 / COVID-19 MRNA VACCINE (MODERNA) 100 MCG: CPT

## 2021-02-06 ENCOUNTER — IMMUNIZATIONS (OUTPATIENT)
Dept: FAMILY MEDICINE CLINIC | Facility: HOSPITAL | Age: 71
End: 2021-02-06

## 2021-02-06 DIAGNOSIS — Z23 ENCOUNTER FOR IMMUNIZATION: Primary | ICD-10-CM

## 2021-02-06 PROCEDURE — 91301 SARS-COV-2 / COVID-19 MRNA VACCINE (MODERNA) 100 MCG: CPT

## 2021-02-06 PROCEDURE — 0012A SARS-COV-2 / COVID-19 MRNA VACCINE (MODERNA) 100 MCG: CPT

## 2021-03-18 ENCOUNTER — APPOINTMENT (OUTPATIENT)
Dept: LAB | Facility: HOSPITAL | Age: 71
End: 2021-03-18
Attending: OBSTETRICS & GYNECOLOGY
Payer: MEDICARE

## 2021-03-18 ENCOUNTER — TRANSCRIBE ORDERS (OUTPATIENT)
Dept: RADIOLOGY | Facility: HOSPITAL | Age: 71
End: 2021-03-18

## 2021-03-18 ENCOUNTER — HOSPITAL ENCOUNTER (OUTPATIENT)
Dept: RADIOLOGY | Facility: HOSPITAL | Age: 71
Discharge: HOME | End: 2021-03-18
Attending: OBSTETRICS & GYNECOLOGY
Payer: MEDICARE

## 2021-03-18 ENCOUNTER — OFFICE VISIT (OUTPATIENT)
Dept: GYNECOLOGIC ONCOLOGY | Facility: CLINIC | Age: 71
End: 2021-03-18
Payer: MEDICARE

## 2021-03-18 VITALS
HEIGHT: 71 IN | DIASTOLIC BLOOD PRESSURE: 80 MMHG | TEMPERATURE: 98.3 F | HEART RATE: 80 BPM | WEIGHT: 258 LBS | BODY MASS INDEX: 36.12 KG/M2 | SYSTOLIC BLOOD PRESSURE: 122 MMHG

## 2021-03-18 DIAGNOSIS — Z85.43 ENCOUNTER FOR FOLLOW-UP SURVEILLANCE OF OVARIAN CANCER: ICD-10-CM

## 2021-03-18 DIAGNOSIS — K43.2 INCISIONAL HERNIA, WITHOUT OBSTRUCTION OR GANGRENE: ICD-10-CM

## 2021-03-18 DIAGNOSIS — C56.2 OVARIAN CANCER ON LEFT (HCC): ICD-10-CM

## 2021-03-18 DIAGNOSIS — Z08 ENCOUNTER FOR FOLLOW-UP SURVEILLANCE OF OVARIAN CANCER: ICD-10-CM

## 2021-03-18 DIAGNOSIS — Z08 ENCOUNTER FOR FOLLOW-UP SURVEILLANCE OF OVARIAN CANCER: Primary | ICD-10-CM

## 2021-03-18 DIAGNOSIS — Z85.43 ENCOUNTER FOR FOLLOW-UP SURVEILLANCE OF OVARIAN CANCER: Primary | ICD-10-CM

## 2021-03-18 LAB
BUN SERPL-MCNC: 18 MG/DL (ref 5–25)
CANCER AG125 SERPL-ACNC: 6.3 U/ML (ref 0–30)
CREAT SERPL-MCNC: 0.94 MG/DL (ref 0.6–1.3)
GFR SERPL CREATININE-BSD FRML MDRD: 63 ML/MIN/1.73SQ M

## 2021-03-18 PROCEDURE — 74177 CT ABD & PELVIS W/CONTRAST: CPT

## 2021-03-18 PROCEDURE — 36415 COLL VENOUS BLD VENIPUNCTURE: CPT

## 2021-03-18 PROCEDURE — 99214 OFFICE O/P EST MOD 30 MIN: CPT | Performed by: OBSTETRICS & GYNECOLOGY

## 2021-03-18 PROCEDURE — 82565 ASSAY OF CREATININE: CPT

## 2021-03-18 PROCEDURE — 86304 IMMUNOASSAY TUMOR CA 125: CPT

## 2021-03-18 PROCEDURE — G1004 CDSM NDSC: HCPCS

## 2021-03-18 PROCEDURE — 84520 ASSAY OF UREA NITROGEN: CPT

## 2021-03-18 RX ADMIN — IOHEXOL 100 ML: 350 INJECTION, SOLUTION INTRAVENOUS at 15:13

## 2021-03-18 NOTE — PROGRESS NOTES
Assessment/Plan:    Problem List Items Addressed This Visit        Other    Encounter for follow-up surveillance of ovarian cancer - Primary       Clinically, patient has no evidence of disease  I will order  which she did not have done prior to today's visit  Will see her back in 3 months for routine surveillance  Relevant Orders        Incisional hernia, without obstruction or gangrene      Patient has a bulge in relation to upper most portion of her prior laparotomy incision  This is concerning for hernia  Patient reports some abdominal discomfort and nonspecific symptoms  I will order a CT scan to further characterize  Doubt this is related to malignancy  Patient will be referred to Dr Azar Rivas  for evaluation and treatment  Relevant Orders    BUN    Creatinine, serum    CT abdomen pelvis w contrast    Ambulatory referral to General Surgery            CHIEF COMPLAINT:     Surveillance for ovarian cancer, complains of abdominal bulge and discomfort      Problem:  Cancer Staging  Encounter for follow-up surveillance of ovarian cancer  Staging form: Ovary, Fallopian Tube, Primary Peritoneal, AJCC 8th Edition  - Pathologic stage from 1/13/2020: FIGO Stage IC3 (pN0, cM0) - Signed by Trinh Ro MD on 1/28/2020        Previous therapy:  Oncology History   Encounter for follow-up surveillance of ovarian cancer   1/13/2020 Initial Diagnosis    Ovarian cancer on left ([facility] [address] )     1/13/2020 -  Cancer Staged    Staging form: Ovary, Fallopian Tube, Primary Peritoneal, AJCC 8th Edition  - Pathologic stage from 1/13/2020: FIGO Stage IC3 (pN0, cM0) - Signed by Trinh Ro MD on 1/28/2020  Histologic grade (G): G3  Histologic grading system: 4 grade system  Residual tumor (R): R0 - None  Histopathologic type: Serous cystadenocarcinoma, NOS  Diagnostic confirmation: Positive histology PLUS positive immunophenotyping and/or positive genetic studies  Specimen type: Excision  Staged by: Managing physician  Cancer antigen 125 () (U/mL): 4,000  Gross residual tumor after primary cyto-reductive surgery: Absent  Residual tumor volume after primary cytoreductive surgery: No gross tumor  National guidelines used in treatment planning: Yes  Type of national guideline used in treatment planning: NCCN       1/13/2020 Surgery    Diagnostic laparoscopy, laparotomy, total hysterectomy, bilateral salpingo-oophorectomy with resection of pelvic mass, bilateral pelvic and periaortic lymphadenectomy, staging peritoneal biopsies, omentectomy, appendectomy  Final pathology consistent with 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  2/11/2020 - 5/26/2020 Chemotherapy    Six cycles of carboplatin/paclitaxel administered by Dr Dorean Cranker  Tolerated well  CT scan at completion of treatment demonstrates no evidence of measurable disease   amarilis near completion of chemotherapy 5 7 units/milliliter           Patient ID: Roxanna Ortiz is a 78 y o  female  HPI    Patient with known history of stage I C3 ovarian cancer, she is status post diagnostic laparoscopy, exploratory laparotomy, total hysterectomy,  Bilateral salpingo-oophorectomy, staging lymphadenectomy, biopsies, omentectomy and appendectomy in January of 2020  She subsequently received 6 cycles of chemotherapy with completion of chemotherapy  amarilis of 5 7 units/milliliter  She has remained with no evidence of disease  Presents for follow-up  Reports recent onset of abdominal bulge in relation to upper most portion of her incision  Additionally she feels diffuse and nonspecific abdominal discomfort  Occasional headaches which she attributes to stress  Also undergoing cardiac evaluation for arrhythmia  Scheduled for cardiac catheterization next week    The following portions of the patient's history were reviewed and updated as appropriate: allergies, current medications, past family history, past medical history, past social history, past surgical history and problem list     Review of Systems    As per HPI  Twelve point review of systems otherwise noncontributory  Current Outpatient Medications   Medication Sig Dispense Refill    apixaban (ELIQUIS) 2 5 mg Take 1 tablet (2 5 mg total) by mouth 2 (two) times a day for 28 days 56 tablet 0    Ascorbic Acid (vitamin C) 100 MG tablet Take 100 mg by mouth daily      b complex vitamins capsule Take 1 capsule by mouth daily      cholecalciferol (VITAMIN D3) 400 units tablet Take 400 Units by mouth daily      estradiol (ESTRACE) 0 1 mg/g vaginal cream insert 1 gram vaginally two times a week  0    lansoprazole (PREVACID) 15 mg capsule Take 15 mg by mouth daily      losartan-hydrochlorothiazide (HYZAAR) 50-12 5 mg per tablet   0    metoprolol tartrate (LOPRESSOR) 25 mg tablet Take 25 mg by mouth   5 tab daily      multivitamin (THERAGRAN) TABS Take 1 tablet by mouth daily      psyllium (METAMUCIL) 58 6 % packet Take 1 packet by mouth daily      simvastatin (ZOCOR) 20 mg tablet   0    trimethoprim (PROLOPRIM) 100 mg tablet   0    venlafaxine 150 MG TB24   0    zolpidem (AMBIEN CR) 12 5 MG CR tablet   0     No current facility-administered medications for this visit  Objective:    Blood pressure 122/80, pulse 80, temperature 98 3 °F (36 8 °C), temperature source Tympanic, height 5' 11" (1 803 m), weight 117 kg (258 lb), not currently breastfeeding  Body mass index is 35 98 kg/m²  Body surface area is 2 35 meters squared  Physical Exam  Vitals signs reviewed  Exam conducted with a chaperone present  Constitutional:       General: She is in acute distress  Appearance: Normal appearance  She is obese  Eyes:      General: No scleral icterus  Right eye: No discharge  Left eye: No discharge  Conjunctiva/sclera: Conjunctivae normal    Pulmonary:      Effort: Pulmonary effort is normal    Abdominal:      General: Abdomen is flat        Palpations: Abdomen is soft  Tenderness: There is no abdominal tenderness  Hernia: A hernia (  Bulge in relation to upper most portion of midline incision with palpable fascial defect measuring approximately 4 cm, contents are soft, nontender and easily reducible  Mass increases with Valsalva  Findings consistent with incisional ventral hernia ) is present  Genitourinary:     Comments: Normal external female genitalia  Normal Bartholin's and [employer]'s glands  Normal urethral meatus and no evidence of urethral discharge or masses  Bladder without fullness mass or tenderness  Vagina without lesion or discharge  No significant pelvic organ prolapse noted  Cervix and uterus are surgically absent  Bimanual exam demonstrates no evidence of pelvic masses  Anus without fissure of lesion  Musculoskeletal:      Right lower leg: No edema  Left lower leg: No edema  Neurological:      Mental Status: She is alert         Christoph Watkins MD, [address] [address]  3/18/2021  12:10 PM

## 2021-03-18 NOTE — ASSESSMENT & PLAN NOTE
Patient has a bulge in relation to upper most portion of her prior laparotomy incision  This is concerning for hernia  Patient reports some abdominal discomfort and nonspecific symptoms  I will order a CT scan to further characterize  Doubt this is related to malignancy  Patient will be referred to Dr Clifton Tilley  for evaluation and treatment

## 2021-03-18 NOTE — LETTER
March 18, 2021     Bakari Finn MD  [address] [address]    Patient: Francisco Braden   YOB: 1950   Date of Visit: 3/18/2021       Dear Dr Carli Carver: Thank you for referring Hayden Soliman to me for evaluation  Below are my notes for this consultation  If you have questions, please do not hesitate to call me  I look forward to following your patient along with you  Sincerely,        Dana Mehta MD        CC: Anastacio Cohn, MD Carmin Gaucher, MD Fern Pickler, MD  3/18/2021 12:10 PM  Sign when Signing Visit  Assessment/Plan:    Problem List Items Addressed This Visit        Other    Encounter for follow-up surveillance of ovarian cancer - Primary       Clinically, patient has no evidence of disease  I will order  which she did not have done prior to today's visit  Will see her back in 3 months for routine surveillance  Relevant Orders        Incisional hernia, without obstruction or gangrene      Patient has a bulge in relation to upper most portion of her prior laparotomy incision  This is concerning for hernia  Patient reports some abdominal discomfort and nonspecific symptoms  I will order a CT scan to further characterize  Doubt this is related to malignancy  Patient will be referred to Dr Hilary Lee  for evaluation and treatment  Relevant Orders    BUN    Creatinine, serum    CT abdomen pelvis w contrast    Ambulatory referral to General Surgery            CHIEF COMPLAINT:     Surveillance for ovarian cancer, complains of abdominal bulge and discomfort      Problem:  Cancer Staging  Encounter for follow-up surveillance of ovarian cancer  Staging form: Ovary, Fallopian Tube, Primary Peritoneal, AJCC 8th Edition  - Pathologic stage from 1/13/2020: FIGO Stage IC3 (pN0, cM0) - Signed by Dana Mehta MD on 1/28/2020        Previous therapy:  Oncology History   Encounter for follow-up surveillance of ovarian cancer   1/13/2020 Initial Diagnosis    Ovarian cancer on left [facility])     1/13/2020 -  Cancer Staged    Staging form: Ovary, Fallopian Tube, Primary Peritoneal, AJCC 8th Edition  - Pathologic stage from 1/13/2020: FIGO Stage IC3 (pN0, cM0) - Signed by True Lynch MD on 1/28/2020  Histologic grade (G): G3  Histologic grading system: 4 grade system  Residual tumor (R): R0 - None  Histopathologic type: Serous cystadenocarcinoma, NOS  Diagnostic confirmation: Positive histology PLUS positive immunophenotyping and/or positive genetic studies  Specimen type: Excision  Staged by: Managing physician  Cancer antigen 125 () (U/mL): 4,000  Gross residual tumor after primary cyto-reductive surgery: Absent  Residual tumor volume after primary cytoreductive surgery: No gross tumor  National guidelines used in treatment planning: Yes  Type of national guideline used in treatment planning: NCCN       1/13/2020 Surgery    Diagnostic laparoscopy, laparotomy, total hysterectomy, bilateral salpingo-oophorectomy with resection of pelvic mass, bilateral pelvic and periaortic lymphadenectomy, staging peritoneal biopsies, omentectomy, appendectomy  Final pathology consistent with 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  2/11/2020 - 5/26/2020 Chemotherapy    Six cycles of carboplatin/paclitaxel administered by Dr Tori Platt  Tolerated well  CT scan at completion of treatment demonstrates no evidence of measurable disease   amarilis near completion of chemotherapy 5 7 units/milliliter           Patient ID: Georgia Vazquez is a 78 y o  female  HPI    Patient with known history of stage I C3 ovarian cancer, she is status post diagnostic laparoscopy, exploratory laparotomy, total hysterectomy,  Bilateral salpingo-oophorectomy, staging lymphadenectomy, biopsies, omentectomy and appendectomy in January of 2020  She subsequently received 6 cycles of chemotherapy with completion of chemotherapy  amarilis of 5 7 units/milliliter  She has remained with no evidence of disease  Presents for follow-up  Reports recent onset of abdominal bulge in relation to upper most portion of her incision  Additionally she feels diffuse and nonspecific abdominal discomfort  Occasional headaches which she attributes to stress  Also undergoing cardiac evaluation for arrhythmia  Scheduled for cardiac catheterization next week  The following portions of the patient's history were reviewed and updated as appropriate: allergies, current medications, past family history, past medical history, past social history, past surgical history and problem list     Review of Systems    As per HPI  Twelve point review of systems otherwise noncontributory  Current Outpatient Medications   Medication Sig Dispense Refill    apixaban (ELIQUIS) 2 5 mg Take 1 tablet (2 5 mg total) by mouth 2 (two) times a day for 28 days 56 tablet 0    Ascorbic Acid (vitamin C) 100 MG tablet Take 100 mg by mouth daily      b complex vitamins capsule Take 1 capsule by mouth daily      cholecalciferol (VITAMIN D3) 400 units tablet Take 400 Units by mouth daily      estradiol (ESTRACE) 0 1 mg/g vaginal cream insert 1 gram vaginally two times a week  0    lansoprazole (PREVACID) 15 mg capsule Take 15 mg by mouth daily      losartan-hydrochlorothiazide (HYZAAR) 50-12 5 mg per tablet   0    metoprolol tartrate (LOPRESSOR) 25 mg tablet Take 25 mg by mouth   5 tab daily      multivitamin (THERAGRAN) TABS Take 1 tablet by mouth daily      psyllium (METAMUCIL) 58 6 % packet Take 1 packet by mouth daily      simvastatin (ZOCOR) 20 mg tablet   0    trimethoprim (PROLOPRIM) 100 mg tablet   0    venlafaxine 150 MG TB24   0    zolpidem (AMBIEN CR) 12 5 MG CR tablet   0     No current facility-administered medications for this visit              Objective:    Blood pressure 122/80, pulse 80, temperature 98 3 °F (36 8 °C), temperature source Tympanic, height 5' 11" (1 803 m), weight 117 kg (258 lb), not currently breastfeeding  Body mass index is 35 98 kg/m²  Body surface area is 2 35 meters squared  Physical Exam  Vitals signs reviewed  Exam conducted with a chaperone present  Constitutional:       General: She is in acute distress  Appearance: Normal appearance  She is obese  Eyes:      General: No scleral icterus  Right eye: No discharge  Left eye: No discharge  Conjunctiva/sclera: Conjunctivae normal    Pulmonary:      Effort: Pulmonary effort is normal    Abdominal:      General: Abdomen is flat  Palpations: Abdomen is soft  Tenderness: There is no abdominal tenderness  Hernia: A hernia (  Bulge in relation to upper most portion of midline incision with palpable fascial defect measuring approximately 4 cm, contents are soft, nontender and easily reducible  Mass increases with Valsalva  Findings consistent with incisional ventral hernia ) is present  Genitourinary:     Comments: Normal external female genitalia  Normal Bartholin's and [place]'s glands  Normal urethral meatus and no evidence of urethral discharge or masses  Bladder without fullness mass or tenderness  Vagina without lesion or discharge  No significant pelvic organ prolapse noted  Cervix and uterus are surgically absent  Bimanual exam demonstrates no evidence of pelvic masses  Anus without fissure of lesion  Musculoskeletal:      Right lower leg: No edema  Left lower leg: No edema  Neurological:      Mental Status: She is alert         Reji Hale MD, [address]  3/18/2021  12:10 PM

## 2021-03-18 NOTE — ASSESSMENT & PLAN NOTE
Clinically, patient has no evidence of disease  I will order  which she did not have done prior to today's visit  Will see her back in 3 months for routine surveillance

## 2021-03-18 NOTE — PATIENT INSTRUCTIONS
Keep appointment for CT scan and blood work  Keep appointment with General surgery for evaluation and treatment recommendations for suspected incisional hernia  Follow-up with us in 3 months  Get  prior to your next appointment and ever subsequent visit

## 2021-03-23 LAB — EXT SARS-COV-2: NEGATIVE

## 2021-03-24 ENCOUNTER — CONSULT (OUTPATIENT)
Dept: SURGERY | Facility: CLINIC | Age: 71
End: 2021-03-24
Payer: MEDICARE

## 2021-03-24 ENCOUNTER — OFFICE VISIT (OUTPATIENT)
Dept: LAB | Facility: HOSPITAL | Age: 71
End: 2021-03-24
Attending: SURGERY
Payer: MEDICARE

## 2021-03-24 VITALS
HEIGHT: 71 IN | HEART RATE: 74 BPM | WEIGHT: 254.6 LBS | TEMPERATURE: 98.6 F | DIASTOLIC BLOOD PRESSURE: 74 MMHG | SYSTOLIC BLOOD PRESSURE: 128 MMHG | BODY MASS INDEX: 35.64 KG/M2

## 2021-03-24 DIAGNOSIS — K43.2 INCISIONAL HERNIA, WITHOUT OBSTRUCTION OR GANGRENE: ICD-10-CM

## 2021-03-24 LAB
ATRIAL RATE: 73 BPM
P AXIS: 52 DEGREES
PR INTERVAL: 166 MS
QRS AXIS: 25 DEGREES
QRSD INTERVAL: 84 MS
QT INTERVAL: 416 MS
QTC INTERVAL: 458 MS
T WAVE AXIS: 39 DEGREES
VENTRICULAR RATE: 73 BPM

## 2021-03-24 PROCEDURE — 93010 ELECTROCARDIOGRAM REPORT: CPT | Performed by: INTERNAL MEDICINE

## 2021-03-24 PROCEDURE — 99204 OFFICE O/P NEW MOD 45 MIN: CPT | Performed by: SURGERY

## 2021-03-24 PROCEDURE — 93005 ELECTROCARDIOGRAM TRACING: CPT

## 2021-03-24 RX ORDER — HEPARIN SODIUM 5000 [USP'U]/ML
5000 INJECTION, SOLUTION INTRAVENOUS; SUBCUTANEOUS ONCE
Status: CANCELLED | OUTPATIENT
Start: 2021-03-24 | End: 2021-03-24

## 2021-03-24 RX ORDER — CARVEDILOL 3.12 MG/1
6.25 TABLET ORAL 2 TIMES DAILY WITH MEALS
COMMUNITY
End: 2021-04-02

## 2021-03-24 NOTE — ASSESSMENT & PLAN NOTE
I reviewed the CT scans with the patient showing her an incisional hernia periumbilical region  I discussed what a hernia is and how it would be repaired  I discussed the procedure in detail including risks, benefits, alternatives, with expect postoperatively  I told her this was reasonable to do laparoscopically with a very small chance of conversion open  She understands and is agreeable to go ahead  I did discuss with her the use of mesh        Plan:  Laparoscopic incisional hernia repair (4) no limitation

## 2021-03-24 NOTE — H&P (VIEW-ONLY)
Office Visit - General Surgery  Annalee Bunch MRN: [id number]  Encounter: [id number]    Assessment and Plan    Problem List Items Addressed This Visit        Other    Incisional hernia, without obstruction or gangrene      I reviewed the CT scans with the patient showing her an incisional hernia periumbilical region  I discussed what a hernia is and how it would be repaired  I discussed the procedure in detail including risks, benefits, alternatives, with expect postoperatively  I told her this was reasonable to do laparoscopically with a very small chance of conversion open  She understands and is agreeable to go ahead  I did discuss with her the use of mesh  Plan:  Laparoscopic incisional hernia repair               Chief Complaint:  Annalee Bunch is a 78 y o  female who presents for Hernia (Consult incisional hernia)    Subjective   28-year-old female previous TAHBSO for ovarian cancer who month or so ago have been lifting furniture and had a bulging at her umbilical region  The bowel function is normal no other complaints or problems at this time    She had a CT scan of the abdomen which demonstrates a incisional hernia    Past Medical History  Past Medical History:   Diagnosis Date    Breast cancer ([address] )     2004 and then 2009    Chicken pox     Frequent UTI     Heart disease     High cholesterol     History of blood transfusion     Hypertension     PONV (postoperative nausea and vomiting)        Past Surgical History  Past Surgical History:   Procedure Laterality Date    APPENDECTOMY      BREAST CYST EXCISION      COLONOSCOPY      CYSTOSCOPY Bilateral 1/13/2020    Procedure: CYSTOSCOPY: CYSTOSCOPY WITH PERIOPERATIVE URETERAL CATHETERS  PLACEMENT;  Surgeon: Patricia Leach MD;  [facility];  Service: Gynecology Oncology    HYSTERECTOMY N/A 1/13/2020    Procedure: EXPLORATORY LAPAROTOMY, OVARIAN CANCER STAGING WITH TOTAL HYSTERECTOMY, BILATERAL SALPINGO-OOPHORECTOMY, BILATERAL PELVIC AND PERIAORTIC LYMPHADENECTOMY, INDICATED APPENDECTOMY, OMENTECTOMY, STAGING PERITONEAL BIOPSIES ;  Surgeon: Jose Waddell MD;  [facility];  Service: Gynecology Oncology    MASTECTOMY Bilateral 2009    PA LAP,DIAGNOSTIC ABDOMEN N/A 2020    Procedure: LAPAROSCOPY DIAGNOSTIC;  Surgeon: Jose Waddell MD;  [facility];  Service: Gynecology Oncology    REPAIR KNEE LIGAMENT Bilateral     ROTATOR CUFF REPAIR         Family History  Family History   Problem Relation Age of Onset    Breast cancer Mother 50    Lung cancer Father     Prostate cancer Brother        Social History  Social History     Socioeconomic History    Marital status: /Civil Union     Spouse name: None    Number of children: None    Years of education: None    Highest education level: None   Occupational History    None   Social Needs    Financial resource strain: None    Food insecurity     Worry: None     Inability: None    Transportation needs     Medical: None     Non-medical: None   Tobacco Use    Smoking status: Former Smoker     Types: Cigarettes     Quit date: 10/31/1987     Years since quittin 4    Smokeless tobacco: Never Used   Substance and Sexual Activity    Alcohol use: Not Currently     Frequency: 2-3 times a week     Comment: socially     Drug use: Never    Sexual activity: Not Currently     Partners: Male     Birth control/protection: Post-menopausal     Comment: same partner-few times a year   Lifestyle    Physical activity     Days per week: None     Minutes per session: None    Stress: None   Relationships    Social connections     Talks on phone: None     Gets together: None     Attends Yazdanism service: None     Active member of club or organization: None     Attends meetings of clubs or organizations: None     Relationship status: None    Intimate partner violence     Fear of current or ex partner: None     Emotionally abused: None     Physically abused: None     Forced sexual activity: None   Other Topics Concern    None   Social History Narrative    None        Medications  Current Outpatient Medications on File Prior to Visit   Medication Sig Dispense Refill    Ascorbic Acid (vitamin C) 100 MG tablet Take 100 mg by mouth daily      b complex vitamins capsule Take 1 capsule by mouth daily      carvedilol (COREG) 3 125 mg tablet Take 3 125 mg by mouth 2 (two) times a day with meals      cholecalciferol (VITAMIN D3) 400 units tablet Take 400 Units by mouth daily      estradiol (ESTRACE) 0 1 mg/g vaginal cream insert 1 gram vaginally two times a week  0    losartan-hydrochlorothiazide (HYZAAR) 50-12 5 mg per tablet   0    multivitamin (THERAGRAN) TABS Take 1 tablet by mouth daily      simvastatin (ZOCOR) 20 mg tablet   0    trimethoprim (PROLOPRIM) 100 mg tablet   0    venlafaxine 150 MG TB24   0    zolpidem (AMBIEN CR) 12 5 MG CR tablet   0    apixaban (ELIQUIS) 2 5 mg Take 1 tablet (2 5 mg total) by mouth 2 (two) times a day for 28 days 56 tablet 0    lansoprazole (PREVACID) 15 mg capsule Take 15 mg by mouth daily      metoprolol tartrate (LOPRESSOR) 25 mg tablet Take 25 mg by mouth   5 tab daily      psyllium (METAMUCIL) 58 6 % packet Take 1 packet by mouth daily       No current facility-administered medications on file prior to visit  Allergies  No Known Allergies    Review of Systems   Constitutional: Negative for chills and fever  HENT: Negative for trouble swallowing and voice change  Eyes: Negative for pain and visual disturbance  Respiratory: Negative for cough and shortness of breath  Cardiovascular: Negative for chest pain and leg swelling  Gastrointestinal: Negative for abdominal pain, nausea and vomiting  Endocrine: Negative for cold intolerance, heat intolerance, polydipsia, polyphagia and polyuria  Genitourinary: Negative for difficulty urinating and flank pain     Musculoskeletal: Negative for arthralgias and gait problem  Skin: Negative for rash and wound  Allergic/Immunologic: Negative for food allergies  Neurological: Negative for seizures, syncope, weakness and headaches  Hematological: Negative for adenopathy  Psychiatric/Behavioral: Negative for confusion  All other systems reviewed and are negative  Objective  Vitals:    03/24/21 1010   BP: 128/74   Pulse: 74   Temp: 98 6 °F (37 °C)       Physical Exam  Vitals signs and nursing note reviewed  Constitutional:       General: She is not in acute distress  Appearance: She is well-developed  She is not diaphoretic  HENT:      Head: Normocephalic and atraumatic  Right Ear: External ear normal       Left Ear: External ear normal    Eyes:      General: No scleral icterus  Conjunctiva/sclera: Conjunctivae normal    Neck:      Musculoskeletal: Neck supple  Thyroid: No thyromegaly  Trachea: No tracheal deviation  Cardiovascular:      Rate and Rhythm: Normal rate and regular rhythm  Heart sounds: Normal heart sounds  No murmur  No friction rub  Pulmonary:      Effort: Pulmonary effort is normal  No respiratory distress  Breath sounds: Normal breath sounds  No wheezing or rales  Abdominal:      General: There is no distension  Palpations: Abdomen is soft  There is no mass  Tenderness: There is no abdominal tenderness  There is no guarding or rebound  Comments: Lower midline incision well healed there is a defect in the fashion periumbilical region and some standing there is a bulging in that same region  Risser abdomen is soft nontender   Musculoskeletal: Normal range of motion  Lymphadenopathy:      Cervical: No cervical adenopathy  Skin:     General: Skin is warm and dry  Neurological:      Mental Status: She is alert and oriented to person, place, and time  Psychiatric:         Behavior: Behavior normal          Thought Content:  Thought content normal          Judgment: Judgment normal

## 2021-03-24 NOTE — PROGRESS NOTES
Office Visit - General Surgery  Soco Garza MRN: [id number]  Encounter: [id number]    Assessment and Plan    Problem List Items Addressed This Visit        Other    Incisional hernia, without obstruction or gangrene      I reviewed the CT scans with the patient showing her an incisional hernia periumbilical region  I discussed what a hernia is and how it would be repaired  I discussed the procedure in detail including risks, benefits, alternatives, with expect postoperatively  I told her this was reasonable to do laparoscopically with a very small chance of conversion open  She understands and is agreeable to go ahead  I did discuss with her the use of mesh  Plan:  Laparoscopic incisional hernia repair               Chief Complaint:  Soco Garza is a 78 y o  female who presents for Hernia (Consult incisional hernia)    Subjective   33-year-old female previous TAHBSO for ovarian cancer who month or so ago have been lifting furniture and had a bulging at her umbilical region  The bowel function is normal no other complaints or problems at this time    She had a CT scan of the abdomen which demonstrates a incisional hernia    Past Medical History  Past Medical History:   Diagnosis Date    Breast cancer ([address] )     2004 and then 2009    Chicken pox     Frequent UTI     Heart disease     High cholesterol     History of blood transfusion     Hypertension     PONV (postoperative nausea and vomiting)        Past Surgical History  Past Surgical History:   Procedure Laterality Date    APPENDECTOMY      BREAST CYST EXCISION      COLONOSCOPY      CYSTOSCOPY Bilateral 1/13/2020    Procedure: CYSTOSCOPY: CYSTOSCOPY WITH PERIOPERATIVE URETERAL CATHETERS  PLACEMENT;  Surgeon: Debi Vale MD;  [facility];  Service: Gynecology Oncology    HYSTERECTOMY N/A 1/13/2020    Procedure: EXPLORATORY LAPAROTOMY, OVARIAN CANCER STAGING WITH TOTAL HYSTERECTOMY, BILATERAL SALPINGO-OOPHORECTOMY, BILATERAL PELVIC AND PERIAORTIC LYMPHADENECTOMY, INDICATED APPENDECTOMY, OMENTECTOMY, STAGING PERITONEAL BIOPSIES ;  Surgeon: Leonela Sandoval MD;  [facility];  Service: Gynecology Oncology    MASTECTOMY Bilateral 2009    ME LAP,DIAGNOSTIC ABDOMEN N/A 2020    Procedure: LAPAROSCOPY DIAGNOSTIC;  Surgeon: Leonela Sandoval MD;  [facility];  Service: Gynecology Oncology    REPAIR KNEE LIGAMENT Bilateral     ROTATOR CUFF REPAIR         Family History  Family History   Problem Relation Age of Onset    Breast cancer Mother 50    Lung cancer Father     Prostate cancer Brother        Social History  Social History     Socioeconomic History    Marital status: /Civil Union     Spouse name: None    Number of children: None    Years of education: None    Highest education level: None   Occupational History    None   Social Needs    Financial resource strain: None    Food insecurity     Worry: None     Inability: None    Transportation needs     Medical: None     Non-medical: None   Tobacco Use    Smoking status: Former Smoker     Types: Cigarettes     Quit date: 10/31/1987     Years since quittin 4    Smokeless tobacco: Never Used   Substance and Sexual Activity    Alcohol use: Not Currently     Frequency: 2-3 times a week     Comment: socially     Drug use: Never    Sexual activity: Not Currently     Partners: Male     Birth control/protection: Post-menopausal     Comment: same partner-few times a year   Lifestyle    Physical activity     Days per week: None     Minutes per session: None    Stress: None   Relationships    Social connections     Talks on phone: None     Gets together: None     Attends Evangelical service: None     Active member of club or organization: None     Attends meetings of clubs or organizations: None     Relationship status: None    Intimate partner violence     Fear of current or ex partner: None     Emotionally abused: None     Physically abused: None     Forced sexual activity: None   Other Topics Concern    None   Social History Narrative    None        Medications  Current Outpatient Medications on File Prior to Visit   Medication Sig Dispense Refill    Ascorbic Acid (vitamin C) 100 MG tablet Take 100 mg by mouth daily      b complex vitamins capsule Take 1 capsule by mouth daily      carvedilol (COREG) 3 125 mg tablet Take 3 125 mg by mouth 2 (two) times a day with meals      cholecalciferol (VITAMIN D3) 400 units tablet Take 400 Units by mouth daily      estradiol (ESTRACE) 0 1 mg/g vaginal cream insert 1 gram vaginally two times a week  0    losartan-hydrochlorothiazide (HYZAAR) 50-12 5 mg per tablet   0    multivitamin (THERAGRAN) TABS Take 1 tablet by mouth daily      simvastatin (ZOCOR) 20 mg tablet   0    trimethoprim (PROLOPRIM) 100 mg tablet   0    venlafaxine 150 MG TB24   0    zolpidem (AMBIEN CR) 12 5 MG CR tablet   0    apixaban (ELIQUIS) 2 5 mg Take 1 tablet (2 5 mg total) by mouth 2 (two) times a day for 28 days 56 tablet 0    lansoprazole (PREVACID) 15 mg capsule Take 15 mg by mouth daily      metoprolol tartrate (LOPRESSOR) 25 mg tablet Take 25 mg by mouth   5 tab daily      psyllium (METAMUCIL) 58 6 % packet Take 1 packet by mouth daily       No current facility-administered medications on file prior to visit  Allergies  No Known Allergies    Review of Systems   Constitutional: Negative for chills and fever  HENT: Negative for trouble swallowing and voice change  Eyes: Negative for pain and visual disturbance  Respiratory: Negative for cough and shortness of breath  Cardiovascular: Negative for chest pain and leg swelling  Gastrointestinal: Negative for abdominal pain, nausea and vomiting  Endocrine: Negative for cold intolerance, heat intolerance, polydipsia, polyphagia and polyuria  Genitourinary: Negative for difficulty urinating and flank pain     Musculoskeletal: Negative for arthralgias and gait problem  Skin: Negative for rash and wound  Allergic/Immunologic: Negative for food allergies  Neurological: Negative for seizures, syncope, weakness and headaches  Hematological: Negative for adenopathy  Psychiatric/Behavioral: Negative for confusion  All other systems reviewed and are negative  Objective  Vitals:    03/24/21 1010   BP: 128/74   Pulse: 74   Temp: 98 6 °F (37 °C)       Physical Exam  Vitals signs and nursing note reviewed  Constitutional:       General: She is not in acute distress  Appearance: She is well-developed  She is not diaphoretic  HENT:      Head: Normocephalic and atraumatic  Right Ear: External ear normal       Left Ear: External ear normal    Eyes:      General: No scleral icterus  Conjunctiva/sclera: Conjunctivae normal    Neck:      Musculoskeletal: Neck supple  Thyroid: No thyromegaly  Trachea: No tracheal deviation  Cardiovascular:      Rate and Rhythm: Normal rate and regular rhythm  Heart sounds: Normal heart sounds  No murmur  No friction rub  Pulmonary:      Effort: Pulmonary effort is normal  No respiratory distress  Breath sounds: Normal breath sounds  No wheezing or rales  Abdominal:      General: There is no distension  Palpations: Abdomen is soft  There is no mass  Tenderness: There is no abdominal tenderness  There is no guarding or rebound  Comments: Lower midline incision well healed there is a defect in the fashion periumbilical region and some standing there is a bulging in that same region  Risser abdomen is soft nontender   Musculoskeletal: Normal range of motion  Lymphadenopathy:      Cervical: No cervical adenopathy  Skin:     General: Skin is warm and dry  Neurological:      Mental Status: She is alert and oriented to person, place, and time  Psychiatric:         Behavior: Behavior normal          Thought Content:  Thought content normal          Judgment: Judgment normal

## 2021-03-30 NOTE — PROGRESS NOTES
Heart Failure Consult Note - Flavio Davalos 78 y o  female MRN: [id number]    @ Encounter: [id number]      Assessment/Plan:    Patient Active Problem List    Diagnosis Date Noted    Anomalous coronary artery origin 2021    Frequent unifocal PVCs 2021    Incisional hernia, without obstruction or gangrene 2021    Encounter for follow-up surveillance of ovarian cancer 2020    History of breast cancer 2020    Encounter for gynecological examination without abnormal finding 10/31/2018    Myopia, bilateral 2017    GERD (gastroesophageal reflux disease) 2011    HTN (hypertension), benign 2011    Obesity, Class I, BMI 30 0-34 9 (see actual BMI) 2011    Obstructive sleep apnea 2011     # Frequent PVCs, ventricular bigeminy  Seen on stress EKG  Now on increased coreg 6 25 mg BID    Studies- personally reviewed by [facility] 3/29/21:   No obstructive CAD  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   LV%    [facility]9/21- [facility]  LVEF: 55-60%  LVIDd:  RV:  Other: dilated aortic root 4 4 cm    [facility] MPI 3/9/21: read as reversible inferior wall defect , PVCs, Gated Spect EF: 50%    # 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- on [address] [facility] 3/29    # HTN- coreg 6 25 mg BID, losartan/ HCTZ 50/12 5 mg daily  # GERD  # MARYANA  # dyslipidemia- simvastatin 20 mg daily  # hx of squamous cell carcinoma of skin  # hx of breast cancer  # ovarian cancer, she is status post diagnostic laparoscopy, exploratory laparotomy, total hysterectomy,  Bilateral salpingo-oophorectomy, staging lymphadenectomy, biopsies, omentectomy and appendectomy in 2020       Today's Plan:  24 hour Holter  Likely will go up on coreg to 12 5 mg BID as BP not quite at goal  Echo in 3 months to eval for primary prevention ICD  Cardiac CTA to determine course of LCx  Also to evaluate aortic root 4 4 cm on echo  She can have her hernia surgery  --2g sodium diet  Weights daily    HPI:       79 yo female consult for newly discovered NICM  She has hx of HTN, MARYANA, dyslipidemia who has ovarian cancer, she is status post diagnostic laparoscopy, exploratory laparotomy, total hysterectomy,  Bilateral salpingo-oophorectomy, staging lymphadenectomy, biopsies, omentectomy and appendectomy in January of 2020  She received 6 cycles of chemothreapy w/ carboplatin and paclitaxel  As far as symptoms as concerned, she has not chest pain, no LANDAVERDE, no hx of syncope  Past Medical History:   Diagnosis Date    Breast cancer ([facility] [address] )     2004 and then 2009    Chicken pox     Frequent UTI     Heart disease     High cholesterol     History of blood transfusion     Hypertension     PONV (postoperative nausea and vomiting)        Review of Systems   Constitutional: Negative for activity change, appetite change, fatigue and unexpected weight change  HENT: Negative for congestion and nosebleeds  Eyes: Negative  Respiratory: Negative for cough, chest tightness and shortness of breath  Cardiovascular: Negative for chest pain, palpitations and leg swelling  Gastrointestinal: Negative for abdominal distention  Endocrine: Negative  Genitourinary: Negative  Musculoskeletal: Negative  Skin: Negative  Neurological: Negative for dizziness, syncope and weakness  Hematological: Negative  Psychiatric/Behavioral: Negative  No Known Allergies        Current Outpatient Medications:     Ascorbic Acid (vitamin C) 100 MG tablet, Take 100 mg by mouth daily, Disp: , Rfl:     b complex vitamins capsule, Take 1 capsule by mouth daily, Disp: , Rfl:     carvedilol (COREG) 3 125 mg tablet, Take 6 25 mg by mouth 2 (two) times a day with meals , Disp: , Rfl:     cholecalciferol (VITAMIN D3) 400 units tablet, Take 400 Units by mouth daily, Disp: , Rfl:     estradiol (ESTRACE) 0 1 mg/g vaginal cream, insert 1 gram vaginally two times a week, Disp: , Rfl: 0    losartan-hydrochlorothiazide (HYZAAR) 50-12 5 mg per tablet, , Disp: , Rfl: 0    multivitamin (THERAGRAN) TABS, Take 1 tablet by mouth daily, Disp: , Rfl:     simvastatin (ZOCOR) 20 mg tablet, , Disp: , Rfl: 0    trimethoprim (PROLOPRIM) 100 mg tablet, daily , Disp: , Rfl: 0    venlafaxine 150 MG TB24, , Disp: , Rfl: 0    zolpidem (AMBIEN CR) 12 5 MG CR tablet, , Disp: , Rfl: 0    apixaban (ELIQUIS) 2 5 mg, Take 1 tablet (2 5 mg total) by mouth 2 (two) times a day for 28 days (Patient not taking: Reported on 3/31/2021), Disp: 56 tablet, Rfl: 0    lansoprazole (PREVACID) 15 mg capsule, Take 15 mg by mouth daily, Disp: , Rfl:     metoprolol tartrate (LOPRESSOR) 25 mg tablet, Take 25 mg by mouth   5 tab daily, Disp: , Rfl:     Omega-3 Fatty Acids (fish oil) 1,000 mg, Take 1,000 mg by mouth daily, Disp: , Rfl:     psyllium (METAMUCIL) 58 6 % packet, Take 1 packet by mouth daily, Disp: , Rfl:     Social History     Socioeconomic History    Marital status: /Civil Union     Spouse name: Not on file    Number of children: Not on file    Years of education: Not on file    Highest education level: Not on file   Occupational History    Not on file   Social Needs    Financial resource strain: Not on file    Food insecurity     Worry: Not on file     Inability: Not on file   Figure 1 needs     Medical: Not on file     Non-medical: Not on file   Tobacco Use    Smoking status: Former Smoker     Types: Cigarettes     Quit date: 10/31/1987     Years since quittin 4    Smokeless tobacco: Never Used   Substance and Sexual Activity    Alcohol use: Not Currently     Frequency: 2-3 times a week     Comment: socially     Drug use: Never    Sexual activity: Not Currently     Partners: Male     Birth control/protection: Post-menopausal     Comment: same partner-few times a year   Lifestyle    Physical activity     Days per week: Not on file     Minutes per session: Not on file    Stress: Not on file   Relationships    Social connections     Talks on phone: Not on file     Gets together: Not on file     Attends Yazidism service: Not on file     Active member of club or organization: Not on file     Attends meetings of clubs or organizations: Not on file     Relationship status: Not on file    Intimate partner violence     Fear of current or ex partner: Not on file     Emotionally abused: Not on file     Physically abused: Not on file     Forced sexual activity: Not on file   Other Topics Concern    Not on file   Social History Narrative    Not on file       Family History   Problem Relation Age of Onset    Breast cancer Mother 50    Lung cancer Father     Prostate cancer Brother        Physical Exam:    Vitals: Blood pressure 134/90, pulse 75, height 5' 11" (1 803 m), weight 114 kg (251 lb 3 2 oz), SpO2 96 %, not currently breastfeeding , Body mass index is 35 04 kg/m² ,   Wt Readings from Last 3 Encounters:   03/31/21 114 kg (251 lb 3 2 oz)   03/24/21 115 kg (254 lb 9 6 oz)   03/18/21 117 kg (258 lb)       Physical Exam  Constitutional:       Appearance: She is well-developed  HENT:      Head: Normocephalic and atraumatic  Eyes:      Pupils: Pupils are equal, round, and reactive to light  Neck:      Musculoskeletal: Normal range of motion  Vascular: No JVD  Cardiovascular:      Rate and Rhythm: Normal rate and regular rhythm  Heart sounds: No murmur  Pulmonary:      Effort: Pulmonary effort is normal  No respiratory distress  Breath sounds: Normal breath sounds  Abdominal:      General: There is no distension  Palpations: Abdomen is soft  Tenderness: There is no abdominal tenderness  Musculoskeletal: Normal range of motion  Skin:     General: Skin is warm and dry  Findings: No rash  Neurological:      Mental Status: She is alert and oriented to person, place, and time           Labs & Results:    Lab Results   Component Value Date    WBC 6 94 01/19/2020    HGB 10 5 (L) 01/19/2020    HCT 33 4 (L) 01/19/2020 MCV 95 01/19/2020     01/19/2020     Lab Results   Component Value Date    SODIUM 137 01/19/2020    K 3 4 (L) 01/19/2020     01/19/2020    CO2 25 01/19/2020    BUN 18 03/18/2021    CREATININE 0 94 03/18/2021    GLUC 133 01/19/2020    CALCIUM 8 4 01/19/2020     No results found for: NTBNP   No results found for: CHOLESTEROL  No results found for: HDL  No results found for: TRIG  No results found for: [place]    EKG personally reviewed by Shant Vargas DO  Counseling / Coordination of Care  Time spent today 40 minutes  Greater than 50% of total time was spent with the patient and / or family counseling and / or coordination of care  We discussed diagnoses, most recent studies and any changes in treatment    Thank you for the opportunity to participate in the care of this patient      [address] [facility] PULMONARY [facility]YP[facility]  MEDICAL DIRECTOR OF [place]

## 2021-03-31 ENCOUNTER — OFFICE VISIT (OUTPATIENT)
Dept: CARDIOLOGY CLINIC | Facility: CLINIC | Age: 71
End: 2021-03-31
Payer: MEDICARE

## 2021-03-31 VITALS
DIASTOLIC BLOOD PRESSURE: 90 MMHG | HEIGHT: 71 IN | BODY MASS INDEX: 35.17 KG/M2 | WEIGHT: 251.2 LBS | HEART RATE: 75 BPM | OXYGEN SATURATION: 96 % | SYSTOLIC BLOOD PRESSURE: 134 MMHG

## 2021-03-31 DIAGNOSIS — I10 HTN (HYPERTENSION), BENIGN: Primary | ICD-10-CM

## 2021-03-31 DIAGNOSIS — Q24.5 ANOMALOUS CORONARY ARTERY ORIGIN: ICD-10-CM

## 2021-03-31 DIAGNOSIS — I49.3 FREQUENT UNIFOCAL PVCS: ICD-10-CM

## 2021-03-31 DIAGNOSIS — I77.810 ASCENDING AORTA DILATATION (HCC): ICD-10-CM

## 2021-03-31 PROCEDURE — 93000 ELECTROCARDIOGRAM COMPLETE: CPT | Performed by: INTERNAL MEDICINE

## 2021-03-31 PROCEDURE — 99204 OFFICE O/P NEW MOD 45 MIN: CPT | Performed by: INTERNAL MEDICINE

## 2021-03-31 RX ORDER — CHLORAL HYDRATE 500 MG
1000 CAPSULE ORAL DAILY
COMMUNITY
End: 2022-05-09 | Stop reason: ALTCHOICE

## 2021-03-31 NOTE — PATIENT INSTRUCTIONS
I am ordering a 24 hour holter- to assess PVC burden    I am also ordering a cardiac CTA to assess your coronary artery

## 2021-04-02 RX ORDER — CARVEDILOL 6.25 MG/1
6.25 TABLET ORAL 2 TIMES DAILY WITH MEALS
COMMUNITY
End: 2022-06-16 | Stop reason: SDUPTHER

## 2021-04-02 NOTE — PRE-PROCEDURE INSTRUCTIONS
Pre-Surgery Instructions:   Medication Instructions    Ascorbic Acid (vitamin C) 100 MG tablet stop pre op    b complex vitamins capsule stop pre op    carvedilol (COREG) 6 25 mg tablet ok take morning of day of surgery with sips of water    cholecalciferol (VITAMIN D3) 400 units tablet stop pre op    estradiol (ESTRACE) 0 1 mg/g vaginal cream don't use after skin prep    losartan-hydrochlorothiazide (HYZAAR) 50-12 5 mg per tablet don't take morning of day of surgery    multivitamin (THERAGRAN) TABS stop pre op    Omega-3 Fatty Acids (fish oil) 1,000 mg stop pre op    simvastatin (ZOCOR) 20 mg tablet ok take morning of day of surgery with sips of water    trimethoprim (PROLOPRIM) 100 mg tablet ok take morning of day of surgery    venlafaxine 150 MG TB24 ok take morning of day of surgery    zolpidem (AMBIEN CR) 12 5 MG CR tablet don't take morning of day of surgery   Have you had / have a sore throat? No  have you had / have a cough less than 1 week? No  Have you had / have a fever greater than 100 0 - 100  4? No  Are you experiencing any shortness of breath? No    Review with patient via phone medications and showering instructions given by surgeon office  Advised don't take NSAID's ok tylenol products  Advised ASC call with surgery schedule time, nothing eat or drink after midnight  Verbalized understanding  ACE/ARB Med Class  Continue this medication up to the evening before surgery/procedure, but do not take the morning of the day of surgery  Antidepressant Med Class  Continue to take this medication on your normal schedule  If this is an oral medication and you take it in the morning, then you may take this medicine with a sip of water  Beta blocker Med Class  Continue to take this heart medication on your normal schedule  If this is an oral medication and you take it in the morning, then you may take this medicine with a sip of water    HRT Med Class  Continue to take this medication on your normal schedule  If this is an oral medication and you take it in the morning, then you may take this medicine with a sip of water  This medication may need to be discontinued for a least 4 weeks prior to your surgery if the surgical procedure is associated with a high risk of blod clots as in hip or knee replacement for example  Please consult with your Surgeon to discuss discontinuing this medication before your surgery  Statin Med Class  Continue to take this medication on your normal schedule  If this is an oral medication and you take it in the morning, then you may take this medicine with a sip of water  Vitamin Med Class  You may continue to take any vitamin that your surgeon has prescribed to you up to the day before surgery  If your surgeon has not specifically prescribed this vitamin or instructed you to continue then stop taking 7 days prior to surgery    Zolpidem Med Class  Continue this medication up to the evening before surgery/procedure, but do not take the morning of the day of surgery

## 2021-04-06 ENCOUNTER — HOSPITAL ENCOUNTER (OUTPATIENT)
Dept: NON INVASIVE DIAGNOSTICS | Facility: CLINIC | Age: 71
Discharge: HOME | End: 2021-04-06
Payer: MEDICARE

## 2021-04-06 DIAGNOSIS — I49.3 FREQUENT UNIFOCAL PVCS: ICD-10-CM

## 2021-04-07 ENCOUNTER — TELEPHONE (OUTPATIENT)
Dept: CARDIOLOGY CLINIC | Facility: CLINIC | Age: 71
End: 2021-04-07

## 2021-04-07 DIAGNOSIS — I49.3 FREQUENT UNIFOCAL PVCS: Primary | ICD-10-CM

## 2021-04-07 NOTE — TELEPHONE ENCOUNTER
Devika Zuñiga called from [facility] location, The battery failed while wearing holter -- They have 9 5 hours of report  Do you want pt to wear another holter or is this enough time?

## 2021-04-08 ENCOUNTER — TELEPHONE (OUTPATIENT)
Dept: SURGERY | Facility: CLINIC | Age: 71
End: 2021-04-08

## 2021-04-08 ENCOUNTER — ANESTHESIA (OUTPATIENT)
Dept: PERIOP | Facility: HOSPITAL | Age: 71
End: 2021-04-08
Payer: MEDICARE

## 2021-04-08 ENCOUNTER — HOSPITAL ENCOUNTER (OUTPATIENT)
Facility: HOSPITAL | Age: 71
Setting detail: OUTPATIENT SURGERY
Discharge: HOME | End: 2021-04-08
Attending: SURGERY | Admitting: SURGERY
Payer: MEDICARE

## 2021-04-08 ENCOUNTER — ANESTHESIA EVENT (OUTPATIENT)
Dept: PERIOP | Facility: HOSPITAL | Age: 71
End: 2021-04-08
Payer: MEDICARE

## 2021-04-08 VITALS
DIASTOLIC BLOOD PRESSURE: 74 MMHG | OXYGEN SATURATION: 95 % | HEART RATE: 74 BPM | WEIGHT: 252 LBS | BODY MASS INDEX: 34.13 KG/M2 | TEMPERATURE: 97.5 F | RESPIRATION RATE: 20 BRPM | HEIGHT: 72 IN | SYSTOLIC BLOOD PRESSURE: 131 MMHG

## 2021-04-08 DIAGNOSIS — K43.2 INCISIONAL HERNIA, WITHOUT OBSTRUCTION OR GANGRENE: Primary | ICD-10-CM

## 2021-04-08 PROCEDURE — 49654 PR LAP, INCISIONAL HERNIA REPAIR,REDUCIBLE: CPT | Performed by: SURGERY

## 2021-04-08 PROCEDURE — C1781 MESH (IMPLANTABLE): HCPCS | Performed by: SURGERY

## 2021-04-08 DEVICE — VENTRALIGHT ST MESH WITH ECHO PS POSITIONING SYSTEM
Type: IMPLANTABLE DEVICE | Status: FUNCTIONAL
Brand: VENTRALIGHT ST MESH WITH ECHO PS POSITIONING SYSTEM

## 2021-04-08 DEVICE — CAPSURE PERMANENT FIXATION SYSTEM 30 PERMANENT FASTENERS
Type: IMPLANTABLE DEVICE | Site: ABDOMEN | Status: FUNCTIONAL
Brand: CAPSURE PERMANENT FIXATION SYSTEM

## 2021-04-08 RX ORDER — CEFAZOLIN SODIUM 2 G/50ML
SOLUTION INTRAVENOUS AS NEEDED
Status: DISCONTINUED | OUTPATIENT
Start: 2021-04-08 | End: 2021-04-08

## 2021-04-08 RX ORDER — CEFAZOLIN SODIUM 2 G/50ML
2000 SOLUTION INTRAVENOUS
Status: DISCONTINUED | OUTPATIENT
Start: 2021-04-08 | End: 2021-04-08 | Stop reason: HOSPADM

## 2021-04-08 RX ORDER — NEOSTIGMINE METHYLSULFATE 1 MG/ML
INJECTION INTRAVENOUS AS NEEDED
Status: DISCONTINUED | OUTPATIENT
Start: 2021-04-08 | End: 2021-04-08

## 2021-04-08 RX ORDER — ROCURONIUM BROMIDE 10 MG/ML
INJECTION, SOLUTION INTRAVENOUS AS NEEDED
Status: DISCONTINUED | OUTPATIENT
Start: 2021-04-08 | End: 2021-04-08

## 2021-04-08 RX ORDER — DEXAMETHASONE SODIUM PHOSPHATE 10 MG/ML
INJECTION, SOLUTION INTRAMUSCULAR; INTRAVENOUS AS NEEDED
Status: DISCONTINUED | OUTPATIENT
Start: 2021-04-08 | End: 2021-04-08

## 2021-04-08 RX ORDER — EPHEDRINE SULFATE 50 MG/ML
INJECTION INTRAVENOUS AS NEEDED
Status: DISCONTINUED | OUTPATIENT
Start: 2021-04-08 | End: 2021-04-08

## 2021-04-08 RX ORDER — HYDROMORPHONE HCL/PF 1 MG/ML
0.2 SYRINGE (ML) INJECTION EVERY 4 HOURS PRN
Status: DISCONTINUED | OUTPATIENT
Start: 2021-04-08 | End: 2021-04-08 | Stop reason: HOSPADM

## 2021-04-08 RX ORDER — PROPOFOL 10 MG/ML
INJECTION, EMULSION INTRAVENOUS AS NEEDED
Status: DISCONTINUED | OUTPATIENT
Start: 2021-04-08 | End: 2021-04-08

## 2021-04-08 RX ORDER — HYDROMORPHONE HCL/PF 1 MG/ML
SYRINGE (ML) INJECTION AS NEEDED
Status: DISCONTINUED | OUTPATIENT
Start: 2021-04-08 | End: 2021-04-08

## 2021-04-08 RX ORDER — ONDANSETRON 2 MG/ML
4 INJECTION INTRAMUSCULAR; INTRAVENOUS ONCE AS NEEDED
Status: DISCONTINUED | OUTPATIENT
Start: 2021-04-08 | End: 2021-04-08 | Stop reason: HOSPADM

## 2021-04-08 RX ORDER — MIDAZOLAM HYDROCHLORIDE 2 MG/2ML
INJECTION, SOLUTION INTRAMUSCULAR; INTRAVENOUS AS NEEDED
Status: DISCONTINUED | OUTPATIENT
Start: 2021-04-08 | End: 2021-04-08

## 2021-04-08 RX ORDER — ONDANSETRON 2 MG/ML
INJECTION INTRAMUSCULAR; INTRAVENOUS AS NEEDED
Status: DISCONTINUED | OUTPATIENT
Start: 2021-04-08 | End: 2021-04-08

## 2021-04-08 RX ORDER — HYDROCODONE BITARTRATE AND ACETAMINOPHEN 5; 325 MG/1; MG/1
1 TABLET ORAL EVERY 4 HOURS PRN
Status: DISCONTINUED | OUTPATIENT
Start: 2021-04-08 | End: 2021-04-08 | Stop reason: HOSPADM

## 2021-04-08 RX ORDER — BUPIVACAINE HYDROCHLORIDE AND EPINEPHRINE 5; 5 MG/ML; UG/ML
INJECTION, SOLUTION PERINEURAL AS NEEDED
Status: DISCONTINUED | OUTPATIENT
Start: 2021-04-08 | End: 2021-04-08 | Stop reason: HOSPADM

## 2021-04-08 RX ORDER — SODIUM CHLORIDE, SODIUM LACTATE, POTASSIUM CHLORIDE, CALCIUM CHLORIDE 600; 310; 30; 20 MG/100ML; MG/100ML; MG/100ML; MG/100ML
INJECTION, SOLUTION INTRAVENOUS CONTINUOUS PRN
Status: DISCONTINUED | OUTPATIENT
Start: 2021-04-08 | End: 2021-04-08

## 2021-04-08 RX ORDER — OXYCODONE HYDROCHLORIDE 5 MG/1
5 TABLET ORAL EVERY 4 HOURS PRN
Qty: 20 TABLET | Refills: 0 | Status: SHIPPED | OUTPATIENT
Start: 2021-04-08 | End: 2021-04-28

## 2021-04-08 RX ORDER — MAGNESIUM HYDROXIDE 1200 MG/15ML
LIQUID ORAL AS NEEDED
Status: DISCONTINUED | OUTPATIENT
Start: 2021-04-08 | End: 2021-04-08 | Stop reason: HOSPADM

## 2021-04-08 RX ORDER — ONDANSETRON 2 MG/ML
4 INJECTION INTRAMUSCULAR; INTRAVENOUS ONCE
Status: DISCONTINUED | OUTPATIENT
Start: 2021-04-08 | End: 2021-04-08 | Stop reason: HOSPADM

## 2021-04-08 RX ORDER — FENTANYL CITRATE 50 UG/ML
INJECTION, SOLUTION INTRAMUSCULAR; INTRAVENOUS AS NEEDED
Status: DISCONTINUED | OUTPATIENT
Start: 2021-04-08 | End: 2021-04-08

## 2021-04-08 RX ORDER — GLYCOPYRROLATE 0.2 MG/ML
INJECTION INTRAMUSCULAR; INTRAVENOUS AS NEEDED
Status: DISCONTINUED | OUTPATIENT
Start: 2021-04-08 | End: 2021-04-08

## 2021-04-08 RX ORDER — HYDROMORPHONE HCL/PF 1 MG/ML
0.5 SYRINGE (ML) INJECTION
Status: DISCONTINUED | OUTPATIENT
Start: 2021-04-08 | End: 2021-04-08 | Stop reason: HOSPADM

## 2021-04-08 RX ORDER — HEPARIN SODIUM 5000 [USP'U]/ML
5000 INJECTION, SOLUTION INTRAVENOUS; SUBCUTANEOUS ONCE
Status: DISCONTINUED | OUTPATIENT
Start: 2021-04-08 | End: 2021-04-08 | Stop reason: HOSPADM

## 2021-04-08 RX ADMIN — DEXAMETHASONE SODIUM PHOSPHATE 10 MG: 10 INJECTION, SOLUTION INTRAMUSCULAR; INTRAVENOUS at 08:06

## 2021-04-08 RX ADMIN — FENTANYL CITRATE 50 MCG: 50 INJECTION INTRAMUSCULAR; INTRAVENOUS at 07:57

## 2021-04-08 RX ADMIN — CEFAZOLIN SODIUM 2000 MG: 2 SOLUTION INTRAVENOUS at 08:10

## 2021-04-08 RX ADMIN — PHENYLEPHRINE HYDROCHLORIDE 30 MCG/MIN: 10 INJECTION INTRAVENOUS at 08:31

## 2021-04-08 RX ADMIN — GLYCOPYRROLATE 0.2 MG: 0.2 INJECTION, SOLUTION INTRAMUSCULAR; INTRAVENOUS at 08:27

## 2021-04-08 RX ADMIN — ROCURONIUM BROMIDE 30 MG: 50 INJECTION, SOLUTION INTRAVENOUS at 08:30

## 2021-04-08 RX ADMIN — HYDROMORPHONE HYDROCHLORIDE 0.5 MG: 1 INJECTION, SOLUTION INTRAMUSCULAR; INTRAVENOUS; SUBCUTANEOUS at 09:29

## 2021-04-08 RX ADMIN — EPHEDRINE SULFATE 10 MG: 50 INJECTION, SOLUTION INTRAVENOUS at 08:26

## 2021-04-08 RX ADMIN — ROCURONIUM BROMIDE 50 MG: 50 INJECTION, SOLUTION INTRAVENOUS at 07:57

## 2021-04-08 RX ADMIN — GLYCOPYRROLATE 0.4 MG: 0.2 INJECTION, SOLUTION INTRAMUSCULAR; INTRAVENOUS at 09:04

## 2021-04-08 RX ADMIN — EPHEDRINE SULFATE 10 MG: 50 INJECTION, SOLUTION INTRAVENOUS at 08:41

## 2021-04-08 RX ADMIN — ONDANSETRON 4 MG: 2 INJECTION INTRAMUSCULAR; INTRAVENOUS at 08:52

## 2021-04-08 RX ADMIN — HYDROMORPHONE HYDROCHLORIDE 0.5 MG: 1 INJECTION, SOLUTION INTRAMUSCULAR; INTRAVENOUS; SUBCUTANEOUS at 09:04

## 2021-04-08 RX ADMIN — HYDROMORPHONE HYDROCHLORIDE 0.5 MG: 1 INJECTION, SOLUTION INTRAMUSCULAR; INTRAVENOUS; SUBCUTANEOUS at 09:37

## 2021-04-08 RX ADMIN — HYDROCODONE BITARTRATE AND ACETAMINOPHEN 1 TABLET: 5; 325 TABLET ORAL at 11:16

## 2021-04-08 RX ADMIN — FENTANYL CITRATE 50 MCG: 50 INJECTION INTRAMUSCULAR; INTRAVENOUS at 08:12

## 2021-04-08 RX ADMIN — MIDAZOLAM 2 MG: 1 INJECTION INTRAMUSCULAR; INTRAVENOUS at 07:54

## 2021-04-08 RX ADMIN — NEOSTIGMINE METHYLSULFATE 3 MG: 1 INJECTION INTRAVENOUS at 09:04

## 2021-04-08 RX ADMIN — SODIUM CHLORIDE, SODIUM LACTATE, POTASSIUM CHLORIDE, AND CALCIUM CHLORIDE: .6; .31; .03; .02 INJECTION, SOLUTION INTRAVENOUS at 07:57

## 2021-04-08 RX ADMIN — PROPOFOL 200 MG: 10 INJECTION, EMULSION INTRAVENOUS at 07:57

## 2021-04-08 NOTE — INTERVAL H&P NOTE
H&P reviewed  After examining the patient I find no changes in the patients condition since the H&P had been written    Plan: laparoscopic incisional hernia repair

## 2021-04-08 NOTE — TELEPHONE ENCOUNTER
Patient called questioning AVS regarding antibiotic  She was stating according to the AVS  pain meds and Antibiotics were order  It was explained that only the pain meds were ordered and the information about the antibiotics on the AVS is standard information provided if the physician ordered antibiotics  Patient understood

## 2021-04-08 NOTE — DISCHARGE INSTRUCTIONS
Incisional Hernia   WHAT YOU NEED TO KNOW:   An incisional hernia is a bulge through the healed incision of a previous surgery in your abdomen  An incisional hernia is usually caused by weakness in the tissues and muscles of your abdomen  The bulge is usually caused by a part of your intestine, but it may also be tissue or fat pushing through the weakness  DISCHARGE INSTRUCTIONS:   Call 911 for any of the following: You have sudden trouble breathing  Seek care immediately if:   · You have severe pain  · You have bloody bowel movements  · You stop having bowel movements or passing gas  · Your abdomen is suddenly very hard  Contact your healthcare provider if:   · You have a fever  · You have nausea and are vomiting  · You are constipated  · Your hernia has returned  · You have a lump, or collection of fluid, under your skin  · You have pain that does not go away, even after you take pain medicine  · You have questions or concerns about your condition or care  Medicines: You may need any of the following:  · NSAIDs , such as ibuprofen, help decrease swelling, pain, and fever  This medicine is available with or without a doctor's order  NSAIDs can cause stomach bleeding or kidney problems in certain people  If you take blood thinner medicine, always ask your healthcare provider if NSAIDs are safe for you  Always read the medicine label and follow directions  · Prescription pain medicine  may be given  Ask your how to take this medicine safely  · Take your medicine as directed  Contact your healthcare provider if you think your medicine is not helping or if you have side effects  Tell him or her if you are allergic to any medicine  Keep a list of the medicines, vitamins, and herbs you take  Include the amounts, and when and why you take them  Bring the list or the pill bottles to follow-up visits  Carry your medicine list with you in case of an emergency      Prevent another incisional hernia:   · Maintain a healthy weight  Ask your healthcare provider how much you should weigh  Ask him to help you create a weight loss plan if you are overweight  Ask your healthcare provider what types of food you should eat  · Do not strain  when you have a bowel movement  Take an over-the-counter bowel movement softener and drink plenty of water  When you cough, hold a pillow against your incision to prevent strain  · Do not smoke  If you smoke, it is never too late to quit  Ask for information if you need help quitting  · Wear your support device as directed  You may need to wear a support device, such as an abdominal binder for up to 2 weeks after surgery  This helps decrease pain and the risk of fluid collecting under your skin  · Return to your usual activities as directed  Do not lift more than 10 pounds or do strenuous activity for up to 6 weeks  Follow up with your healthcare provider as directed:  Write down your questions so you remember to ask them during your visits  © Copyright [address] Information is for End User's use only and may not be sold, redistributed or otherwise used for commercial purposes  All illustrations and images included in CareNotes® are the copyrighted property of [employer]  or [facility] Shon Babb   The above information is an  only  It is not intended as medical advice for individual conditions or treatments  Talk to your doctor, nurse or pharmacist before following any medical regimen to see if it is safe and effective for you

## 2021-04-08 NOTE — OP NOTE
OPERATIVE REPORT  PATIENT NAME: Geovanny Arzate    :  1950  MRN: [id number]  Pt Location: BE OR ROOM 06    SURGERY DATE: 2021    Surgeon(s) and Role:     * Cristopher Anguiano MD - Primary    Preop Diagnosis:  Incisional hernia, without obstruction or gangrene [K43 2]    Post-Op Diagnosis Codes:     * Incisional hernia, without obstruction or gangrene [K43 2]    Procedure(s) (LRB):  REPAIR HERNIA INCISIONAL LAPAROSCOPIC (N/A)    Specimen(s):  * No specimens in log *    Estimated Blood Loss:   Minimal    Drains:  * No LDAs found *    Anesthesia Type:   General    Operative Indications:  Incisional hernia, without obstruction or gangrene [K43 2]      Operative Findings:  Multiple hernia defects along the incision  15 x 25 piece of Ventralight ST mesh was used    Complications:   None    Procedure and Technique:  Patient was identified by visualization conversation on operating room table  After satisfactory induction general anesthesia, Hogue catheter was placed  Her abdomen was then prepped and draped in usual sterile fashion  Time-out taken  Local anesthesia of 0 5% Marcaine was infiltrated in the skin subcutaneous tissue and deeper layers tissue in the left upper quadrant  Stab wound was made  A Veress needle introduced into the abdominal cavity  Carbon dioxide was insufflated to about 2 L  On the right lateral abdomen, local anesthesia infiltrated incision made and a 5 mm trocar is placed under direct vision  Brief exploration failed to reveal any at adhesions  There were several defects in the midline fascia  In the epigastric region, local anesthesia infiltrated 5 mm trocar introduced  In the left lateral abdomen, local anesthesia infiltrated is incision made and 12 mm trocar introduced  The hernia defect was measured out at about 10 x 20 to encompass all the hernia defects  A 15 x 25 piece of Ventralight ST mesh was then rolled and placed in the abdomen    The Echo device was inflated and the mesh brought up to the abdominal wall  It was tacked in place with multiple capture tacks  Once this was done everything seemed in order  The 12 mm trocar was removed and a 0 Vicryl suture on a suture Passer was used to close this defect  The gas was allowed to escape  Skin of all incisions was closed with subcuticular 4 Monocryl  Glue was applied to all the incision sites then the patient was awakened taken recovery satisfactorily  Instrument needle sponge counts were correct  RF wanding was clear     I was present for the entire procedure and I was present for all critical portions of the procedure    Patient Disposition:  PACU     SIGNATURE: Kezia Tripp MD  DATE: April 8, 2021  TIME: 9:24 AM

## 2021-04-08 NOTE — ANESTHESIA POSTPROCEDURE EVALUATION
Post-Op Assessment Note    CV Status:  Stable  Pain Score: 2    Pain management: adequate     Mental Status:  Alert and awake   Hydration Status:  Euvolemic   PONV Controlled:  Controlled   Airway Patency:  Patent      Post Op Vitals Reviewed: Yes      Staff: CRNA         No complications documented      BP   117/63   Temp (!) 97 2 °F (36 2 °C) (04/08/21 0923)    Pulse  75   Resp   12   SpO2   98

## 2021-04-10 ENCOUNTER — NURSE TRIAGE (OUTPATIENT)
Dept: OTHER | Facility: OTHER | Age: 71
End: 2021-04-10

## 2021-04-10 DIAGNOSIS — K43.2 INCISIONAL HERNIA, WITHOUT OBSTRUCTION OR GANGRENE: Primary | ICD-10-CM

## 2021-04-10 RX ORDER — NALOXONE HYDROCHLORIDE 4 MG/.1ML
SPRAY NASAL
Qty: 1 EACH | Refills: 0 | Status: SHIPPED | OUTPATIENT
Start: 2021-04-10 | End: 2021-06-16

## 2021-04-10 RX ORDER — OXYCODONE HYDROCHLORIDE 5 MG/1
5 TABLET ORAL EVERY 4 HOURS PRN
Qty: 10 TABLET | Refills: 0 | Status: SHIPPED | OUTPATIENT
Start: 2021-04-10 | End: 2021-06-16

## 2021-04-10 NOTE — TELEPHONE ENCOUNTER
TC on call MD    Message:  Tacos Angel RN/ [facility]/ PT: Kami Wilson : 1950/ Reporting moderate post op pain especially with with movement and re-positioning, had a REPAIR HERNIA INCISIONAL LAPAROSCOPIC (N/A Abdomen), states "i am struggling to get up and down", and getting mild relief from oxycodone 5mg and alternating with tylenol  Patient states has 3 pills left and is requesting a refill  Informed of the policy of filling narcotics on the weekend

## 2021-04-10 NOTE — TELEPHONE ENCOUNTER
Regarding: PostOp Pain - Medication Refill   ----- Message from Mindy Bravo RN sent at 4/10/2021 12:31 PM EDT -----  "I had an incisional hernia repair done on Thursday in [facility] by Dr [facility]IN [facility]  He gave me 20 pills of pain medication and I'm going to run out  It's painful when I get up and walk   5/10 pain currently "

## 2021-04-10 NOTE — TELEPHONE ENCOUNTER
Reason for Disposition   [1] MILD-MODERATE post-op pain (e g , pain scale 1-7) AND [2] not controlled with pain medications    Answer Assessment - Initial Assessment Questions  1  SYMPTOM: "What's the main symptom you're concerned about?" (e g , pain, fever, vomiting)      Pain with movement, "I am struggling with getting up and down"     2  ONSET: "When did s/s  start?"      4/8/2021    3  SURGERY: "What surgery was performed?"      REPAIR HERNIA INCISIONAL LAPAROSCOPIC (N/A Abdomen)    4  DATE of SURGERY: "When was surgery performed?"       4/8/2021    5  ANESTHESIA: " What type of anesthesia did you have?" (e g , general, spinal, epidural, local)      General     6  PAIN: "Is there any pain?" If so, ask: "How bad is it?"  (Scale 1-10; or mild, moderate, severe)      6/10    7  FEVER: "Do you have a fever?" If so, ask: "What is your temperature, how was it measured, and when did it start?"      Denies    8  VOMITING: "Is there any vomiting?" If yes, ask: "How many times?"      Denies     9  BLEEDING: "Is there any bleeding?" If so, ask: "How much?" and "Where?"      Denies    10   OTHER SYMPTOMS: "Do you have any other symptoms?" (e g , drainage from wound, painful urination, constipation)        States has had gas, but no bm at this time since surgery    Protocols used: POST-OP SYMPTOMS AND QUESTIONS-[facility]

## 2021-04-21 ENCOUNTER — OFFICE VISIT (OUTPATIENT)
Dept: SURGERY | Facility: CLINIC | Age: 71
End: 2021-04-21

## 2021-04-21 VITALS — WEIGHT: 251.5 LBS | HEIGHT: 72 IN | TEMPERATURE: 98.1 F | BODY MASS INDEX: 34.06 KG/M2

## 2021-04-21 DIAGNOSIS — Z87.19 STATUS POST LAPAROSCOPIC HERNIA REPAIR: Primary | ICD-10-CM

## 2021-04-21 DIAGNOSIS — Z98.890 STATUS POST LAPAROSCOPIC HERNIA REPAIR: Primary | ICD-10-CM

## 2021-04-21 PROCEDURE — 99024 POSTOP FOLLOW-UP VISIT: CPT | Performed by: SURGERY

## 2021-04-21 NOTE — PROGRESS NOTES
Office Visit - General Surgery  Jann Samayoa MRN: [id number]  Encounter: [id number]    Assessment and Plan    Problem List Items Addressed This Visit        Other    Status post laparoscopic hernia repair - Primary      Doing well at this time  I told her burning and discomfort she has would gradually resolve as time goes on  Allow her to increase her activity as she feels comfortable  See her back 1 month  Chief Complaint:  Jann Samayoa is a 78 y o  female who presents for Post-op (p/o incisional hernia  Appetite good  Bowel and bladder function normal )    Subjective   62-year-old female status post laparoscopic incisional hernia repair doing fairly well  Had some pain initially and now has more burning sensation right side in mid abdomen  Still a little uncomfortable bending over  Eating okay bowels are moving satisfactorily      Past Medical History  Past Medical History:   Diagnosis Date    Breast cancer ([facility] [address] )     2004 and then 2009    Cancer [facility])     Chicken pox     Colon polyp     CPAP (continuous positive airway pressure) dependence     Frequent UTI     GERD (gastroesophageal reflux disease)     Heart disease     High cholesterol     History of blood transfusion     Hypertension     PONV (postoperative nausea and vomiting)     Sleep apnea        Past Surgical History  Past Surgical History:   Procedure Laterality Date    APPENDECTOMY      BREAST CYST EXCISION      CHOLECYSTECTOMY      COLONOSCOPY      CYSTOSCOPY Bilateral 1/13/2020    Procedure: CYSTOSCOPY: CYSTOSCOPY WITH PERIOPERATIVE URETERAL CATHETERS  PLACEMENT;  Surgeon: Eduin Bermeo MD;  [facility];  Service: Gynecology Oncology    CYSTOSCOPY      HYSTERECTOMY N/A 1/13/2020    Procedure: EXPLORATORY LAPAROTOMY, OVARIAN CANCER STAGING WITH TOTAL HYSTERECTOMY, BILATERAL SALPINGO-OOPHORECTOMY, BILATERAL PELVIC AND PERIAORTIC LYMPHADENECTOMY, INDICATED APPENDECTOMY, OMENTECTOMY, STAGING PERITONEAL BIOPSIES ;  Surgeon: La Eubanks MD;  [facility];  Service: Gynecology Oncology    MASTECTOMY Bilateral 2009    [place] LAP, INCISIONAL HERNIA REPAIR,REDUCIBLE N/A 2021    Procedure: REPAIR HERNIA INCISIONAL LAPAROSCOPIC;  Surgeon: Moose Gonzalez MD;  [facility];  Service: General    [place] LAP,DIAGNOSTIC ABDOMEN N/A 2020    Procedure: LAPAROSCOPY DIAGNOSTIC;  Surgeon: La Eubanks MD;  [facility];  Service: Gynecology Oncology    REPAIR KNEE LIGAMENT Bilateral     ROTATOR CUFF REPAIR         Family History  Family History   Problem Relation Age of Onset    Breast cancer Mother 50    Lung cancer Father     Prostate cancer Brother        Social History  Social History     Socioeconomic History    Marital status: /Civil Union     Spouse name: None    Number of children: None    Years of education: None    Highest education level: None   Occupational History    None   Social Needs    Financial resource strain: None    Food insecurity     Worry: None     Inability: None    Transportation needs     Medical: None     Non-medical: None   Tobacco Use    Smoking status: Former Smoker     Types: Cigarettes     Quit date: 10/31/1987     Years since quittin 4    Smokeless tobacco: Never Used   Substance and Sexual Activity    Alcohol use: Yes     Frequency: 2-3 times a week     Drinks per session: 1 or 2     Comment: socially     Drug use: Never    Sexual activity: Not Currently     Partners: Male     Birth control/protection: Post-menopausal     Comment: same partner-few times a year   Lifestyle    Physical activity     Days per week: None     Minutes per session: None    Stress: None   Relationships    Social connections     Talks on phone: None     Gets together: None     Attends Mandaen service: None     Active member of club or organization: None     Attends meetings of clubs or organizations: None     Relationship status: None    Intimate partner violence     Fear of current or ex partner: None     Emotionally abused: None     Physically abused: None     Forced sexual activity: None   Other Topics Concern    None   Social History Narrative    None        Medications  Current Outpatient Medications on File Prior to Visit   Medication Sig Dispense Refill    b complex vitamins capsule Take 1 capsule by mouth daily      carvedilol (COREG) 6 25 mg tablet Take 6 25 mg by mouth 2 (two) times a day with meals      cholecalciferol (VITAMIN D3) 400 units tablet Take 400 Units by mouth daily      estradiol (ESTRACE) 0 1 mg/g vaginal cream insert 1 gram vaginally two times a week  0    losartan-hydrochlorothiazide (HYZAAR) 50-12 5 mg per tablet Take 1 tablet by mouth daily   0    multivitamin (THERAGRAN) TABS Take 1 tablet by mouth daily      Omega-3 Fatty Acids (fish oil) 1,000 mg Take 1,000 mg by mouth daily      simvastatin (ZOCOR) 20 mg tablet Take 20 mg by mouth daily   0    trimethoprim (PROLOPRIM) 100 mg tablet Take 100 mg by mouth daily   0    venlafaxine 150 MG TB24 Take 150 mg by mouth daily with breakfast   0    zolpidem (AMBIEN CR) 12 5 MG CR tablet Take 12 5 mg by mouth daily at bedtime   0    Ascorbic Acid (vitamin C) 100 MG tablet Take 100 mg by mouth daily      naloxone (NARCAN) 4 mg/0 1 mL nasal spray Administer 1 spray into a nostril  If no response after 2-3 minutes, give another dose in the other nostril using a new spray  1 each 0    oxyCODONE (ROXICODONE) 5 mg immediate release tablet Take 1 tablet (5 mg total) by mouth every 4 (four) hours as needed for moderate pain for up to 20 daysMax Daily Amount: 30 mg 20 tablet 0    oxyCODONE (ROXICODONE) 5 mg immediate release tablet Take 1 tablet (5 mg total) by mouth every 4 (four) hours as needed for moderate painMax Daily Amount: 30 mg 10 tablet 0     No current facility-administered medications on file prior to visit          Allergies  No Known Allergies    Review of Systems    Objective  Vitals:    04/21/21 1249   Temp: 98 1 °F (36 7 °C)       Physical Exam     Abdomen: Laparoscopic incisions healing well  Soft nontender  Questionable small seroma at the hernia site

## 2021-04-21 NOTE — ASSESSMENT & PLAN NOTE
Doing well at this time  I told her burning and discomfort she has would gradually resolve as time goes on  Allow her to increase her activity as she feels comfortable  See her back 1 month

## 2021-04-26 ENCOUNTER — HOSPITAL ENCOUNTER (OUTPATIENT)
Dept: NON INVASIVE DIAGNOSTICS | Facility: CLINIC | Age: 71
Discharge: HOME | End: 2021-04-26
Payer: MEDICARE

## 2021-04-26 DIAGNOSIS — I49.3 FREQUENT UNIFOCAL PVCS: ICD-10-CM

## 2021-04-26 PROCEDURE — 93225 XTRNL ECG REC<48 HRS REC: CPT

## 2021-04-26 PROCEDURE — 93226 XTRNL ECG REC<48 HR SCAN A/R: CPT

## 2021-04-29 PROCEDURE — 93227 XTRNL ECG REC<48 HR R&I: CPT | Performed by: INTERNAL MEDICINE

## 2021-05-03 ENCOUNTER — TELEPHONE (OUTPATIENT)
Dept: CARDIOLOGY CLINIC | Facility: CLINIC | Age: 71
End: 2021-05-03

## 2021-05-03 NOTE — TELEPHONE ENCOUNTER
Left message about her Holter monitor results  ===View-only below this line===  ----- Message -----  From: Farzana Armstrong DO  Sent: 4/29/2021  12:50 PM EDT  To: Karen Farooq MA    Holter monitor reviewed and has relatively low burden of premature beats- but she does have a decent amount of premature premature ventricular contractions  I would like you to buy over the counter magnesium oxide 400 mg daily

## 2021-05-04 NOTE — TELEPHONE ENCOUNTER
Advised  Verbally understood  Pt would like to know if it is ok for her to continue with 2 fish oil pills bid?       Please advise

## 2021-05-13 ENCOUNTER — HOSPITAL ENCOUNTER (OUTPATIENT)
Dept: CT IMAGING | Facility: HOSPITAL | Age: 71
Discharge: HOME | End: 2021-05-13
Attending: INTERNAL MEDICINE
Payer: MEDICARE

## 2021-05-13 VITALS — SYSTOLIC BLOOD PRESSURE: 114 MMHG | RESPIRATION RATE: 20 BRPM | HEART RATE: 75 BPM | DIASTOLIC BLOOD PRESSURE: 75 MMHG

## 2021-05-13 DIAGNOSIS — I77.810 ASCENDING AORTA DILATATION (HCC): ICD-10-CM

## 2021-05-13 DIAGNOSIS — Q24.5 ANOMALOUS CORONARY ARTERY ORIGIN: ICD-10-CM

## 2021-05-13 PROCEDURE — 75574 CT ANGIO HRT W/3D IMAGE: CPT

## 2021-05-13 RX ORDER — NITROGLYCERIN 0.4 MG/1
0.4 TABLET SUBLINGUAL ONCE
Status: COMPLETED | OUTPATIENT
Start: 2021-05-13 | End: 2021-05-13

## 2021-05-13 RX ORDER — METOPROLOL TARTRATE 5 MG/5ML
5 INJECTION INTRAVENOUS
Status: DISCONTINUED | OUTPATIENT
Start: 2021-05-13 | End: 2021-05-14 | Stop reason: HOSPADM

## 2021-05-13 RX ADMIN — METOPROLOL TARTRATE 5 MG: 5 INJECTION INTRAVENOUS at 11:32

## 2021-05-13 RX ADMIN — METOPROLOL TARTRATE 5 MG: 5 INJECTION INTRAVENOUS at 11:27

## 2021-05-13 RX ADMIN — METOPROLOL TARTRATE 5 MG: 5 INJECTION INTRAVENOUS at 11:42

## 2021-05-13 RX ADMIN — NITROGLYCERIN 0.4 MG: 0.4 TABLET SUBLINGUAL at 11:51

## 2021-05-13 RX ADMIN — METOPROLOL TARTRATE 5 MG: 5 INJECTION INTRAVENOUS at 11:22

## 2021-05-13 RX ADMIN — IODIXANOL 100 ML: 320 INJECTION, SOLUTION INTRAVASCULAR at 12:01

## 2021-05-13 RX ADMIN — METOPROLOL TARTRATE 5 MG: 5 INJECTION INTRAVENOUS at 11:37

## 2021-05-25 ENCOUNTER — OFFICE VISIT (OUTPATIENT)
Dept: SURGERY | Facility: CLINIC | Age: 71
End: 2021-05-25

## 2021-05-25 VITALS — WEIGHT: 253 LBS | TEMPERATURE: 97.8 F | BODY MASS INDEX: 34.27 KG/M2 | HEIGHT: 72 IN

## 2021-05-25 DIAGNOSIS — Z87.19 STATUS POST LAPAROSCOPIC HERNIA REPAIR: Primary | ICD-10-CM

## 2021-05-25 DIAGNOSIS — Z98.890 STATUS POST LAPAROSCOPIC HERNIA REPAIR: Primary | ICD-10-CM

## 2021-05-25 PROBLEM — K43.2 INCISIONAL HERNIA, WITHOUT OBSTRUCTION OR GANGRENE: Status: RESOLVED | Noted: 2021-03-18 | Resolved: 2021-05-25

## 2021-05-25 PROCEDURE — 99024 POSTOP FOLLOW-UP VISIT: CPT | Performed by: SURGERY

## 2021-05-25 NOTE — ASSESSMENT & PLAN NOTE
Overall she is feeling better and doing more  I told her the lump that she feels is most likely a seroma and should go away by itself  I told take several months to resolve completely  I told if it bothers her, we could aspirated here in the office  Otherwise the lower activity as she feels comfortable  I am not sure diarrhea is related to  surgery at all  See her back here if needed

## 2021-05-25 NOTE — PROGRESS NOTES
Office Visit - [address] MRN: [id number]  Encounter: [id number]    Assessment and Plan    Problem List Items Addressed This Visit        Other    Status post laparoscopic hernia repair - Primary      Overall she is feeling better and doing more  I told her the lump that she feels is most likely a seroma and should go away by itself  I told take several months to resolve completely  I told if it bothers her, we could aspirated here in the office  Otherwise the lower activity as she feels comfortable  I am not sure diarrhea is related to  surgery at all  See her back here if needed  Chief Complaint:  Bharti Thacker is a 78 y o  female who presents for Post-op (2nd p/o incisional hernia repair )    Subjective   68-year-old female status post laparoscopic incisional hernia repair doing fairly well  She had some diarrhea recently for unknown reason  She had had some burning sensation both sides of her abdomen more on the right which is getting better    She notices a lump at the hernia site    Past Medical History  Past Medical History:   Diagnosis Date    Breast cancer ([facility] [address] )     2004 and then 2009    [facility]     Chicken pox     Colon polyp     CPAP (continuous positive airway pressure) dependence     Frequent UTI     GERD (gastroesophageal reflux disease)     Heart disease     High cholesterol     History of blood transfusion     Hypertension     PONV (postoperative nausea and vomiting)     Sleep apnea        Past Surgical History  Past Surgical History:   Procedure Laterality Date    APPENDECTOMY      BREAST CYST EXCISION      CHOLECYSTECTOMY      COLONOSCOPY      CYSTOSCOPY Bilateral 1/13/2020    Procedure: CYSTOSCOPY: CYSTOSCOPY WITH PERIOPERATIVE URETERAL CATHETERS  PLACEMENT;  Surgeon: Kimberly Wilkins MD;  [facility];  Service: Gynecology Oncology    CYSTOSCOPY      HYSTERECTOMY N/A 1/13/2020    Procedure: EXPLORATORY LAPAROTOMY, OVARIAN CANCER STAGING WITH TOTAL HYSTERECTOMY, BILATERAL SALPINGO-OOPHORECTOMY, BILATERAL PELVIC AND PERIAORTIC LYMPHADENECTOMY, INDICATED APPENDECTOMY, OMENTECTOMY, STAGING PERITONEAL BIOPSIES ;  Surgeon: Fabio Díaz MD;  [facility];  Service: Gynecology Oncology    MASTECTOMY Bilateral 2009    [place] LAP, INCISIONAL HERNIA REPAIR,REDUCIBLE N/A 2021    Procedure: REPAIR HERNIA INCISIONAL LAPAROSCOPIC;  Surgeon: Latonia Mccray MD;  [facility];  Service: General    [place] LAP,DIAGNOSTIC ABDOMEN N/A 2020    Procedure: LAPAROSCOPY DIAGNOSTIC;  Surgeon: Fabio Díaz MD;  [facility];  Service: Gynecology Oncology    REPAIR KNEE LIGAMENT Bilateral     ROTATOR CUFF REPAIR         Family History  Family History   Problem Relation Age of Onset    Breast cancer Mother 50    Lung cancer Father     Prostate cancer Brother        Social History  Social History     Socioeconomic History    Marital status: /Civil Union     Spouse name: None    Number of children: None    Years of education: None    Highest education level: None   Occupational History    None   Social Needs    Financial resource strain: None    Food insecurity     Worry: None     Inability: None    Transportation needs     Medical: None     Non-medical: None   Tobacco Use    Smoking status: Former Smoker     Types: Cigarettes     Quit date: 10/31/1987     Years since quittin 5    Smokeless tobacco: Never Used   Substance and Sexual Activity    Alcohol use: Yes     Frequency: 2-3 times a week     Drinks per session: 1 or 2     Comment: socially     Drug use: Never    Sexual activity: Not Currently     Partners: Male     Birth control/protection: Post-menopausal     Comment: same partner-few times a year   Lifestyle    Physical activity     Days per week: None     Minutes per session: None    Stress: None   Relationships    Social connections     Talks on phone: None     Gets together: None Attends [employer]an service: None     Active member of club or organization: None     Attends meetings of clubs or organizations: None     Relationship status: None    Intimate partner violence     Fear of current or ex partner: None     Emotionally abused: None     Physically abused: None     Forced sexual activity: None   Other Topics Concern    None   Social History Narrative    None        Medications  Current Outpatient Medications on File Prior to Visit   Medication Sig Dispense Refill    b complex vitamins capsule Take 1 capsule by mouth daily      carvedilol (COREG) 6 25 mg tablet Take 6 25 mg by mouth 2 (two) times a day with meals      cholecalciferol (VITAMIN D3) 400 units tablet Take 400 Units by mouth daily      estradiol (ESTRACE) 0 1 mg/g vaginal cream insert 1 gram vaginally two times a week  0    losartan-hydrochlorothiazide (HYZAAR) 50-12 5 mg per tablet Take 1 tablet by mouth daily   0    magnesium oxide (MAG-OX) 400 mg Take 400 mg by mouth daily      multivitamin (THERAGRAN) TABS Take 1 tablet by mouth daily      Omega-3 Fatty Acids (fish oil) 1,000 mg Take 1,000 mg by mouth daily      simvastatin (ZOCOR) 20 mg tablet Take 20 mg by mouth daily   0    trimethoprim (PROLOPRIM) 100 mg tablet Take 100 mg by mouth daily   0    venlafaxine 150 MG TB24 Take 150 mg by mouth daily with breakfast   0    zolpidem (AMBIEN CR) 12 5 MG CR tablet Take 12 5 mg by mouth daily at bedtime   0    Ascorbic Acid (vitamin C) 100 MG tablet Take 100 mg by mouth daily      naloxone (NARCAN) 4 mg/0 1 mL nasal spray Administer 1 spray into a nostril  If no response after 2-3 minutes, give another dose in the other nostril using a new spray  1 each 0    oxyCODONE (ROXICODONE) 5 mg immediate release tablet Take 1 tablet (5 mg total) by mouth every 4 (four) hours as needed for moderate painMax Daily Amount: 30 mg 10 tablet 0     No current facility-administered medications on file prior to visit  Allergies  No Known Allergies    Review of Systems    Objective  Vitals:    05/25/21 1126   Temp: 97 8 °F (36 6 °C)       Physical Exam    abdomen:  Incisions healing well  Soft nontender, there is a bulging at the previous hernia site just above the umbilicus    About 3 x 3 cm

## 2021-06-15 ENCOUNTER — TELEPHONE (OUTPATIENT)
Dept: SURGICAL ONCOLOGY | Facility: CLINIC | Age: 71
End: 2021-06-15

## 2021-06-15 NOTE — TELEPHONE ENCOUNTER
Korey Peers had labs completed at a [facility] lab, she asked if we received her results, looked in media did not see anything in the chart, may Esaujean Peers receive a call in regards to her lab results being received?

## 2021-06-15 NOTE — TELEPHONE ENCOUNTER
Return call placed to patient  No labs received at this time from [facility] lab  LMOM to notify patient

## 2021-06-16 ENCOUNTER — HOSPITAL ENCOUNTER (OUTPATIENT)
Dept: RADIOLOGY | Facility: HOSPITAL | Age: 71
Discharge: HOME | End: 2021-06-16
Payer: MEDICARE

## 2021-06-16 ENCOUNTER — OFFICE VISIT (OUTPATIENT)
Dept: GYNECOLOGIC ONCOLOGY | Facility: CLINIC | Age: 71
End: 2021-06-16
Payer: MEDICARE

## 2021-06-16 ENCOUNTER — TELEPHONE (OUTPATIENT)
Dept: GYNECOLOGIC ONCOLOGY | Facility: CLINIC | Age: 71
End: 2021-06-16

## 2021-06-16 VITALS
HEART RATE: 67 BPM | WEIGHT: 249 LBS | HEIGHT: 72 IN | TEMPERATURE: 98.2 F | BODY MASS INDEX: 33.72 KG/M2 | SYSTOLIC BLOOD PRESSURE: 122 MMHG | DIASTOLIC BLOOD PRESSURE: 88 MMHG | RESPIRATION RATE: 18 BRPM

## 2021-06-16 DIAGNOSIS — R97.1 ELEVATED CA-125: ICD-10-CM

## 2021-06-16 DIAGNOSIS — C56.9 MALIGNANT NEOPLASM OF OVARY, UNSPECIFIED LATERALITY (HCC): ICD-10-CM

## 2021-06-16 DIAGNOSIS — Z85.43 ENCOUNTER FOR FOLLOW-UP SURVEILLANCE OF OVARIAN CANCER: ICD-10-CM

## 2021-06-16 DIAGNOSIS — C56.9 MALIGNANT NEOPLASM OF OVARY, UNSPECIFIED LATERALITY (HCC): Primary | ICD-10-CM

## 2021-06-16 DIAGNOSIS — K76.9 LIVER LESION: ICD-10-CM

## 2021-06-16 DIAGNOSIS — Z08 ENCOUNTER FOR FOLLOW-UP SURVEILLANCE OF OVARIAN CANCER: ICD-10-CM

## 2021-06-16 DIAGNOSIS — Z85.43 HISTORY OF OVARIAN CANCER: Primary | ICD-10-CM

## 2021-06-16 PROCEDURE — G1004 CDSM NDSC: HCPCS

## 2021-06-16 PROCEDURE — 99214 OFFICE O/P EST MOD 30 MIN: CPT | Performed by: PHYSICIAN ASSISTANT

## 2021-06-16 PROCEDURE — 71260 CT THORAX DX C+: CPT

## 2021-06-16 PROCEDURE — 74177 CT ABD & PELVIS W/CONTRAST: CPT

## 2021-06-16 RX ADMIN — IOHEXOL 100 ML: 350 INJECTION, SOLUTION INTRAVENOUS at 13:55

## 2021-06-16 NOTE — TELEPHONE ENCOUNTER
LMOM for patient to return call this AM prior to appt with Dr Gagan Acosta at [address] to discuss  results

## 2021-06-16 NOTE — PROGRESS NOTES
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  She is recently s/p hernia repair with known post-operative seroma   is elevated to 107 2  CT imaging is without definitive evidence of disease recurrence  The patient is asymptomatic and her clinic exam is without recurrence  Above findings discussed with Dr Traci Hall via telephone  Will plan for 6 week follow-up , as this could still be reactive from recent hernia surgery  The patient will make a provisional appointment to return to the office in 3 months for continued cancer surveillance  Follow-up on repeat   Plan for follow-up CT imaging to re-image liver lesion in approximately 3 months  Relevant Orders        Liver lesion     6 mm, indeterminate, identified on 6/16/2021 imaging  Plan to repeat in approximately 3 months            Encounter for follow-up surveillance of ovarian cancer      Other Visit Diagnoses     Elevated CA-125        Relevant Orders                CHIEF COMPLAINT:   Ovarian cancer surveillance    Problem:  Cancer Staging  History of ovarian cancer  Staging form: Ovary, Fallopian Tube, Primary Peritoneal, AJCC 8th Edition  - Pathologic stage from 1/13/2020: FIGO Stage IC3 (pN0, cM0) - Signed by Domitila Tijerina MD on 1/28/2020  Histopathologic type: Serous cystadenocarcinoma, NOS  Histologic grade (G): G3  Histologic grading system: 4 grade system  Residual tumor (R): R0 - None  Diagnostic confirmation: Positive histology PLUS positive immunophenotyping and/or positive genetic studies  Specimen type: Excision  Staged by: Managing physician  Cancer antigen 125 () (U/mL): 4,000  Gross residual tumor after primary cyto-reductive surgery: Absent  Residual tumor volume after primary cytoreductive surgery: No gross tumor  National guidelines used in treatment planning: Yes  Type of national guideline used in treatment planning: NCCN              Previous therapy:  Oncology History   History of ovarian cancer   1/13/2020 Initial Diagnosis    Ovarian cancer on left ([address] )     1/13/2020 -  Cancer Staged    Staging form: Ovary, Fallopian Tube, Primary Peritoneal, AJCC 8th Edition  - Pathologic stage from 1/13/2020: FIGO Stage IC3 (pN0, cM0) - Signed by MD Abdelrahman on 1/28/2020  Histologic grade (G): G3  Histologic grading system: 4 grade system  Residual tumor (R): R0 - None  Histopathologic type: Serous cystadenocarcinoma, NOS  Diagnostic confirmation: Positive histology PLUS positive immunophenotyping and/or positive genetic studies  Specimen type: Excision  Staged by: Managing physician  Cancer antigen 125 () (U/mL): 4,000  Gross residual tumor after primary cyto-reductive surgery: Absent  Residual tumor volume after primary cytoreductive surgery: No gross tumor  National guidelines used in treatment planning: Yes  Type of national guideline used in treatment planning: NCCN       1/13/2020 Surgery    Diagnostic laparoscopy, laparotomy, total hysterectomy, bilateral salpingo-oophorectomy with resection of pelvic mass, bilateral pelvic and periaortic lymphadenectomy, staging peritoneal biopsies, omentectomy, appendectomy  Final pathology consistent with 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  2/11/2020 - 5/26/2020 Chemotherapy    Six cycles of carboplatin/paclitaxel administered by Dr Ritesh Love  Tolerated well  CT scan at completion of treatment demonstrates no evidence of measurable disease   amarilis near completion of chemotherapy 5 7 units/milliliter           Patient ID: Tracy Olvera is a 78 y o  female  who presents to the office for ovarian cancer surveillance  No vaginal bleeding, abdominal/pelvic pain  Normal bowel and bladder function   The patient recently underwent hernia surgery in early April 2021  She notes she has been slow to recover and is finally regaining strength/stamina  She was been following post-operatively with general surgery and has a known incisional hernia  Her recent  from 6/15/2021 was elevated  To 107 2  STAT CT imaging per performed today  Report is listed below  This was reviewed  No interval change in medical history since last visit  Quality of life is good  The following portions of the patient's history were reviewed and updated as appropriate: allergies, current medications, past medical history, past surgical history and problem list     Review of Systems   Constitutional: Positive for fatigue  Negative for fever  HENT: Negative  Eyes: Negative  Respiratory: Negative  Cardiovascular: Negative  Gastrointestinal: Positive for abdominal pain (occasional)  Negative for constipation, diarrhea, nausea and vomiting  Genitourinary: Negative  Musculoskeletal: Negative  Skin: Negative  Neurological: Negative  Psychiatric/Behavioral: Negative          Current Outpatient Medications   Medication Sig Dispense Refill    b complex vitamins capsule Take 1 capsule by mouth daily      carvedilol (COREG) 6 25 mg tablet Take 6 25 mg by mouth 2 (two) times a day with meals      cholecalciferol (VITAMIN D3) 400 units tablet Take 400 Units by mouth daily      estradiol (ESTRACE) 0 1 mg/g vaginal cream insert 1 gram vaginally two times a week  0    losartan-hydrochlorothiazide (HYZAAR) 50-12 5 mg per tablet Take 1 tablet by mouth daily   0    magnesium oxide (MAG-OX) 400 mg Take 400 mg by mouth daily      multivitamin (THERAGRAN) TABS Take 1 tablet by mouth daily      Omega-3 Fatty Acids (fish oil) 1,000 mg Take 1,000 mg by mouth daily      simvastatin (ZOCOR) 20 mg tablet Take 20 mg by mouth daily   0    trimethoprim (PROLOPRIM) 100 mg tablet Take 100 mg by mouth daily   0    venlafaxine 150 MG TB24 Take 150 mg by mouth daily with breakfast   0    zolpidem (AMBIEN CR) 12 5 MG CR tablet Take 12 5 mg by mouth daily at bedtime   0     No current facility-administered medications for this visit  Objective:    Blood pressure 122/88, pulse 67, temperature 98 2 °F (36 8 °C), temperature source Tympanic, resp  rate 18, height 6' (1 829 m), weight 113 kg (249 lb), not currently breastfeeding  Body mass index is 33 77 kg/m²  Body surface area is 2 34 meters squared  Physical Exam  Vitals reviewed  Exam conducted with a chaperone present  Constitutional:       General: She is not in acute distress  Appearance: Normal appearance  She is not ill-appearing  HENT:      Head: Normocephalic and atraumatic  Mouth/Throat:      Mouth: Mucous membranes are moist    Eyes:      General:         Right eye: No discharge  Left eye: No discharge  Conjunctiva/sclera: Conjunctivae normal    Pulmonary:      Effort: Pulmonary effort is normal    Abdominal:      Palpations: Abdomen is soft  There is mass (palpable seroma identified of CT imaging at superior portion of midline incision)  Tenderness: There is no abdominal tenderness  Hernia: No hernia is present  Genitourinary:     Comments: The external female genitalia is normal  The bartholin's, uretheral and skenes glands are normal  The urethral meatus is normal (midline with no lesions)  Anus without fissure or lesion  Speculum exam reveals a grossly normal vagina  No masses, lesions,discharge or bleeding  No significant cystocele or rectocele noted  Bimanual exam notes a surgical absent cervix, uterus and adnexal structures  No masses or fullness  Bladder is without fullness, mass or tenderness  Musculoskeletal:      Right lower leg: No edema  Left lower leg: No edema  Skin:     General: Skin is warm and dry  Coloration: Skin is not jaundiced  Findings: No rash  Neurological:      General: No focal deficit present        Mental Status: She is alert and oriented to person, place, and time  Cranial Nerves: No cranial nerve deficit  Sensory: No sensory deficit  Motor: No weakness  Gait: Gait normal    Psychiatric:         Mood and Affect: Mood normal          Behavior: Behavior normal          Thought Content: Thought content normal          Judgment: Judgment normal         Trend:  Lab Results   Component Value Date     6 3 03/18/2021     4,086 7 (H) 01/06/2020         Study Result    Narrative & Impression   CT CHEST, ABDOMEN AND PELVIS WITH IV CONTRAST     INDICATION:   C56 9: Malignant neoplasm of unspecified ovary  R97 1: Elevated cancer antigen 125 ()      COMPARISON:  None      TECHNIQUE: CT examination of the chest, abdomen and pelvis was performed  Axial, sagittal, and coronal 2D reformatted images were created from the source data and submitted for interpretation      Radiation dose length product (DLP) for this visit:  1506 02 mGy-cm   This examination, like all CT scans performed in the [facility], was performed utilizing techniques to minimize radiation dose exposure, including the use of   iterative reconstruction and automated exposure control      IV Contrast:  100 mL of iohexol (OMNIPAQUE)  Enteric Contrast: Enteric contrast was administered      FINDINGS:     CHEST     LUNGS:  Stable 2 mm subpleural nodule in the left lower lobe on image 110 of series 3  Calcified right lower lobe granuloma  No suspicious lung nodule, mass or consolidation  There is no tracheal or endobronchial lesion      PLEURA:  Unremarkable      HEART/GREAT VESSELS: 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  Coronary artery calcifications  Heart size is within normal limits  No pericardial effusion      MEDIASTINUM AND LISA:  Unremarkable      CHEST WALL AND LOWER NECK:   Status post bilateral mastectomy    Mediport has been removed      ABDOMEN     LIVER/BILIARY TREE: New 6 mm subdiaphragmatic or subcapsular hypodense lesion at the superior aspect of segment 4 that is incompletely characterized  On reconstructed images, the lesion is partially isodense to fat  Exact etiology uncertain  Query   focus of fat necrosis  Recommend follow-up  No other focal liver lesion  No biliary dilatation         GALLBLADDER:  Gallbladder is surgically absent      SPLEEN:  Unremarkable      PANCREAS:  Unremarkable      ADRENAL GLANDS:  Unremarkable      KIDNEYS/URETERS: No stones  Stable tiny hypodense renal lesions too small to characterize  Left renal scarring  Stable prominence of the left extrarenal cyst        STOMACH AND BOWEL:  Diverticulosis without diverticulitis  No obstruction      APPENDIX:  No findings to suggest appendicitis      ABDOMINOPELVIC CAVITY:  No ascites  No pneumoperitoneum  No lymphadenopathy      VESSELS:  Unremarkable for patient's age      PELVIS     REPRODUCTIVE ORGANS:  Unremarkable for patient's age      URINARY BLADDER:  Unremarkable      ABDOMINAL WALL/INGUINAL REGIONS: New postoperative changes status post ventral hernia repair  There is a new 5 2 x 4 6 cm subcutaneous cyst at the site of umbilical hernia repair that is favored to represent seroma  No recurrent hernia  Left inguinal   cyst measuring 3 3 x 2 3 cm is mildly decreased in size and again may represent a lymphocele      OSSEOUS STRUCTURES:  No acute fracture or destructive osseous lesion  Spinal degenerative changes are noted      IMPRESSION:  No definite metastatic disease  New 6 mm hypodense structure subjacent to the right hemidiaphragm along the superior margin of the liver  Appears partially isodense to fat  Exact etiology uncertain       Recommend 3 month follow-up      Status post ventral hernia repair with development of subcutaneous seroma      Workstation performed: [id number]

## 2021-06-17 PROBLEM — K76.9 LIVER LESION: Status: ACTIVE | Noted: 2021-06-17

## 2021-06-17 PROBLEM — Z08 ENCOUNTER FOR FOLLOW-UP SURVEILLANCE OF OVARIAN CANCER: Status: ACTIVE | Noted: 2021-06-17

## 2021-06-17 PROBLEM — Z85.43 ENCOUNTER FOR FOLLOW-UP SURVEILLANCE OF OVARIAN CANCER: Status: ACTIVE | Noted: 2021-06-17

## 2021-06-17 NOTE — ASSESSMENT & PLAN NOTE
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  She is recently s/p hernia repair with known post-operative seroma   is elevated to 107 2  CT imaging is without definitive evidence of disease recurrence  The patient is asymptomatic and her clinic exam is without recurrence  Above findings discussed with Dr Gagan Acosta via telephone  Will plan for 6 week follow-up , as this could still be reactive from recent hernia surgery  The patient will make a provisional appointment to return to the office in 3 months for continued cancer surveillance  Follow-up on repeat   Plan for follow-up CT imaging to re-image liver lesion in approximately 3 months

## 2021-06-28 NOTE — PROGRESS NOTES
Heart Failure Consult Note - Jayla Rebolledo 78 y o  female MRN: [id number]    @ Encounter: [id number]      Assessment/Plan:    Patient Active Problem List    Diagnosis Date Noted    Liver lesion 2021    Encounter for follow-up surveillance of ovarian cancer 2021    Status post laparoscopic hernia repair 2021    Anomalous coronary artery origin 2021    Frequent unifocal PVCs 2021    History of ovarian cancer 2020    History of breast cancer 2020    Encounter for gynecological examination without abnormal finding 10/31/2018    Myopia, bilateral 2017    GERD (gastroesophageal reflux disease) 2011    HTN (hypertension), benign 2011    Obesity, Class I, BMI 30 0-34 9 (see actual BMI) 2011    Obstructive sleep apnea 2011     # Frequent PVCs, ventricular bigeminy  Seen on stress EKG  Now on increased coreg 6 25 mg BID, magnesium  24 hour Holter 21: , avg 85 bpm; 3 9% PVC burden, no afib    Studies- personally reviewed by [facility] 3/29/21:   No obstructive CAD  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   LV%    Echocardiogram 3/9/21- [place]  LVEF: 55-60%  LVIDd:  RV:  Other: dilated aortic root 4 4 cm    [employer] MPI 3/9/21: read as reversible inferior wall defect , PVCs, Gated Spect EF: 50%    # 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- on [facility] 3/29  CTA Cardiac 21:   Calcium score 696  CORONARY ANATOMY:    Anomalous left circumflex coronary artery originating from the root of the right coronary artery, then taking a benign course behind the aortic root before continuing in the left atrioventricular groove    Coronary artery anatomy is otherwise normal     # Mildly enlarged ascending aorta  CTA Cardiac 21: 4 1 cm (no change from 2020)    # HTN- coreg 6 25 mg BID, losartan/ HCTZ 50/12 5 mg daily  # GERD  # MARYANA  # dyslipidemia- simvastatin 20 mg daily  # hx of squamous cell carcinoma of skin  # hx of breast cancer  # ovarian cancer, she is status post diagnostic laparoscopy, exploratory laparotomy, total hysterectomy,  Bilateral salpingo-oophorectomy, staging lymphadenectomy, biopsies, omentectomy and appendectomy in January of 2020  Today's Plan:  Already on simvastatin 20 mg (increased calcium score)  Recommend repeat lipid profile, with goal LDL < 70   Recommend asa 81 mg daily- will start  BP at goal < 130 mmHg   Will notify me if develops worsening LANDAVERDE or chest pressure develops  --2g sodium diet  Weights daily    HPI:       79 yo female consult for newly discovered NICM  She has hx of HTN, MARYANA, dyslipidemia who has ovarian cancer, she is status post diagnostic laparoscopy, exploratory laparotomy, total hysterectomy,  Bilateral salpingo-oophorectomy, staging lymphadenectomy, biopsies, omentectomy and appendectomy in January of 2020  She received 6 cycles of chemothreapy w/ carboplatin and paclitaxel  As far as symptoms as concerned, she has not chest pain, no LANDAVERDE, no hx of syncope  Interim:  Last time I cleared her for hernia surgery  24 hour Holter 4/26/21: , avg 85 bpm; 3 9% PVC burden, no afib  CTA Cardiac 5/13/21:   Calcium score 696  CORONARY ANATOMY:    Anomalous left circumflex coronary artery originating from the root of the right coronary artery, then taking a benign course behind the aortic root before continuing in the left atrioventricular groove  Coronary artery anatomy is otherwise normal     Symptom wise she has fatigue    Past Medical History:   Diagnosis Date    Breast cancer ([facility] [address] )     2004 and then 2009    Cancer [facility])     Chicken pox     Colon polyp     CPAP (continuous positive airway pressure) dependence     Frequent UTI     GERD (gastroesophageal reflux disease)     Heart disease     High cholesterol     History of blood transfusion     Hypertension     PONV (postoperative nausea and vomiting)     Sleep apnea        Review of Systems   Constitutional: Positive for fatigue  Negative for activity change, appetite change and unexpected weight change  HENT: Negative for congestion and nosebleeds  Eyes: Negative  Respiratory: Positive for shortness of breath  Negative for cough and chest tightness  Cardiovascular: Negative for chest pain, palpitations and leg swelling  Gastrointestinal: Negative for abdominal distention  Endocrine: Negative  Genitourinary: Negative  Musculoskeletal: Negative  Skin: Negative  Neurological: Negative for dizziness, syncope and weakness  Hematological: Negative  Psychiatric/Behavioral: Negative  No Known Allergies        Current Outpatient Medications:     b complex vitamins capsule, Take 1 capsule by mouth daily, Disp: , Rfl:     carvedilol (COREG) 6 25 mg tablet, Take 6 25 mg by mouth 2 (two) times a day with meals, Disp: , Rfl:     cholecalciferol (VITAMIN D3) 400 units tablet, Take 400 Units by mouth daily, Disp: , Rfl:     estradiol (ESTRACE) 0 1 mg/g vaginal cream, insert 1 gram vaginally two times a week, Disp: , Rfl: 0    losartan-hydrochlorothiazide (HYZAAR) 50-12 5 mg per tablet, Take 1 tablet by mouth daily , Disp: , Rfl: 0    magnesium oxide (MAG-OX) 400 mg, Take 400 mg by mouth daily, Disp: , Rfl:     multivitamin (THERAGRAN) TABS, Take 1 tablet by mouth daily, Disp: , Rfl:     Omega-3 Fatty Acids (fish oil) 1,000 mg, Take 1,000 mg by mouth daily, Disp: , Rfl:     simvastatin (ZOCOR) 20 mg tablet, Take 20 mg by mouth daily , Disp: , Rfl: 0    trimethoprim (PROLOPRIM) 100 mg tablet, Take 100 mg by mouth daily , Disp: , Rfl: 0    venlafaxine 150 MG TB24, Take 150 mg by mouth daily with breakfast , Disp: , Rfl: 0    zolpidem (AMBIEN CR) 12 5 MG CR tablet, Take 12 5 mg by mouth daily at bedtime , Disp: , Rfl: 0    Social History     Socioeconomic History    Marital status: /Civil Union     Spouse name: Not on file    Number of children: Not on file    Years of education: Not on file   Bigg Mcneil Highest education level: Not on file   Occupational History    Not on file   Tobacco Use    Smoking status: Former Smoker     Types: Cigarettes     Quit date: 10/31/1987     Years since quittin 6    Smokeless tobacco: Never Used   Vaping Use    Vaping Use: Never used   Substance and Sexual Activity    Alcohol use: Yes     Comment: socially     Drug use: Never    Sexual activity: Not Currently     Partners: Male     Birth control/protection: Post-menopausal     Comment: same partner-few times a year   Other Topics Concern    Not on file   Social History Narrative    Not on file     Social Determinants of Health     Financial Resource Strain:     Difficulty of Paying Living Expenses:    Food Insecurity:     Worried About Running Out of Food in the Last Year:     [address] in the Last Year:    Transportation Needs:     Lack of Transportation (Medical):  Lack of Transportation (Non-Medical):    Physical Activity:     Days of Exercise per Week:     Minutes of Exercise per Session:    Stress:     Feeling of Stress :    Social Connections:     Frequency of Communication with Friends and Family:     Frequency of Social Gatherings with Friends and Family:     Attends Orthodox Services:     Active Member of Clubs or Organizations:     Attends Club or Organization Meetings:     Marital Status:    Intimate Partner Violence:     Fear of Current or Ex-Partner:     Emotionally Abused:     Physically Abused:     Sexually Abused:        Family History   Problem Relation Age of Onset    Breast cancer Mother 50    Lung cancer Father     Prostate cancer Brother        Physical Exam:    Vitals: not currently breastfeeding  , There is no height or weight on file to calculate BMI ,   Wt Readings from Last 3 Encounters:   21 113 kg (249 lb)   21 115 kg (253 lb)   21 114 kg (251 lb 8 oz)       Physical Exam  Constitutional:       Appearance: She is well-developed     HENT: Head: Normocephalic and atraumatic  Eyes:      Pupils: Pupils are equal, round, and reactive to light  Neck:      Vascular: No JVD  Cardiovascular:      Rate and Rhythm: Normal rate and regular rhythm  Heart sounds: No murmur heard  Pulmonary:      Effort: Pulmonary effort is normal  No respiratory distress  Breath sounds: Normal breath sounds  Abdominal:      General: There is no distension  Palpations: Abdomen is soft  Tenderness: There is no abdominal tenderness  Musculoskeletal:         General: Normal range of motion  Cervical back: Normal range of motion  Skin:     General: Skin is warm and dry  Findings: No rash  Neurological:      Mental Status: She is alert and oriented to person, place, and time  Labs & Results:    Lab Results   Component Value Date    WBC 6 94 01/19/2020    HGB 10 5 (L) 01/19/2020    HCT 33 4 (L) 01/19/2020    MCV 95 01/19/2020     01/19/2020     Lab Results   Component Value Date    SODIUM 137 01/19/2020    K 3 4 (L) 01/19/2020     01/19/2020    CO2 25 01/19/2020    BUN 18 03/18/2021    CREATININE 0 94 03/18/2021    GLUC 133 01/19/2020    CALCIUM 8 4 01/19/2020     No results found for: NTBNP   No results found for: CHOLESTEROL  No results found for: HDL  No results found for: TRIG  No results found for: [place]    EKG personally reviewed by Jayne Gamboa DO  Counseling / Coordination of Care  Time spent today 40 minutes  Greater than 50% of total time was spent with the patient and / or family counseling and / or coordination of care  We discussed diagnoses, most recent studies and any changes in treatment    Thank you for the opportunity to participate in the care of this patient      [address] [facility] PULMONARY HYPERTENSION  MEDICAL DIRECTOR OF [facility]

## 2021-06-29 ENCOUNTER — OFFICE VISIT (OUTPATIENT)
Dept: CARDIOLOGY CLINIC | Facility: CLINIC | Age: 71
End: 2021-06-29
Payer: MEDICARE

## 2021-06-29 VITALS
OXYGEN SATURATION: 98 % | DIASTOLIC BLOOD PRESSURE: 74 MMHG | BODY MASS INDEX: 33.46 KG/M2 | HEIGHT: 72 IN | WEIGHT: 247 LBS | SYSTOLIC BLOOD PRESSURE: 130 MMHG | HEART RATE: 76 BPM

## 2021-06-29 DIAGNOSIS — Q24.5 ANOMALOUS CORONARY ARTERY ORIGIN: Primary | ICD-10-CM

## 2021-06-29 DIAGNOSIS — E78.5 DYSLIPIDEMIA: ICD-10-CM

## 2021-06-29 DIAGNOSIS — I49.3 FREQUENT UNIFOCAL PVCS: ICD-10-CM

## 2021-06-29 DIAGNOSIS — I10 HTN (HYPERTENSION), BENIGN: ICD-10-CM

## 2021-06-29 PROCEDURE — 99214 OFFICE O/P EST MOD 30 MIN: CPT | Performed by: INTERNAL MEDICINE

## 2021-06-29 NOTE — PATIENT INSTRUCTIONS
Start aspirin daily    Let me know if you develop worsening shortness of breath or chest heaviness    I ordered lipid profile

## 2021-07-29 ENCOUNTER — TELEPHONE (OUTPATIENT)
Dept: HEMATOLOGY ONCOLOGY | Facility: CLINIC | Age: 71
End: 2021-07-29

## 2021-07-29 NOTE — TELEPHONE ENCOUNTER
reportedly in the 400's  LMOM for patient to return my call  Plan to schedule CT scan and office visit

## 2021-07-29 NOTE — TELEPHONE ENCOUNTER
Patient is scheduled with Dr Silvio Phelan for 09/16, but Nirali Camacho states something should be done sooner  They can order what is needed or have her see Dr Silvio Phelan sooner    Please call her at [phone]

## 2021-07-30 ENCOUNTER — TELEPHONE (OUTPATIENT)
Dept: GYNECOLOGIC ONCOLOGY | Facility: CLINIC | Age: 71
End: 2021-07-30

## 2021-07-30 NOTE — TELEPHONE ENCOUNTER
Left patient message that CT is scheduled Monday 8/2 at 1:30pm at [facility]  She will need to have blood work completed and will need to  barium  She was advised to call me back with more instruction

## 2021-07-30 NOTE — TELEPHONE ENCOUNTER
Patient would like to request a call back, she mentioned they were suppose to call her back about scheduling CT scan  but hasn't heard back from anyone  Please call back at [facility]: [phone]

## 2021-08-04 ENCOUNTER — HOSPITAL ENCOUNTER (OUTPATIENT)
Dept: RADIOLOGY | Age: 71
Discharge: HOME | End: 2021-08-04
Payer: MEDICARE

## 2021-08-04 ENCOUNTER — OFFICE VISIT (OUTPATIENT)
Dept: CARDIAC SURGERY | Facility: CLINIC | Age: 71
End: 2021-08-04
Payer: MEDICARE

## 2021-08-04 ENCOUNTER — OFFICE VISIT (OUTPATIENT)
Dept: GYNECOLOGIC ONCOLOGY | Facility: CLINIC | Age: 71
End: 2021-08-04
Payer: MEDICARE

## 2021-08-04 VITALS
BODY MASS INDEX: 33.46 KG/M2 | RESPIRATION RATE: 16 BRPM | OXYGEN SATURATION: 96 % | WEIGHT: 247 LBS | HEART RATE: 89 BPM | SYSTOLIC BLOOD PRESSURE: 109 MMHG | TEMPERATURE: 97.7 F | HEIGHT: 72 IN | DIASTOLIC BLOOD PRESSURE: 67 MMHG

## 2021-08-04 VITALS
RESPIRATION RATE: 17 BRPM | OXYGEN SATURATION: 93 % | TEMPERATURE: 98.2 F | DIASTOLIC BLOOD PRESSURE: 72 MMHG | HEART RATE: 85 BPM | SYSTOLIC BLOOD PRESSURE: 112 MMHG | BODY MASS INDEX: 33.46 KG/M2 | WEIGHT: 247 LBS | HEIGHT: 72 IN

## 2021-08-04 DIAGNOSIS — C56.9 MALIGNANT NEOPLASM OF OVARY, UNSPECIFIED LATERALITY (HCC): ICD-10-CM

## 2021-08-04 DIAGNOSIS — R97.1 ELEVATED CA-125: ICD-10-CM

## 2021-08-04 DIAGNOSIS — Z98.890 STATUS POST LAPAROSCOPIC HERNIA REPAIR: ICD-10-CM

## 2021-08-04 DIAGNOSIS — R59.0 MEDIASTINAL LYMPHADENOPATHY: Primary | ICD-10-CM

## 2021-08-04 DIAGNOSIS — C56.9 MALIGNANT NEOPLASM OF OVARY, UNSPECIFIED LATERALITY (HCC): Primary | ICD-10-CM

## 2021-08-04 DIAGNOSIS — Z87.19 STATUS POST LAPAROSCOPIC HERNIA REPAIR: ICD-10-CM

## 2021-08-04 DIAGNOSIS — R59.0 MEDIASTINAL LYMPHADENOPATHY: ICD-10-CM

## 2021-08-04 PROBLEM — Z08 ENCOUNTER FOR FOLLOW-UP SURVEILLANCE OF OVARIAN CANCER: Status: RESOLVED | Noted: 2021-06-17 | Resolved: 2021-08-04

## 2021-08-04 PROBLEM — Z85.43 ENCOUNTER FOR FOLLOW-UP SURVEILLANCE OF OVARIAN CANCER: Status: RESOLVED | Noted: 2021-06-17 | Resolved: 2021-08-04

## 2021-08-04 PROCEDURE — G1004 CDSM NDSC: HCPCS

## 2021-08-04 PROCEDURE — 99214 OFFICE O/P EST MOD 30 MIN: CPT | Performed by: OBSTETRICS & GYNECOLOGY

## 2021-08-04 PROCEDURE — 74177 CT ABD & PELVIS W/CONTRAST: CPT

## 2021-08-04 PROCEDURE — 99205 OFFICE O/P NEW HI 60 MIN: CPT | Performed by: THORACIC SURGERY (CARDIOTHORACIC VASCULAR SURGERY)

## 2021-08-04 PROCEDURE — 71260 CT THORAX DX C+: CPT

## 2021-08-04 RX ADMIN — IOHEXOL 100 ML: 350 INJECTION, SOLUTION INTRAVENOUS at 12:14

## 2021-08-04 RX ADMIN — IOHEXOL 50 ML: 240 INJECTION, SOLUTION INTRATHECAL; INTRAVASCULAR; INTRAVENOUS; ORAL at 12:14

## 2021-08-04 NOTE — ASSESSMENT & PLAN NOTE
CT scan today demonstrates a distinctly enlarged greater than 2 cm lymph node  Given  elevation I discussed with her differential diagnosis which includes potential metastatic disease ( this would be quite atypical ) versus other diagnostic possibilities such as sarcoidosis, etc   I have reviewed CT scan with Dr Alina Roberts from thoracic surgery today  He thinks this lymph node is readily amenable to transbronchial ultrasound-guided biopsy  He will evaluate patient today and proceed with sampling in the near future  I will follow up on results and further management recommendations will follow

## 2021-08-04 NOTE — PROGRESS NOTES
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  Recurrent/metastatic disease is quite uncommon/ atypical in these cases  She has had an elevated  which was 1st noted shortly after ventral hernia repair surgery with mesh  She maintains as seroma in relation to her mesh which could explain at least in part  elevation  However, her  has been trending up further and therefore CT scan of the chest, abdomen and pelvis was obtained today  This demonstrates no evidence of intraperitoneal measurable disease  Thoracic lymphadenopathy identified  See plan under mediastinal lymphadenopathy  Patient inquired about potential benefit of PET-CT  I discussed limitation of PET-CT in this disease state and under current circumstances  Will await results of lymph node biopsy and further management recommendations will follow  Cardiovascular and Mediastinum    Mediastinal lymphadenopathy       CT scan today demonstrates a distinctly enlarged greater than 2 cm lymph node  Given  elevation I discussed with her differential diagnosis which includes potential metastatic disease ( this would be quite atypical ) versus other diagnostic possibilities such as sarcoidosis, etc   I have reviewed CT scan with Dr Adelaida House from thoracic surgery today  He thinks this lymph node is readily amenable to transbronchial ultrasound-guided biopsy  He will evaluate patient today and proceed with sampling in the near future  I will follow up on results and further management recommendations will follow           Relevant Orders    Ambulatory referral to Thoracic Surgery       Other    RESOLVED: Status post laparoscopic hernia repair            CHIEF COMPLAINT:     Follow-up for ovarian cancer,  elevation, new is an lymphadenopathy  Problem:  Cancer Staging  Ovarian cancer [facility])  Staging form: Ovary, Fallopian Tube, Primary Peritoneal, AJCC 8th Edition  - Pathologic stage from 1/13/2020: FIGO Stage IC3 (pN0, cM0) - Signed by Felix Wyatt MD on 1/28/2020        Previous therapy:  Oncology History   Ovarian cancer ([facility] [address] )   1/13/2020 Initial Diagnosis    Ovarian cancer on left ([facility] [address] )     1/13/2020 -  Cancer Staged    Staging form: Ovary, Fallopian Tube, Primary Peritoneal, AJCC 8th Edition  - Pathologic stage from 1/13/2020: FIGO Stage IC3 (pN0, cM0) - Signed by Felix Wyatt MD on 1/28/2020  Histologic grade (G): G3  Histologic grading system: 4 grade system  Residual tumor (R): R0 - None  Histopathologic type: Serous cystadenocarcinoma, NOS  Diagnostic confirmation: Positive histology PLUS positive immunophenotyping and/or positive genetic studies  Specimen type: Excision  Staged by: Managing physician  Cancer antigen 125 () (U/mL): 4,000  Gross residual tumor after primary cyto-reductive surgery: Absent  Residual tumor volume after primary cytoreductive surgery: No gross tumor  National guidelines used in treatment planning: Yes  Type of national guideline used in treatment planning: NCCN       1/13/2020 Surgery    Diagnostic laparoscopy, laparotomy, total hysterectomy, bilateral salpingo-oophorectomy with resection of pelvic mass, bilateral pelvic and periaortic lymphadenectomy, staging peritoneal biopsies, omentectomy, appendectomy  Final pathology consistent with 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  2/11/2020 - 5/26/2020 Chemotherapy    Six cycles of carboplatin/paclitaxel administered by Dr Christoph Hoffman  Tolerated well  CT scan at completion of treatment demonstrates no evidence of measurable disease     amarilis near completion of chemotherapy 5 7 units/milliliter           Patient ID: Juliette Zamudio is a 78 y o  female  HPI    Patient with history of remote breast cancer, stage I C2 ovarian cancer comprehensively staged and treated with subsequent chemotherapy which was completed in May of 2020  She then developed ventral hernia which was repaired laparoscopically with mesh  Shortly after hernia repair she was noted to have an elevated   This has been trending up  She presents today for follow-up  CT scan of the chest, abdomen and pelvis was ordered which demonstrates hernia mesh in place with overlying seroma, no evidence of peritoneal carcinomatosis, no ascites, no retroperitoneal lymphadenopathy, no pelvic or peritoneal masses  Of interest, CT scan of the chest demonstrates a new/ enlarged mediastinal lymph node 2-3 cm  Patient reports occasional mid abdominal pain under need incision, likely related to seroma/ mesh  Recently treated for UTI  Reports normal bowel bladder function  Denies vaginal bleeding, drainage or discharge  Reports dyspnea on exertion  The following portions of the patient's history were reviewed and updated as appropriate: allergies, current medications, past family history, past medical history, past social history, past surgical history and problem list     Review of Systems    As per [facility] [place]  Twelve point review of systems otherwise unremarkable    Current Outpatient Medications   Medication Sig Dispense Refill    b complex vitamins capsule Take 1 capsule by mouth daily      carvedilol (COREG) 6 25 mg tablet Take 6 25 mg by mouth 2 (two) times a day with meals      cholecalciferol (VITAMIN D3) 400 units tablet Take 400 Units by mouth daily      estradiol (ESTRACE) 0 1 mg/g vaginal cream insert 1 gram vaginally two times a week  0    losartan-hydrochlorothiazide (HYZAAR) 50-12 5 mg per tablet Take 1 tablet by mouth daily   0    magnesium oxide (MAG-OX) 400 mg Take 400 mg by mouth 2 (two) times a day       multivitamin (THERAGRAN) TABS Take 1 tablet by mouth daily      simvastatin (ZOCOR) 20 mg tablet Take 20 mg by mouth daily   0    trimethoprim (PROLOPRIM) 100 mg tablet Take 100 mg by mouth daily   0    venlafaxine 150 MG TB24 Take 150 mg by mouth daily with breakfast   0    zolpidem (AMBIEN CR) 12 5 MG CR tablet Take 12 5 mg by mouth daily at bedtime   0    Omega-3 Fatty Acids (fish oil) 1,000 mg Take 1,000 mg by mouth daily (Patient not taking: Reported on 6/29/2021)       No current facility-administered medications for this visit  Objective:    Blood pressure 112/72, pulse 85, temperature 98 2 °F (36 8 °C), temperature source Temporal, resp  rate 17, height 6' (1 829 m), weight 112 kg (247 lb), SpO2 93 %, not currently breastfeeding  Body mass index is 33 5 kg/m²  Body surface area is 2 33 meters squared  Physical Exam  Vitals reviewed  Exam conducted with a chaperone present  Constitutional:       General: She is not in acute distress  Appearance: Normal appearance  She is not ill-appearing, toxic-appearing or diaphoretic  Eyes:      General: No scleral icterus  Right eye: No discharge  Left eye: No discharge  Conjunctiva/sclera: Conjunctivae normal    Cardiovascular:      Rate and Rhythm: Normal rate and regular rhythm  Heart sounds: Normal heart sounds  No murmur heard  Pulmonary:      Effort: No respiratory distress  Breath sounds: Normal breath sounds  No stridor  No wheezing or rales  Abdominal:      General: There is no distension  Palpations: Abdomen is soft  There is no mass  Tenderness: There is no abdominal tenderness  There is no guarding  Genitourinary:     Comments: Normal external female genitalia  Normal Bartholin's and Darlington's glands  Normal urethral meatus and no evidence of urethral discharge or masses  Bladder without fullness mass or tenderness  Vagina without lesion or discharge  No significant pelvic organ prolapse noted  Cervix and uterus are surgically absent    Bimanual exam demonstrates no evidence of pelvic masses  Anus without fissure of lesion  Musculoskeletal:      Right lower leg: No edema  Left lower leg: No edema   Trend:   7/28/2021                                               434 IU/mL  Lab Results   Component Value Date     6 3 03/18/2021     4,086 7 (H) 01/06/2020       CT scan chest, abdomen and pelvis August 4, 2021: I have personally reviewed interpreted images  There is evidence of anchors and hernia mesh underlying anterior abdominal wall  Overlying circumscript seroma with some rim enhancement consistent with inflammatory changes noted  No evidence of ascites  No retroperitoneal lymphadenopathy  No evidence of omental/mesenteric nodularity  No measurable intraperitoneal disease  Retroperitoneal lymph nodes do not appear enlarged  Lungs without nodules or lesions  Subcarinal mediastinal lymph node measuring approximately 2-3 cm noted    CT scan reviewed with Dr Destin Soto as well    Shorty Mcdaniels MD, [facility], [facility]  8/4/2021  3:36 PM

## 2021-08-04 NOTE — ASSESSMENT & PLAN NOTE
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  Recurrent/metastatic disease is quite uncommon/ atypical in these cases  She has had an elevated  which was 1st noted shortly after ventral hernia repair surgery with mesh  She maintains as seroma in relation to her mesh which could explain at least in part  elevation  However, her  has been trending up further and therefore CT scan of the chest, abdomen and pelvis was obtained today  This demonstrates no evidence of intraperitoneal measurable disease  Thoracic lymphadenopathy identified  See plan under mediastinal lymphadenopathy  Patient inquired about potential benefit of PET-CT  I discussed limitation of PET-CT in this disease state and under current circumstances  Will await results of lymph node biopsy and further management recommendations will follow

## 2021-08-04 NOTE — H&P (VIEW-ONLY)
Thoracic Consult  Assessment/Plan:     82-year-old female with history of ovarian cancer status post resection and adjuvant chemotherapy with an increasing  and an enlarging 2 5 cm subcarinal lymph nodes suspicious for malignancy     I had a good conversation today with the patient and her  about this lymph node  They understand that is somewhat suspicious for a cancer  He could represent metastatic ovarian cancer or some sort of new other malignancy, even lymphoma  We also discussed benign finding such as granulomatous disease  At this time I would recommend biopsy  I would recommend a bronchoscopy and EBUS  They understand this is a low risk procedure and would like to proceed  This will be scheduled for Monday 8/9  Consent was signed in the office today  Deepa Christy MD      Diagnoses and all orders for this visit:    Mediastinal lymphadenopathy  -     Case request operating room: ENDOBRONCHIAL ULTRASOUND (EBUS); Standing  -     Case request operating room: ENDOBRONCHIAL ULTRASOUND (EBUS)    Other orders  -     Diet NPO; Sips with meds; Standing  -     Height and weight upon arrival; Standing  -     Void on call to OR; Standing  -     Insert peripheral IV; Standing  -     Shower/scrub; Standing  -     Place sequential compression device; Standing            Thoracic History     Problem:   Enlarged paratracheal lymphadenopathy in the setting of ovarian cancer    Procedure:     Pathology:        Subjective:    Patient ID: Gibson Wiley is a 78 y o  female  HPI   Nelly Morel is a 82-year-old female with history ovarian cancer status post total hysterectomy and BSO followed by chemotherapy in 2020,  Breast cancer, GERD, sleep apnea, hypertension and obesity with a BMI of 32 we are seeing in consultation from [address] [place] [address] surgery for mediastinal lymphadenopathy  She has been undergoing routine blood work and surveillance imaging for her ovarian cancer    She has had a persistently elevated  felt to be secondary to her abdominal wall hernia and associated seroma  Repeat CT imaging showed enlarged paratracheal lymphadenopathy  Currently she denies any major complaints  No respiratory issues  She denies any cough or shortness of breath  She has a distant 20 pack-year smoking history  No other respiratory complaints at that time  No fevers or chills  No unintentional weight loss      The following portions of the patient's history were reviewed and updated as appropriate: allergies, current medications, past family history, past medical history, past social history, past surgical history and problem list     Past Medical History:   Diagnosis Date    [facility]     2004 and then 2009    Cancer [facility]) 2005,2009    breast    Cancer [facility] 2020    ovarian    Chicken pox     Colon polyp     CPAP (continuous positive airway pressure) dependence     Frequent UTI     GERD (gastroesophageal reflux disease)     Heart disease     High cholesterol     History of blood transfusion     Hypertension     PONV (postoperative nausea and vomiting)     Sleep apnea       Past Surgical History:   Procedure Laterality Date    APPENDECTOMY      BREAST CYST EXCISION      CHOLECYSTECTOMY      COLONOSCOPY      CYSTOSCOPY Bilateral 1/13/2020    Procedure: CYSTOSCOPY: CYSTOSCOPY WITH PERIOPERATIVE URETERAL CATHETERS  PLACEMENT;  Surgeon: Steve Norwood MD;  [facility];  Service: Gynecology Oncology    CYSTOSCOPY      HYSTERECTOMY N/A 1/13/2020    Procedure: EXPLORATORY LAPAROTOMY, OVARIAN CANCER STAGING WITH TOTAL HYSTERECTOMY, BILATERAL SALPINGO-OOPHORECTOMY, BILATERAL PELVIC AND PERIAORTIC LYMPHADENECTOMY, INDICATED APPENDECTOMY, OMENTECTOMY, STAGING PERITONEAL BIOPSIES ;  Surgeon: Steve Norwood MD;  [facility];  Service: Gynecology Oncology    MASTECTOMY Bilateral 2009    VT LAP, INCISIONAL HERNIA REPAIR,REDUCIBLE N/A 4/8/2021    Procedure: REPAIR HERNIA INCISIONAL LAPAROSCOPIC;  Surgeon: Rasheed Aranda MD;  [facility];  Service: General    MN LAP,DIAGNOSTIC ABDOMEN N/A 2020    Procedure: LAPAROSCOPY DIAGNOSTIC;  Surgeon: Aranza Krishnamurthy MD;  [facility];  Service: Gynecology Oncology    REPAIR KNEE LIGAMENT Bilateral     ROTATOR CUFF REPAIR        Family History   Problem Relation Age of Onset    Breast cancer Mother 50    Lung cancer Father     Prostate cancer Brother       Social History     Socioeconomic History    Marital status: /Civil Union     Spouse name: Not on file    Number of children: Not on file    Years of education: Not on file    Highest education level: Not on file   Occupational History    Not on file   Tobacco Use    Smoking status: Former Smoker     Types: Cigarettes     Quit date: 10/31/1987     Years since quittin 7    Smokeless tobacco: Never Used   Vaping Use    Vaping Use: Never used   Substance and Sexual Activity    Alcohol use: Yes     Comment: socially     Drug use: Never    Sexual activity: Not Currently     Partners: Male     Birth control/protection: Post-menopausal     Comment: same partner-few times a year   Other Topics Concern    Not on file   Social History Narrative    Not on file     Social Determinants of Health     Financial Resource Strain:     Difficulty of Paying Living Expenses:    Food Insecurity:     Worried About Running Out of Food in the Last Year:     Ran Out of Food in the Last Year:    Transportation Needs:     Lack of Transportation (Medical):      Lack of Transportation (Non-Medical):    Physical Activity:     Days of Exercise per Week:     Minutes of Exercise per Session:    Stress:     Feeling of Stress :    Social Connections:     Frequency of Communication with Friends and Family:     Frequency of Social Gatherings with Friends and Family:     Attends Gnosticism Services:     Active Member of Clubs or Organizations:     Attends Club or Organization Meetings:     Marital Status:    Intimate Partner Violence:     Fear of Current or Ex-Partner:     Emotionally Abused:     Physically Abused:     Sexually Abused:       Review of Systems   Constitutional: Negative for activity change, appetite change, chills, diaphoresis, fatigue, fever and unexpected weight change  HENT: Negative  Eyes: Negative  Respiratory: Negative for apnea, cough, chest tightness, shortness of breath, wheezing and stridor  Cardiovascular: Negative for chest pain, palpitations and leg swelling  Gastrointestinal: Negative for abdominal distention, abdominal pain, blood in stool, constipation, diarrhea, nausea and vomiting  Endocrine: Negative  Genitourinary: Negative  Musculoskeletal: Negative  Skin: Negative  Allergic/Immunologic: Negative  Neurological: Negative  Hematological: Negative  Psychiatric/Behavioral: Negative  Objective:   Physical Exam  Vitals and nursing note reviewed  Constitutional:       General: She is not in acute distress  Appearance: Normal appearance  She is well-developed  She is not diaphoretic  Comments: Appears well in no acute distress   HENT:      Head: Normocephalic and atraumatic  Mouth/Throat:      Pharynx: No oropharyngeal exudate  Comments: Wearing a mask  Eyes:      General: No scleral icterus  Conjunctiva/sclera: Conjunctivae normal       Pupils: Pupils are equal, round, and reactive to light  Neck:      Thyroid: No thyromegaly  Vascular: No JVD  Trachea: No tracheal deviation  Comments: No appreciable cervical adenopathy  Cardiovascular:      Rate and Rhythm: Normal rate and regular rhythm  Heart sounds: Normal heart sounds  No murmur heard  No friction rub  No gallop  Pulmonary:      Effort: Pulmonary effort is normal  No respiratory distress  Breath sounds: Normal breath sounds  No wheezing or rales        Comments: Normal work of breathing on room air  Chest:      Chest wall: No tenderness  Abdominal:      General: Bowel sounds are normal  There is no distension  Palpations: Abdomen is soft  There is no mass  Tenderness: There is no abdominal tenderness  There is no guarding or rebound  Comments: Abdominal incisions are healed  No evidence of recurrent hernia  Musculoskeletal:         General: No tenderness or deformity  Normal range of motion  Cervical back: Normal range of motion and neck supple  Skin:     General: Skin is warm and dry  Coloration: Skin is not pale  Findings: No erythema or rash  Neurological:      Mental Status: She is alert and oriented to person, place, and time  Psychiatric:         Behavior: Behavior normal          Thought Content: Thought content normal          Judgment: Judgment normal      /67   Pulse 89   Temp 97 7 °F (36 5 °C) (Temporal)   Resp 16   Ht 6' 1" (1 854 m)   Wt 112 kg (247 lb)   LMP  (LMP Unknown)   SpO2 96%   BMI 32 59 kg/m²     No Chest XR results available for this patient  No CT Chest results available for this patient  CT chest abdomen pelvis w contrast    Result Date: 8/4/2021  Narrative CT CHEST, ABDOMEN AND PELVIS WITH IV CONTRAST INDICATION:   C56 9: Malignant neoplasm of unspecified ovary R97 1: Elevated cancer antigen 125 ()  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 with elevated CA-125 of 107 2 COMPARISON: CT from June 16, 2021 TECHNIQUE: CT examination of the chest, abdomen and pelvis was performed  Axial, sagittal, and coronal 2D reformatted images were created from the source data and submitted for interpretation  Radiation dose length product (DLP) for this visit:  2285 mGy-cm     This examination, like all CT scans performed in the [facility], was performed utilizing techniques to minimize radiation dose exposure, including the use of iterative reconstruction and automated exposure control  IV Contrast:  50 mL of iohexol (OMNIPAQUE) 100 mL of iohexol (OMNIPAQUE) Enteric Contrast: Enteric contrast was administered  FINDINGS: CHEST LUNGS:  A left lower lung nodular density seen in image 106 series 3, measuring about 4 mm, stable  No new measurable lung nodule seen  Mild fissural thickening seen on the left side, stable A left lower lobe granuloma seen PLEURA:  No pleural effusion seen No pneumothorax seen HEART/GREAT VESSELS:  Unremarkable for patient's age  Ascending aorta measures 4 cm MEDIASTINUM AND LISA: Lower right paratracheal lymph node is seen, measuring about 4 mm There is a subcarinal lymph node measures 1 1 cm x 2 3 cm  Not visible on the previous study of Ana 10, 2020 On the previous study of June 16, 2021 this was measuring 1 cm x 1 5 cm CHEST WALL AND LOWER NECK:   No significant axillary lymph node enlargement seen Bilateral breast implants are noted ABDOMEN LIVER/BILIARY TREE:  Again noted is a focal hypodense area in the right subdiaphragmatic region and indenting the capsule of the liver, measuring 8 mm, stable A hypodensity seen left hepatic lobe, segment 4, stable due to focal fatty infiltration GALLBLADDER:  Surgically absent The CBD measures 1 1 cm SPLEEN:  Unremarkable  PANCREAS:  Unremarkable  ADRENAL GLANDS:  Unremarkable  KIDNEYS/URETERS:  Prominence of the left renal pelvis and right renal pelvis seen, unchanged STOMACH AND BOWEL:  Diverticulosis seen There is no abnormal bowel wall thickening No abnormal dilation of the small bowel loops seen APPENDIX:  No findings to suggest appendicitis  ABDOMINOPELVIC CAVITY:  Again noted is a rounded density near the deep inguinal ring in the left inguinal region measuring 2 2 x 2 6 cm    This is stable this has become smaller since the previous study of March 18, 2021 VESSELS:  Celiac trunk appear unremarkable SMA appear unremarkable MARA appear unremarkable PELVIS REPRODUCTIVE ORGANS:  The uterus is surgically absent The vaginal vault appears unchanged from  Small amount of air noted within the vagina URINARY BLADDER:  Unremarkable  ABDOMINAL WALL/INGUINAL REGIONS:  Postsurgical changes from anterior abdominal wall hernia repair with mesh  Progressive involution of the anterior abdominal wall seroma  This now measures 3 7 x 3 cm  Previously measuring 5 3 x 5 3 cm OSSEOUS STRUCTURES:  No acute fracture or destructive osseous lesion  Impression There is mildly enlarging subcarinal lymph node which measures about 1 1 cm in short axis and about 2 3 cm in transverse dimension, Not seen on the previous study of Ana 10, 2020 and mildly larger since the previous study of June 16, 2021  Further management on the clinical basis  This is suspicious in view of rising CA-125 level Previously noted small nodular area in the right subdiaphragmatic region superior to the liver with measuring about 18 mm, stable No new measurable lymphadenopathy in the abdomen, pelvis No new liver lesion The study was marked in EPIC for significant notification  Workstation performed: [id number]     CT chest abdomen pelvis w contrast    Result Date: 6/16/2021  Narrative CT CHEST, ABDOMEN AND PELVIS WITH IV CONTRAST INDICATION:   C56 9: Malignant neoplasm of unspecified ovary R97 1: Elevated cancer antigen 125 ()  COMPARISON:  None  TECHNIQUE: CT examination of the chest, abdomen and pelvis was performed  Axial, sagittal, and coronal 2D reformatted images were created from the source data and submitted for interpretation  Radiation dose length product (DLP) for this visit:  1506 02 mGy-cm   This examination, like all CT scans performed in the [facility], was performed utilizing techniques to minimize radiation dose exposure, including the use of iterative reconstruction and automated exposure control  IV Contrast:  100 mL of iohexol (OMNIPAQUE) Enteric Contrast: Enteric contrast was administered   FINDINGS: CHEST LUNGS: Stable 2 mm subpleural nodule in the left lower lobe on image 110 of series 3  Calcified right lower lobe granuloma  No suspicious lung nodule, mass or consolidation  There is no tracheal or endobronchial lesion  PLEURA:  Unremarkable  HEART/GREAT VESSELS: 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  Coronary artery calcifications  Heart size is within normal limits  No pericardial effusion  MEDIASTINUM AND LISA:  Unremarkable  CHEST WALL AND LOWER NECK:   Status post bilateral mastectomy  Mediport has been removed  ABDOMEN LIVER/BILIARY TREE:  New 6 mm subdiaphragmatic or subcapsular hypodense lesion at the superior aspect of segment 4 that is incompletely characterized  On reconstructed images, the lesion is partially isodense to fat  Exact etiology uncertain  Query focus of fat necrosis  Recommend follow-up  No other focal liver lesion  No biliary dilatation  GALLBLADDER:  Gallbladder is surgically absent  SPLEEN:  Unremarkable  PANCREAS:  Unremarkable  ADRENAL GLANDS:  Unremarkable  KIDNEYS/URETERS: No stones  Stable tiny hypodense renal lesions too small to characterize  Left renal scarring  Stable prominence of the left extrarenal cyst   STOMACH AND BOWEL:  Diverticulosis without diverticulitis  No obstruction  APPENDIX:  No findings to suggest appendicitis  ABDOMINOPELVIC CAVITY:  No ascites  No pneumoperitoneum  No lymphadenopathy  VESSELS:  Unremarkable for patient's age  PELVIS REPRODUCTIVE ORGANS:  Unremarkable for patient's age  URINARY BLADDER:  Unremarkable  ABDOMINAL WALL/INGUINAL REGIONS: New postoperative changes status post ventral hernia repair  There is a new 5 2 x 4 6 cm subcutaneous cyst at the site of umbilical hernia repair that is favored to represent seroma  No recurrent hernia  Left inguinal cyst measuring 3 3 x 2 3 cm is mildly decreased in size and again may represent a lymphocele   OSSEOUS STRUCTURES:  No acute fracture or destructive osseous lesion  Spinal degenerative changes are noted  Impression No definite metastatic disease  New 6 mm hypodense structure subjacent to the right hemidiaphragm along the superior margin of the liver  Appears partially isodense to fat  Exact etiology uncertain       Recommend 3 month follow-up  Status post ventral hernia repair with development of subcutaneous seroma  Workstation performed: [id number]        I personally reviewed her most recent CT of the chest in PACs from 08/04/2021  This shows a 2 4 by 1 2 cm subcarinal lymph node  This was not present in June of 2020  This is significantly enlarged  This is grown from June of 2021  There is no evidence of lung masses  There is no other significant adenopathy      Danielle Ng MD  Thoracic Surgeon    (Available by [facility] Text)

## 2021-08-04 NOTE — PROGRESS NOTES
Thoracic Consult  Assessment/Plan:     73-year-old female with history of ovarian cancer status post resection and adjuvant chemotherapy with an increasing  and an enlarging 2 5 cm subcarinal lymph nodes suspicious for malignancy     I had a good conversation today with the patient and her  about this lymph node  They understand that is somewhat suspicious for a cancer  He could represent metastatic ovarian cancer or some sort of new other malignancy, even lymphoma  We also discussed benign finding such as granulomatous disease  At this time I would recommend biopsy  I would recommend a bronchoscopy and EBUS  They understand this is a low risk procedure and would like to proceed  This will be scheduled for Monday 8/9  Consent was signed in the office today  Edu Cisneros MD      Diagnoses and all orders for this visit:    Mediastinal lymphadenopathy  -     Case request operating room: ENDOBRONCHIAL ULTRASOUND (EBUS); Standing  -     Case request operating room: ENDOBRONCHIAL ULTRASOUND (EBUS)    Other orders  -     Diet NPO; Sips with meds; Standing  -     Height and weight upon arrival; Standing  -     Void on call to OR; Standing  -     Insert peripheral IV; Standing  -     Shower/scrub; Standing  -     Place sequential compression device; Standing            Thoracic History     Problem:   Enlarged paratracheal lymphadenopathy in the setting of ovarian cancer    Procedure:     Pathology:        Subjective:    Patient ID: Katie Milner is a 78 y o  female  CINDY Yi is a 73-year-old female with history ovarian cancer status post total hysterectomy and BSO followed by chemotherapy in 2020,  Breast cancer, GERD, sleep apnea, hypertension and obesity with a BMI of 32 we are seeing in consultation from [id number] [place] [address] surgery for mediastinal lymphadenopathy  She has been undergoing routine blood work and surveillance imaging for her ovarian cancer    She has had a persistently elevated  felt to be secondary to her abdominal wall hernia and associated seroma  Repeat CT imaging showed enlarged paratracheal lymphadenopathy  Currently she denies any major complaints  No respiratory issues  She denies any cough or shortness of breath  She has a distant 20 pack-year smoking history  No other respiratory complaints at that time  No fevers or chills  No unintentional weight loss      The following portions of the patient's history were reviewed and updated as appropriate: allergies, current medications, past family history, past medical history, past social history, past surgical history and problem list     Past Medical History:   Diagnosis Date    [facility]     2004 and then 2009    Cancer [facility]) 2005,2009    breast    [facility] 2020    ovarian    Chicken pox     Colon polyp     CPAP (continuous positive airway pressure) dependence     Frequent UTI     GERD (gastroesophageal reflux disease)     Heart disease     High cholesterol     History of blood transfusion     Hypertension     PONV (postoperative nausea and vomiting)     Sleep apnea       Past Surgical History:   Procedure Laterality Date    APPENDECTOMY      BREAST CYST EXCISION      CHOLECYSTECTOMY      COLONOSCOPY      CYSTOSCOPY Bilateral 1/13/2020    Procedure: CYSTOSCOPY: CYSTOSCOPY WITH PERIOPERATIVE URETERAL CATHETERS  PLACEMENT;  Surgeon: Adam Magana MD;  [facility];  Service: Gynecology Oncology    CYSTOSCOPY      HYSTERECTOMY N/A 1/13/2020    Procedure: EXPLORATORY LAPAROTOMY, OVARIAN CANCER STAGING WITH TOTAL HYSTERECTOMY, BILATERAL SALPINGO-OOPHORECTOMY, BILATERAL PELVIC AND PERIAORTIC LYMPHADENECTOMY, INDICATED APPENDECTOMY, OMENTECTOMY, STAGING PERITONEAL BIOPSIES ;  Surgeon: Adam Magana MD;  [facility];  Service: Gynecology Oncology    MASTECTOMY Bilateral 2009    MN LAP, INCISIONAL HERNIA REPAIR,REDUCIBLE N/A 4/8/2021    Procedure: REPAIR HERNIA INCISIONAL LAPAROSCOPIC;  Surgeon: Jorge Buck MD;  [facility];  Service: General    NE LAP,DIAGNOSTIC ABDOMEN N/A 2020    Procedure: LAPAROSCOPY DIAGNOSTIC;  Surgeon: Moose Rivas MD;  [facility];  Service: Gynecology Oncology    REPAIR KNEE LIGAMENT Bilateral     ROTATOR CUFF REPAIR        Family History   Problem Relation Age of Onset    Breast cancer Mother 50    Lung cancer Father     Prostate cancer Brother       Social History     Socioeconomic History    Marital status: /Civil Union     Spouse name: Not on file    Number of children: Not on file    Years of education: Not on file    Highest education level: Not on file   Occupational History    Not on file   Tobacco Use    Smoking status: Former Smoker     Types: Cigarettes     Quit date: 10/31/1987     Years since quittin 7    Smokeless tobacco: Never Used   Vaping Use    Vaping Use: Never used   Substance and Sexual Activity    Alcohol use: Yes     Comment: socially     Drug use: Never    Sexual activity: Not Currently     Partners: Male     Birth control/protection: Post-menopausal     Comment: same partner-few times a year   Other Topics Concern    Not on file   Social History Narrative    Not on file     Social Determinants of Health     Financial Resource Strain:     Difficulty of Paying Living Expenses:    Food Insecurity:     Worried About Running Out of Food in the Last Year:     Ran Out of Food in the Last Year:    Transportation Needs:     Lack of Transportation (Medical):      Lack of Transportation (Non-Medical):    Physical Activity:     Days of Exercise per Week:     Minutes of Exercise per Session:    Stress:     Feeling of Stress :    Social Connections:     Frequency of Communication with Friends and Family:     Frequency of Social Gatherings with Friends and Family:     Attends Jehovah's witness Services:     Active Member of Clubs or Organizations:     Attends Club or Organization Meetings:     Marital Status:    Intimate Partner Violence:     Fear of Current or Ex-Partner:     Emotionally Abused:     Physically Abused:     Sexually Abused:       Review of Systems   Constitutional: Negative for activity change, appetite change, chills, diaphoresis, fatigue, fever and unexpected weight change  HENT: Negative  Eyes: Negative  Respiratory: Negative for apnea, cough, chest tightness, shortness of breath, wheezing and stridor  Cardiovascular: Negative for chest pain, palpitations and leg swelling  Gastrointestinal: Negative for abdominal distention, abdominal pain, blood in stool, constipation, diarrhea, nausea and vomiting  Endocrine: Negative  Genitourinary: Negative  Musculoskeletal: Negative  Skin: Negative  Allergic/Immunologic: Negative  Neurological: Negative  Hematological: Negative  Psychiatric/Behavioral: Negative  Objective:   Physical Exam  Vitals and nursing note reviewed  Constitutional:       General: She is not in acute distress  Appearance: Normal appearance  She is well-developed  She is not diaphoretic  Comments: Appears well in no acute distress   HENT:      Head: Normocephalic and atraumatic  Mouth/Throat:      Pharynx: No oropharyngeal exudate  Comments: Wearing a mask  Eyes:      General: No scleral icterus  Conjunctiva/sclera: Conjunctivae normal       Pupils: Pupils are equal, round, and reactive to light  Neck:      Thyroid: No thyromegaly  Vascular: No JVD  Trachea: No tracheal deviation  Comments: No appreciable cervical adenopathy  Cardiovascular:      Rate and Rhythm: Normal rate and regular rhythm  Heart sounds: Normal heart sounds  No murmur heard  No friction rub  No gallop  Pulmonary:      Effort: Pulmonary effort is normal  No respiratory distress  Breath sounds: Normal breath sounds  No wheezing or rales        Comments: Normal work of breathing on room air  Chest:      Chest wall: No tenderness  Abdominal:      General: Bowel sounds are normal  There is no distension  Palpations: Abdomen is soft  There is no mass  Tenderness: There is no abdominal tenderness  There is no guarding or rebound  Comments: Abdominal incisions are healed  No evidence of recurrent hernia  Musculoskeletal:         General: No tenderness or deformity  Normal range of motion  Cervical back: Normal range of motion and neck supple  Skin:     General: Skin is warm and dry  Coloration: Skin is not pale  Findings: No erythema or rash  Neurological:      Mental Status: She is alert and oriented to person, place, and time  Psychiatric:         Behavior: Behavior normal          Thought Content: Thought content normal          Judgment: Judgment normal      /67   Pulse 89   Temp 97 7 °F (36 5 °C) (Temporal)   Resp 16   Ht 6' 1" (1 854 m)   Wt 112 kg (247 lb)   LMP  (LMP Unknown)   SpO2 96%   BMI 32 59 kg/m²     No Chest XR results available for this patient  No CT Chest results available for this patient  CT chest abdomen pelvis w contrast    Result Date: 8/4/2021  Narrative CT CHEST, ABDOMEN AND PELVIS WITH IV CONTRAST INDICATION:   C56 9: Malignant neoplasm of unspecified ovary R97 1: Elevated cancer antigen 125 ()  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 with elevated CA-125 of 107 2 COMPARISON: CT from June 16, 2021 TECHNIQUE: CT examination of the chest, abdomen and pelvis was performed  Axial, sagittal, and coronal 2D reformatted images were created from the source data and submitted for interpretation  Radiation dose length product (DLP) for this visit:  2285 mGy-cm     This examination, like all CT scans performed in the [facility], was performed utilizing techniques to minimize radiation dose exposure, including the use of iterative reconstruction and automated exposure control  IV Contrast:  50 mL of iohexol (OMNIPAQUE) 100 mL of iohexol (OMNIPAQUE) Enteric Contrast: Enteric contrast was administered  FINDINGS: CHEST LUNGS:  A left lower lung nodular density seen in image 106 series 3, measuring about 4 mm, stable  No new measurable lung nodule seen  Mild fissural thickening seen on the left side, stable A left lower lobe granuloma seen PLEURA:  No pleural effusion seen No pneumothorax seen HEART/GREAT VESSELS:  Unremarkable for patient's age  Ascending aorta measures 4 cm MEDIASTINUM AND LISA: Lower right paratracheal lymph node is seen, measuring about 4 mm There is a subcarinal lymph node measures 1 1 cm x 2 3 cm  Not visible on the previous study of Ana 10, 2020 On the previous study of June 16, 2021 this was measuring 1 cm x 1 5 cm CHEST WALL AND LOWER NECK:   No significant axillary lymph node enlargement seen Bilateral breast implants are noted ABDOMEN LIVER/BILIARY TREE:  Again noted is a focal hypodense area in the right subdiaphragmatic region and indenting the capsule of the liver, measuring 8 mm, stable A hypodensity seen left hepatic lobe, segment 4, stable due to focal fatty infiltration GALLBLADDER:  Surgically absent The CBD measures 1 1 cm SPLEEN:  Unremarkable  PANCREAS:  Unremarkable  ADRENAL GLANDS:  Unremarkable  KIDNEYS/URETERS:  Prominence of the left renal pelvis and right renal pelvis seen, unchanged STOMACH AND BOWEL:  Diverticulosis seen There is no abnormal bowel wall thickening No abnormal dilation of the small bowel loops seen APPENDIX:  No findings to suggest appendicitis  ABDOMINOPELVIC CAVITY:  Again noted is a rounded density near the deep inguinal ring in the left inguinal region measuring 2 2 x 2 6 cm    This is stable this has become smaller since the previous study of March 18, 2021 VESSELS:  Celiac trunk appear unremarkable SMA appear unremarkable MARA appear unremarkable PELVIS REPRODUCTIVE ORGANS:  The uterus is surgically absent The vaginal vault appears unchanged from  Small amount of air noted within the vagina URINARY BLADDER:  Unremarkable  ABDOMINAL WALL/INGUINAL REGIONS:  Postsurgical changes from anterior abdominal wall hernia repair with mesh  Progressive involution of the anterior abdominal wall seroma  This now measures 3 7 x 3 cm  Previously measuring 5 3 x 5 3 cm OSSEOUS STRUCTURES:  No acute fracture or destructive osseous lesion  Impression There is mildly enlarging subcarinal lymph node which measures about 1 1 cm in short axis and about 2 3 cm in transverse dimension, Not seen on the previous study of Ana 10, 2020 and mildly larger since the previous study of June 16, 2021  Further management on the clinical basis  This is suspicious in view of rising CA-125 level Previously noted small nodular area in the right subdiaphragmatic region superior to the liver with measuring about 18 mm, stable No new measurable lymphadenopathy in the abdomen, pelvis No new liver lesion The study was marked in EPIC for significant notification  Workstation performed: [id number]     CT chest abdomen pelvis w contrast    Result Date: 6/16/2021  Narrative CT CHEST, ABDOMEN AND PELVIS WITH IV CONTRAST INDICATION:   C56 9: Malignant neoplasm of unspecified ovary R97 1: Elevated cancer antigen 125 ()  COMPARISON:  None  TECHNIQUE: CT examination of the chest, abdomen and pelvis was performed  Axial, sagittal, and coronal 2D reformatted images were created from the source data and submitted for interpretation  Radiation dose length product (DLP) for this visit:  1506 02 mGy-cm   This examination, like all CT scans performed in the [facility], was performed utilizing techniques to minimize radiation dose exposure, including the use of iterative reconstruction and automated exposure control  IV Contrast:  100 mL of iohexol (OMNIPAQUE) Enteric Contrast: Enteric contrast was administered   FINDINGS: CHEST LUNGS: Stable 2 mm subpleural nodule in the left lower lobe on image 110 of series 3  Calcified right lower lobe granuloma  No suspicious lung nodule, mass or consolidation  There is no tracheal or endobronchial lesion  PLEURA:  Unremarkable  HEART/GREAT VESSELS: 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  Coronary artery calcifications  Heart size is within normal limits  No pericardial effusion  MEDIASTINUM AND LISA:  Unremarkable  CHEST WALL AND LOWER NECK:   Status post bilateral mastectomy  Mediport has been removed  ABDOMEN LIVER/BILIARY TREE:  New 6 mm subdiaphragmatic or subcapsular hypodense lesion at the superior aspect of segment 4 that is incompletely characterized  On reconstructed images, the lesion is partially isodense to fat  Exact etiology uncertain  Query focus of fat necrosis  Recommend follow-up  No other focal liver lesion  No biliary dilatation  GALLBLADDER:  Gallbladder is surgically absent  SPLEEN:  Unremarkable  PANCREAS:  Unremarkable  ADRENAL GLANDS:  Unremarkable  KIDNEYS/URETERS: No stones  Stable tiny hypodense renal lesions too small to characterize  Left renal scarring  Stable prominence of the left extrarenal cyst   STOMACH AND BOWEL:  Diverticulosis without diverticulitis  No obstruction  APPENDIX:  No findings to suggest appendicitis  ABDOMINOPELVIC CAVITY:  No ascites  No pneumoperitoneum  No lymphadenopathy  VESSELS:  Unremarkable for patient's age  PELVIS REPRODUCTIVE ORGANS:  Unremarkable for patient's age  URINARY BLADDER:  Unremarkable  ABDOMINAL WALL/INGUINAL REGIONS: New postoperative changes status post ventral hernia repair  There is a new 5 2 x 4 6 cm subcutaneous cyst at the site of umbilical hernia repair that is favored to represent seroma  No recurrent hernia  Left inguinal cyst measuring 3 3 x 2 3 cm is mildly decreased in size and again may represent a lymphocele   OSSEOUS STRUCTURES:  No acute fracture or destructive osseous lesion  Spinal degenerative changes are noted  Impression No definite metastatic disease  New 6 mm hypodense structure subjacent to the right hemidiaphragm along the superior margin of the liver  Appears partially isodense to fat  Exact etiology uncertain       Recommend 3 month follow-up  Status post ventral hernia repair with development of subcutaneous seroma  Workstation performed: [id number]        I personally reviewed her most recent CT of the chest in PACs from 08/04/2021  This shows a 2 4 by 1 2 cm subcarinal lymph node  This was not present in June of 2020  This is significantly enlarged  This is grown from June of 2021  There is no evidence of lung masses  There is no other significant adenopathy      Mora Neighbours MD  Thoracic Surgeon    (Available by [facility] Text)

## 2021-08-04 NOTE — LETTER
August 4, 2021     Mindy Mejia MD  [address] [facility] [address]    Patient: Baljinder Napier   YOB: 1950   Date of Visit: 8/4/2021       Dear Dr Aj Garza: Thank you for referring Vona Ormond to me for evaluation  Below are my notes for this consultation  If you have questions, please do not hesitate to call me  I look forward to following your patient along with you  Sincerely,        Chandler Horner MD        CC: MD Chandler Persaud MD  8/4/2021  7:00 PM  Sign when Signing Visit  Thoracic Consult  Assessment/Plan:     82-year-old female with history of ovarian cancer status post resection and adjuvant chemotherapy with an increasing  and an enlarging 2 5 cm subcarinal lymph nodes suspicious for malignancy     I had a good conversation today with the patient and her  about this lymph node  They understand that is somewhat suspicious for a cancer  He could represent metastatic ovarian cancer or some sort of new other malignancy, even lymphoma  We also discussed benign finding such as granulomatous disease  At this time I would recommend biopsy  I would recommend a bronchoscopy and EBUS  They understand this is a low risk procedure and would like to proceed  This will be scheduled for Monday 8/9  Consent was signed in the office today  Chandler Horner MD      Diagnoses and all orders for this visit:    Mediastinal lymphadenopathy  -     Case request operating room: ENDOBRONCHIAL ULTRASOUND (EBUS); Standing  -     Case request operating room: ENDOBRONCHIAL ULTRASOUND (EBUS)    Other orders  -     Diet NPO; Sips with meds; Standing  -     Height and weight upon arrival; Standing  -     Void on call to OR; Standing  -     Insert peripheral IV;  Standing  -     Shower/scrub; Standing  -     Place sequential compression device; Standing            Thoracic History     Problem:   Enlarged paratracheal lymphadenopathy in the setting of ovarian cancer    Procedure:     Pathology:        Subjective:    Patient ID: Kalie Leong is a 78 y o  female  HPI   Nadine Lin is a 71-year-old female with history ovarian cancer status post total hysterectomy and BSO followed by chemotherapy in 2020,  Breast cancer, GERD, sleep apnea, hypertension and obesity with a BMI of 32 we are seeing in consultation from [address] [place] [address] surgery for mediastinal lymphadenopathy  She has been undergoing routine blood work and surveillance imaging for her ovarian cancer  She has had a persistently elevated  felt to be secondary to her abdominal wall hernia and associated seroma  Repeat CT imaging showed enlarged paratracheal lymphadenopathy  Currently she denies any major complaints  No respiratory issues  She denies any cough or shortness of breath  She has a distant 20 pack-year smoking history  No other respiratory complaints at that time  No fevers or chills  No unintentional weight loss      The following portions of the patient's history were reviewed and updated as appropriate: allergies, current medications, past family history, past medical history, past social history, past surgical history and problem list     Past Medical History:   Diagnosis Date    Breast cancer ([place] [address] )     2004 and then 2009    Cancer [facility]) 2005,2009    breast    Cancer ([facility] ) 2020    ovarian    Chicken pox     Colon polyp     CPAP (continuous positive airway pressure) dependence     Frequent UTI     GERD (gastroesophageal reflux disease)     Heart disease     High cholesterol     History of blood transfusion     Hypertension     PONV (postoperative nausea and vomiting)     Sleep apnea       Past Surgical History:   Procedure Laterality Date    APPENDECTOMY      BREAST CYST EXCISION      CHOLECYSTECTOMY      COLONOSCOPY      CYSTOSCOPY Bilateral 1/13/2020    Procedure: CYSTOSCOPY: CYSTOSCOPY WITH PERIOPERATIVE URETERAL CATHETERS  PLACEMENT;  Surgeon: Bita Angelo MD; [facility];  Service: Gynecology Oncology    CYSTOSCOPY      HYSTERECTOMY N/A 2020    Procedure: EXPLORATORY LAPAROTOMY, OVARIAN CANCER STAGING WITH TOTAL HYSTERECTOMY, BILATERAL SALPINGO-OOPHORECTOMY, BILATERAL PELVIC AND PERIAORTIC LYMPHADENECTOMY, INDICATED APPENDECTOMY, OMENTECTOMY, STAGING PERITONEAL BIOPSIES ;  Surgeon: Pierre Johnston MD;  [facility];  Service: Gynecology Oncology    MASTECTOMY Bilateral 2009    ME LAP, INCISIONAL HERNIA REPAIR,REDUCIBLE N/A 2021    Procedure: REPAIR HERNIA INCISIONAL LAPAROSCOPIC;  Surgeon: Alfonso Monroy MD;  [facility];  Service: General    ME LAP,DIAGNOSTIC ABDOMEN N/A 2020    Procedure: LAPAROSCOPY DIAGNOSTIC;  Surgeon: Pierer Johnston MD;  [facility];  Service: Gynecology Oncology    REPAIR KNEE LIGAMENT Bilateral     ROTATOR CUFF REPAIR        Family History   Problem Relation Age of Onset    Breast cancer Mother 50    Lung cancer Father     Prostate cancer Brother       Social History     Socioeconomic History    Marital status: /Civil Union     Spouse name: Not on file    Number of children: Not on file    Years of education: Not on file    Highest education level: Not on file   Occupational History    Not on file   Tobacco Use    Smoking status: Former Smoker     Types: Cigarettes     Quit date: 10/31/1987     Years since quittin 7    Smokeless tobacco: Never Used   Vaping Use    Vaping Use: Never used   Substance and Sexual Activity    Alcohol use: Yes     Comment: socially     Drug use: Never    Sexual activity: Not Currently     Partners: Male     Birth control/protection: Post-menopausal     Comment: same partner-few times a year   Other Topics Concern    Not on file   Social History Narrative    Not on file     Social Determinants of Health     Financial Resource Strain:     Difficulty of Paying Living Expenses:    Food Insecurity:     Worried About Running Out of Food in the Last Year:    [address] in the Last Year:    Transportation Needs:     Lack of Transportation (Medical):  Lack of Transportation (Non-Medical):    Physical Activity:     Days of Exercise per Week:     Minutes of Exercise per Session:    Stress:     Feeling of Stress :    Social Connections:     Frequency of Communication with Friends and Family:     Frequency of Social Gatherings with Friends and Family:     Attends Jain Services:     Active Member of Clubs or Organizations:     Attends Club or Organization Meetings:     Marital Status:    Intimate Partner Violence:     Fear of Current or Ex-Partner:     Emotionally Abused:     Physically Abused:     Sexually Abused:       Review of Systems   Constitutional: Negative for activity change, appetite change, chills, diaphoresis, fatigue, fever and unexpected weight change  HENT: Negative  Eyes: Negative  Respiratory: Negative for apnea, cough, chest tightness, shortness of breath, wheezing and stridor  Cardiovascular: Negative for chest pain, palpitations and leg swelling  Gastrointestinal: Negative for abdominal distention, abdominal pain, blood in stool, constipation, diarrhea, nausea and vomiting  Endocrine: Negative  Genitourinary: Negative  Musculoskeletal: Negative  Skin: Negative  Allergic/Immunologic: Negative  Neurological: Negative  Hematological: Negative  Psychiatric/Behavioral: Negative  Objective:   Physical Exam  Vitals and nursing note reviewed  Constitutional:       General: She is not in acute distress  Appearance: Normal appearance  She is well-developed  She is not diaphoretic  Comments: Appears well in no acute distress   HENT:      Head: Normocephalic and atraumatic  Mouth/Throat:      Pharynx: No oropharyngeal exudate  Comments: Wearing a mask  Eyes:      General: No scleral icterus       Conjunctiva/sclera: Conjunctivae normal  Pupils: Pupils are equal, round, and reactive to light  Neck:      Thyroid: No thyromegaly  Vascular: No JVD  Trachea: No tracheal deviation  Comments: No appreciable cervical adenopathy  Cardiovascular:      Rate and Rhythm: Normal rate and regular rhythm  Heart sounds: Normal heart sounds  No murmur heard  No friction rub  No gallop  Pulmonary:      Effort: Pulmonary effort is normal  No respiratory distress  Breath sounds: Normal breath sounds  No wheezing or rales  Comments: Normal work of breathing on room air  Chest:      Chest wall: No tenderness  Abdominal:      General: Bowel sounds are normal  There is no distension  Palpations: Abdomen is soft  There is no mass  Tenderness: There is no abdominal tenderness  There is no guarding or rebound  Comments: Abdominal incisions are healed  No evidence of recurrent hernia  Musculoskeletal:         General: No tenderness or deformity  Normal range of motion  Cervical back: Normal range of motion and neck supple  Skin:     General: Skin is warm and dry  Coloration: Skin is not pale  Findings: No erythema or rash  Neurological:      Mental Status: She is alert and oriented to person, place, and time  Psychiatric:         Behavior: Behavior normal          Thought Content: Thought content normal          Judgment: Judgment normal      /67   Pulse 89   Temp 97 7 °F (36 5 °C) (Temporal)   Resp 16   Ht 6' 1" (1 854 m)   Wt 112 kg (247 lb)   LMP  (LMP Unknown)   SpO2 96%   BMI 32 59 kg/m²     No Chest XR results available for this patient  No CT Chest results available for this patient  CT chest abdomen pelvis w contrast    Result Date: 8/4/2021  Narrative CT CHEST, ABDOMEN AND PELVIS WITH IV CONTRAST INDICATION:   C56 9: Malignant neoplasm of unspecified ovary R97 1: Elevated cancer antigen 125 ()    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 with elevated CA-125 of 107 2 COMPARISON: CT from June 16, 2021 TECHNIQUE: CT examination of the chest, abdomen and pelvis was performed  Axial, sagittal, and coronal 2D reformatted images were created from the source data and submitted for interpretation  Radiation dose length product (DLP) for this visit:  2285 mGy-cm   This examination, like all CT scans performed in the [facility], was performed utilizing techniques to minimize radiation dose exposure, including the use of iterative reconstruction and automated exposure control  IV Contrast:  50 mL of iohexol (OMNIPAQUE) 100 mL of iohexol (OMNIPAQUE) Enteric Contrast: Enteric contrast was administered  FINDINGS: CHEST LUNGS:  A left lower lung nodular density seen in image 106 series 3, measuring about 4 mm, stable  No new measurable lung nodule seen  Mild fissural thickening seen on the left side, stable A left lower lobe granuloma seen PLEURA:  No pleural effusion seen No pneumothorax seen HEART/GREAT VESSELS:  Unremarkable for patient's age  Ascending aorta measures 4 cm MEDIASTINUM AND LISA: Lower right paratracheal lymph node is seen, measuring about 4 mm There is a subcarinal lymph node measures 1 1 cm x 2 3 cm  Not visible on the previous study of Ana 10, 2020 On the previous study of June 16, 2021 this was measuring 1 cm x 1 5 cm CHEST WALL AND LOWER NECK:   No significant axillary lymph node enlargement seen Bilateral breast implants are noted ABDOMEN LIVER/BILIARY TREE:  Again noted is a focal hypodense area in the right subdiaphragmatic region and indenting the capsule of the liver, measuring 8 mm, stable A hypodensity seen left hepatic lobe, segment 4, stable due to focal fatty infiltration GALLBLADDER:  Surgically absent The CBD measures 1 1 cm SPLEEN:  Unremarkable  PANCREAS:  Unremarkable  ADRENAL GLANDS:  Unremarkable   KIDNEYS/URETERS:  Prominence of the left renal pelvis and right renal pelvis seen, unchanged STOMACH AND BOWEL:  Diverticulosis seen There is no abnormal bowel wall thickening No abnormal dilation of the small bowel loops seen APPENDIX:  No findings to suggest appendicitis  ABDOMINOPELVIC CAVITY:  Again noted is a rounded density near the deep inguinal ring in the left inguinal region measuring 2 2 x 2 6 cm  This is stable this has become smaller since the previous study of March 18, 2021 VESSELS:  Celiac trunk appear unremarkable SMA appear unremarkable MARA appear unremarkable PELVIS REPRODUCTIVE ORGANS:  The uterus is surgically absent The vaginal vault appears unchanged from  Small amount of air noted within the vagina URINARY BLADDER:  Unremarkable  ABDOMINAL WALL/INGUINAL REGIONS:  Postsurgical changes from anterior abdominal wall hernia repair with mesh  Progressive involution of the anterior abdominal wall seroma  This now measures 3 7 x 3 cm  Previously measuring 5 3 x 5 3 cm OSSEOUS STRUCTURES:  No acute fracture or destructive osseous lesion  Impression There is mildly enlarging subcarinal lymph node which measures about 1 1 cm in short axis and about 2 3 cm in transverse dimension, Not seen on the previous study of Ana 10, 2020 and mildly larger since the previous study of June 16, 2021  Further management on the clinical basis  This is suspicious in view of rising CA-125 level Previously noted small nodular area in the right subdiaphragmatic region superior to the liver with measuring about 18 mm, stable No new measurable lymphadenopathy in the abdomen, pelvis No new liver lesion The study was marked in EPIC for significant notification  Workstation performed: [id number]     CT chest abdomen pelvis w contrast    Result Date: 6/16/2021  Narrative CT CHEST, ABDOMEN AND PELVIS WITH IV CONTRAST INDICATION:   C56 9: Malignant neoplasm of unspecified ovary R97 1: Elevated cancer antigen 125 ()  COMPARISON:  None   TECHNIQUE: CT examination of the chest, abdomen and pelvis was performed  Axial, sagittal, and coronal 2D reformatted images were created from the source data and submitted for interpretation  Radiation dose length product (DLP) for this visit:  1506 02 mGy-cm   This examination, like all CT scans performed in the [facility], was performed utilizing techniques to minimize radiation dose exposure, including the use of iterative reconstruction and automated exposure control  IV Contrast:  100 mL of iohexol (OMNIPAQUE) Enteric Contrast: Enteric contrast was administered  FINDINGS: CHEST LUNGS:  Stable 2 mm subpleural nodule in the left lower lobe on image 110 of series 3  Calcified right lower lobe granuloma  No suspicious lung nodule, mass or consolidation  There is no tracheal or endobronchial lesion  PLEURA:  Unremarkable  HEART/GREAT VESSELS: 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  Coronary artery calcifications  Heart size is within normal limits  No pericardial effusion  MEDIASTINUM AND LISA:  Unremarkable  CHEST WALL AND LOWER NECK:   Status post bilateral mastectomy  Mediport has been removed  ABDOMEN LIVER/BILIARY TREE:  New 6 mm subdiaphragmatic or subcapsular hypodense lesion at the superior aspect of segment 4 that is incompletely characterized  On reconstructed images, the lesion is partially isodense to fat  Exact etiology uncertain  Query focus of fat necrosis  Recommend follow-up  No other focal liver lesion  No biliary dilatation  GALLBLADDER:  Gallbladder is surgically absent  SPLEEN:  Unremarkable  PANCREAS:  Unremarkable  ADRENAL GLANDS:  Unremarkable  KIDNEYS/URETERS: No stones  Stable tiny hypodense renal lesions too small to characterize  Left renal scarring  Stable prominence of the left extrarenal cyst   STOMACH AND BOWEL:  Diverticulosis without diverticulitis  No obstruction  APPENDIX:  No findings to suggest appendicitis  ABDOMINOPELVIC CAVITY:  No ascites  No pneumoperitoneum  No lymphadenopathy  VESSELS:  Unremarkable for patient's age  PELVIS REPRODUCTIVE ORGANS:  Unremarkable for patient's age  URINARY BLADDER:  Unremarkable  ABDOMINAL WALL/INGUINAL REGIONS: New postoperative changes status post ventral hernia repair  There is a new 5 2 x 4 6 cm subcutaneous cyst at the site of umbilical hernia repair that is favored to represent seroma  No recurrent hernia  Left inguinal cyst measuring 3 3 x 2 3 cm is mildly decreased in size and again may represent a lymphocele  OSSEOUS STRUCTURES:  No acute fracture or destructive osseous lesion  Spinal degenerative changes are noted  Impression No definite metastatic disease  New 6 mm hypodense structure subjacent to the right hemidiaphragm along the superior margin of the liver  Appears partially isodense to fat  Exact etiology uncertain       Recommend 3 month follow-up  Status post ventral hernia repair with development of subcutaneous seroma  Workstation performed: [id number]        I personally reviewed her most recent CT of the chest in PACs from 08/04/2021  This shows a 2 4 by 1 2 cm subcarinal lymph node  This was not present in June of 2020  This is significantly enlarged  This is grown from June of 2021  There is no evidence of lung masses  There is no other significant adenopathy      Carmencita Martini MD  Thoracic Surgeon    (Available by [facility] Text)

## 2021-08-04 NOTE — LETTER
August 4, 2021     Hall Rinne, MD  [address] [facility] [address]    Patient: Shelly Blake   YOB: 1950   Date of Visit: 8/4/2021       Dear Dr Gloria Lara: Thank you for referring Arnaud Garcias to me for evaluation  Below are my notes for this consultation  If you have questions, please do not hesitate to call me  I look forward to following your patient along with you  Sincerely,        Michael Ramirez MD        CC: MD Gunnar aHrt MD Ascencion Sachs, MD  8/4/2021  3:39 PM  Signed  Assessment/Plan:    Problem List Items Addressed This Visit        Endocrine    Ovarian cancer [facility]) - Primary       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  Recurrent/metastatic disease is quite uncommon/ atypical in these cases  She has had an elevated  which was 1st noted shortly after ventral hernia repair surgery with mesh  She maintains as seroma in relation to her mesh which could explain at least in part  elevation  However, her  has been trending up further and therefore CT scan of the chest, abdomen and pelvis was obtained today  This demonstrates no evidence of intraperitoneal measurable disease  Thoracic lymphadenopathy identified  See plan under mediastinal lymphadenopathy  Patient inquired about potential benefit of PET-CT  I discussed limitation of PET-CT in this disease state and under current circumstances  Will await results of lymph node biopsy and further management recommendations will follow  Cardiovascular and Mediastinum    Mediastinal lymphadenopathy       CT scan today demonstrates a distinctly enlarged greater than 2 cm lymph node    Given  elevation I discussed with her differential diagnosis which includes potential metastatic disease ( this would be quite atypical ) versus other diagnostic possibilities such as sarcoidosis, etc   I have reviewed CT scan with Dr Tunde Solorzano from thoracic surgery today  He thinks this lymph node is readily amenable to transbronchial ultrasound-guided biopsy  He will evaluate patient today and proceed with sampling in the near future  I will follow up on results and further management recommendations will follow  Relevant Orders    Ambulatory referral to Thoracic Surgery       Other    RESOLVED: Status post laparoscopic hernia repair            CHIEF COMPLAINT:     Follow-up for ovarian cancer,  elevation, new is an lymphadenopathy      Problem:  Cancer Staging  Ovarian cancer [facility])  Staging form: Ovary, Fallopian Tube, Primary Peritoneal, AJCC 8th Edition  - Pathologic stage from 1/13/2020: FIGO Stage IC3 (pN0, cM0) - Signed by Monty Pollock MD on 1/28/2020        Previous therapy:  Oncology History   Ovarian cancer ([facility] [address] )   1/13/2020 Initial Diagnosis    Ovarian cancer on left ([facility] [address] )     1/13/2020 -  Cancer Staged    Staging form: Ovary, Fallopian Tube, Primary Peritoneal, AJCC 8th Edition  - Pathologic stage from 1/13/2020: FIGO Stage IC3 (pN0, cM0) - Signed by Monty Pollock MD on 1/28/2020  Histologic grade (G): G3  Histologic grading system: 4 grade system  Residual tumor (R): R0 - None  Histopathologic type: Serous cystadenocarcinoma, NOS  Diagnostic confirmation: Positive histology PLUS positive immunophenotyping and/or positive genetic studies  Specimen type: Excision  Staged by: Managing physician  Cancer antigen 125 () (U/mL): 4,000  Gross residual tumor after primary cyto-reductive surgery: Absent  Residual tumor volume after primary cytoreductive surgery: No gross tumor  National guidelines used in treatment planning: Yes  Type of national guideline used in treatment planning: NCCN       1/13/2020 Surgery    Diagnostic laparoscopy, laparotomy, total hysterectomy, bilateral salpingo-oophorectomy with resection of pelvic mass, bilateral pelvic and periaortic lymphadenectomy, staging peritoneal biopsies, omentectomy, appendectomy  Final pathology consistent with 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  2/11/2020 - 5/26/2020 Chemotherapy    Six cycles of carboplatin/paclitaxel administered by Dr Ramon Groves  Tolerated well  CT scan at completion of treatment demonstrates no evidence of measurable disease   amarilis near completion of chemotherapy 5 7 units/milliliter           Patient ID: Gibson Wiley is a 78 y o  female  HPI    Patient with history of remote breast cancer, stage I C2 ovarian cancer comprehensively staged and treated with subsequent chemotherapy which was completed in May of 2020  She then developed ventral hernia which was repaired laparoscopically with mesh  Shortly after hernia repair she was noted to have an elevated   This has been trending up  She presents today for follow-up  CT scan of the chest, abdomen and pelvis was ordered which demonstrates hernia mesh in place with overlying seroma, no evidence of peritoneal carcinomatosis, no ascites, no retroperitoneal lymphadenopathy, no pelvic or peritoneal masses  Of interest, CT scan of the chest demonstrates a new/ enlarged mediastinal lymph node 2-3 cm  Patient reports occasional mid abdominal pain under need incision, likely related to seroma/ mesh  Recently treated for UTI  Reports normal bowel bladder function  Denies vaginal bleeding, drainage or discharge  Reports dyspnea on exertion  The following portions of the patient's history were reviewed and updated as appropriate: allergies, current medications, past family history, past medical history, past social history, past surgical history and problem list     Review of Systems    As per [facility]  Twelve point review of systems otherwise unremarkable    Current Outpatient Medications   Medication Sig Dispense Refill    b complex vitamins capsule Take 1 capsule by mouth daily      carvedilol (COREG) 6 25 mg tablet Take 6 25 mg by mouth 2 (two) times a day with meals      cholecalciferol (VITAMIN D3) 400 units tablet Take 400 Units by mouth daily      estradiol (ESTRACE) 0 1 mg/g vaginal cream insert 1 gram vaginally two times a week  0    losartan-hydrochlorothiazide (HYZAAR) 50-12 5 mg per tablet Take 1 tablet by mouth daily   0    magnesium oxide (MAG-OX) 400 mg Take 400 mg by mouth 2 (two) times a day       multivitamin (THERAGRAN) TABS Take 1 tablet by mouth daily      simvastatin (ZOCOR) 20 mg tablet Take 20 mg by mouth daily   0    trimethoprim (PROLOPRIM) 100 mg tablet Take 100 mg by mouth daily   0    venlafaxine 150 MG TB24 Take 150 mg by mouth daily with breakfast   0    zolpidem (AMBIEN CR) 12 5 MG CR tablet Take 12 5 mg by mouth daily at bedtime   0    Omega-3 Fatty Acids (fish oil) 1,000 mg Take 1,000 mg by mouth daily (Patient not taking: Reported on 6/29/2021)       No current facility-administered medications for this visit  Objective:    Blood pressure 112/72, pulse 85, temperature 98 2 °F (36 8 °C), temperature source Temporal, resp  rate 17, height 6' (1 829 m), weight 112 kg (247 lb), SpO2 93 %, not currently breastfeeding  Body mass index is 33 5 kg/m²  Body surface area is 2 33 meters squared  Physical Exam  Vitals reviewed  Exam conducted with a chaperone present  Constitutional:       General: She is not in acute distress  Appearance: Normal appearance  She is not ill-appearing, toxic-appearing or diaphoretic  Eyes:      General: No scleral icterus  Right eye: No discharge  Left eye: No discharge  Conjunctiva/sclera: Conjunctivae normal    Cardiovascular:      Rate and Rhythm: Normal rate and regular rhythm  Heart sounds: Normal heart sounds  No murmur heard  Pulmonary:      Effort: No respiratory distress  Breath sounds: Normal breath sounds  No stridor  No wheezing or rales     Abdominal: General: There is no distension  Palpations: Abdomen is soft  There is no mass  Tenderness: There is no abdominal tenderness  There is no guarding  Genitourinary:     Comments: Normal external female genitalia  Normal Bartholin's and [place]'s glands  Normal urethral meatus and no evidence of urethral discharge or masses  Bladder without fullness mass or tenderness  Vagina without lesion or discharge  No significant pelvic organ prolapse noted  Cervix and uterus are surgically absent  Bimanual exam demonstrates no evidence of pelvic masses  Anus without fissure of lesion  Musculoskeletal:      Right lower leg: No edema  Left lower leg: No edema   Trend:   7/28/2021                                               434 IU/mL  Lab Results   Component Value Date     6 3 03/18/2021     4,086 7 (H) 01/06/2020       CT scan chest, abdomen and pelvis August 4, 2021: I have personally reviewed interpreted images  There is evidence of anchors and hernia mesh underlying anterior abdominal wall  Overlying circumscript seroma with some rim enhancement consistent with inflammatory changes noted  No evidence of ascites  No retroperitoneal lymphadenopathy  No evidence of omental/mesenteric nodularity  No measurable intraperitoneal disease  Retroperitoneal lymph nodes do not appear enlarged  Lungs without nodules or lesions  Subcarinal mediastinal lymph node measuring approximately 2-3 cm noted    CT scan reviewed with Dr Karen Phipps as well    Winifred Bonner MD, [address] [place] [address]  8/4/2021  3:36 PM

## 2021-08-04 NOTE — PATIENT INSTRUCTIONS
Call us once you have received biopsy results  Will decide on follow-up plan after your lung/mediastinal procedure

## 2021-08-05 ENCOUNTER — TELEPHONE (OUTPATIENT)
Dept: HEMATOLOGY ONCOLOGY | Facility: CLINIC | Age: 71
End: 2021-08-05

## 2021-08-05 NOTE — TELEPHONE ENCOUNTER
Patient calling to advise she saw Dr Jessie Puente yesterday and that Dr Christy Russell advised her to call after  Patient is scheduled for a biopsy on Monday 08/09/2021 with Dr Jessie Puente and scheduled a follow up to see Dr Christy Russell on 08/30/2021   Patient can be reached at [phone]

## 2021-08-06 NOTE — PRE-PROCEDURE INSTRUCTIONS
Pre-Surgery Instructions:   Medication Instructions    b complex vitamins capsule Instructed to avoid all ASA/NSAIDs and OTC Vit/Supp from now until after surgery  Tylenol ok prn    carvedilol (COREG) 6 25 mg tablet Instructed to take per normal schedule including DOS with sips water    cholecalciferol (VITAMIN D3) 400 units tablet Instructed to avoid all ASA/NSAIDs and OTC Vit/Supp from now until after surgery  Tylenol ok prn    estradiol (ESTRACE) 0 1 mg/g vaginal cream Instructed to take per normal schedule except DOS    losartan-hydrochlorothiazide (HYZAAR) 50-12 5 mg per tablet Instructed to take per normal schedule except DOS    magnesium oxide (MAG-OX) 400 mg Instructed to take per normal schedule except DOS    multivitamin (THERAGRAN) TABS Instructed to avoid all ASA/NSAIDs and OTC Vit/Supp from now until after surgery  Tylenol ok prn    Omega-3 Fatty Acids (fish oil) 1,000 mg Instructed to avoid all ASA/NSAIDs and OTC Vit/Supp from now until after surgery  Tylenol ok prn    simvastatin (ZOCOR) 20 mg tablet Instructed to take per normal schedule    trimethoprim (PROLOPRIM) 100 mg tablet Instructed to take per normal schedule    venlafaxine 150 MG TB24 Instructed to take per normal schedule    zolpidem (AMBIEN CR) 12 5 MG CR tablet Instructed to take per normal schedule    Pre-op medication, and showering instructions with antibacteral soap reviewed  Instructed to avoid all ASA/NSAIDs and OTC Vit/Supp from now until after surgery  Tylenol ok prn  Pt  Verbalized an understanding of all instructions reviewed and offers no concerns at this time

## 2021-08-08 ENCOUNTER — ANESTHESIA EVENT (OUTPATIENT)
Dept: PERIOP | Facility: HOSPITAL | Age: 71
End: 2021-08-08
Payer: MEDICARE

## 2021-08-09 ENCOUNTER — HOSPITAL ENCOUNTER (OUTPATIENT)
Facility: HOSPITAL | Age: 71
Setting detail: OUTPATIENT SURGERY
Discharge: HOME | End: 2021-08-09
Attending: THORACIC SURGERY (CARDIOTHORACIC VASCULAR SURGERY) | Admitting: THORACIC SURGERY (CARDIOTHORACIC VASCULAR SURGERY)
Payer: MEDICARE

## 2021-08-09 ENCOUNTER — ANESTHESIA (OUTPATIENT)
Dept: PERIOP | Facility: HOSPITAL | Age: 71
End: 2021-08-09
Payer: MEDICARE

## 2021-08-09 VITALS
HEART RATE: 74 BPM | WEIGHT: 247 LBS | TEMPERATURE: 97.4 F | SYSTOLIC BLOOD PRESSURE: 149 MMHG | OXYGEN SATURATION: 97 % | DIASTOLIC BLOOD PRESSURE: 73 MMHG | RESPIRATION RATE: 18 BRPM | BODY MASS INDEX: 33.46 KG/M2 | HEIGHT: 72 IN

## 2021-08-09 DIAGNOSIS — R59.0 MEDIASTINAL LYMPHADENOPATHY: ICD-10-CM

## 2021-08-09 PROBLEM — R11.2 PONV (POSTOPERATIVE NAUSEA AND VOMITING): Status: ACTIVE | Noted: 2021-08-09

## 2021-08-09 PROBLEM — Z98.890 PONV (POSTOPERATIVE NAUSEA AND VOMITING): Status: ACTIVE | Noted: 2021-08-09

## 2021-08-09 PROCEDURE — 88341 IMHCHEM/IMCYTCHM EA ADD ANTB: CPT | Performed by: PATHOLOGY

## 2021-08-09 PROCEDURE — 88305 TISSUE EXAM BY PATHOLOGIST: CPT | Performed by: PATHOLOGY

## 2021-08-09 PROCEDURE — 31652 BRONCH EBUS SAMPLNG 1/2 NODE: CPT | Performed by: THORACIC SURGERY (CARDIOTHORACIC VASCULAR SURGERY)

## 2021-08-09 PROCEDURE — 88172 CYTP DX EVAL FNA 1ST EA SITE: CPT | Performed by: PATHOLOGY

## 2021-08-09 PROCEDURE — 88184 FLOWCYTOMETRY/ TC 1 MARKER: CPT | Performed by: THORACIC SURGERY (CARDIOTHORACIC VASCULAR SURGERY)

## 2021-08-09 PROCEDURE — 88342 IMHCHEM/IMCYTCHM 1ST ANTB: CPT | Performed by: PATHOLOGY

## 2021-08-09 PROCEDURE — 88185 FLOWCYTOMETRY/TC ADD-ON: CPT

## 2021-08-09 PROCEDURE — 88173 CYTOPATH EVAL FNA REPORT: CPT | Performed by: PATHOLOGY

## 2021-08-09 RX ORDER — MEPERIDINE HYDROCHLORIDE 25 MG/ML
12.5 INJECTION INTRAMUSCULAR; INTRAVENOUS; SUBCUTANEOUS
Status: DISCONTINUED | OUTPATIENT
Start: 2021-08-09 | End: 2021-08-09 | Stop reason: HOSPADM

## 2021-08-09 RX ORDER — PROMETHAZINE HYDROCHLORIDE 25 MG/ML
12.5 INJECTION, SOLUTION INTRAMUSCULAR; INTRAVENOUS ONCE AS NEEDED
Status: DISCONTINUED | OUTPATIENT
Start: 2021-08-09 | End: 2021-08-09 | Stop reason: HOSPADM

## 2021-08-09 RX ORDER — PROMETHAZINE HYDROCHLORIDE 25 MG/ML
12.5 INJECTION, SOLUTION INTRAMUSCULAR; INTRAVENOUS ONCE AS NEEDED
Status: DISCONTINUED | OUTPATIENT
Start: 2021-08-09 | End: 2021-08-09

## 2021-08-09 RX ORDER — SODIUM CHLORIDE 9 MG/ML
INJECTION, SOLUTION INTRAVENOUS CONTINUOUS PRN
Status: DISCONTINUED | OUTPATIENT
Start: 2021-08-09 | End: 2021-08-09

## 2021-08-09 RX ORDER — EPHEDRINE SULFATE 50 MG/ML
INJECTION INTRAVENOUS AS NEEDED
Status: DISCONTINUED | OUTPATIENT
Start: 2021-08-09 | End: 2021-08-09

## 2021-08-09 RX ORDER — LIDOCAINE HYDROCHLORIDE 10 MG/ML
INJECTION, SOLUTION EPIDURAL; INFILTRATION; INTRACAUDAL; PERINEURAL AS NEEDED
Status: DISCONTINUED | OUTPATIENT
Start: 2021-08-09 | End: 2021-08-09

## 2021-08-09 RX ORDER — SUCCINYLCHOLINE/SOD CL,ISO/PF 100 MG/5ML
SYRINGE (ML) INTRAVENOUS AS NEEDED
Status: DISCONTINUED | OUTPATIENT
Start: 2021-08-09 | End: 2021-08-09

## 2021-08-09 RX ORDER — ONDANSETRON 2 MG/ML
INJECTION INTRAMUSCULAR; INTRAVENOUS AS NEEDED
Status: DISCONTINUED | OUTPATIENT
Start: 2021-08-09 | End: 2021-08-09

## 2021-08-09 RX ORDER — GLYCOPYRROLATE 0.2 MG/ML
INJECTION INTRAMUSCULAR; INTRAVENOUS AS NEEDED
Status: DISCONTINUED | OUTPATIENT
Start: 2021-08-09 | End: 2021-08-09

## 2021-08-09 RX ORDER — SODIUM CHLORIDE, SODIUM LACTATE, POTASSIUM CHLORIDE, CALCIUM CHLORIDE 600; 310; 30; 20 MG/100ML; MG/100ML; MG/100ML; MG/100ML
INJECTION, SOLUTION INTRAVENOUS CONTINUOUS PRN
Status: DISCONTINUED | OUTPATIENT
Start: 2021-08-09 | End: 2021-08-09

## 2021-08-09 RX ORDER — DEXAMETHASONE SODIUM PHOSPHATE 10 MG/ML
INJECTION, SOLUTION INTRAMUSCULAR; INTRAVENOUS AS NEEDED
Status: DISCONTINUED | OUTPATIENT
Start: 2021-08-09 | End: 2021-08-09

## 2021-08-09 RX ORDER — HYDROMORPHONE HCL/PF 1 MG/ML
0.5 SYRINGE (ML) INJECTION
Status: DISCONTINUED | OUTPATIENT
Start: 2021-08-09 | End: 2021-08-09 | Stop reason: HOSPADM

## 2021-08-09 RX ORDER — MIDAZOLAM HYDROCHLORIDE 2 MG/2ML
INJECTION, SOLUTION INTRAMUSCULAR; INTRAVENOUS AS NEEDED
Status: DISCONTINUED | OUTPATIENT
Start: 2021-08-09 | End: 2021-08-09

## 2021-08-09 RX ORDER — FENTANYL CITRATE 50 UG/ML
INJECTION, SOLUTION INTRAMUSCULAR; INTRAVENOUS AS NEEDED
Status: DISCONTINUED | OUTPATIENT
Start: 2021-08-09 | End: 2021-08-09

## 2021-08-09 RX ORDER — PROPOFOL 10 MG/ML
INJECTION, EMULSION INTRAVENOUS AS NEEDED
Status: DISCONTINUED | OUTPATIENT
Start: 2021-08-09 | End: 2021-08-09

## 2021-08-09 RX ORDER — FENTANYL CITRATE/PF 50 MCG/ML
25 SYRINGE (ML) INJECTION
Status: DISCONTINUED | OUTPATIENT
Start: 2021-08-09 | End: 2021-08-09 | Stop reason: HOSPADM

## 2021-08-09 RX ORDER — ALBUTEROL SULFATE 2.5 MG/3ML
2.5 SOLUTION RESPIRATORY (INHALATION) ONCE AS NEEDED
Status: DISCONTINUED | OUTPATIENT
Start: 2021-08-09 | End: 2021-08-09 | Stop reason: HOSPADM

## 2021-08-09 RX ORDER — ONDANSETRON 2 MG/ML
4 INJECTION INTRAMUSCULAR; INTRAVENOUS ONCE AS NEEDED
Status: DISCONTINUED | OUTPATIENT
Start: 2021-08-09 | End: 2021-08-09 | Stop reason: HOSPADM

## 2021-08-09 RX ORDER — LIDOCAINE HYDROCHLORIDE 20 MG/ML
INJECTION, SOLUTION INFILTRATION; PERINEURAL AS NEEDED
Status: DISCONTINUED | OUTPATIENT
Start: 2021-08-09 | End: 2021-08-09 | Stop reason: HOSPADM

## 2021-08-09 RX ORDER — PROPOFOL 10 MG/ML
INJECTION, EMULSION INTRAVENOUS CONTINUOUS PRN
Status: DISCONTINUED | OUTPATIENT
Start: 2021-08-09 | End: 2021-08-09

## 2021-08-09 RX ADMIN — LIDOCAINE HYDROCHLORIDE 100 MG: 10 INJECTION, SOLUTION EPIDURAL; INFILTRATION; INTRACAUDAL; PERINEURAL at 09:29

## 2021-08-09 RX ADMIN — ONDANSETRON 4 MG: 2 INJECTION INTRAMUSCULAR; INTRAVENOUS at 09:41

## 2021-08-09 RX ADMIN — PROPOFOL 100 MCG/KG/MIN: 10 INJECTION, EMULSION INTRAVENOUS at 09:36

## 2021-08-09 RX ADMIN — DEXAMETHASONE SODIUM PHOSPHATE 10 MG: 10 INJECTION, SOLUTION INTRAMUSCULAR; INTRAVENOUS at 09:41

## 2021-08-09 RX ADMIN — PROPOFOL 150 MG: 10 INJECTION, EMULSION INTRAVENOUS at 09:32

## 2021-08-09 RX ADMIN — EPHEDRINE SULFATE 10 MG: 50 INJECTION, SOLUTION INTRAVENOUS at 09:36

## 2021-08-09 RX ADMIN — REMIFENTANIL HYDROCHLORIDE 0.1 MCG/KG/MIN: 1 INJECTION, POWDER, LYOPHILIZED, FOR SOLUTION INTRAVENOUS at 09:37

## 2021-08-09 RX ADMIN — EPHEDRINE SULFATE 10 MG: 50 INJECTION, SOLUTION INTRAVENOUS at 09:56

## 2021-08-09 RX ADMIN — EPHEDRINE SULFATE 10 MG: 50 INJECTION, SOLUTION INTRAVENOUS at 09:52

## 2021-08-09 RX ADMIN — FENTANYL CITRATE 50 MCG: 50 INJECTION INTRAMUSCULAR; INTRAVENOUS at 09:37

## 2021-08-09 RX ADMIN — SODIUM CHLORIDE, SODIUM LACTATE, POTASSIUM CHLORIDE, AND CALCIUM CHLORIDE: .6; .31; .03; .02 INJECTION, SOLUTION INTRAVENOUS at 09:25

## 2021-08-09 RX ADMIN — PHENYLEPHRINE HYDROCHLORIDE 30 MCG/MIN: 10 INJECTION INTRAVENOUS at 09:45

## 2021-08-09 RX ADMIN — Medication 20 MG: at 09:45

## 2021-08-09 RX ADMIN — SODIUM CHLORIDE: 9 INJECTION, SOLUTION INTRAVENOUS at 09:33

## 2021-08-09 RX ADMIN — MIDAZOLAM 2 MG: 1 INJECTION INTRAMUSCULAR; INTRAVENOUS at 09:24

## 2021-08-09 RX ADMIN — EPHEDRINE SULFATE 10 MG: 50 INJECTION, SOLUTION INTRAVENOUS at 09:47

## 2021-08-09 RX ADMIN — GLYCOPYRROLATE 0.1 MG: 0.2 INJECTION, SOLUTION INTRAMUSCULAR; INTRAVENOUS at 09:50

## 2021-08-09 RX ADMIN — FENTANYL CITRATE 50 MCG: 50 INJECTION INTRAMUSCULAR; INTRAVENOUS at 09:29

## 2021-08-09 NOTE — OP NOTE
OPERATIVE REPORT  PATIENT NAME: Juliette Zamudio    :  1950  MRN: [id number]  Pt Location:  OR ROOM 07    SURGERY DATE: 2021    Surgeon(s) and Role:     * Arpita Caballero MD - Primary     * Amanuel Smith MD - Assisting    Preop Diagnosis:  Mediastinal lymphadenopathy [R59 0]    Post-Op Diagnosis Codes:     * Mediastinal lymphadenopathy [R59 0]    Procedure(s) (LRB):  ENDOBRONCHIAL ULTRASOUND (EBUS) (N/A)  BRONCHOSCOPY FLEXIBLE (N/A)    Specimen(s):  ID Type Source Tests Collected by Time Destination   1 : level 7 FNA Lymph Node FINE NEEDLE ASPIRATION Arpita Caballero MD 2021 [id number]    A : level 7 Tissue Lymph Node LEUKEMIA/LYMPHOMA FLOW CYTOMETRY Arpita Caballero MD 2021 1000        Estimated Blood Loss:   Minimal    Drains:  * No LDAs found *    Anesthesia Type:   General    Operative Indications:  Mediastinal lymphadenopathy [R610]  73-year-old female with history of ovarian cancer status post resection and adjuvant chemotherapy with an increasing  and an enlarging 2 5 cm subcarinal lymph nodes suspicious for malignancy    Operative Findings:  Definite evidence of carcinoma in the subcarinal lymph node  Will have to await final staining to determine subtype  Complications:   None    Procedure and Technique:  After obtaining informed consent from the patient, they were transported to the operating room and placed supine on the OR table  Monitored anesthesia conditions were administered and an LMA airway was introduced into the oropharynx  A formal time-out was performed at this time including patient, date of birth, intended procedure, antibiotic usage as appropriate, beta-blocker usage as appropriate and plans for specimen handling  An adult bronchoscope was inserted into the LMA and the vocal cords were anesthetized with 10 mL of 2% lidocaine  The bronchoscope was then inserted into the main trachea and the sergio was again anesthetized with 10 mL of 2% lidocaine   A thorough bronchoscopy was then performed examining the trachea, mainstem bronchi, sigmoid segmental and subsegmental bronchi  There were no endo luminal masses or any other abnormalities identified  All mucus was suctioned out to improve visualization  There is no suspicion or identified risk for TB or other air board infectious disease  This bronchoscopy was being performed for diagnostic purposes only  The Olympus EBUS scope was then inserted and used to identify mediastinal lymph node stations  Mediastinal stations 7, 10R, 4R, 2R, 4 L, and 2 L were visualized using ultrasound guidance  Color Doppler was used as needed to identify and avoid surrounding vasculature  A 19gauge EBUS needle was used to obtain node biopsies under direct visualization  Biopsies were obtained from station 7  There is no other significant lymphadenopathy greater than a cm        These were immediately analyzed by cytopathology showing definite evidence of malignancy  This was suspicious for metastatic ovarian cancer  I sent multiple additional passes for cell block as well as multiple passes for lymphoma protocol  Again all other stations were examined and nothing was greater than a cm so they were not biopsied  We switch back to a bronchoscope fully suctioned out the airway  There was no significant bleeding seen from the airway  This portion of the procedure was completed at this time  She woke in the LMA was removed without issue    She was transferred to the PACU in stable condition     I was present for the entire procedure    Patient Disposition:  PACU     SIGNATURE: Shane Lagos MD  DATE: August 9, 2021  TIME: 10:37 AM

## 2021-08-09 NOTE — ANESTHESIA POSTPROCEDURE EVALUATION
Post-Op Assessment Note    CV Status:  Stable  Pain Score: 0    Pain management: adequate     Mental Status:  Alert and awake   Hydration Status:  Euvolemic   PONV Controlled:  Controlled   Airway Patency:  Patent      Post Op Vitals Reviewed: Yes      Staff: CRNA         No complications documented      BP   105/58   Temp   97 0   Pulse  65   Resp   12   SpO2   100 4 L fm

## 2021-08-09 NOTE — ANESTHESIA PREPROCEDURE EVALUATION
Procedure:  ENDOBRONCHIAL ULTRASOUND (EBUS) (N/A Bronchus)  BRONCHOSCOPY FLEXIBLE (N/A Bronchus)    Relevant Problems   ANESTHESIA   (+) PONV (postoperative nausea and vomiting)      CARDIO   (+) Frequent unifocal PVCs   (+) HTN (hypertension), benign      ENDO (within normal limits)      GI/HEPATIC   (+) GERD (gastroesophageal reflux disease)      /RENAL (within normal limits)      GYN   (+) Ovarian cancer (HCC)      HEMATOLOGY (within normal limits)      MUSCULOSKELETAL (within normal limits)      NEURO/PSYCH   (+) History of breast cancer      PULMONARY   (+) Obstructive sleep apnea      Other   (+) Mediastinal lymphadenopathy        Physical Exam    Airway    Mallampati score: I  TM Distance: >3 FB  Neck ROM: full     Dental   No notable dental hx     Cardiovascular  Cardiovascular exam normal    Pulmonary  Pulmonary exam normal     Other Findings        Anesthesia Plan  ASA Score- 2     Anesthesia Type- general with ASA Monitors  Additional Monitors:   Airway Plan: ETT  Plan Factors-Exercise tolerance (METS): >4 METS  Chart reviewed  EKG reviewed  Imaging results reviewed  Existing labs reviewed  Patient summary reviewed  Patient is not a current smoker  Patient did not smoke on day of surgery  Obstructive sleep apnea risk education given perioperatively  Induction- intravenous  Postoperative Plan- Plan for postoperative opioid use  Planned trial extubation    Informed Consent- Anesthetic plan and risks discussed with patient  I personally reviewed this patient with the CRNA  Discussed and agreed on the Anesthesia Plan with the CRNA  Rosella Goldmann

## 2021-08-11 LAB — SCAN RESULT: NORMAL

## 2021-08-16 ENCOUNTER — TELEPHONE (OUTPATIENT)
Dept: GYNECOLOGIC ONCOLOGY | Facility: CLINIC | Age: 71
End: 2021-08-16

## 2021-08-17 ENCOUNTER — OFFICE VISIT (OUTPATIENT)
Dept: GYNECOLOGIC ONCOLOGY | Facility: CLINIC | Age: 71
End: 2021-08-17
Payer: MEDICARE

## 2021-08-17 VITALS
HEIGHT: 72 IN | HEART RATE: 80 BPM | SYSTOLIC BLOOD PRESSURE: 130 MMHG | RESPIRATION RATE: 16 BRPM | BODY MASS INDEX: 33.18 KG/M2 | TEMPERATURE: 97.5 F | WEIGHT: 245 LBS | DIASTOLIC BLOOD PRESSURE: 90 MMHG

## 2021-08-17 DIAGNOSIS — C56.9 MALIGNANT NEOPLASM OF OVARY, UNSPECIFIED LATERALITY (HCC): Primary | ICD-10-CM

## 2021-08-17 PROCEDURE — 99215 OFFICE O/P EST HI 40 MIN: CPT | Performed by: OBSTETRICS & GYNECOLOGY

## 2021-08-17 PROCEDURE — 1124F ACP DISCUSS-NO DSCNMKR DOCD: CPT | Performed by: OBSTETRICS & GYNECOLOGY

## 2021-08-17 NOTE — PATIENT INSTRUCTIONS
Contact Dr Kaushik Early for initiation of therapy  Call us if he/you want to coordinate Port-A-Cath placement and subsequent imaging and follow up

## 2021-08-17 NOTE — PROGRESS NOTES
Assessment/Plan:    Problem List Items Addressed This Visit        Endocrine    Ovarian cancer 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 which was treated aggressively with 6 cycles of adjuvant therapy completed in May of 2020  Today, I discussed implications of this diagnosis as well as incurable nature of recurrent ovarian cancer  However, she understands the prognosis for treatment at this time under grounds of her performance status and prolonged platinum free interval is good  Patient has no evidence of germ line pathogenic mutations  At this time we have sent tumor for molecular testing/somatic genetic evaluation and findings could guide subsequent therapies and even the possibility of maintenance therapy after upcoming treatment for recurrent disease  I explained potential management options to patient and her  today  Options include best supportive care/ observation until symptoms arise  I discussed why this is not truly and advisable approach  Alternatively, we discussed potential consultation at outside centers for consideration of clinical trials versus treatment with approved available regimens  They want to proceed with treatment  I would recommend proceeding with carboplatin AUC 5 and Doxil 30 milligram/m2 both to be administer on day 1 of a 28 day cycle in consonance with CALYPSO trial regimen  We discussed potential pros and cons of adding bevacizumab to above mention regimen  She will discuss this with her treating medical oncologist Virginia Larson       We  discussed how we typically  treat with 4 initial cycles follow-up by reimaging in consideration of 2-4 additional cycles thereafter  If complete response is achieved, we would give consideration to either maintenance with bevacizumab if used or initiation of PARP inhibitor therapy (pending potential somatic mutations and or homozygous recommendation deficiency in primary tumor)  Patient will contact her primary medical oncologist to discuss treatment  I would be happy to facilitate Port-A-Cath placement, further assessments by our service and or evaluation of response with Magan Tamez's CT scan imaging pending recommendations / timing from Dr Huber Mahoney  CHIEF COMPLAINT:   Discussion of recent biopsy results and treatment recommendations for recurrent platinum sensitive ovarian cancer      Problem:  Cancer Staging  Ovarian cancer [facility])  Staging form: Ovary, Fallopian Tube, Primary Peritoneal, AJCC 8th Edition  - Pathologic stage from 1/13/2020: FIGO Stage IC3 (pN0, cM0) - Signed by Michael Ramirez MD on 1/28/2020        Previous therapy:  Oncology History   Ovarian cancer ([facility] [address] )   1/13/2020 Initial Diagnosis    Ovarian cancer on left ([facility] [address] )     1/13/2020 -  Cancer Staged    Staging form: Ovary, Fallopian Tube, Primary Peritoneal, AJCC 8th Edition  - Pathologic stage from 1/13/2020: FIGO Stage IC3 (pN0, cM0) - Signed by Michael Ramirez MD on 1/28/2020  Histologic grade (G): G3  Histologic grading system: 4 grade system  Residual tumor (R): R0 - None  Histopathologic type: Serous cystadenocarcinoma, NOS  Diagnostic confirmation: Positive histology PLUS positive immunophenotyping and/or positive genetic studies  Specimen type: Excision  Staged by: Managing physician  Cancer antigen 125 () (U/mL): 4,000  Gross residual tumor after primary cyto-reductive surgery: Absent  Residual tumor volume after primary cytoreductive surgery: No gross tumor  National guidelines used in treatment planning: Yes  Type of national guideline used in treatment planning: NCCN       1/13/2020 Surgery    Diagnostic laparoscopy, laparotomy, total hysterectomy, bilateral salpingo-oophorectomy with resection of pelvic mass, bilateral pelvic and periaortic lymphadenectomy, staging peritoneal biopsies, omentectomy, appendectomy  Final pathology consistent with 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  2/11/2020 - 5/26/2020 Chemotherapy    Six cycles of carboplatin/paclitaxel administered by Dr Reina Love  Tolerated well  CT scan at completion of treatment demonstrates no evidence of measurable disease   amarilis near completion of chemotherapy 5 7 units/milliliter     8/9/2021 Recurrence     Elevated  triggered imaging  which demonstrated suspicious mediastinal lymphadenopathy  Endobronchial ultrasound-guided fine-needle aspiration on August 9, 2021 demonstrates metastatic carcinoma with immuno phenotype consistent with metastatic ovarian primary  Patient ID: Rojas Eubanks is a 78 y o  female  HPI    Patient with comprehensively staged IC3 high-grade serous ovarian cancer diagnosed in 2020  She underwent comprehensive surgical staging followed by 6 cycles of chemotherapy with carboplatin and Taxol completed in May of 2020  Most recently, she had ventral hernia repair with mesh after which she developed an abdominal wall seroma  At the same time, her  was noted to be elevated which was thought to be in relation to this hernia repair  Subsequent evaluation demonstrated up trend in  and cross-sectional CT scan suggested the possibility of metastatic disease with evidence of retroperitoneal lymphadenopathy, new small liver lesion and approximately 2 3 cm subcarinal mediastinal lymph node  Patient was referred to thoracic surgery and underwent endobronchial ultrasound-guided FNA of mediastinal lymph node which confirmed metastatic /recurrent disease  She presents today to discuss results and treatment recommendations    Reports ongoing fatigue and dyspnea on exertion which has been occurring for the last 2-3 months  No other symptoms  The following portions of the patient's history were reviewed and updated as appropriate: allergies, current medications, past family history, past medical history, past social history, past surgical history and problem list     Review of Systems    As per HPI  Twelve point review of systems otherwise unremarkable  Current Outpatient Medications   Medication Sig Dispense Refill    b complex vitamins capsule Take 1 capsule by mouth daily      carvedilol (COREG) 6 25 mg tablet Take 6 25 mg by mouth 2 (two) times a day with meals      cholecalciferol (VITAMIN D3) 400 units tablet Take 400 Units by mouth daily      estradiol (ESTRACE) 0 1 mg/g vaginal cream insert 1 gram vaginally two times a week  0    losartan-hydrochlorothiazide (HYZAAR) 50-12 5 mg per tablet Take 1 tablet by mouth daily   0    magnesium oxide (MAG-OX) 400 mg Take 400 mg by mouth 2 (two) times a day       multivitamin (THERAGRAN) TABS Take 1 tablet by mouth daily      Omega-3 Fatty Acids (fish oil) 1,000 mg Take 1,000 mg by mouth daily       simvastatin (ZOCOR) 20 mg tablet Take 20 mg by mouth daily   0    trimethoprim (PROLOPRIM) 100 mg tablet Take 100 mg by mouth daily   0    venlafaxine 150 MG TB24 Take 150 mg by mouth daily with breakfast   0    zolpidem (AMBIEN CR) 12 5 MG CR tablet Take 12 5 mg by mouth daily at bedtime   0     No current facility-administered medications for this visit  Objective:    Blood pressure 130/90, pulse 80, temperature 97 5 °F (36 4 °C), temperature source Temporal, resp  rate 16, height 6' (1 829 m), weight 111 kg (245 lb), not currently breastfeeding  Body mass index is 33 23 kg/m²  Body surface area is 2 32 meters squared      Physical Exam    Lab Results   Component Value Date     6 3 03/18/2021     Lab Results   Component Value Date    K 3 4 (L) 01/19/2020     01/19/2020    CO2 25 01/19/2020    BUN 18 03/18/2021    CREATININE 0 94 03/18/2021    CALCIUM 8 4 01/19/2020    AST 25 01/19/2020    ALT 27 01/19/2020    ALKPHOS 60 01/19/2020    EGFR 63 03/18/2021     Lab Results   Component Value Date    WBC 6 94 01/19/2020    HGB 10 5 (L) 01/19/2020    HCT 33 4 (L) 01/19/2020    MCV 95 01/19/2020     01/19/2020     Lab Results   Component Value Date    NEUTROABS 5 00 01/19/2020        Trend:  Lab Results   Component Value Date     6 3 03/18/2021     4,086 7 (H) 01/06/2020       Component    Case Report   Non-gynecologic Cytology                          Case: [id number]                                   Authorizing Provider: Edu Cisneros MD      Collected:           08/09/2021 0949               Ordering Location:     [address]      Received:            08/09/2021 [address] [place] [address] Operating Room                                                       Pathologist:           Bhavesh Romero MD                                                    Specimens:   A) - Lymph Node, 7                                                                                   B) - Lymph Node, 7                                                                                   C) - Lymph Node, 7, CELL BLOCK                                                             Final Diagnosis   A-B-C  Lymph Node, 7 (ThinPrep, smear and cell block preparations)  Conclusive Evidence of Malignancy  Metastatic adenocarcinoma  See comment      Satisfactory for evaluation      Comment: Immunohistochemistry is positive in the tumor cells for AE1/3, CK7, PAX8, p53, and WT-1; and negative for CK20, TTF-1, and p40  The findings are consistent with metastasis from the patient's previously diagnosed high-grade serous carcinoma of the ovary ([id number])     Electronically signed by Bhavesh Romero MD on 8/12/2021 at 10:26 AM   Intraoperative Consultation       Lymph Node, Level 7 FNA On-Site Evaluation:    Positive for non-small cell carcinoma      Dr Sharmila Laws  Interpretation performed at [facility], [address] [place] [id number]      Gross Description       A  20 mL of red fluid with tissue fragments present, received in CytoLyt         B  4 slides received (2 Alcohol-Fixed, 2 Diff-Quik)         C  Moderate cell button, grey        Additional Information    [employer]s FDA approved ,  and ThinPrep Imaging Duo System are utilized with strict adherence to the 's instruction manual to prepare gynecologic and non-gynecologic cytology specimens for the production of ThinPrep slides as well as for gynecologic ThinPrep imaging  These processes have been validated by our laboratory and/or by the       These tests were developed and their performance characteristics determined by [facility] [address] [place] [address] or [address] [place] [address]  They may not be cleared or approved by the [place]  Food and Drug Administration  The FDA has determined that such clearance or approval is not necessary  These tests are used for clinical purposes  They should not be regarded as investigational or for research  This laboratory has been approved by [place] [id number], designated as a high-complexity laboratory and is qualified to perform these tests  Interpretation performed at [facility], [address] [place] [id number]       8/4/2021 Study Result    Narrative & Impression   CT CHEST, ABDOMEN AND PELVIS WITH IV CONTRAST     INDICATION:   C56 9: Malignant neoplasm of unspecified ovary  R97 1: Elevated cancer antigen 125 ()  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 with elevated CA-125 of 107 2     COMPARISON: CT from June 16, 2021     TECHNIQUE: CT examination of the chest, abdomen and pelvis was performed   Axial, sagittal, and coronal 2D reformatted images were created from the source data and submitted for interpretation      Radiation dose length product (DLP) for this visit:  2285 mGy-cm   This examination, like all CT scans performed in the [facility], was performed utilizing techniques to minimize radiation dose exposure, including the use of iterative   reconstruction and automated exposure control      IV Contrast:  50 mL of iohexol (OMNIPAQUE) 100 mL of iohexol (OMNIPAQUE)  Enteric Contrast: Enteric contrast was administered      FINDINGS:     CHEST     LUNGS:  A left lower lung nodular density seen in image 106 series 3, measuring about 4 mm, stable  No new measurable lung nodule seen  Mild fissural thickening seen on the left side, stable  A left lower lobe granuloma seen  PLEURA:  No pleural effusion seen  No pneumothorax seen     HEART/GREAT VESSELS:  Unremarkable for patient's age  Ascending aorta measures 4 cm  MEDIASTINUM AND LISA: Lower right paratracheal lymph node is seen, measuring about 4 mm  There is a subcarinal lymph node measures 1 1 cm x 2 3 cm    Not visible on the previous study of Ana 10, 2020   On the previous study of June 16, 2021 this was measuring 1 cm x 1 5 cm  CHEST WALL AND LOWER NECK:   No significant axillary lymph node enlargement seen  Bilateral breast implants are noted  ABDOMEN     LIVER/BILIARY TREE:  Again noted is a focal hypodense area in the right subdiaphragmatic region and indenting the capsule of the liver, measuring 8 mm, stable  A hypodensity seen left hepatic lobe, segment 4, stable due to focal fatty infiltration  GALLBLADDER:  Surgically absent  The CBD measures 1 1 cm  SPLEEN:  Unremarkable      PANCREAS:  Unremarkable      ADRENAL GLANDS:  Unremarkable      KIDNEYS/URETERS:  Prominence of the left renal pelvis and right renal pelvis seen, unchanged     STOMACH AND BOWEL:  Diverticulosis seen  There is no abnormal bowel wall thickening  No abnormal dilation of the small bowel loops seen     APPENDIX:  No findings to suggest appendicitis      ABDOMINOPELVIC CAVITY:  Again noted is a rounded density near the deep inguinal ring in the left inguinal region measuring 2 2 x 2 6 cm  This is stable this has become smaller since the previous study of March 18, 2021     VESSELS:  Celiac trunk appear unremarkable  SMA appear unremarkable  MARA appear unremarkable     PELVIS     REPRODUCTIVE ORGANS:  The uterus is surgically absent  The vaginal vault appears unchanged from  Small amount of air noted within the vagina     URINARY BLADDER:  Unremarkable      ABDOMINAL WALL/INGUINAL REGIONS:  Postsurgical changes from anterior abdominal wall hernia repair with mesh  Progressive involution of the anterior abdominal wall seroma  This now measures 3 7 x 3 cm  Previously measuring 5 3 x 5 3 cm     OSSEOUS STRUCTURES:  No acute fracture or destructive osseous lesion      IMPRESSION:  There is mildly enlarging subcarinal lymph node which measures about 1 1 cm in short axis and about 2 3 cm in transverse dimension, Not seen on the previous study of Ana 10, 2020 and mildly larger since the previous study of June 16, 2021  Further   management on the clinical basis    This is suspicious in view of rising CA-125 level     Previously noted small nodular area in the right subdiaphragmatic region superior to the liver with measuring about 18 mm, stable     No new measurable lymphadenopathy in the abdomen, pelvis     No new liver lesion     The study was marked in EPIC for significant notification      Workstation performed: Maddi William MD, [id number] [facility] 132  8/17/2021  4:24 PM

## 2021-08-17 NOTE — LETTER
August 17, 2021     [place], [address] [address]    Patient: Rod Wick   YOB: 1950   Date of Visit: 8/17/2021       Dear Dr Cloyce Castleman: Thank you for referring Devante Cole to me for evaluation  Below are my notes for this consultation  If you have questions, please do not hesitate to call me  I look forward to following your patient along with you  Sincerely,        Steve Norwood MD        CC: MD Steve Steiner MD  8/17/2021  4:28 PM  Sign when Signing Visit  Assessment/Plan:    Problem List Items Addressed This Visit        Endocrine    Ovarian cancer [facility]) - Primary      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 which was treated aggressively with 6 cycles of adjuvant therapy completed in May of 2020  Today, I discussed implications of this diagnosis as well as incurable nature of recurrent ovarian cancer  However, she understands the prognosis for treatment at this time under grounds of her performance status and prolonged platinum free interval is good  Patient has no evidence of germ line pathogenic mutations  At this time we have sent tumor for molecular testing/somatic genetic evaluation and findings could guide subsequent therapies and even the possibility of maintenance therapy after upcoming treatment for recurrent disease  I explained potential management options to patient and her  today  Options include best supportive care/ observation until symptoms arise  I discussed why this is not truly and advisable approach    Alternatively, we discussed potential consultation at outside centers for consideration of clinical trials versus treatment with approved available regimens  They want to proceed with treatment  I would recommend proceeding with carboplatin AUC 5 and Doxil 30 milligram/m2 both to be administer on day 1 of a 28 day cycle in consonance with CALYPSO trial regimen  We discussed potential pros and cons of adding bevacizumab to above mention regimen  She will discuss this with her treating medical oncologist Ed Hartman  We  discussed how we typically  treat with 4 initial cycles follow-up by reimaging in consideration of 2-4 additional cycles thereafter  If complete response is achieved, we would give consideration to either maintenance with bevacizumab if used or initiation of PARP inhibitor therapy (pending potential somatic mutations and or homozygous recommendation deficiency in primary tumor)  Patient will contact her primary medical oncologist to discuss treatment  I would be happy to facilitate Port-A-Cath placement, further assessments by our service and or evaluation of response with Sebastian Tamez's CT scan imaging pending recommendations / timing from Dr Joann James  CHIEF COMPLAINT:   Discussion of recent biopsy results and treatment recommendations for recurrent platinum sensitive ovarian cancer      Problem:  Cancer Staging  Ovarian cancer [facility])  Staging form: Ovary, Fallopian Tube, Primary Peritoneal, AJCC 8th Edition  - Pathologic stage from 1/13/2020: FIGO Stage IC3 (pN0, cM0) - Signed by Monty Pollock MD on 1/28/2020        Previous therapy:  Oncology History   Ovarian cancer ([facility] [address] )   1/13/2020 Initial Diagnosis    Ovarian cancer on left ([facility] [address] )     1/13/2020 -  Cancer Staged    Staging form: Ovary, Fallopian Tube, Primary Peritoneal, AJCC 8th Edition  - Pathologic stage from 1/13/2020: FIGO Stage IC3 (pN0, cM0) - Signed by Monty Pollock MD on 1/28/2020  Histologic grade (G): G3  Histologic grading system: 4 grade system  Residual tumor (R): R0 - None  Histopathologic type: Serous cystadenocarcinoma, NOS  Diagnostic confirmation: Positive histology PLUS positive immunophenotyping and/or positive genetic studies  Specimen type: Excision  Staged by: Managing physician  Cancer antigen 125 () (U/mL): 4,000  Gross residual tumor after primary cyto-reductive surgery: Absent  Residual tumor volume after primary cytoreductive surgery: No gross tumor  National guidelines used in treatment planning: Yes  Type of national guideline used in treatment planning: NCCN       1/13/2020 Surgery    Diagnostic laparoscopy, laparotomy, total hysterectomy, bilateral salpingo-oophorectomy with resection of pelvic mass, bilateral pelvic and periaortic lymphadenectomy, staging peritoneal biopsies, omentectomy, appendectomy  Final pathology consistent with 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  2/11/2020 - 5/26/2020 Chemotherapy    Six cycles of carboplatin/paclitaxel administered by Dr Mena Sandhu  Tolerated well  CT scan at completion of treatment demonstrates no evidence of measurable disease   amarilis near completion of chemotherapy 5 7 units/milliliter     8/9/2021 Recurrence     Elevated  triggered imaging  which demonstrated suspicious mediastinal lymphadenopathy  Endobronchial ultrasound-guided fine-needle aspiration on August 9, 2021 demonstrates metastatic carcinoma with immuno phenotype consistent with metastatic ovarian primary  Patient ID: Lady Ortiz is a 78 y o  female  HPI    Patient with comprehensively staged IC3 high-grade serous ovarian cancer diagnosed in 2020  She underwent comprehensive surgical staging followed by 6 cycles of chemotherapy with carboplatin and Taxol completed in May of 2020  Most recently, she had ventral hernia repair with mesh after which she developed an abdominal wall seroma  At the same time, her  was noted to be elevated which was thought to be in relation to this hernia repair    Subsequent evaluation demonstrated up trend in  and cross-sectional CT scan suggested the possibility of metastatic disease with evidence of retroperitoneal lymphadenopathy, new small liver lesion and approximately 2 3 cm subcarinal mediastinal lymph node  Patient was referred to thoracic surgery and underwent endobronchial ultrasound-guided FNA of mediastinal lymph node which confirmed metastatic /recurrent disease  She presents today to discuss results and treatment recommendations  Reports ongoing fatigue and dyspnea on exertion which has been occurring for the last 2-3 months  No other symptoms  The following portions of the patient's history were reviewed and updated as appropriate: allergies, current medications, past family history, past medical history, past social history, past surgical history and problem list     Review of Systems    As per HPI  Twelve point review of systems otherwise unremarkable    Current Outpatient Medications   Medication Sig Dispense Refill    b complex vitamins capsule Take 1 capsule by mouth daily      carvedilol (COREG) 6 25 mg tablet Take 6 25 mg by mouth 2 (two) times a day with meals      cholecalciferol (VITAMIN D3) 400 units tablet Take 400 Units by mouth daily      estradiol (ESTRACE) 0 1 mg/g vaginal cream insert 1 gram vaginally two times a week  0    losartan-hydrochlorothiazide (HYZAAR) 50-12 5 mg per tablet Take 1 tablet by mouth daily   0    magnesium oxide (MAG-OX) 400 mg Take 400 mg by mouth 2 (two) times a day       multivitamin (THERAGRAN) TABS Take 1 tablet by mouth daily      Omega-3 Fatty Acids (fish oil) 1,000 mg Take 1,000 mg by mouth daily       simvastatin (ZOCOR) 20 mg tablet Take 20 mg by mouth daily   0    trimethoprim (PROLOPRIM) 100 mg tablet Take 100 mg by mouth daily   0    venlafaxine 150 MG TB24 Take 150 mg by mouth daily with breakfast   0    zolpidem (AMBIEN CR) 12 5 MG CR tablet Take 12 5 mg by mouth daily at bedtime   0     No current facility-administered medications for this visit  Objective:    Blood pressure 130/90, pulse 80, temperature 97 5 °F (36 4 °C), temperature source Temporal, resp  rate 16, height 6' (1 829 m), weight 111 kg (245 lb), not currently breastfeeding  Body mass index is 33 23 kg/m²  Body surface area is 2 32 meters squared  Physical Exam    Lab Results   Component Value Date     6 3 03/18/2021     Lab Results   Component Value Date    K 3 4 (L) 01/19/2020     01/19/2020    CO2 25 01/19/2020    BUN 18 03/18/2021    CREATININE 0 94 03/18/2021    CALCIUM 8 4 01/19/2020    AST 25 01/19/2020    ALT 27 01/19/2020    ALKPHOS 60 01/19/2020    EGFR 63 03/18/2021     Lab Results   Component Value Date    WBC 6 94 01/19/2020    HGB 10 5 (L) 01/19/2020    HCT 33 4 (L) 01/19/2020    MCV 95 01/19/2020     01/19/2020     Lab Results   Component Value Date    NEUTROABS 5 00 01/19/2020        Trend:  Lab Results   Component Value Date     6 3 03/18/2021     4,086 7 (H) 01/06/2020       Component    Case Report   Non-gynecologic Cytology                          Case: [id number]                                   Authorizing Provider: Albin Montoya MD      Collected:           08/09/2021 0949               Ordering Location:     [address]      Received:            08/09/2021 [address] [place] [address] Operating Room                                                       Pathologist:           Jaylin Garcia MD                                                    Specimens:   A) - Lymph Node, 7                                                                                   B) - Lymph Node, 7                                                                                   C) - Lymph Node, 7, CELL BLOCK                                                             Final Diagnosis   A-B-C   Lymph Node, 7 (ThinPrep, smear and cell block preparations)  Conclusive Evidence of Malignancy  Metastatic adenocarcinoma  See comment      Satisfactory for evaluation      Comment: Immunohistochemistry is positive in the tumor cells for AE1/3, CK7, PAX8, p53, and WT-1; and negative for CK20, TTF-1, and p40  The findings are consistent with metastasis from the patient's previously diagnosed high-grade serous carcinoma of the ovary ([id number])  Electronically signed by Gil Garrett MD on 8/12/2021 at 10:26 AM   Intraoperative Consultation       Lymph Node, Level 7 FNA On-Site Evaluation:      Positive for non-small cell carcinoma      Dr Muñoz Paulie  Interpretation performed at [facility], [address] [place] [id number]      Gross Description       A  20 mL of red fluid with tissue fragments present, received in CytoLyt         B  4 slides received (2 Alcohol-Fixed, 2 Diff-Quik)         C  Moderate cell button, grey        Additional Information    [employer]'s FDA approved ,  and ThinPrep Imaging Duo System are utilized with strict adherence to the 's instruction manual to prepare gynecologic and non-gynecologic cytology specimens for the production of ThinPrep slides as well as for gynecologic ThinPrep imaging  These processes have been validated by our laboratory and/or by the       These tests were developed and their performance characteristics determined by [facility] [address] [place] [address] or [address] [place] [address]  They may not be cleared or approved by the [place]  Food and Drug Administration  The FDA has determined that such clearance or approval is not necessary  These tests are used for clinical purposes  They should not be regarded as investigational or for research  This laboratory has been approved by [place] [id number], designated as a high-complexity laboratory and is qualified to perform these tests    Interpretation performed at [address] [address]       8/4/2021 Study Result    Narrative & Impression   CT CHEST, ABDOMEN AND PELVIS WITH IV CONTRAST     INDICATION:   C56 9: Malignant neoplasm of unspecified ovary  R97 1: Elevated cancer antigen 125 ()  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 with elevated CA-125 of 107 2     COMPARISON: CT from June 16, 2021     TECHNIQUE: CT examination of the chest, abdomen and pelvis was performed  Axial, sagittal, and coronal 2D reformatted images were created from the source data and submitted for interpretation      Radiation dose length product (DLP) for this visit:  2285 mGy-cm   This examination, like all CT scans performed in the [facility], was performed utilizing techniques to minimize radiation dose exposure, including the use of iterative   reconstruction and automated exposure control      IV Contrast:  50 mL of iohexol (OMNIPAQUE) 100 mL of iohexol (OMNIPAQUE)  Enteric Contrast: Enteric contrast was administered      FINDINGS:     CHEST     LUNGS:  A left lower lung nodular density seen in image 106 series 3, measuring about 4 mm, stable  No new measurable lung nodule seen  Mild fissural thickening seen on the left side, stable  A left lower lobe granuloma seen  PLEURA:  No pleural effusion seen  No pneumothorax seen     HEART/GREAT VESSELS:  Unremarkable for patient's age  Ascending aorta measures 4 cm  MEDIASTINUM AND LISA: Lower right paratracheal lymph node is seen, measuring about 4 mm  There is a subcarinal lymph node measures 1 1 cm x 2 3 cm    Not visible on the previous study of Ana 10, 2020   On the previous study of June 16, 2021 this was measuring 1 cm x 1 5 cm  CHEST WALL AND LOWER NECK:   No significant axillary lymph node enlargement seen  Bilateral breast implants are noted  ABDOMEN     LIVER/BILIARY TREE:  Again noted is a focal hypodense area in the right subdiaphragmatic region and indenting the capsule of the liver, measuring 8 mm, stable  A hypodensity seen left hepatic lobe, segment 4, stable due to focal fatty infiltration  GALLBLADDER:  Surgically absent  The CBD measures 1 1 cm  SPLEEN:  Unremarkable      PANCREAS:  Unremarkable      ADRENAL GLANDS:  Unremarkable      KIDNEYS/URETERS:  Prominence of the left renal pelvis and right renal pelvis seen, unchanged     STOMACH AND BOWEL:  Diverticulosis seen  There is no abnormal bowel wall thickening  No abnormal dilation of the small bowel loops seen     APPENDIX:  No findings to suggest appendicitis      ABDOMINOPELVIC CAVITY:  Again noted is a rounded density near the deep inguinal ring in the left inguinal region measuring 2 2 x 2 6 cm  This is stable this has become smaller since the previous study of March 18, 2021     VESSELS:  Celiac trunk appear unremarkable  SMA appear unremarkable  MARA appear unremarkable     PELVIS     REPRODUCTIVE ORGANS:  The uterus is surgically absent  The vaginal vault appears unchanged from  Small amount of air noted within the vagina     URINARY BLADDER:  Unremarkable      ABDOMINAL WALL/INGUINAL REGIONS:  Postsurgical changes from anterior abdominal wall hernia repair with mesh  Progressive involution of the anterior abdominal wall seroma  This now measures 3 7 x 3 cm  Previously measuring 5 3 x 5 3 cm     OSSEOUS STRUCTURES:  No acute fracture or destructive osseous lesion      IMPRESSION:  There is mildly enlarging subcarinal lymph node which measures about 1 1 cm in short axis and about 2 3 cm in transverse dimension, Not seen on the previous study of Ana 10, 2020 and mildly larger since the previous study of June 16, 2021  Further   management on the clinical basis    This is suspicious in view of rising CA-125 level     Previously noted small nodular area in the right subdiaphragmatic region superior to the liver with measuring about 18 mm, stable     No new measurable lymphadenopathy in the abdomen, pelvis     No new liver lesion     The study was marked in EPIC for significant notification      Workstation performed: Son Camargo MD, Steve Null  8/17/2021  4:24 PM

## 2021-08-17 NOTE — ASSESSMENT & PLAN NOTE
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 which was treated aggressively with 6 cycles of adjuvant therapy completed in May of 2020  Today, I discussed implications of this diagnosis as well as incurable nature of recurrent ovarian cancer  However, she understands the prognosis for treatment at this time under grounds of her performance status and prolonged platinum free interval is good  Patient has no evidence of germ line pathogenic mutations  At this time we have sent tumor for molecular testing/somatic genetic evaluation and findings could guide subsequent therapies and even the possibility of maintenance therapy after upcoming treatment for recurrent disease  I explained potential management options to patient and her  today  Options include best supportive care/ observation until symptoms arise  I discussed why this is not truly and advisable approach  Alternatively, we discussed potential consultation at outside centers for consideration of clinical trials versus treatment with approved available regimens  They want to proceed with treatment  I would recommend proceeding with carboplatin AUC 5 and Doxil 30 milligram/m2 both to be administer on day 1 of a 28 day cycle in consonance with CALYPSO trial regimen  We discussed potential pros and cons of adding bevacizumab to above mention regimen  She will discuss this with her treating medical oncologist Weston Kim  We  discussed how we typically  treat with 4 initial cycles follow-up by reimaging in consideration of 2-4 additional cycles thereafter    If complete response is achieved, we would give consideration to either maintenance with bevacizumab if used or initiation of PARP inhibitor therapy (pending potential somatic mutations and or homozygous recommendation deficiency in primary tumor)  Patient will contact her primary medical oncologist to discuss treatment  I would be happy to facilitate Port-A-Cath placement, further assessments by our service and or evaluation of response with [id number] [facility]'s CT scan imaging pending recommendations / timing from Dr Sara Barroso

## 2021-09-14 ENCOUNTER — TELEPHONE (OUTPATIENT)
Dept: GYNECOLOGIC ONCOLOGY | Facility: CLINIC | Age: 71
End: 2021-09-14

## 2021-09-15 LAB — SCAN RESULT: NORMAL

## 2021-09-29 ENCOUNTER — TELEPHONE (OUTPATIENT)
Dept: GYNECOLOGIC ONCOLOGY | Facility: CLINIC | Age: 71
End: 2021-09-29

## 2021-09-29 ENCOUNTER — TELEPHONE (OUTPATIENT)
Dept: HEMATOLOGY ONCOLOGY | Facility: CLINIC | Age: 71
End: 2021-09-29

## 2021-09-29 NOTE — TELEPHONE ENCOUNTER
Perez is calling in requesting to have her last lab results to be faxed over to her oncologist, she has provided me [phone]

## 2021-11-19 ENCOUNTER — ANNUAL EXAM (OUTPATIENT)
Dept: OBGYN CLINIC | Facility: CLINIC | Age: 71
End: 2021-11-19
Payer: MEDICARE

## 2021-11-19 VITALS — DIASTOLIC BLOOD PRESSURE: 80 MMHG | WEIGHT: 206 LBS | BODY MASS INDEX: 27.94 KG/M2 | SYSTOLIC BLOOD PRESSURE: 128 MMHG

## 2021-11-19 DIAGNOSIS — Z78.0 ASYMPTOMATIC AGE-RELATED POSTMENOPAUSAL STATE: ICD-10-CM

## 2021-11-19 DIAGNOSIS — Z13.820 ENCOUNTER FOR SCREENING FOR OSTEOPOROSIS: ICD-10-CM

## 2021-11-19 DIAGNOSIS — Z01.419 ENCOUNTER FOR GYNECOLOGICAL EXAMINATION WITHOUT ABNORMAL FINDING: ICD-10-CM

## 2021-11-19 DIAGNOSIS — Z01.419 ENCOUNTER FOR ANNUAL ROUTINE GYNECOLOGICAL EXAMINATION: ICD-10-CM

## 2021-11-19 DIAGNOSIS — Z91.89 GYN EXAM FOR HIGH-RISK MEDICARE PATIENT: Primary | ICD-10-CM

## 2021-11-19 DIAGNOSIS — Z85.3 HISTORY OF BREAST CANCER: ICD-10-CM

## 2021-11-19 DIAGNOSIS — C56.9 MALIGNANT NEOPLASM OF OVARY, UNSPECIFIED LATERALITY (HCC): ICD-10-CM

## 2021-11-19 DIAGNOSIS — Z12.31 SCREENING MAMMOGRAM, ENCOUNTER FOR: ICD-10-CM

## 2021-11-19 PROBLEM — I71.2 ANEURYSM OF THORACIC AORTA (HCC): Status: ACTIVE | Noted: 2021-01-12

## 2021-11-19 PROBLEM — H25.099 SENILE INCIPIENT CATARACT: Status: ACTIVE | Noted: 2020-12-21

## 2021-11-19 PROBLEM — I71.20 ANEURYSM OF THORACIC AORTA: Status: ACTIVE | Noted: 2021-01-12

## 2021-11-19 PROBLEM — M47.812 CERVICAL ARTHRITIS: Status: ACTIVE | Noted: 2018-09-12

## 2021-11-19 PROBLEM — I49.9 VENTRICULAR ARRHYTHMIA: Status: ACTIVE | Noted: 2021-03-01

## 2021-11-19 PROBLEM — I42.9 CARDIOMYOPATHY (HCC): Status: ACTIVE | Noted: 2021-03-01

## 2021-11-19 PROBLEM — Q24.5 CONGENITAL ANOMALY OF CORONARY ARTERY: Status: ACTIVE | Noted: 2021-03-29

## 2021-11-19 PROBLEM — R94.39 CARDIOVASCULAR STRESS TEST ABNORMAL: Status: ACTIVE | Noted: 2021-03-10

## 2021-11-19 PROCEDURE — G0101 CA SCREEN;PELVIC/BREAST EXAM: HCPCS | Performed by: OBSTETRICS & GYNECOLOGY

## 2021-11-19 RX ORDER — GABAPENTIN 100 MG/1
100 CAPSULE ORAL AS NEEDED
COMMUNITY

## 2021-11-19 RX ORDER — PROCHLORPERAZINE MALEATE 10 MG
10 TABLET ORAL EVERY 6 HOURS PRN
COMMUNITY
Start: 2021-08-26

## 2021-11-29 ENCOUNTER — TELEPHONE (OUTPATIENT)
Dept: HEMATOLOGY ONCOLOGY | Facility: CLINIC | Age: 71
End: 2021-11-29

## 2021-12-02 ENCOUNTER — TELEPHONE (OUTPATIENT)
Dept: GYNECOLOGIC ONCOLOGY | Facility: CLINIC | Age: 71
End: 2021-12-02

## 2021-12-15 ENCOUNTER — HOSPITAL ENCOUNTER (OUTPATIENT)
Dept: RADIOLOGY | Age: 71
Discharge: HOME | End: 2021-12-15
Payer: MEDICARE

## 2021-12-15 ENCOUNTER — OFFICE VISIT (OUTPATIENT)
Dept: GYNECOLOGIC ONCOLOGY | Facility: CLINIC | Age: 71
End: 2021-12-15
Payer: MEDICARE

## 2021-12-15 VITALS
OXYGEN SATURATION: 95 % | WEIGHT: 211 LBS | BODY MASS INDEX: 28.58 KG/M2 | HEART RATE: 91 BPM | SYSTOLIC BLOOD PRESSURE: 114 MMHG | TEMPERATURE: 98.8 F | HEIGHT: 72 IN | RESPIRATION RATE: 16 BRPM | DIASTOLIC BLOOD PRESSURE: 64 MMHG

## 2021-12-15 DIAGNOSIS — C56.9 MALIGNANT NEOPLASM OF OVARY, UNSPECIFIED LATERALITY (HCC): Primary | ICD-10-CM

## 2021-12-15 DIAGNOSIS — C56.9 MALIGNANT NEOPLASM OF OVARY, UNSPECIFIED LATERALITY (HCC): ICD-10-CM

## 2021-12-15 PROCEDURE — 74177 CT ABD & PELVIS W/CONTRAST: CPT

## 2021-12-15 PROCEDURE — 71260 CT THORAX DX C+: CPT

## 2021-12-15 PROCEDURE — G1004 CDSM NDSC: HCPCS

## 2021-12-15 PROCEDURE — 99214 OFFICE O/P EST MOD 30 MIN: CPT | Performed by: OBSTETRICS & GYNECOLOGY

## 2021-12-15 RX ADMIN — IOHEXOL 50 ML: 240 INJECTION, SOLUTION INTRATHECAL; INTRAVASCULAR; INTRAVENOUS; ORAL at 11:12

## 2021-12-15 RX ADMIN — IOHEXOL 100 ML: 350 INJECTION, SOLUTION INTRAVENOUS at 11:13

## 2022-01-19 ENCOUNTER — TELEPHONE (OUTPATIENT)
Dept: GYNECOLOGIC ONCOLOGY | Facility: CLINIC | Age: 72
End: 2022-01-19

## 2022-01-19 NOTE — TELEPHONE ENCOUNTER
Attempted to call pt to reschedule appt with provider due to provider changing locations  Patient's mailbox was full   Will send letter

## 2022-03-22 ENCOUNTER — OFFICE VISIT (OUTPATIENT)
Dept: GYNECOLOGIC ONCOLOGY | Facility: CLINIC | Age: 72
End: 2022-03-22
Payer: MEDICARE

## 2022-03-22 VITALS
HEIGHT: 72 IN | SYSTOLIC BLOOD PRESSURE: 120 MMHG | DIASTOLIC BLOOD PRESSURE: 78 MMHG | BODY MASS INDEX: 28.99 KG/M2 | WEIGHT: 214 LBS | TEMPERATURE: 98.1 F

## 2022-03-22 DIAGNOSIS — C56.9 MALIGNANT NEOPLASM OF OVARY, UNSPECIFIED LATERALITY (HCC): Primary | ICD-10-CM

## 2022-03-22 PROCEDURE — 99213 OFFICE O/P EST LOW 20 MIN: CPT | Performed by: OBSTETRICS & GYNECOLOGY

## 2022-03-22 NOTE — ASSESSMENT & PLAN NOTE
Patient had a complete biochemical/clinical response to carboplatin and Doxil is now on your operative maintenance  She is tolerating well with no side effects whatsoever  Her  amarilis 8 5 units/milliliter (February 21, 2022, down from 10 1 in November 2021 which was down from 25 3 in October of 2021)  Clinically and biochemically patient has no evidence of disease  I agree with continuation of Niraparib for maintenance  Will typically monitor  and only proceed with cross-sectional imaging if elevations or new symptoms were to arise  She will be monitored by Dr Catherine Goddard in his team locally  I plan to see her back every 3 months for pelvic exams/surveillance  She knows to contact me if she has any questions or concerns

## 2022-03-22 NOTE — PROGRESS NOTES
Assessment/Plan:    Problem List Items Addressed This Visit        Endocrine    Ovarian cancer [facility]) - Primary     Patient had a complete biochemical/clinical response to carboplatin and Doxil is now on your operative maintenance  She is tolerating well with no side effects whatsoever  Her  amarilis 8 5 units/milliliter (February 21, 2022, down from 10 1 in November 2021 which was down from 25 3 in October of 2021)  Clinically and biochemically patient has no evidence of disease  I agree with continuation of Niraparib for maintenance  Will typically monitor  and only proceed with cross-sectional imaging if elevations or new symptoms were to arise  She will be monitored by Dr Matthew Montoya in his team locally  I plan to see her back every 3 months for pelvic exams/surveillance  She knows to contact me if she has any questions or concerns  Relevant Medications    niraparib (Zejula) 100 MG CAPS            CHIEF COMPLAINT:   Follow-up for ovarian cancer      Problem:  Cancer Staging  Ovarian cancer [facility])  Staging form: Ovary, Fallopian Tube, Primary Peritoneal, AJCC 8th Edition  - Pathologic stage from 1/13/2020: FIGO Stage IC3 (pN0, cM0) - Signed by Familia Montez MD on 1/28/2020        Previous therapy:  Oncology History   Ovarian cancer ([facility] [address] )   1/13/2020 Initial Diagnosis    Ovarian cancer on left ([facility] [address] )     1/13/2020 -  Cancer Staged    Staging form: Ovary, Fallopian Tube, Primary Peritoneal, AJCC 8th Edition  - Pathologic stage from 1/13/2020: FIGO Stage IC3 (pN0, cM0) - Signed by Familia Montez MD on 1/28/2020  Histologic grade (G): G3  Histologic grading system: 4 grade system  Residual tumor (R): R0 - None  Histopathologic type: Serous cystadenocarcinoma, NOS  Diagnostic confirmation: Positive histology PLUS positive immunophenotyping and/or positive genetic studies  Specimen type: Excision  Staged by: Managing physician  Cancer antigen 125 () (U/mL): 4,000  Gross residual tumor after primary cyto-reductive surgery: Absent  Residual tumor volume after primary cytoreductive surgery: No gross tumor  National guidelines used in treatment planning: Yes  Type of national guideline used in treatment planning: NCCN       1/13/2020 Surgery    Diagnostic laparoscopy, laparotomy, total hysterectomy, bilateral salpingo-oophorectomy with resection of pelvic mass, bilateral pelvic and periaortic lymphadenectomy, staging peritoneal biopsies, omentectomy, appendectomy  Final pathology consistent with 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  2/11/2020 - 5/26/2020 Chemotherapy    Six cycles of carboplatin/paclitaxel administered by Dr Ancelmo Wilde  Tolerated well  CT scan at completion of treatment demonstrates no evidence of measurable disease   amarilis near completion of chemotherapy 5 7 units/milliliter     8/9/2021 Recurrence     Elevated  triggered imaging  which demonstrated suspicious mediastinal lymphadenopathy  Endobronchial ultrasound-guided fine-needle aspiration on August 9, 2021 demonstrates metastatic carcinoma with immuno phenotype consistent with metastatic ovarian primary  9/1/2021 - 1/24/2022 Chemotherapy    Carboplatin + Doxil x 6 cycles (Dr Ra Campbell)  Complete response  Amarilis Ca125 8 5 IU/mL  3/2022 -  Chemotherapy    Niraparib  Patient ID: Lynsey Pham is a 73 y o  female  HPI  Patient with recurrent platinum sensitive stage I C3 ovarian cancer  She recurred in August of 2021 and went on to be treated with carboplatin Doxil for 6 cycles which she completed in January 2022  She had a complete clinical/biochemical response  She has since started near operative maintenance  She has been treated locally by Dr Ra Campbell  He and I have closely collaborated and communicated about Lydia's care from the moment of initial diagnosis  Merlinda Monday presents today for follow-up  She feels quite well  Denies vaginal bleeding, drainage or discharge  Denies pelvic or abdominal pain  Reports normal bowel bladder function  She is tolerating Niraparib with no significant side effects  The following portions of the patient's history were reviewed and updated as appropriate: allergies, current medications, past family history, past medical history, past social history, past surgical history and problem list     Review of Systems  As per HPI  Twelve point review of systems otherwise unremarkable  Current Outpatient Medications   Medication Sig Dispense Refill    niraparib (Zejula) 100 MG CAPS Take 300 mg by mouth daily at bedtime      b complex vitamins capsule Take 1 capsule by mouth daily      carvedilol (COREG) 6 25 mg tablet Take 6 25 mg by mouth 2 (two) times a day with meals      cholecalciferol (VITAMIN D3) 400 units tablet Take 400 Units by mouth daily      estradiol (ESTRACE) 0 1 mg/g vaginal cream insert 1 gram vaginally two times a week  0    gabapentin (NEURONTIN) 100 mg capsule gabapentin 100 mg capsule   take 1 capsule by mouth three times a day AFTER FOOD      losartan-hydrochlorothiazide (HYZAAR) 50-12 5 mg per tablet Take 1 tablet by mouth daily   0    magnesium oxide (MAG-OX) 400 mg Take 400 mg by mouth 2 (two) times a day       multivitamin (THERAGRAN) TABS Take 1 tablet by mouth daily (Patient not taking: Reported on 3/22/2022 )      Omega-3 Fatty Acids (fish oil) 1,000 mg Take 1,000 mg by mouth daily       prochlorperazine (COMPAZINE) 10 mg tablet Take 10 mg by mouth every 6 (six) hours as needed      simvastatin (ZOCOR) 20 mg tablet Take 20 mg by mouth daily   0    trimethoprim (PROLOPRIM) 100 mg tablet Take 100 mg by mouth daily   0    venlafaxine 150 MG TB24 Take 150 mg by mouth daily with breakfast   0    zolpidem (AMBIEN CR) 12 5 MG CR tablet Take 12 5 mg by mouth daily at bedtime   0     No current facility-administered medications for this visit             Objective:    Blood pressure 120/78, temperature 98 1 °F (36 7 °C), height 6' (1 829 m), weight 97 1 kg (214 lb), not currently breastfeeding  Body mass index is 29 02 kg/m²  Body surface area is 2 19 meters squared  Physical Exam  Vitals reviewed  Exam conducted with a chaperone present  Constitutional:       General: She is not in acute distress  Appearance: Normal appearance  She is obese  Eyes:      General: No scleral icterus  Right eye: No discharge  Left eye: No discharge  Conjunctiva/sclera: Conjunctivae normal    Cardiovascular:      Rate and Rhythm: Normal rate and regular rhythm  Heart sounds: Normal heart sounds  No murmur heard  Pulmonary:      Effort: Pulmonary effort is normal  No respiratory distress  Breath sounds: No stridor  No wheezing  Abdominal:      General: Abdomen is flat  There is no distension  Palpations: Abdomen is soft  There is no mass  Tenderness: There is no abdominal tenderness  There is no guarding  Genitourinary:     Comments: Normal external female genitalia  Normal Bartholin's and Bevington's glands  Normal urethral meatus and no evidence of urethral discharge or masses  Bladder without fullness mass or tenderness  Vagina without lesion or discharge  No significant pelvic organ prolapse noted  Cervix and uterus are surgically absent  Bimanual exam demonstrates no evidence of pelvic masses  Anus without fissure of lesion  Musculoskeletal:      Right lower leg: No edema  Left lower leg: No edema  Neurological:      Mental Status: She is alert         Colette Wilson MD, [address]  3/22/2022  2:43 PM

## 2022-04-28 DIAGNOSIS — C56.9 MALIGNANT NEOPLASM OF OVARY, UNSPECIFIED LATERALITY (HCC): Primary | ICD-10-CM

## 2022-05-09 ENCOUNTER — HOSPITAL ENCOUNTER (OUTPATIENT)
Dept: RADIOLOGY | Facility: HOSPITAL | Age: 72
Discharge: HOME | End: 2022-05-09
Payer: MEDICARE

## 2022-05-09 ENCOUNTER — OFFICE VISIT (OUTPATIENT)
Dept: GYNECOLOGIC ONCOLOGY | Facility: CLINIC | Age: 72
End: 2022-05-09
Payer: MEDICARE

## 2022-05-09 VITALS
BODY MASS INDEX: 29.12 KG/M2 | TEMPERATURE: 99.4 F | SYSTOLIC BLOOD PRESSURE: 132 MMHG | DIASTOLIC BLOOD PRESSURE: 80 MMHG | HEIGHT: 72 IN | WEIGHT: 215 LBS

## 2022-05-09 DIAGNOSIS — C56.9 MALIGNANT NEOPLASM OF OVARY, UNSPECIFIED LATERALITY (HCC): ICD-10-CM

## 2022-05-09 DIAGNOSIS — C56.3 MALIGNANT NEOPLASM OF BILATERAL OVARIES (HCC): ICD-10-CM

## 2022-05-09 DIAGNOSIS — R97.8 ELEVATED TUMOR MARKERS: ICD-10-CM

## 2022-05-09 DIAGNOSIS — R97.1 ELEVATED CA-125: ICD-10-CM

## 2022-05-09 DIAGNOSIS — C56.9 MALIGNANT NEOPLASM OF OVARY, UNSPECIFIED LATERALITY (HCC): Primary | ICD-10-CM

## 2022-05-09 PROCEDURE — 71250 CT THORAX DX C-: CPT

## 2022-05-09 PROCEDURE — 99213 OFFICE O/P EST LOW 20 MIN: CPT | Performed by: OBSTETRICS & GYNECOLOGY

## 2022-05-09 PROCEDURE — 74176 CT ABD & PELVIS W/O CONTRAST: CPT

## 2022-05-09 PROCEDURE — G1004 CDSM NDSC: HCPCS

## 2022-05-09 RX ORDER — LORAZEPAM 1 MG/1
1 TABLET ORAL
Qty: 1 TABLET | Refills: 0 | Status: SHIPPED | OUTPATIENT
Start: 2022-05-09 | End: 2022-05-17

## 2022-05-09 NOTE — PROGRESS NOTES
Assessment/Plan:    Problem List Items Addressed This Visit        Endocrine    [facility] ([facility] ) - Primary     On Willaim Favor after response to platinum based chemotherapy given for recurrence  Her  has risen >2X amarilis  Now in 45s  CT without contrast obtained today (national IV contrast shortage) and proved non-informative  Plan to continue Willaim Favor  I recommended obtaining PET? CT and brain MRI to r/o remote posibility of measurable non-yet identified metastatic/recurrent disease  Pt with anxiety and claustrophobia  Will give Ativan 1 mg po x 1 30 mins prior to MRI  Relevant Medications    LORazepam (ATIVAN) 1 mg tablet    Other Relevant Orders    NM PET CT skull base to mid thigh    MRI brain w wo contrast       Other    Elevated CA-125    Relevant Medications    LORazepam (ATIVAN) 1 mg tablet    Other Relevant Orders    NM PET CT skull base to mid thigh    MRI brain w wo contrast      Other Visit Diagnoses     Malignant neoplasm of bilateral ovaries ([facility] )         Relevant Orders    NM PET CT skull base to mid thigh            CHIEF COMPLAINT:   Assessment for  rise while on Zejula after response to Pt based therapy for recurrent disease      Problem:  Cancer Staging  Ovarian cancer [facility])  Staging form: Ovary, Fallopian Tube, Primary Peritoneal, AJCC 8th Edition  - Pathologic stage from 1/13/2020: FIGO Stage IC3 (pN0, cM0) - Signed by Pamela Weiss MD on 1/28/2020        Previous therapy:  Oncology History   Ovarian cancer ([facility] )   1/13/2020 Initial Diagnosis    Ovarian cancer on left ([facility] )     1/13/2020 -  Cancer Staged    Staging form: Ovary, Fallopian Tube, Primary Peritoneal, AJCC 8th Edition  - Pathologic stage from 1/13/2020: FIGO Stage IC3 (pN0, cM0) - Signed by Pamela Weiss MD on 1/28/2020  Histologic grade (G): G3  Histologic grading system: 4 grade system  Residual tumor (R): R0 - None  Histopathologic type: Serous cystadenocarcinoma, NOS  Diagnostic confirmation: Positive histology PLUS positive immunophenotyping and/or positive genetic studies  Specimen type: Excision  Staged by: Managing physician  Cancer antigen 125 () (U/mL): 4,000  Gross residual tumor after primary cyto-reductive surgery: Absent  Residual tumor volume after primary cytoreductive surgery: No gross tumor  National guidelines used in treatment planning: Yes  Type of national guideline used in treatment planning: NCCN       1/13/2020 Surgery    Diagnostic laparoscopy, laparotomy, total hysterectomy, bilateral salpingo-oophorectomy with resection of pelvic mass, bilateral pelvic and periaortic lymphadenectomy, staging peritoneal biopsies, omentectomy, appendectomy  Final pathology consistent with 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  2/11/2020 - 5/26/2020 Chemotherapy    Six cycles of carboplatin/paclitaxel administered by Dr Zakiya Camejo  Tolerated well  CT scan at completion of treatment demonstrates no evidence of measurable disease   amarilis near completion of chemotherapy 5 7 units/milliliter     8/9/2021 Recurrence     Elevated  triggered imaging  which demonstrated suspicious mediastinal lymphadenopathy  Endobronchial ultrasound-guided fine-needle aspiration on August 9, 2021 demonstrates metastatic carcinoma with immuno phenotype consistent with metastatic ovarian primary  9/1/2021 - 1/24/2022 Chemotherapy    Carboplatin + Doxil x 6 cycles (Dr Tena Ugalde)  Complete response  Amarilis Ca125 8 5 IU/mL  3/2022 -  Chemotherapy    Niraparib  Patient ID: Mariaelena Clark is a 73 y o  female  HPI  Pt is on Niraparib and tolerates well  Rising Ca125 noted  CT without contrast obtained today  Here to discuss results  Reports no abdominal or pelvic pain  Denies N/V  Reports normal bowel and bladder function    The following portions of the patient's history were reviewed and updated as appropriate: allergies, current medications, past family history, past medical history, past social history, past surgical history and problem list     Review of Systems  As per HPI  12 point ROS otherwise unremarkable  Current Outpatient Medications   Medication Sig Dispense Refill    b complex vitamins capsule Take 1 capsule by mouth daily      carvedilol (COREG) 6 25 mg tablet Take 6 25 mg by mouth 2 (two) times a day with meals      cholecalciferol (VITAMIN D3) 400 units tablet Take 400 Units by mouth daily      estradiol (ESTRACE) 0 1 mg/g vaginal cream insert 1 gram vaginally two times a week  0    gabapentin (NEURONTIN) 100 mg capsule gabapentin 100 mg capsule   take 1 capsule by mouth three times a day AFTER FOOD      LORazepam (ATIVAN) 1 mg tablet Take 1 tablet (1 mg total) by mouth once in imaging for anxiety (take 30 mins before MRI) for up to 1 dose 1 tablet 0    losartan-hydrochlorothiazide (HYZAAR) 50-12 5 mg per tablet Take 1 tablet by mouth daily   0    magnesium oxide (MAG-OX) 400 mg Take 400 mg by mouth 2 (two) times a day       multivitamin (THERAGRAN) TABS Take 1 tablet by mouth daily (Patient not taking: Reported on 3/22/2022 )      niraparib (Zejula) 100 MG CAPS Take 300 mg by mouth daily at bedtime      prochlorperazine (COMPAZINE) 10 mg tablet Take 10 mg by mouth every 6 (six) hours as needed      simvastatin (ZOCOR) 20 mg tablet Take 20 mg by mouth daily   0    trimethoprim (PROLOPRIM) 100 mg tablet Take 100 mg by mouth daily   0    venlafaxine 150 MG TB24 Take 150 mg by mouth daily with breakfast   0    zolpidem (AMBIEN CR) 12 5 MG CR tablet Take 12 5 mg by mouth daily at bedtime   0     No current facility-administered medications for this visit  Objective:    Blood pressure 132/80, temperature 99 4 °F (37 4 °C), temperature source Tympanic, height 6' (1 829 m), weight 97 5 kg (215 lb), not currently breastfeeding  Body mass index is 29 16 kg/m²  Body surface area is 2 2 meters squared  Physical Exam  Vitals reviewed     Constitutional: General: She is not in acute distress  Appearance: Normal appearance  She is not ill-appearing  Eyes:      General: No scleral icterus  Right eye: No discharge  Left eye: No discharge  Conjunctiva/sclera: Conjunctivae normal    Cardiovascular:      Rate and Rhythm: Normal rate and regular rhythm  Heart sounds: Normal heart sounds  No murmur heard  Pulmonary:      Effort: Pulmonary effort is normal  No respiratory distress  Breath sounds: Normal breath sounds  No wheezing  Abdominal:      General: Abdomen is flat  There is no distension  Palpations: Abdomen is soft  There is no mass  Tenderness: There is no abdominal tenderness  Genitourinary:     Comments: Deferred  Musculoskeletal:      Right lower leg: No edema  Left lower leg: No edema  Neurological:      Mental Status: She is alert         Catherine Fabian MD, [address]  5/9/2022  3:43 PM

## 2022-05-09 NOTE — LETTER
May 9, 2022     Jesuschace Conner, [address] [address]    Patient: Eva oPsada   YOB: 1950   Date of Visit: 5/9/2022       Dear Dr Nilda Mabry: Thank you for referring Kwame Spear to me for evaluation  Below are my notes for this consultation  If you have questions, please do not hesitate to call me  I look forward to following your patient along with you  Sincerely,        Leonela Sandoval MD        CC: MD Leonela Lal MD  5/9/2022  3:43 PM  Incomplete  Assessment/Plan:    Problem List Items Addressed This Visit        Endocrine    Ovarian cancer ([facility] [address] ) - Primary     On Ann Dyson after response to platinum based chemotherapy given for recurrence  Her  has risen >2X amarilis  Now in 45s  CT without contrast obtained today (national IV contrast shortage) and proved non-informative  Plan to continue Ann Dyson  I recommended obtaining PET? CT and brain MRI to r/o remote posibility of measurable non-yet identified metastatic/recurrent disease  Pt with anxiety and claustrophobia  Will give Ativan 1 mg po x 1 30 mins prior to MRI  Relevant Medications    LORazepam (ATIVAN) 1 mg tablet    Other Relevant Orders    NM PET CT skull base to mid thigh    MRI brain w wo contrast       Other    Elevated CA-125    Relevant Medications    LORazepam (ATIVAN) 1 mg tablet    Other Relevant Orders    NM PET CT skull base to mid thigh    MRI brain w wo contrast      Other Visit Diagnoses     Malignant neoplasm of bilateral ovaries ([facility] )         Relevant Orders    NM PET CT skull base to mid thigh            CHIEF COMPLAINT:   Assessment for  rise while on Zejula after response to Pt based therapy for recurrent disease      Problem:  Cancer Staging  Ovarian cancer [facility])  Staging form: Ovary, Fallopian Tube, Primary Peritoneal, AJCC 8th Edition  - Pathologic stage from 1/13/2020: FIGO Stage IC3 (pN0, cM0) - Signed by Leonela Sandoval MD on 1/28/2020        Previous therapy:  Oncology History   Ovarian cancer ([address] )   1/13/2020 Initial Diagnosis    Ovarian cancer on left [facility])     1/13/2020 -  Cancer Staged    Staging form: Ovary, Fallopian Tube, Primary Peritoneal, AJCC 8th Edition  - Pathologic stage from 1/13/2020: FIGO Stage IC3 (pN0, cM0) - Signed by Shane Salinas MD on 1/28/2020  Histologic grade (G): G3  Histologic grading system: 4 grade system  Residual tumor (R): R0 - None  Histopathologic type: Serous cystadenocarcinoma, NOS  Diagnostic confirmation: Positive histology PLUS positive immunophenotyping and/or positive genetic studies  Specimen type: Excision  Staged by: Managing physician  Cancer antigen 125 () (U/mL): 4,000  Gross residual tumor after primary cyto-reductive surgery: Absent  Residual tumor volume after primary cytoreductive surgery: No gross tumor  National guidelines used in treatment planning: Yes  Type of national guideline used in treatment planning: NCCN       1/13/2020 Surgery    Diagnostic laparoscopy, laparotomy, total hysterectomy, bilateral salpingo-oophorectomy with resection of pelvic mass, bilateral pelvic and periaortic lymphadenectomy, staging peritoneal biopsies, omentectomy, appendectomy  Final pathology consistent with 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  2/11/2020 - 5/26/2020 Chemotherapy    Six cycles of carboplatin/paclitaxel administered by Dr Suzette Corrales  Tolerated well  CT scan at completion of treatment demonstrates no evidence of measurable disease   amarilis near completion of chemotherapy 5 7 units/milliliter     8/9/2021 Recurrence     Elevated  triggered imaging  which demonstrated suspicious mediastinal lymphadenopathy  Endobronchial ultrasound-guided fine-needle aspiration on August 9, 2021 demonstrates metastatic carcinoma with immuno phenotype consistent with metastatic ovarian primary       9/1/2021 - 1/24/2022 Chemotherapy    Carboplatin + Doxil x 6 cycles (Dr Nathan Hilliard)  Complete response  Fadi Ca125 8 5 IU/mL  3/2022 -  Chemotherapy    Niraparib  Patient ID: Olimpia Ornelas is a 73 y o  female  HPI  Pt is on Niraparib and tolerates well  Rising Ca125 noted  CT without contrast obtained today  Here to discuss results  Reports no abdominal or pelvic pain  Denies N/V  Reports normal bowel and bladder function  The following portions of the patient's history were reviewed and updated as appropriate: allergies, current medications, past family history, past medical history, past social history, past surgical history and problem list     Review of Systems  As per HPI  12 point ROS otherwise unremarkable    Current Outpatient Medications   Medication Sig Dispense Refill    b complex vitamins capsule Take 1 capsule by mouth daily      carvedilol (COREG) 6 25 mg tablet Take 6 25 mg by mouth 2 (two) times a day with meals      cholecalciferol (VITAMIN D3) 400 units tablet Take 400 Units by mouth daily      estradiol (ESTRACE) 0 1 mg/g vaginal cream insert 1 gram vaginally two times a week  0    gabapentin (NEURONTIN) 100 mg capsule gabapentin 100 mg capsule   take 1 capsule by mouth three times a day AFTER FOOD      LORazepam (ATIVAN) 1 mg tablet Take 1 tablet (1 mg total) by mouth once in imaging for anxiety (take 30 mins before MRI) for up to 1 dose 1 tablet 0    losartan-hydrochlorothiazide (HYZAAR) 50-12 5 mg per tablet Take 1 tablet by mouth daily   0    magnesium oxide (MAG-OX) 400 mg Take 400 mg by mouth 2 (two) times a day       multivitamin (THERAGRAN) TABS Take 1 tablet by mouth daily (Patient not taking: Reported on 3/22/2022 )      niraparib (Zejula) 100 MG CAPS Take 300 mg by mouth daily at bedtime      prochlorperazine (COMPAZINE) 10 mg tablet Take 10 mg by mouth every 6 (six) hours as needed      simvastatin (ZOCOR) 20 mg tablet Take 20 mg by mouth daily   0    trimethoprim (PROLOPRIM) 100 mg tablet Take 100 mg by mouth daily 0    venlafaxine 150 MG TB24 Take 150 mg by mouth daily with breakfast   0    zolpidem (AMBIEN CR) 12 5 MG CR tablet Take 12 5 mg by mouth daily at bedtime   0     No current facility-administered medications for this visit  Objective:    Blood pressure 132/80, temperature 99 4 °F (37 4 °C), temperature source Tympanic, height 6' (1 829 m), weight 97 5 kg (215 lb), not currently breastfeeding  Body mass index is 29 16 kg/m²  Body surface area is 2 2 meters squared  Physical Exam  Vitals reviewed  Constitutional:       General: She is not in acute distress  Appearance: Normal appearance  She is not ill-appearing  Eyes:      General: No scleral icterus  Right eye: No discharge  Left eye: No discharge  Conjunctiva/sclera: Conjunctivae normal    Cardiovascular:      Rate and Rhythm: Normal rate and regular rhythm  Heart sounds: Normal heart sounds  No murmur heard  Pulmonary:      Effort: Pulmonary effort is normal  No respiratory distress  Breath sounds: Normal breath sounds  No wheezing  Abdominal:      General: Abdomen is flat  There is no distension  Palpations: Abdomen is soft  There is no mass  Tenderness: There is no abdominal tenderness  Genitourinary:     Comments: Deferred  Musculoskeletal:      Right lower leg: No edema  Left lower leg: No edema  Neurological:      Mental Status: She is alert         Shane Salinas MD, [address]  5/9/2022  3:43 PM

## 2022-05-09 NOTE — ASSESSMENT & PLAN NOTE
On Zejula after response to platinum based chemotherapy given for recurrence  Her  has risen >2X amarilis  Now in 45s  CT without contrast obtained today (national IV contrast shortage) and proved non-informative  Plan to continue Welby Melinda  I recommended obtaining PET? CT and brain MRI to r/o remote posibility of measurable non-yet identified metastatic/recurrent disease  Pt with anxiety and claustrophobia  Will give Ativan 1 mg po x 1 30 mins prior to MRI

## 2022-05-10 ENCOUNTER — HOSPITAL ENCOUNTER (OUTPATIENT)
Dept: MRI IMAGING | Facility: HOSPITAL | Age: 72
Discharge: HOME | End: 2022-05-10
Attending: OBSTETRICS & GYNECOLOGY
Payer: MEDICARE

## 2022-05-10 DIAGNOSIS — C56.9 MALIGNANT NEOPLASM OF OVARY, UNSPECIFIED LATERALITY (HCC): ICD-10-CM

## 2022-05-10 DIAGNOSIS — R97.1 ELEVATED CA-125: ICD-10-CM

## 2022-05-10 PROCEDURE — G1004 CDSM NDSC: HCPCS

## 2022-05-10 PROCEDURE — A9585 GADOBUTROL INJECTION: HCPCS | Performed by: OBSTETRICS & GYNECOLOGY

## 2022-05-10 PROCEDURE — 70553 MRI BRAIN STEM W/O & W/DYE: CPT

## 2022-05-10 RX ADMIN — GADOBUTROL 10 ML: 604.72 INJECTION INTRAVENOUS at 14:11

## 2022-05-17 ENCOUNTER — TELEPHONE (OUTPATIENT)
Dept: GYNECOLOGIC ONCOLOGY | Facility: CLINIC | Age: 72
End: 2022-05-17

## 2022-05-17 DIAGNOSIS — C56.9 MALIGNANT NEOPLASM OF OVARY, UNSPECIFIED LATERALITY (HCC): Primary | ICD-10-CM

## 2022-05-17 RX ORDER — LORAZEPAM 1 MG/1
TABLET ORAL
Qty: 1 TABLET | Refills: 0 | Status: SHIPPED | OUTPATIENT
Start: 2022-05-17 | End: 2022-06-16 | Stop reason: ALTCHOICE

## 2022-05-17 NOTE — TELEPHONE ENCOUNTER
Pt called and stated she has a Pet scan scheduled for Friday 05/20/2022 and will like Ativan prescribe since she is so nerves to get the test done

## 2022-05-20 ENCOUNTER — HOSPITAL ENCOUNTER (OUTPATIENT)
Dept: RADIOLOGY | Age: 72
Discharge: HOME | End: 2022-05-20
Payer: MEDICARE

## 2022-05-20 DIAGNOSIS — R97.1 ELEVATED CA-125: ICD-10-CM

## 2022-05-20 DIAGNOSIS — C56.9 MALIGNANT NEOPLASM OF OVARY, UNSPECIFIED LATERALITY (HCC): ICD-10-CM

## 2022-05-20 DIAGNOSIS — C56.3 MALIGNANT NEOPLASM OF BILATERAL OVARIES (HCC): ICD-10-CM

## 2022-05-20 LAB — GLUCOSE SERPL-MCNC: 121 MG/DL (ref 65–140)

## 2022-05-20 PROCEDURE — 82948 REAGENT STRIP/BLOOD GLUCOSE: CPT

## 2022-05-20 PROCEDURE — A9552 F18 FDG: HCPCS

## 2022-05-20 PROCEDURE — 78815 PET IMAGE W/CT SKULL-THIGH: CPT

## 2022-05-20 PROCEDURE — G1004 CDSM NDSC: HCPCS

## 2022-05-23 ENCOUNTER — TELEPHONE (OUTPATIENT)
Dept: GYNECOLOGIC ONCOLOGY | Facility: CLINIC | Age: 72
End: 2022-05-23

## 2022-05-23 NOTE — TELEPHONE ENCOUNTER
Return call placed to patient  Reviewed PET images and MRI with Dr Lucas Coates  Recommended to continue on niraparib, and f/u in August  Patient to call if new symptoms arise in interim

## 2022-05-26 ENCOUNTER — PATIENT MESSAGE (OUTPATIENT)
Dept: GYNECOLOGIC ONCOLOGY | Facility: CLINIC | Age: 72
End: 2022-05-26

## 2022-06-15 NOTE — PROGRESS NOTES
Heart Failure Office Note - Pat Weiss 73 y o  female MRN: [id number]    @ Encounter: [id number]      Assessment/Plan:    Patient Active Problem List    Diagnosis Date Noted    PONV (postoperative nausea and vomiting) 2021    Mediastinal lymphadenopathy 2021    Liver lesion 2021    Anomalous coronary artery origin 2021    Frequent unifocal PVCs 2021    Congenital anomaly of coronary artery 2021    Cardiovascular stress test abnormal 03/10/2021    Cardiomyopathy ([facility] [address] ) 2021    Ventricular arrhythmia 2021    Aneurysm of thoracic aorta ([facility] [address] ) 2021    Senile incipient cataract 2020    O[facility]ian cancer ([facility] ) 2020    Elevated CA-125 2020    History of breast cancer 2020    Encounter for gynecological examination without abnormal finding 10/31/2018    Cervical arthritis 2018    Myopia, bilateral 2017    Mixed anxiety and depressive disorder 2016    Bradycardia 2015    Hyperlipidemia 2015    Insomnia 2015    Myopia 2014    GERD (gastroesophageal reflux disease) 2011    HTN (hypertension), benign 2011    Obesity, Class I, BMI 30 0-34 9 (see actual BMI) 2011    Obstructive sleep apnea 2011     # Frequent PVCs, ventricular bigeminy  Seen on stress EKG  Now on increased coreg 6 25 mg BID, magnesium  24 hour Holter 21: , avg 85 bpm; 3 9% PVC burden, no afib    Studies- personally reviewed by 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- on [facility] 3/29  CTA Cardiac 21:   Calcium score 696  CORONARY ANATOMY:    Anomalous left circumflex coronary artery originating from the root of the right coronary artery, then taking a benign course behind the aortic root before continuing in the left atrioventricular groove  Coronary artery anatomy is otherwise normal     # Mildly enlarged ascending aorta  CTA Cardiac 5/13/21: 4 1 cm (no change from Jan 2020)    # HTN- coreg 6 25 mg BID, losartan/ HCTZ 50/12 5 mg daily  # GERD  # MARYANA- on BiPap  # dyslipidemia- simvastatin 20 mg daily  # hx of squamous cell carcinoma of skin  # hx of breast cancer  # ovarian cancer, she is status post diagnostic laparoscopy, exploratory laparotomy, total hysterectomy,  Bilateral salpingo-oophorectomy, staging lymphadenectomy, biopsies, omentectomy and appendectomy in January of 2020  Today's Plan:  Already on simvastatin 20 mg (increased calcium score)  Continue simvastatin for dyslipidemia  BP at goal < 130 mmHg - no change in coreg and losartan/ hctz  On BiPap for MARYANA  --2g sodium diet  Weights daily    HPI:       77 yo female consult for newly discovered NICM  She has hx of HTN, MARYANA, dyslipidemia who has ovarian cancer, she is status post diagnostic laparoscopy, exploratory laparotomy, total hysterectomy,  Bilateral salpingo-oophorectomy, staging lymphadenectomy, biopsies, omentectomy and appendectomy in January of 2020  She received 6 cycles of chemothreapy w/ carboplatin and paclitaxel  As far as symptoms as concerned, she has not chest pain, no LANDVAERDE, no hx of syncope  Interim:  Last time I cleared her for hernia surgery  24 hour Holter 4/26/21: , avg 85 bpm; 3 9% PVC burden, no afib  CTA Cardiac 5/13/21:   Calcium score 696  CORONARY ANATOMY:    Anomalous left circumflex coronary artery originating from the root of the right coronary artery, then taking a benign course behind the aortic root before continuing in the left atrioventricular groove    Coronary artery anatomy is otherwise normal     Symptom wise she has fatigue    Undergoing treatment for her ovarian cancer        Past Medical History: Diagnosis Date    Breast cancer [facility])     2004 and then 2009    Cancer [facility]) 2005,2009    breast    Cancer ([facility] [address] ) 2020    ovarian    Chicken pox     Colon polyp     CPAP (continuous positive airway pressure) dependence     Frequent UTI     Heart disease     High cholesterol     History of blood transfusion     2013 with Bilat Knee replacement    Hypertension     Irregular heart beat     PVC's    PONV (postoperative nausea and vomiting)     Sleep apnea        Review of Systems   Constitutional: Positive for fatigue  Negative for activity change, appetite change and unexpected weight change  HENT: Negative for congestion and nosebleeds  Eyes: Negative  Respiratory: Positive for shortness of breath  Negative for cough and chest tightness  Cardiovascular: Negative for chest pain, palpitations and leg swelling  Gastrointestinal: Negative for abdominal distention  Endocrine: Negative  Genitourinary: Negative  Musculoskeletal: Negative  Skin: Negative  Neurological: Negative for dizziness, syncope and weakness  Hematological: Negative  Psychiatric/Behavioral: Negative  No Known Allergies        Current Outpatient Medications:     b complex vitamins capsule, Take 1 capsule by mouth daily, Disp: , Rfl:     carvedilol (COREG) 6 25 mg tablet, Take 6 25 mg by mouth 2 (two) times a day with meals, Disp: , Rfl:     cholecalciferol (VITAMIN D3) 400 units tablet, Take 400 Units by mouth daily, Disp: , Rfl:     estradiol (ESTRACE) 0 1 mg/g vaginal cream, insert 1 gram vaginally two times a week, Disp: , Rfl: 0    gabapentin (NEURONTIN) 100 mg capsule, Take 100 mg by mouth if needed, Disp: , Rfl:     losartan-hydrochlorothiazide (HYZAAR) 50-12 5 mg per tablet, Take 1 tablet by mouth daily , Disp: , Rfl: 0    magnesium oxide (MAG-OX) 400 mg, Take 400 mg by mouth 2 (two) times a day , Disp: , Rfl:     niraparib (ZEJULA) 100 MG CAPS, Take 300 mg by mouth daily at bedtime, Disp: , Rfl:     prochlorperazine (COMPAZINE) 10 mg tablet, Take 10 mg by mouth every 6 (six) hours as needed, Disp: , Rfl:     simvastatin (ZOCOR) 20 mg tablet, Take 20 mg by mouth daily , Disp: , Rfl: 0    trimethoprim (PROLOPRIM) 100 mg tablet, Take 100 mg by mouth daily , Disp: , Rfl: 0    venlafaxine 150 MG TB24, Take 150 mg by mouth daily with breakfast , Disp: , Rfl: 0    zolpidem (AMBIEN CR) 12 5 MG CR tablet, Take 12 5 mg by mouth daily at bedtime , Disp: , Rfl: 0    Social History     Socioeconomic History    Marital status: /Civil Union     Spouse name: Not on file    Number of children: Not on file    Years of education: Not on file    Highest education level: Not on file   Occupational History    Not on file   Tobacco Use    Smoking status: Former Smoker     Types: Cigarettes     Quit date: 10/31/1987     Years since quittin 6    Smokeless tobacco: Never Used   Vaping Use    Vaping Use: Never used   Substance and Sexual Activity    Alcohol use: Yes     Comment: socially     Drug use: Never    Sexual activity: Not Currently     Partners: Male     Birth control/protection: Post-menopausal     Comment: same partner-few times a year   Other Topics Concern    Not on file   Social History Narrative    Not on file     Social Determinants of Health     Financial Resource Strain: Not on file   Food Insecurity: Not on file   Transportation Needs: Not on file   Physical Activity: Not on file   Stress: Not on file   Social Connections: Not on file   Intimate Partner Violence: Not on file   Housing Stability: Not on file       Family History   Problem Relation Age of Onset    Breast cancer Mother 50    Lung cancer Father     Prostate cancer Brother        Physical Exam:    Vitals: Blood pressure 130/82, pulse 73, weight 94 4 kg (208 lb 3 2 oz), not currently breastfeeding , Body mass index is 28 24 kg/m² ,   Wt Readings from Last 3 Encounters:   22 94 4 kg (208 lb 3 2 oz) 05/09/22 97 5 kg (215 lb)   03/22/22 97 1 kg (214 lb)       Physical Exam  Constitutional:       Appearance: She is well-developed  HENT:      Head: Normocephalic and atraumatic  Eyes:      Pupils: Pupils are equal, round, and reactive to light  Neck:      Vascular: No JVD  Cardiovascular:      Rate and Rhythm: Normal rate and regular rhythm  Heart sounds: No murmur heard  Pulmonary:      Effort: Pulmonary effort is normal  No respiratory distress  Breath sounds: Normal breath sounds  Abdominal:      General: There is no distension  Palpations: Abdomen is soft  Tenderness: There is no abdominal tenderness  Musculoskeletal:         General: Normal range of motion  Cervical back: Normal range of motion  Skin:     General: Skin is warm and dry  Findings: No rash  Neurological:      Mental Status: She is alert and oriented to person, place, and time  Labs & Results:    Lab Results   Component Value Date    WBC 6 94 01/19/2020    HGB 10 5 (L) 01/19/2020    HCT 33 4 (L) 01/19/2020    MCV 95 01/19/2020     01/19/2020     Lab Results   Component Value Date    SODIUM 137 01/19/2020    K 3 4 (L) 01/19/2020     01/19/2020    CO2 25 01/19/2020    BUN 18 03/18/2021    CREATININE 0 94 03/18/2021    GLUC 133 01/19/2020    CALCIUM 8 4 01/19/2020     No results found for: NTBNP   No results found for: CHOLESTEROL  No results found for: HDL  No results found for: TRIG  No results found for: [place]    EKG personally reviewed by Yoon Gregg DO  Counseling / Coordination of Care  Time spent today 25 minutes  Greater than 50% of total time was spent with the patient and / or family counseling and / or coordination of care  We discussed diagnoses, most recent studies and any changes in treatment    Thank you for the opportunity to participate in the care of this patient      KASSY GASTELUM [id number] [facility]  PULMONARY [facility]  MEDICAL DIRECTOR OF [id number] [address]

## 2022-06-16 ENCOUNTER — OFFICE VISIT (OUTPATIENT)
Dept: CARDIOLOGY CLINIC | Facility: CLINIC | Age: 72
End: 2022-06-16
Payer: MEDICARE

## 2022-06-16 VITALS
DIASTOLIC BLOOD PRESSURE: 82 MMHG | HEART RATE: 73 BPM | WEIGHT: 208.2 LBS | BODY MASS INDEX: 28.24 KG/M2 | SYSTOLIC BLOOD PRESSURE: 130 MMHG

## 2022-06-16 DIAGNOSIS — I77.810 ASCENDING AORTA DILATATION (HCC): ICD-10-CM

## 2022-06-16 DIAGNOSIS — I49.3 FREQUENT UNIFOCAL PVCS: Primary | ICD-10-CM

## 2022-06-16 DIAGNOSIS — E78.00 PURE HYPERCHOLESTEROLEMIA: ICD-10-CM

## 2022-06-16 DIAGNOSIS — I42.0 DILATED CARDIOMYOPATHY (HCC): ICD-10-CM

## 2022-06-16 DIAGNOSIS — Q24.5 ANOMALOUS CORONARY ARTERY ORIGIN: ICD-10-CM

## 2022-06-16 PROCEDURE — 99214 OFFICE O/P EST MOD 30 MIN: CPT | Performed by: INTERNAL MEDICINE

## 2022-06-16 RX ORDER — CARVEDILOL 6.25 MG/1
6.25 TABLET ORAL 2 TIMES DAILY WITH MEALS
Qty: 180 TABLET | Refills: 3 | Status: SHIPPED | OUTPATIENT
Start: 2022-06-16

## 2022-06-27 ENCOUNTER — PATIENT MESSAGE (OUTPATIENT)
Dept: GYNECOLOGIC ONCOLOGY | Facility: CLINIC | Age: 72
End: 2022-06-27

## 2022-06-27 NOTE — TELEPHONE ENCOUNTER
Patient called in regarding her appt with Dr Florencia Holbrook on August 30th, the patient requested a sooner appt with Dr Florencia Holbrook  I r/s the 3 month f/u for august 16th @11:30am  She is aware of her rising CA-125  Patient would like a phone call back  Please advise   Thank you    [phone]

## 2022-07-12 ENCOUNTER — TELEPHONE (OUTPATIENT)
Dept: OBGYN CLINIC | Facility: CLINIC | Age: 72
End: 2022-07-12

## 2022-07-12 DIAGNOSIS — N76.2 ACUTE VULVITIS: Primary | ICD-10-CM

## 2022-07-12 NOTE — TELEPHONE ENCOUNTER
Mycolog please  I would like to add a steroid to the antifungal to help reduce irritation    If no relief will need office visit

## 2022-07-12 NOTE — TELEPHONE ENCOUNTER
Pt is not having any discharge, itching is to vulvar area,not internal,  uro dr  told her to try monistat 3 day, and that didn't help, should she try diflucan?  Or a cream?

## 2022-07-28 ENCOUNTER — PATIENT MESSAGE (OUTPATIENT)
Dept: GYNECOLOGIC ONCOLOGY | Facility: CLINIC | Age: 72
End: 2022-07-28

## 2022-07-28 DIAGNOSIS — C56.9 MALIGNANT NEOPLASM OF OVARY, UNSPECIFIED LATERALITY (HCC): Primary | ICD-10-CM

## 2022-08-08 LAB — HBA1C MFR BLD HPLC: 5.7 %

## 2022-08-16 ENCOUNTER — HOSPITAL ENCOUNTER (OUTPATIENT)
Dept: CT IMAGING | Facility: HOSPITAL | Age: 72
Discharge: HOME | End: 2022-08-16
Payer: MEDICARE

## 2022-08-16 ENCOUNTER — OFFICE VISIT (OUTPATIENT)
Dept: GYNECOLOGIC ONCOLOGY | Facility: CLINIC | Age: 72
End: 2022-08-16
Payer: MEDICARE

## 2022-08-16 ENCOUNTER — HOSPITAL ENCOUNTER (OUTPATIENT)
Dept: MRI IMAGING | Facility: HOSPITAL | Age: 72
Discharge: HOME | End: 2022-08-16
Attending: OBSTETRICS & GYNECOLOGY
Payer: MEDICARE

## 2022-08-16 VITALS
HEIGHT: 72 IN | DIASTOLIC BLOOD PRESSURE: 82 MMHG | SYSTOLIC BLOOD PRESSURE: 150 MMHG | WEIGHT: 216 LBS | BODY MASS INDEX: 29.26 KG/M2 | TEMPERATURE: 99.3 F

## 2022-08-16 DIAGNOSIS — C56.9 MALIGNANT NEOPLASM OF OVARY, UNSPECIFIED LATERALITY (HCC): ICD-10-CM

## 2022-08-16 DIAGNOSIS — C56.9 MALIGNANT NEOPLASM OF OVARY, UNSPECIFIED LATERALITY (HCC): Primary | ICD-10-CM

## 2022-08-16 DIAGNOSIS — F41.9 ANXIETY: ICD-10-CM

## 2022-08-16 DIAGNOSIS — R97.1 ELEVATED CA-125: ICD-10-CM

## 2022-08-16 PROCEDURE — 74177 CT ABD & PELVIS W/CONTRAST: CPT

## 2022-08-16 PROCEDURE — 71260 CT THORAX DX C+: CPT

## 2022-08-16 PROCEDURE — 99214 OFFICE O/P EST MOD 30 MIN: CPT | Performed by: OBSTETRICS & GYNECOLOGY

## 2022-08-16 PROCEDURE — A9585 GADOBUTROL INJECTION: HCPCS | Performed by: OBSTETRICS & GYNECOLOGY

## 2022-08-16 PROCEDURE — G1004 CDSM NDSC: HCPCS

## 2022-08-16 PROCEDURE — 70553 MRI BRAIN STEM W/O & W/DYE: CPT

## 2022-08-16 RX ORDER — LORAZEPAM 1 MG/1
TABLET ORAL
Qty: 1 TABLET | Refills: 0 | Status: SHIPPED | OUTPATIENT
Start: 2022-08-16 | End: 2022-08-23

## 2022-08-16 RX ADMIN — IOHEXOL 65 ML: 350 INJECTION, SOLUTION INTRAVENOUS at 09:21

## 2022-08-16 RX ADMIN — GADOBUTROL 9 ML: 604.72 INJECTION INTRAVENOUS at 14:58

## 2022-08-16 NOTE — ASSESSMENT & PLAN NOTE
Patient  continues to rise  There are no other explanation for spurious  elevation  She had a cardiac catheterization in March of 2021 which showed ejection fraction of 55% ruling out CHF  She does not have any other serous ascites or active processes  In addition, the up trend has been steady since May of 2022  We were worried about occult disease  MRI of the brain will be ordered to rule out the possibility of brain Mets given headaches  I will discuss with Dr Ancelmo Wilde potential therapeutic interventions  I do not think that we are benefiting from neuropathy rib at this point  I would favor treating her as a platinum resistant recurrence since  elevation started approximately 4 months after completion of last chemotherapy with carboplatin and Doxil  Therapeutic options include multiple cytotoxic agents (topotecan, Taxol/Taxotere, gemcitabine, primary tract set, etcetera) alone or in combination with bevacizumab  Treatment plan to be designed based on patient's preference is after consulting with treating medical oncologist     I plan to see her back in the office in 3 months to follow-up response to therapy

## 2022-08-16 NOTE — PATIENT INSTRUCTIONS
Keep appointment for MRI  Dr Raeann Mohs and I will discuss her case  His office will contact you with treatment plan

## 2022-08-16 NOTE — PROGRESS NOTES
Assessment/Plan:    Problem List Items Addressed This Visit        Endocrine    Ovarian cancer [facility]) - Primary     Patient  continues to rise  There are no other explanation for spurious  elevation  She had a cardiac catheterization in March of 2021 which showed ejection fraction of 55% ruling out CHF  She does not have any other serous ascites or active processes  In addition, the up trend has been steady since May of 2022  We were worried about occult disease  MRI of the brain will be ordered to rule out the possibility of brain Mets given headaches  I will discuss with Dr Roge Marshall potential therapeutic interventions  I do not think that we are benefiting from neuropathy rib at this point  I would favor treating her as a platinum resistant recurrence since  elevation started approximately 4 months after completion of last chemotherapy with carboplatin and Doxil  Therapeutic options include multiple cytotoxic agents (topotecan, Taxol/Taxotere, gemcitabine, primary tract set, etcetera) alone or in combination with bevacizumab  Treatment plan to be designed based on patient's preference is after consulting with treating medical oncologist     I plan to see her back in the office in 3 months to follow-up response to therapy           Relevant Orders    MRI brain w wo contrast    BUN    Creatinine, serum       Other    Elevated CA-125    Relevant Orders    MRI brain w wo contrast    BUN    Creatinine, serum        CHIEF COMPLAINT:   Follow-up for ovarian cancer    Problem:  Cancer Staging  Ovarian cancer [facility])  Staging form: Ovary, Fallopian Tube, Primary Peritoneal, AJCC 8th Edition  - Pathologic stage from 1/13/2020: FIGO Stage IC3 (pN0, cM0) - Signed by Emily Irby MD on 1/28/2020        Previous therapy:  Oncology History   Ovarian cancer ([facility] [address] )   1/13/2020 Initial Diagnosis    Ovarian cancer on left ([facility] [address] )     1/13/2020 -  Cancer Staged    Staging form: Ovary, Fallopian Tube, Primary Peritoneal, AJCC 8th Edition  - Pathologic stage from 1/13/2020: FIGO Stage IC3 (pN0, cM0) - Signed by Jose Waddell MD on 1/28/2020  Histologic grade (G): G3  Histologic grading system: 4 grade system  Residual tumor (R): R0 - None  Histopathologic type: Serous cystadenocarcinoma, NOS  Diagnostic confirmation: Positive histology PLUS positive immunophenotyping and/or positive genetic studies  Specimen type: Excision  Staged by: Managing physician  Cancer antigen 125 () (U/mL): 4,000  Gross residual tumor after primary cyto-reductive surgery: Absent  Residual tumor volume after primary cytoreductive surgery: No gross tumor  National guidelines used in treatment planning: Yes  Type of national guideline used in treatment planning: NCCN       1/13/2020 Surgery    Diagnostic laparoscopy, laparotomy, total hysterectomy, bilateral salpingo-oophorectomy with resection of pelvic mass, bilateral pelvic and periaortic lymphadenectomy, staging peritoneal biopsies, omentectomy, appendectomy  Final pathology consistent with 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  2/11/2020 - 5/26/2020 Chemotherapy    Six cycles of carboplatin/paclitaxel administered by Dr Avel Bourgeois  Tolerated well  CT scan at completion of treatment demonstrates no evidence of measurable disease   amarilis near completion of chemotherapy 5 7 units/milliliter     8/9/2021 Recurrence     Elevated  triggered imaging  which demonstrated suspicious mediastinal lymphadenopathy  Endobronchial ultrasound-guided fine-needle aspiration on August 9, 2021 demonstrates metastatic carcinoma with immuno phenotype consistent with metastatic ovarian primary  9/1/2021 - 1/24/2022 Chemotherapy    Carboplatin + Doxil x 6 cycles (Dr Catherine Goddard)  Complete response  Amarilis Ca125 8 5 IU/mL  3/2022 -  Chemotherapy    Niraparib             Patient ID: Jon Fontana is a 73 y o  female  HPI  Patient with 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 originally diagnosed in January 2020  After completion of adjuvant therapy in May of 2020, she remained with no evidence of disease until August of 2021  At that time, elevated  triggered imaging which demonstrated chest lymphadenopathy  Endobronchial ultrasound-guided biopsy confirmed metastatic/recurrent high-grade serous ovarian carcinoma  She was treated with carboplatin and Doxil for 6 cycles with an excellent clinical response  She then started near operative which she has been receiving since March of 2022  Since May of this year her  has again been rising  On May 25th was 61 9, June 22 120 1 and most recently on July 26 it was 167 6 units/milliliter  Approximately 3 months ago she had imaging and MRI of the brain which failed to demonstrate measurable disease  She had CT scan of the chest, abdomen and pelvis today prior to this visit which demonstrates no evidence of measurable disease  I have personally reviewed images  The following portions of the patient's history were reviewed and updated as appropriate: allergies, current medications, past family history, past medical history, past social history, past surgical history and problem list     Review of Systems  As per HPI  Recent UTI treated with antibiotics  Recurrent/somewhat persistent headaches for the last few weeks  Denies abdominal pain, bloating, pelvic pressure or changes in bowel or bladder function  Twelve point review of systems otherwise unremarkable    Current Outpatient Medications   Medication Sig Dispense Refill    b complex vitamins capsule Take 1 capsule by mouth daily      carvedilol (COREG) 6 25 mg tablet Take 1 tablet (6 25 mg total) by mouth 2 (two) times a day with meals 180 tablet 3    cholecalciferol (VITAMIN D3) 400 units tablet Take 400 Units by mouth daily      estradiol (ESTRACE) 0 1 mg/g vaginal cream insert 1 gram vaginally two times a week  0    gabapentin (NEURONTIN) 100 mg capsule Take 100 mg by mouth if needed      losartan-hydrochlorothiazide (HYZAAR) 50-12 5 mg per tablet Take 1 tablet by mouth daily   0    magnesium oxide (MAG-OX) 400 mg Take 400 mg by mouth 2 (two) times a day       niraparib (ZEJULA) 100 MG CAPS Take 300 mg by mouth daily at bedtime      nystatin-triamcinolone (MYCOLOG-II) cream Apply topically 2 (two) times a day 30 g 0    prochlorperazine (COMPAZINE) 10 mg tablet Take 10 mg by mouth every 6 (six) hours as needed      simvastatin (ZOCOR) 20 mg tablet Take 20 mg by mouth daily   0    trimethoprim (PROLOPRIM) 100 mg tablet Take 100 mg by mouth daily   0    venlafaxine 150 MG TB24 Take 150 mg by mouth daily with breakfast   0    zolpidem (AMBIEN CR) 12 5 MG CR tablet Take 12 5 mg by mouth daily at bedtime   0     No current facility-administered medications for this visit  Objective:    Blood pressure 150/82, temperature 99 3 °F (37 4 °C), temperature source Tympanic, height 6' (1 829 m), weight 98 kg (216 lb), not currently breastfeeding  Body mass index is 29 29 kg/m²  Body surface area is 2 2 meters squared  Physical Exam  Vitals reviewed  Constitutional:       General: She is not in acute distress  Appearance: Normal appearance  She is not toxic-appearing  Eyes:      General: No scleral icterus  Right eye: No discharge  Left eye: No discharge  Conjunctiva/sclera: Conjunctivae normal    Pulmonary:      Effort: Pulmonary effort is normal       Breath sounds: No stridor  Abdominal:      General: There is no distension  Palpations: Abdomen is soft  There is no mass  Tenderness: There is no abdominal tenderness  There is no guarding  Hernia: No hernia is present  Genitourinary:     Comments: Normal external female genitalia  Normal Bartholin's and Upper [place]oman's glands  Normal urethral meatus and no evidence of urethral discharge or masses  Bladder without fullness mass or tenderness   Vagina without lesion or discharge  No significant pelvic organ prolapse noted  Cervix and uterus are surgically absent  Bimanual exam demonstrates no evidence of pelvic masses  Anus without fissure of lesion  Musculoskeletal:      Cervical back: Neck supple  No rigidity or tenderness  Right lower leg: No edema  Left lower leg: No edema  Lymphadenopathy:      Cervical: No cervical adenopathy  Neurological:      Mental Status: She is alert  8/16/2022 Study Result    Narrative & Impression   CT CHEST, ABDOMEN AND PELVIS WITH IV CONTRAST     INDICATION:   C56 9: Malignant neoplasm of unspecified ovary      COMPARISON:  CT 5/9/2022, PET/CT 5/20/2022     TECHNIQUE: CT examination of the chest, abdomen and pelvis was performed  Axial, sagittal, and coronal 2D reformatted images were created from the source data and submitted for interpretation      Radiation dose length product (DLP) for this visit:  635 mGy-cm   This examination, like all CT scans performed in the [facility], was performed utilizing techniques to minimize radiation dose exposure, including the use of iterative   reconstruction and automated exposure control      IV Contrast:  65 mL of iohexol (OMNIPAQUE)  Enteric Contrast: Enteric contrast was administered      FINDINGS:     CHEST     LUNGS:  Lungs are clear  There is no tracheal or endobronchial lesion      PLEURA:  Unremarkable      HEART/GREAT VESSELS: Heart is unremarkable for patient's age  No thoracic aortic aneurysm      MEDIASTINUM AND LISA:  Unremarkable      CHEST WALL AND LOWER NECK:  Patient is status post bilateral mastectomy      ABDOMEN     LIVER/BILIARY TREE:  Unremarkable      GALLBLADDER:  Gallbladder is surgically absent      SPLEEN:  Unremarkable      PANCREAS:  Unremarkable      ADRENAL GLANDS:  Unremarkable      KIDNEYS/URETERS:  Unremarkable   No hydronephrosis      STOMACH AND BOWEL:  There is colonic diverticulosis without evidence of acute diverticulitis      APPENDIX:  No findings to suggest appendicitis      ABDOMINOPELVIC CAVITY:  No ascites  No pneumoperitoneum  No lymphadenopathy      VESSELS:  Unremarkable for patient's age      PELVIS     REPRODUCTIVE ORGANS:  Surgical changes of prior hysterectomy      URINARY BLADDER:  Unremarkable      ABDOMINAL WALL/INGUINAL REGIONS:  Anterior abdominal wall hernia repair again present noted      OSSEOUS STRUCTURES:  No acute fracture or destructive osseous lesion      IMPRESSION:     No CT evidence metastatic disease within the chest abdomen or pelvis            Workstation performed: [id number]     I have personally reviewed CT scan images  I do not see evidence of pathologically enlarged lymph nodes in the chest, abdomen or pelvis  I do not see evidence of ascites or measurable tumor      Emilia Breaux MD, [address]  8/16/2022  12:10 PM

## 2022-08-16 NOTE — LETTER
August 16, 2022     Callie Inman, 2003 [address] [address]    Patient: Isadora Chang   YOB: 1950   Date of Visit: 8/16/2022       Dear Dr Alwin Cogan: Thank you for referring Tori Stoddard to me for evaluation  Below are my notes for this consultation  If you have questions, please do not hesitate to call me  I look forward to following your patient along with you  Sincerely,        Emilia Breaux MD        CC: MD Emilia Garrison MD  8/16/2022 12:11 PM  Incomplete  Assessment/Plan:    Problem List Items Addressed This Visit        Endocrine    Ovarian cancer [facility]) - Primary     Patient  continues to rise  There are no other explanation for spurious  elevation  She had a cardiac catheterization in March of 2021 which showed ejection fraction of 55% ruling out CHF  She does not have any other serous ascites or active processes  In addition, the up trend has been steady since May of 2022  We were worried about occult disease  MRI of the brain will be ordered to rule out the possibility of brain Mets given headaches  I will discuss with Dr Alwin Cogan potential therapeutic interventions  I do not think that we are benefiting from neuropathy rib at this point  I would favor treating her as a platinum resistant recurrence since  elevation started approximately 4 months after completion of last chemotherapy with carboplatin and Doxil  Therapeutic options include multiple cytotoxic agents (topotecan, Taxol/Taxotere, gemcitabine, primary tract set, etcetera) alone or in combination with bevacizumab  Treatment plan to be designed based on patient's preference is after consulting with treating medical oncologist     I plan to see her back in the office in 3 months to follow-up response to therapy           Relevant Orders    MRI brain w wo contrast    BUN    Creatinine, serum       Other    Elevated CA-125    Relevant Orders    MRI brain w wo contrast    BUN    Creatinine, serum        CHIEF COMPLAINT:   Follow-up for ovarian cancer    Problem:  Cancer Staging  Ovarian cancer [facility])  Staging form: Ovary, Fallopian Tube, Primary Peritoneal, AJCC 8th Edition  - Pathologic stage from 1/13/2020: FIGO Stage IC3 (pN0, cM0) - Signed by Katie Gamez MD on 1/28/2020        Previous therapy:  Oncology History   Ovarian cancer ([facility] [address] )   1/13/2020 Initial Diagnosis    Ovarian cancer on left ([facility] [address] )     1/13/2020 -  Cancer Staged    Staging form: Ovary, Fallopian Tube, Primary Peritoneal, AJCC 8th Edition  - Pathologic stage from 1/13/2020: FIGO Stage IC3 (pN0, cM0) - Signed by Katie Gamez MD on 1/28/2020  Histologic grade (G): G3  Histologic grading system: 4 grade system  Residual tumor (R): R0 - None  Histopathologic type: Serous cystadenocarcinoma, NOS  Diagnostic confirmation: Positive histology PLUS positive immunophenotyping and/or positive genetic studies  Specimen type: Excision  Staged by: Managing physician  Cancer antigen 125 () (U/mL): 4,000  Gross residual tumor after primary cyto-reductive surgery: Absent  Residual tumor volume after primary cytoreductive surgery: No gross tumor  National guidelines used in treatment planning: Yes  Type of national guideline used in treatment planning: NCCN       1/13/2020 Surgery    Diagnostic laparoscopy, laparotomy, total hysterectomy, bilateral salpingo-oophorectomy with resection of pelvic mass, bilateral pelvic and periaortic lymphadenectomy, staging peritoneal biopsies, omentectomy, appendectomy  Final pathology consistent with 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  2/11/2020 - 5/26/2020 Chemotherapy    Six cycles of carboplatin/paclitaxel administered by Dr Debi Talamantes  Tolerated well  CT scan at completion of treatment demonstrates no evidence of measurable disease     amarilis near completion of chemotherapy 5 7 units/milliliter     8/9/2021 Recurrence Elevated  triggered imaging  which demonstrated suspicious mediastinal lymphadenopathy  Endobronchial ultrasound-guided fine-needle aspiration on August 9, 2021 demonstrates metastatic carcinoma with immuno phenotype consistent with metastatic ovarian primary  9/1/2021 - 1/24/2022 Chemotherapy    Carboplatin + Doxil x 6 cycles (Dr Charity Mcclellan)  Complete response  Fadi Ca125 8 5 IU/mL  3/2022 -  Chemotherapy    Niraparib  Patient ID: Danielle Ortiz is a 73 y o  female  HPI  Patient with 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 originally diagnosed in January 2020  After completion of adjuvant therapy in May of 2020, she remained with no evidence of disease until August of 2021  At that time, elevated  triggered imaging which demonstrated chest lymphadenopathy  Endobronchial ultrasound-guided biopsy confirmed metastatic/recurrent high-grade serous ovarian carcinoma  She was treated with carboplatin and Doxil for 6 cycles with an excellent clinical response  She then started near operative which she has been receiving since March of 2022  Since May of this year her  has again been rising  On May 25th was 61 9, June 22 120 1 and most recently on July 26 it was 167 6 units/milliliter  Approximately 3 months ago she had imaging and MRI of the brain which failed to demonstrate measurable disease  She had CT scan of the chest, abdomen and pelvis today prior to this visit which demonstrates no evidence of measurable disease  I have personally reviewed images  The following portions of the patient's history were reviewed and updated as appropriate: allergies, current medications, past family history, past medical history, past social history, past surgical history and problem list     Review of Systems  As per HPI  Recent UTI treated with antibiotics  Recurrent/somewhat persistent headaches for the last few weeks    Denies abdominal pain, bloating, pelvic pressure or changes in bowel or bladder function  Twelve point review of systems otherwise unremarkable  Current Outpatient Medications   Medication Sig Dispense Refill    b complex vitamins capsule Take 1 capsule by mouth daily      carvedilol (COREG) 6 25 mg tablet Take 1 tablet (6 25 mg total) by mouth 2 (two) times a day with meals 180 tablet 3    cholecalciferol (VITAMIN D3) 400 units tablet Take 400 Units by mouth daily      estradiol (ESTRACE) 0 1 mg/g vaginal cream insert 1 gram vaginally two times a week  0    gabapentin (NEURONTIN) 100 mg capsule Take 100 mg by mouth if needed      losartan-hydrochlorothiazide (HYZAAR) 50-12 5 mg per tablet Take 1 tablet by mouth daily   0    magnesium oxide (MAG-OX) 400 mg Take 400 mg by mouth 2 (two) times a day       niraparib (ZEJULA) 100 MG CAPS Take 300 mg by mouth daily at bedtime      nystatin-triamcinolone (MYCOLOG-II) cream Apply topically 2 (two) times a day 30 g 0    prochlorperazine (COMPAZINE) 10 mg tablet Take 10 mg by mouth every 6 (six) hours as needed      simvastatin (ZOCOR) 20 mg tablet Take 20 mg by mouth daily   0    trimethoprim (PROLOPRIM) 100 mg tablet Take 100 mg by mouth daily   0    venlafaxine 150 MG TB24 Take 150 mg by mouth daily with breakfast   0    zolpidem (AMBIEN CR) 12 5 MG CR tablet Take 12 5 mg by mouth daily at bedtime   0     No current facility-administered medications for this visit  Objective:    Blood pressure 150/82, temperature 99 3 °F (37 4 °C), temperature source Tympanic, height 6' (1 829 m), weight 98 kg (216 lb), not currently breastfeeding  Body mass index is 29 29 kg/m²  Body surface area is 2 2 meters squared  Physical Exam  Vitals reviewed  Constitutional:       General: She is not in acute distress  Appearance: Normal appearance  She is not toxic-appearing  Eyes:      General: No scleral icterus  Right eye: No discharge  Left eye: No discharge        Conjunctiva/sclera: Conjunctivae normal    Pulmonary:      Effort: Pulmonary effort is normal       Breath sounds: No stridor  Abdominal:      General: There is no distension  Palpations: Abdomen is soft  There is no mass  Tenderness: There is no abdominal tenderness  There is no guarding  Hernia: No hernia is present  Genitourinary:     Comments: Normal external female genitalia  Normal Bartholin's and [place]'s glands  Normal urethral meatus and no evidence of urethral discharge or masses  Bladder without fullness mass or tenderness  Vagina without lesion or discharge  No significant pelvic organ prolapse noted  Cervix and uterus are surgically absent  Bimanual exam demonstrates no evidence of pelvic masses  Anus without fissure of lesion  Musculoskeletal:      Cervical back: Neck supple  No rigidity or tenderness  Right lower leg: No edema  Left lower leg: No edema  Lymphadenopathy:      Cervical: No cervical adenopathy  Neurological:      Mental Status: She is alert  8/16/2022 Study Result    Narrative & Impression   CT CHEST, ABDOMEN AND PELVIS WITH IV CONTRAST     INDICATION:   C56 9: Malignant neoplasm of unspecified ovary      COMPARISON:  CT 5/9/2022, PET/CT 5/20/2022     TECHNIQUE: CT examination of the chest, abdomen and pelvis was performed  Axial, sagittal, and coronal 2D reformatted images were created from the source data and submitted for interpretation      Radiation dose length product (DLP) for this visit:  635 mGy-cm   This examination, like all CT scans performed in the [facility], was performed utilizing techniques to minimize radiation dose exposure, including the use of iterative   reconstruction and automated exposure control      IV Contrast:  65 mL of iohexol (OMNIPAQUE)  Enteric Contrast: Enteric contrast was administered      FINDINGS:     CHEST     LUNGS:  Lungs are clear    There is no tracheal or endobronchial lesion      PLEURA: Unremarkable      HEART/GREAT VESSELS: Heart is unremarkable for patient's age  No thoracic aortic aneurysm      MEDIASTINUM AND LISA:  Unremarkable      CHEST WALL AND LOWER NECK:  Patient is status post bilateral mastectomy      ABDOMEN     LIVER/BILIARY TREE:  Unremarkable      GALLBLADDER:  Gallbladder is surgically absent      SPLEEN:  Unremarkable      PANCREAS:  Unremarkable      ADRENAL GLANDS:  Unremarkable      KIDNEYS/URETERS:  Unremarkable  No hydronephrosis      STOMACH AND BOWEL:  There is colonic diverticulosis without evidence of acute diverticulitis      APPENDIX:  No findings to suggest appendicitis      ABDOMINOPELVIC CAVITY:  No ascites  No pneumoperitoneum  No lymphadenopathy      VESSELS:  Unremarkable for patient's age      PELVIS     REPRODUCTIVE ORGANS:  Surgical changes of prior hysterectomy      URINARY BLADDER:  Unremarkable      ABDOMINAL WALL/INGUINAL REGIONS:  Anterior abdominal wall hernia repair again present noted      OSSEOUS STRUCTURES:  No acute fracture or destructive osseous lesion      IMPRESSION:     No CT evidence metastatic disease within the chest abdomen or pelvis            Workstation performed: [id number]     I have personally reviewed CT scan images  I do not see evidence of pathologically enlarged lymph nodes in the chest, abdomen or pelvis  I do not see evidence of ascites or measurable tumor  Leela Sanders MD, [address] [place] [address]  8/16/2022  12:10 PM          Leela Sanders MD  8/16/2022 12:09 PM  Sign when Signing Visit  Assessment/Plan:    Problem List Items Addressed This Visit        Endocrine    Ovarian cancer [facility]) - Primary     Patient  continues to rise  There are no other explanation for spurious  elevation  She had a cardiac catheterization in March of 2021 which showed ejection fraction of 55% ruling out CHF  She does not have any other serous ascites or active processes    In addition, the op trend has been steady since May of 2022     We were worried about occult disease  MRI of the brain will be ordered to rule out the possibility of brain Mets given headaches  I will discuss with Dr Franki Agosto potential therapeutic interventions  I do not think that we are benefiting from neuropathy rib at this point  I would favor treating her as a platinum resistant recurrence since  elevation started approximately 4 months after completion of last chemotherapy with carboplatin and Doxil  Therapeutic options include multiple cytotoxic agents (topotecan, Taxol/Taxotere, gemcitabine, primary tract set, etcetera) alone or in combination with bevacizumab  Treatment plan to be designed based on patient's preference is after consulting with treating medical oncologist     I plan to see her back in the office in 3 months to follow-up response to therapy           Relevant Orders    MRI brain w wo contrast    BUN    Creatinine, serum       Other    Elevated CA-125    Relevant Orders    MRI brain w wo contrast    BUN    Creatinine, serum        CHIEF COMPLAINT:   Follow-up for ovarian cancer    Problem:  Cancer Staging  Ovarian cancer [facility])  Staging form: Ovary, Fallopian Tube, Primary Peritoneal, AJCC 8th Edition  - Pathologic stage from 1/13/2020: FIGO Stage IC3 (pN0, cM0) - Signed by Emily Irby MD on 1/28/2020        Previous therapy:  Oncology History   Ovarian cancer ([facility] [address] )   1/13/2020 Initial Diagnosis    Ovarian cancer on left ([facility] [address] )     1/13/2020 -  Cancer Staged    Staging form: Ovary, Fallopian Tube, Primary Peritoneal, AJCC 8th Edition  - Pathologic stage from 1/13/2020: FIGO Stage IC3 (pN0, cM0) - Signed by Emily Irby MD on 1/28/2020  Histologic grade (G): G3  Histologic grading system: 4 grade system  Residual tumor (R): R0 - None  Histopathologic type: Serous cystadenocarcinoma, NOS  Diagnostic confirmation: Positive histology PLUS positive immunophenotyping and/or positive genetic studies  Specimen type: Excision  Staged by: Managing physician  Cancer antigen 125 () (U/mL): 4,000  Gross residual tumor after primary cyto-reductive surgery: Absent  Residual tumor volume after primary cytoreductive surgery: No gross tumor  National guidelines used in treatment planning: Yes  Type of national guideline used in treatment planning: NCCN       1/13/2020 Surgery    Diagnostic laparoscopy, laparotomy, total hysterectomy, bilateral salpingo-oophorectomy with resection of pelvic mass, bilateral pelvic and periaortic lymphadenectomy, staging peritoneal biopsies, omentectomy, appendectomy  Final pathology consistent with 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  2/11/2020 - 5/26/2020 Chemotherapy    Six cycles of carboplatin/paclitaxel administered by Dr Alwin Cogan  Tolerated well  CT scan at completion of treatment demonstrates no evidence of measurable disease   amarilis near completion of chemotherapy 5 7 units/milliliter     8/9/2021 Recurrence     Elevated  triggered imaging  which demonstrated suspicious mediastinal lymphadenopathy  Endobronchial ultrasound-guided fine-needle aspiration on August 9, 2021 demonstrates metastatic carcinoma with immuno phenotype consistent with metastatic ovarian primary  9/1/2021 - 1/24/2022 Chemotherapy    Carboplatin + Doxil x 6 cycles (Dr Bhavin Arboleda)  Complete response  Amarilis Ca125 8 5 IU/mL  3/2022 -  Chemotherapy    Niraparib  Patient ID: Isadora Chang is a 73 y o  female  HPI  Patient with 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 originally diagnosed in January 2020  After completion of adjuvant therapy in May of 2020, she remained with no evidence of disease until August of 2021  At that time, elevated  triggered imaging which demonstrated chest lymphadenopathy  Endobronchial ultrasound-guided biopsy confirmed metastatic/recurrent high-grade serous ovarian carcinoma    She was treated with carboplatin and Doxil for 6 cycles with an excellent clinical response  She then started near operative which she has been receiving since March of 2022  Since May of this year her  has again been rising  On May 25th was 61 9, June 22 120 1 and most recently on July 26 it was 167 6 units/milliliter  Approximately 3 months ago she had imaging and MRI of the brain which failed to demonstrate measurable disease  She had CT scan of the chest, abdomen and pelvis today prior to this visit which demonstrates no evidence of measurable disease  I have personally reviewed images  The following portions of the patient's history were reviewed and updated as appropriate: allergies, current medications, past family history, past medical history, past social history, past surgical history and problem list     Review of Systems  As per HPI  Recent UTI treated with antibiotics  Recurrent/somewhat persistent headaches for the last few weeks  Denies abdominal pain, bloating, pelvic pressure or changes in bowel or bladder function  Twelve point review of systems otherwise unremarkable    Current Outpatient Medications   Medication Sig Dispense Refill    b complex vitamins capsule Take 1 capsule by mouth daily      carvedilol (COREG) 6 25 mg tablet Take 1 tablet (6 25 mg total) by mouth 2 (two) times a day with meals 180 tablet 3    cholecalciferol (VITAMIN D3) 400 units tablet Take 400 Units by mouth daily      estradiol (ESTRACE) 0 1 mg/g vaginal cream insert 1 gram vaginally two times a week  0    gabapentin (NEURONTIN) 100 mg capsule Take 100 mg by mouth if needed      losartan-hydrochlorothiazide (HYZAAR) 50-12 5 mg per tablet Take 1 tablet by mouth daily   0    magnesium oxide (MAG-OX) 400 mg Take 400 mg by mouth 2 (two) times a day       niraparib (ZEJULA) 100 MG CAPS Take 300 mg by mouth daily at bedtime      nystatin-triamcinolone (MYCOLOG-II) cream Apply topically 2 (two) times a day 30 g 0    prochlorperazine (COMPAZINE) 10 mg tablet Take 10 mg by mouth every 6 (six) hours as needed      simvastatin (ZOCOR) 20 mg tablet Take 20 mg by mouth daily   0    trimethoprim (PROLOPRIM) 100 mg tablet Take 100 mg by mouth daily   0    venlafaxine 150 MG TB24 Take 150 mg by mouth daily with breakfast   0    zolpidem (AMBIEN CR) 12 5 MG CR tablet Take 12 5 mg by mouth daily at bedtime   0     No current facility-administered medications for this visit  Objective:    Blood pressure 150/82, temperature 99 3 °F (37 4 °C), temperature source Tympanic, height 6' (1 829 m), weight 98 kg (216 lb), not currently breastfeeding  Body mass index is 29 29 kg/m²  Body surface area is 2 2 meters squared  Physical Exam  Vitals reviewed  Constitutional:       General: She is not in acute distress  Appearance: Normal appearance  She is not toxic-appearing  Eyes:      General: No scleral icterus  Right eye: No discharge  Left eye: No discharge  Conjunctiva/sclera: Conjunctivae normal    Pulmonary:      Effort: Pulmonary effort is normal       Breath sounds: No stridor  Abdominal:      General: There is no distension  Palpations: Abdomen is soft  There is no mass  Tenderness: There is no abdominal tenderness  There is no guarding  Hernia: No hernia is present  Genitourinary:     Comments: Normal external female genitalia  Normal Bartholin's and [place]'s glands  Normal urethral meatus and no evidence of urethral discharge or masses  Bladder without fullness mass or tenderness  Vagina without lesion or discharge  No significant pelvic organ prolapse noted  Cervix and uterus are surgically absent  Bimanual exam demonstrates no evidence of pelvic masses  Anus without fissure of lesion  Musculoskeletal:      Cervical back: Neck supple  No rigidity or tenderness  Right lower leg: No edema  Left lower leg: No edema  Lymphadenopathy:      Cervical: No cervical adenopathy     Neurological: Mental Status: She is alert  8/16/2022 Study Result    Narrative & Impression   CT CHEST, ABDOMEN AND PELVIS WITH IV CONTRAST     INDICATION:   C56 9: Malignant neoplasm of unspecified ovary      COMPARISON:  CT 5/9/2022, PET/CT 5/20/2022     TECHNIQUE: CT examination of the chest, abdomen and pelvis was performed  Axial, sagittal, and coronal 2D reformatted images were created from the source data and submitted for interpretation      Radiation dose length product (DLP) for this visit:  635 mGy-cm   This examination, like all CT scans performed in the [facility], was performed utilizing techniques to minimize radiation dose exposure, including the use of iterative   reconstruction and automated exposure control      IV Contrast:  65 mL of iohexol (OMNIPAQUE)  Enteric Contrast: Enteric contrast was administered      FINDINGS:     CHEST     LUNGS:  Lungs are clear  There is no tracheal or endobronchial lesion      PLEURA:  Unremarkable      HEART/GREAT VESSELS: Heart is unremarkable for patient's age  No thoracic aortic aneurysm      MEDIASTINUM AND LISA:  Unremarkable      CHEST WALL AND LOWER NECK:  Patient is status post bilateral mastectomy      ABDOMEN     LIVER/BILIARY TREE:  Unremarkable      GALLBLADDER:  Gallbladder is surgically absent      SPLEEN:  Unremarkable      PANCREAS:  Unremarkable      ADRENAL GLANDS:  Unremarkable      KIDNEYS/URETERS:  Unremarkable  No hydronephrosis      STOMACH AND BOWEL:  There is colonic diverticulosis without evidence of acute diverticulitis      APPENDIX:  No findings to suggest appendicitis      ABDOMINOPELVIC CAVITY:  No ascites  No pneumoperitoneum    No lymphadenopathy      VESSELS:  Unremarkable for patient's age      PELVIS     REPRODUCTIVE ORGANS:  Surgical changes of prior hysterectomy      URINARY BLADDER:  Unremarkable      ABDOMINAL WALL/INGUINAL REGIONS:  Anterior abdominal wall hernia repair again present noted      OSSEOUS STRUCTURES:  No acute fracture or destructive osseous lesion      IMPRESSION:     No CT evidence metastatic disease within the chest abdomen or pelvis            Workstation performed: [id number]     I have personally reviewed CT scan images  I do not see evidence of pathologically enlarged lymph nodes in the chest, abdomen or pelvis  I do not see evidence of ascites or measurable tumor      Deanna Ramsey MD, [address]  8/16/2022  12:09 PM 11-Dec-2020 21:48

## 2022-08-17 ENCOUNTER — HOSPITAL ENCOUNTER (OUTPATIENT)
Facility: HOSPITAL | Age: 72
Setting detail: OBSERVATION
Discharge: HOME | End: 2022-08-17
Attending: OBSTETRICS & GYNECOLOGY | Admitting: OBSTETRICS & GYNECOLOGY
Payer: MEDICARE

## 2022-08-17 VITALS
RESPIRATION RATE: 17 BRPM | TEMPERATURE: 97.6 F | HEART RATE: 80 BPM | OXYGEN SATURATION: 97 % | DIASTOLIC BLOOD PRESSURE: 85 MMHG | SYSTOLIC BLOOD PRESSURE: 137 MMHG

## 2022-08-17 DIAGNOSIS — C56.9 MALIGNANT NEOPLASM OF OVARY, UNSPECIFIED LATERALITY (HCC): ICD-10-CM

## 2022-08-17 DIAGNOSIS — C79.31 BRAIN METASTASES (HCC): Primary | ICD-10-CM

## 2022-08-17 PROCEDURE — NC001 PR NO CHARGE: Performed by: OBSTETRICS & GYNECOLOGY

## 2022-08-17 PROCEDURE — 99222 1ST HOSP IP/OBS MODERATE 55: CPT | Performed by: OBSTETRICS & GYNECOLOGY

## 2022-08-17 PROCEDURE — 99203 OFFICE O/P NEW LOW 30 MIN: CPT | Performed by: INTERNAL MEDICINE

## 2022-08-17 PROCEDURE — 99223 1ST HOSP IP/OBS HIGH 75: CPT | Performed by: RADIOLOGY

## 2022-08-17 RX ORDER — ACETAMINOPHEN 325 MG/1
650 TABLET ORAL EVERY 6 HOURS PRN
Status: DISCONTINUED | OUTPATIENT
Start: 2022-08-17 | End: 2022-08-17 | Stop reason: HOSPADM

## 2022-08-17 RX ORDER — ZOLPIDEM TARTRATE 5 MG/1
5 TABLET ORAL
Status: DISCONTINUED | OUTPATIENT
Start: 2022-08-17 | End: 2022-08-17 | Stop reason: HOSPADM

## 2022-08-17 RX ORDER — LORAZEPAM 1 MG/1
1 TABLET ORAL EVERY 8 HOURS PRN
Status: DISCONTINUED | OUTPATIENT
Start: 2022-08-17 | End: 2022-08-17 | Stop reason: HOSPADM

## 2022-08-17 RX ORDER — VENLAFAXINE HYDROCHLORIDE 150 MG/1
150 CAPSULE, EXTENDED RELEASE ORAL DAILY
Status: DISCONTINUED | OUTPATIENT
Start: 2022-08-17 | End: 2022-08-17 | Stop reason: HOSPADM

## 2022-08-17 RX ORDER — DEXAMETHASONE 4 MG/1
4 TABLET ORAL EVERY 12 HOURS SCHEDULED
Qty: 28 TABLET | Refills: 0 | Status: SHIPPED | OUTPATIENT
Start: 2022-08-17 | End: 2022-08-24 | Stop reason: DRUGHIGH

## 2022-08-17 RX ORDER — PRAVASTATIN SODIUM 20 MG
20 TABLET ORAL
Status: DISCONTINUED | OUTPATIENT
Start: 2022-08-17 | End: 2022-08-17 | Stop reason: HOSPADM

## 2022-08-17 RX ORDER — OXYCODONE HYDROCHLORIDE 5 MG/1
5 TABLET ORAL EVERY 4 HOURS PRN
Status: DISCONTINUED | OUTPATIENT
Start: 2022-08-17 | End: 2022-08-17 | Stop reason: HOSPADM

## 2022-08-17 RX ORDER — DEXAMETHASONE 4 MG/1
4 TABLET ORAL EVERY 12 HOURS SCHEDULED
Status: DISCONTINUED | OUTPATIENT
Start: 2022-08-17 | End: 2022-08-17 | Stop reason: HOSPADM

## 2022-08-17 RX ORDER — CARVEDILOL 6.25 MG/1
6.25 TABLET ORAL 2 TIMES DAILY WITH MEALS
Status: DISCONTINUED | OUTPATIENT
Start: 2022-08-17 | End: 2022-08-17 | Stop reason: HOSPADM

## 2022-08-17 RX ORDER — OMEGA-3S/DHA/EPA/FISH OIL/D3 300MG-1000
400 CAPSULE ORAL DAILY
Status: DISCONTINUED | OUTPATIENT
Start: 2022-08-17 | End: 2022-08-17 | Stop reason: HOSPADM

## 2022-08-17 RX ADMIN — DEXAMETHASONE 4 MG: 4 TABLET ORAL at 11:08

## 2022-08-17 NOTE — PROGRESS NOTES
GYNECOLOGIC ONCOLOGY FOLLOW-UP    I have discussed case with Dr Sara Kruger from Radiation Oncology  Given size and distribution of brain metastases patient is deemed a good candidate for stereotactic radio surgery  This would entail some more formal treatment planning and will require a different lineal accelerator not available at [facility]  With this in mind, patient is deemed ready for discharge and will follow up early next week with radiation oncology team at [facility] for simulation/treatment planning with intent to start treatment shortly thereafter  Patient will be sent home on dexamethasone 4 mg p o  b i d   Instructions and precautions given  All questions answered      Faisal Griffith MD, [address]  8/17/2022  12:32 PM

## 2022-08-17 NOTE — H&P
Assessment/Plan:    1  Brain metastases:  Patient with some headache  However no significant vessels genetic edema noted on MRI  Will start low-dose dexamethasone at 4 mg p o  b i d   Radiation oncology consulted  Patient may benefit from prompt initiation of therapy  Will await further recommendations regarding specific treatment modality and timing to initiate therapy  Patient aware of MRI results  All questions answered  Will avoid medical anticoagulation given brain Mets  Will prescribe Tylenol p r n  Mild-to-moderate pain and oxycodone p r n  Severe pain  2  Ovarian cancer:  Patient is aware of the incurable nature of her disease  She has now platinum resistant disease which appears confined to the brain  Patient was on your operative which will be on hold during this hospital admission  I will discuss with her medical oncologist plan for therapy after plan for treatment of brain metastases is established  Patient is coping well with her disease process  However symptoms do exist and she may benefit from palliative care consultation  Consult has been requested  I have discussed patient's wishes in case of medical emergency that would lead to cardiopulmonary arrest, she wants to be DNR/DNI  She understands the treatment goals will still focus on tumor directed therapies as per her wishes  3  Hypertension:  Continue carvedilol and losartan HCTZ while in house  4  Anxiety:  Effexor extended release 150 mg p o  qD and Lorazepam p r n  [place] [facility] She has been on zolpidem p r n  For insomnia  Will continue while in-house  5  Hyperlipidemia:  On Zocor 20 mg p o  Q h s  will continue statin as per pharmacy replacement protocol while in house  CHIEF COMPLAINT:   Patient requested to come in for prompt evaluation newly diagnosed brain metastases      Subjective:  Occasional headaches    Problem:  Cancer Staging  Ovarian cancer [facility])  Staging form: Ovary, Fallopian Tube, Primary Peritoneal, AJCC 8th Edition  - Pathologic stage from 1/13/2020: FIGO Stage IC3 (pN0, cM0) - Signed by Seamus Connelly MD on 1/28/2020      Previous therapy:  Oncology History   Ovarian cancer ([facility] [address] )   1/13/2020 Initial Diagnosis    Ovarian cancer on left ([facility] [address] )     1/13/2020 -  Cancer Staged    Staging form: Ovary, Fallopian Tube, Primary Peritoneal, AJCC 8th Edition  - Pathologic stage from 1/13/2020: FIGO Stage IC3 (pN0, cM0) - Signed by Seamus Connelly MD on 1/28/2020  Histologic grade (G): G3  Histologic grading system: 4 grade system  Residual tumor (R): R0 - None  Histopathologic type: Serous cystadenocarcinoma, NOS  Diagnostic confirmation: Positive histology PLUS positive immunophenotyping and/or positive genetic studies  Specimen type: Excision  Staged by: Managing physician  Cancer antigen 125 () (U/mL): 4,000  Gross residual tumor after primary cyto-reductive surgery: Absent  Residual tumor volume after primary cytoreductive surgery: No gross tumor  National guidelines used in treatment planning: Yes  Type of national guideline used in treatment planning: NCCN       1/13/2020 Surgery    Diagnostic laparoscopy, laparotomy, total hysterectomy, bilateral salpingo-oophorectomy with resection of pelvic mass, bilateral pelvic and periaortic lymphadenectomy, staging peritoneal biopsies, omentectomy, appendectomy  Final pathology consistent with 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  2/11/2020 - 5/26/2020 Chemotherapy    Six cycles of carboplatin/paclitaxel administered by Dr Jethro Rayo  Tolerated well  CT scan at completion of treatment demonstrates no evidence of measurable disease   amarilis near completion of chemotherapy 5 7 units/milliliter     8/9/2021 Recurrence     Elevated  triggered imaging  which demonstrated suspicious mediastinal lymphadenopathy    Endobronchial ultrasound-guided fine-needle aspiration on August 9, 2021 demonstrates metastatic carcinoma with immuno phenotype consistent with metastatic ovarian primary  9/1/2021 - 1/24/2022 Chemotherapy    Carboplatin + Doxil x 6 cycles (Dr Diane Kramer)  Complete response  Fadi Ca125 8 5 IU/mL  3/2022 -  Chemotherapy    Niraparib  Patient ID: Karlie Ag is a 73 y o  female  HPI  Patient well known to me with stage I C3 now recurrent, platinum resistant ovarian cancer  She completed treatment with carboplatin and Doxil for platinum sensitive recurrence  Treatment was completed in January 2022  Subsequently, she was started on Niraparib maintenance  Importantly, she has had now noticeable elevation  since May  At that time cross-sectional imaging with CT scan of the chest, abdomen and pelvis and MRI of the brain was unrevealing  Now due to persistent/further increase were repeated CT scan of the chest abdomen and pelvis which fails to demonstrate measurable disease  MRI of the brain performed emergently yesterday demonstrates evidence of multiple brain metastases with minimal vasogenic edema  Patient was asked to come to the hospital for prompt consultation by radiation oncology and treatment planning  Reports occasional headaches  No focal neurologic symptoms  Review of Systems  As per [facility]  Twelve point review of systems otherwise unremarkable peer  Current Facility-Administered Medications   Medication Dose Route Frequency Provider Last Rate Last Admin    acetaminophen (TYLENOL) tablet 650 mg  650 mg Oral Q6H PRN Domitila Tijerina MD        carvedilol (COREG) tablet 6 25 mg  6 25 mg Oral BID With Meals Susan Leal MD        cholecalciferol (VITAMIN D3) tablet 400 Units  400 Units Oral Daily Susan Leal MD        dexamethasone (DECADRON) tablet 4 mg  4 mg Oral Q12H [address] Susan Leal MD        LORazepam (ATIVAN) tablet 1 mg  1 mg Oral Q8H PRN Domitila Tijerina MD        losartan potassium-hydrochlorothiazide (HYZAAR 50/12  5) combination   Oral Daily Susan MD Mel        oxyCODONE (ROXICODONE) IR tablet 5 mg  5 mg Oral Q4H PRN Pedro Lynne MD        pravastatin (PRAVACHOL) tablet 20 mg  20 mg Oral Daily With Dinner Pedro Lynne MD        venlafaxine [facility] ) 24 hr capsule 150 mg  150 mg Oral Daily Susan Leal MD        zolpidem (AMBIEN) tablet 5 mg  5 mg Oral HS PRN Pedro Lynne MD           No Known Allergies    Past Medical History:   Diagnosis Date    Breast cancer ([facility] [address] )     2004 and then 2009    Cancer [facility]) 2005,2009    breast    Cancer ([facility] [address] ) 2020    ovarian    Chicken pox     Colon polyp     CPAP (continuous positive airway pressure) dependence     Frequent UTI     Heart disease     High cholesterol     History of blood transfusion     2013 with Bilat Knee replacement    Hypertension     Irregular heart beat     PVC's    PONV (postoperative nausea and vomiting)     Sleep apnea        Past Surgical History:   Procedure Laterality Date    APPENDECTOMY      BREAST CYST EXCISION      CHOLECYSTECTOMY      COLONOSCOPY      CYSTOSCOPY Bilateral 1/13/2020    Procedure: CYSTOSCOPY: CYSTOSCOPY WITH PERIOPERATIVE URETERAL CATHETERS  PLACEMENT;  Surgeon: Pedro Lynne MD;  [facility];  Service: Gynecology Oncology    CYSTOSCOPY      HYSTERECTOMY N/A 1/13/2020    Procedure: EXPLORATORY LAPAROTOMY, OVARIAN CANCER STAGING WITH TOTAL HYSTERECTOMY, BILATERAL SALPINGO-OOPHORECTOMY, BILATERAL PELVIC AND PERIAORTIC LYMPHADENECTOMY, INDICATED APPENDECTOMY, OMENTECTOMY, STAGING PERITONEAL BIOPSIES ;  Surgeon: Pedro Lynne MD;  [facility];  Service: Gynecology Oncology    JOINT REPLACEMENT      Bilat Knees    MASTECTOMY Bilateral 2009    WI BRONCHOSCOPY NEEDLE BX TRACHEA MAIN STEM&/BRON N/A 8/9/2021    Procedure: ENDOBRONCHIAL ULTRASOUND (EBUS);   Surgeon: Cee Diamond MD;  [facility];  Service: Thoracic    WI BRONCHOSCOPY,DIAGNOSTIC N/A 8/9/2021    Procedure: BRONCHOSCOPY FLEXIBLE;  Surgeon: Riddhi Hendrickson MD;  Location[facility];  Service: Thoracic    CO LAP, INCISIONAL HERNIA REPAIR,REDUCIBLE N/A 2021    Procedure: REPAIR HERNIA INCISIONAL LAPAROSCOPIC;  Surgeon: Marycarmen Kasper MD;  [facility];  Service: General    CO LAP,DIAGNOSTIC ABDOMEN N/A 2020    Procedure: LAPAROSCOPY DIAGNOSTIC;  Surgeon: Hemant Sheriff MD;  [facility];  Service: Gynecology Oncology    REPAIR KNEE LIGAMENT Bilateral     ROTATOR CUFF REPAIR         OB History        2    Para   2    Term   0       0    AB   0    Living   0       SAB   0    IAB   0    Ectopic   0    Multiple   0    Live Births   0           Obstetric Comments   Menarche: 17 y/o   Both vag deliveries              Family History   Problem Relation Age of Onset    Breast cancer Mother 50    Lung cancer Father     Prostate cancer Brother        The following portions of the patient's history were reviewed and updated as appropriate: allergies, current medications, past family history, past medical history, past social history, past surgical history and problem list       Objective:    not currently breastfeeding  There is no height or weight on file to calculate BMI  Physical Exam  Vitals reviewed  Exam conducted with a chaperone present  Constitutional:       General: She is not in acute distress  Appearance: Normal appearance  She is not toxic-appearing  Eyes:      General: No scleral icterus  Right eye: No discharge  Left eye: No discharge  Conjunctiva/sclera: Conjunctivae normal    Cardiovascular:      Rate and Rhythm: Normal rate and regular rhythm  Heart sounds: Normal heart sounds  No murmur heard  Pulmonary:      Effort: Pulmonary effort is normal  No respiratory distress  Breath sounds: Normal breath sounds  No wheezing  Abdominal:      General: There is no distension  Palpations: Abdomen is soft  There is no mass  Tenderness: There is no abdominal tenderness  There is no guarding  Genitourinary:     Comments: Deferred  See pelvic exam from office visit August 16, 2022  Musculoskeletal:      Right lower leg: No edema  Left lower leg: No edema  Neurological:      General: No focal deficit present  Mental Status: She is alert and oriented to person, place, and time  Cranial Nerves: No cranial nerve deficit  Sensory: No sensory deficit  Motor: No weakness  Gait: Gait normal        8/16/2022 Study Result    Narrative & Impression   MRI BRAIN WITH AND WITHOUT CONTRAST     INDICATION: C56 9: Malignant neoplasm of unspecified ovary  R97 1: Elevated cancer antigen 125 ()      COMPARISON:  Brain MRI 5/10/2022     TECHNIQUE:  Sagittal T1, axial T2, axial FLAIR, axial T1, axial [place], axial diffusion  Sagittal, axial T1 postcontrast   Axial bravo postcontrast with coronal reconstructions           IV Contrast:  9 mL of Gadobutrol injection (SINGLE-DOSE)      IMAGE QUALITY:   Diagnostic      FINDINGS:     BRAIN PARENCHYMA:     New enhancing lesions involving gray-white matter junction of bilateral cerebral hemispheres including:     -Left frontal lobe 0 7 x 0 5 cm lesion (series 12 image 55)  -Left parietal lobe 0 3 cm lesion (series 12 image 58)  -Right precentral gyrus 0 3 cm lesion (series 12 image 75)  -Right parietal lobe 0 2 cm lesion (series 12 image 67)  -Right parietal lobe 0 5 cm lesion (series 12 image 58)  -Punctuate right parietal lesion (series 2 images 65 and 63)     There are perilesional edema in the anterior left frontal lobe and a smaller edema within bilateral parietal lobes  No mass effect       There is questionable punctuate lesion versus vascular enhancement in the posteromedial left temporal lobe (series 12 image 35)       Nonspecific  foci of T2/FLAIR hyperintensities involving periventricular and subcortical white matter, most compatible with mild microangiopathic change      No acute infarct  No intracranial hemorrhage     VENTRICLES:  Normal for the patient's age      SELLA AND PITUITARY GLAND:  Normal      ORBITS:  Normal      PARANASAL SINUSES:  Normal      VASCULATURE:  Evaluation of the major intracranial vasculature demonstrates appropriate flow voids      CALVARIUM AND SKULL BASE:  Normal      EXTRACRANIAL SOFT TISSUES:  Normal      IMPRESSION:     1  Intracranial metastatic disease with new subcentimeter gray-white matter junction enhancing lesions as described    The largest in the anterior left frontal lobe measures 0 7 cm with mild vasogenic edema      2   Mild chronic microangiopathy                     The study was marked in EPIC for significant notification      Workstation performed: [id number]         Lab Results   Component Value Date     6 3 03/18/2021     Lab Results   Component Value Date    WBC 6 94 01/19/2020    HGB 10 5 (L) 01/19/2020    HCT 33 4 (L) 01/19/2020    MCV 95 01/19/2020     01/19/2020     Lab Results   Component Value Date    K 3 4 (L) 01/19/2020     01/19/2020    CO2 25 01/19/2020    BUN 18 03/18/2021    CREATININE 0 94 03/18/2021    CALCIUM 8 4 01/19/2020    AST 25 01/19/2020    ALT 27 01/19/2020    ALKPHOS 60 01/19/2020    EGFR 63 03/18/2021        Trend:  Lab Results   Component Value Date     6 3 03/18/2021     4,086 7 (H) 01/06/2020     Mertha Babinski, MD, [place]  8/17/2022  9:55 AM

## 2022-08-17 NOTE — CONSULTS
Consultation - Palliative and Supportive Care   Jann Samayoa 73 y o  female [id number]    Patient Active Problem List   Diagnosis    Encounter for gynecological examination without abnormal finding    GERD (gastroesophageal reflux disease)    HTN (hypertension), benign    Obesity, Class I, BMI 30 0-34 9 (see actual BMI)    Obstructive sleep apnea    Myopia, bilateral    Elevated CA-125    History of breast cancer    [facility]var[facility] ([facility] [address] )    Anomalous coronary artery origin    Frequent unifocal PVCs    Liver lesion    Mediastinal lymphadenopathy    PONV (postoperative nausea and vomiting)    Aneurysm of thoracic aorta (HCC)    Bradycardia    Cardiomyopathy (HCC)    Cardiovascular stress test abnormal    Cervical arthritis    Congenital anomaly of coronary artery    Hyperlipidemia    Insomnia    Mixed anxiety and depressive disorder    Myopia    Senile incipient cataract    Ventricular arrhythmia    Brain metastases (HCC)     Active issues specifically addressed today include:    Ovarian Cancer  Brain metastases (New Dx)  Goals of Care Discussion      Plan:  1  Symptom management -      Will defer to primary team at this time     2  Goals -     Pt's goals are to live well for the time that remains, she would like to pursue disease-directed therapies for now, she would like to follow in the [facility] for support, and is very open to visit from the      Code Status: DNR/DNI - Level 3   Decisional apparatus:  Patient is competent on my exam today  If competence is lost, patient's substitute decision maker would default to her spouse by [place] 169  Advance Directive / Living Will / POLST:  Will bring in for scanning to chart     I have reviewed the patient's controlled substance dispensing history in the Prescription Drug Monitoring Program in compliance with the [facility] regulations before prescribing any controlled substances           We appreciate the invitation to be involved in this patient's care  We will continue to follow  Please do not hesitate to reach our on call provider through our clinic answering service at  should you have acute symptom control concerns  Karina Teixeira MD  Palliative and Supportive Care  Clinic/Answering Service: [phone]  You can find me on [employer]! IDENTIFICATION:  Inpatient consult to Palliative Care  Consult performed by: Tanya Miller MD  Consult ordered by: Frandy Orellana MD        Physician Requesting Consult: No att  providers found  Reason for Consult / Principal Problem: Ovarian Cancer with new brain metastases for GOC discussion  Hx and PE limited by: NA    HISTORY OF PRESENT ILLNESS:       Karina Hussein is a 73 y o  female with PMH of ovarian cancer diagnosed 2 5 yrs ago and who presents with HA and is found to have six subcentimeter metastases scattered throughout the brain  This news is new to her since yesterday when MRI was reported and she has been having mild headaches and some fatigue but otherwise felt well and had ascribed this to stress 2/2 her son's difficulties  She feels shocked and overwhelmed by this sudden change in status and has been using yoga and breath work to help calm herself  Her  Jaci Schwab is at bedside, very supportive  She shares that she has an extensive family Hx of cancer and that her mother  of breast Ca in , her father of lung ca in  and her brother of prostate Ca in   Review of Systems   Constitutional: Positive for malaise/fatigue  Negative for decreased appetite and weight loss  HENT: Negative for ear pain and sore throat  Eyes: Negative for pain and visual disturbance  Cardiovascular: Negative for chest pain  Respiratory: Negative for cough and shortness of breath  Skin: Negative for suspicious lesions  Gastrointestinal: Negative for abdominal pain, nausea and vomiting  Genitourinary: Negative for dysuria     Neurological: Negative for focal weakness and vertigo  Psychiatric/Behavioral: Negative for depression  The patient is not nervous/anxious  Past Medical History:   Diagnosis Date    Breast cancer ([facility] [address] )     2004 and then 2009    Cancer [facility]) 2005,2009    breast    Cancer ([facility] [address] ) 2020    ovarian    Chicken pox     Colon polyp     CPAP (continuous positive airway pressure) dependence     Frequent UTI     Heart disease     High cholesterol     History of blood transfusion     2013 with Bilat Knee replacement    Hypertension     Irregular heart beat     PVC's    PONV (postoperative nausea and vomiting)     Sleep apnea      Past Surgical History:   Procedure Laterality Date    APPENDECTOMY      BREAST CYST EXCISION      CHOLECYSTECTOMY      COLONOSCOPY      CYSTOSCOPY Bilateral 1/13/2020    Procedure: CYSTOSCOPY: CYSTOSCOPY WITH PERIOPERATIVE URETERAL CATHETERS  PLACEMENT;  Surgeon: Emily Irby MD;  [facility];  Service: Gynecology Oncology    CYSTOSCOPY      HYSTERECTOMY N/A 1/13/2020    Procedure: EXPLORATORY LAPAROTOMY, OVARIAN CANCER STAGING WITH TOTAL HYSTERECTOMY, BILATERAL SALPINGO-OOPHORECTOMY, BILATERAL PELVIC AND PERIAORTIC LYMPHADENECTOMY, INDICATED APPENDECTOMY, OMENTECTOMY, STAGING PERITONEAL BIOPSIES ;  Surgeon: Emily Irby MD;  [facility];  Service: Gynecology Oncology    JOINT REPLACEMENT      Bilat Knees    MASTECTOMY Bilateral 2009    IL BRONCHOSCOPY NEEDLE BX TRACHEA MAIN STEM&/BRON N/A 8/9/2021    Procedure: ENDOBRONCHIAL ULTRASOUND (EBUS);   Surgeon: Estephanie Dawkins MD;  Location[facility];  Service: Thoracic    [place] [address] N/A 8/9/2021    Procedure: Kelsie Solorio;  Surgeon: Estephanie Dawkins MD;  Location: [facility];  Service: Thoracic    IL LAP, INCISIONAL HERNIA REPAIR,REDUCIBLE N/A 4/8/2021    Procedure: REPAIR HERNIA INCISIONAL LAPAROSCOPIC;  Surgeon: Parke Fothergill, MD;  Location: [facility];  Service: General    IL LAP,DIAGNOSTIC ABDOMEN N/A 2020    Procedure: LAPAROSCOPY DIAGNOSTIC;  Surgeon: Faisal Griffith MD;  [facility];  Service: Gynecology Oncology    REPAIR KNEE LIGAMENT Bilateral     ROTATOR CUFF REPAIR       Social History     Socioeconomic History    Marital status: /Civil Union     Spouse name: Not on file    Number of children: Not on file    Years of education: Not on file    Highest education level: Not on file   Occupational History    Not on file   Tobacco Use    Smoking status: Former Smoker     Types: Cigarettes     Quit date: 10/31/1987     Years since quittin 8    Smokeless tobacco: Never Used   Vaping Use    Vaping Use: Never used   Substance and Sexual Activity    Alcohol use: Yes     Comment: socially     Drug use: Never    Sexual activity: Not Currently     Partners: Male     Birth control/protection: Post-menopausal     Comment: same partner-few times a year   Other Topics Concern    Not on file   Social History Narrative    Not on file     Social Determinants of Health     Financial Resource Strain: Not on file   Food Insecurity: Not on file   Transportation Needs: Not on file   Physical Activity: Not on file   Stress: Not on file   Social Connections: Not on file   Intimate Partner Violence: Not on file   Housing Stability: Not on file     Family History   Problem Relation Age of Onset    Breast cancer Mother 50    Lung cancer Father     Prostate cancer Brother        MEDICATIONS / ALLERGIES:    all current active meds have been reviewed    No Known Allergies    OBJECTIVE:    Physical Exam  Physical Exam  Constitutional:       Appearance: Normal appearance  HENT:      Head: Atraumatic  Mouth/Throat:      Mouth: Mucous membranes are moist    Eyes:      Extraocular Movements: Extraocular movements intact  Cardiovascular:      Pulses: Normal pulses     Pulmonary:      Effort: Pulmonary effort is normal    Musculoskeletal:         General: Normal range of motion  Cervical back: Normal range of motion  Skin:     General: Skin is warm and dry  Neurological:      General: No focal deficit present  Mental Status: She is alert and oriented to person, place, and time  Psychiatric:         Mood and Affect: Mood normal          Behavior: Behavior normal          Lab Results: I have personally reviewed pertinent labs  Imaging Studies: pertinent imaging reviewed  EKG, Pathology, and Other Studies: pertinent studies reviewed    Counseling / Coordination of Care    Total floor / unit time spent today 60 minutes  Greater than 50% of total time was spent with the patient and / or family counseling and / or coordination of care   A description of the counseling / coordination of care: Goals of care discussion, emotional support provided

## 2022-08-17 NOTE — CONSULTS
Consultation - Radiation Oncology   Jon Fontana 73 y o  female MRN: [id number]  [facility] Encounter: [id number]        History of Present Illness   Physician Requesting Consult: Jose Waddell MD  Reason for Consult / Principal Problem: Brain metastases    HPI: Jon Fontana is a 73year old woman with a prior history of right breast cancer (diagnosed 7/2006, stage IA pT1bN0(i-)M0, invasive ductal carcinoma, grade 2, measuring 1 2 cm, ER/LA negative and HER2 negative   BRCA1/BRCA2 negative) managed with breast conservation, adjuvant TC and radiation followed by left breast cancer (stage IA pT1bN0(i-)M0, grade 2, measuring 6 mm, 0/4 lymph nodes positive, ER/LA positive and Her2 negative, Oncotype Dx 27) managed with bilateral mastectomy and adjuvant endocrine therapy        She more recently presented in 1/2020 with scant vaginal spotting   Evaluation included endometrial biopsy, pelvic ultrasound and , which was > 4000   She proceeded with cystoscopy with perioperative ureteral catheter placement, diagnostic laparoscopy, exploratory laparoscopy, total abdominal hysterectomy, bilateral salpingoophorectomy, bilateral and periaortic lymphadenectomy, appendectomy, omentectomy and staging peritoneal biopsies on 1/13/2020       She was diagnosed with 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 with evidence of ovarian surface involvement by tumor but otherwise no evidence of extraovarian disease   She began carboplatin and paclitaxel and completed 6 cycles in 5/2021   She later developed recurrence, detected by CA-125 and measurable disease within the chest (lower right paratracheal node and subcarinal node)   EBUS on 8/9/21 included biopsy of station 7, and pathology was consistent with metastases from high grade serous cancer of the ovary   Somatic mutation testing was remarkable for p53 mutation and FLT1   PD-L1 was not over-expressed, TPS <1%   She began carboplatin and doxil on 9/1/21   She completed 6 cycles on 1/24/22 with a favorable response   She later began niraparib on 2/28/22       MRI brain on 5/10/22 demonstrated no acute intracranial pathology  Most recent PET/CT on 5/20/22 demonstrated mild activity in the right perihilar region, nonspecific  There were no findings of hypermetabolic malignancy in the neck, abdomen or pelvis  She more recently began to experience headaches with  rise  CT chest/abdomen and pelvis on 8/16/22 demonstrated no CT evidence of metastatic disease within the chest/abdomen/pelvis  MRI brain on 8/16/22 demonstrated new enhancing lesions involving the gray-white matter junction of the bilateral cerebral hemispheres including a 0 7 cm lesion in the left frontal lobe, 0 3 cm lesion in the left parietal lobe, 0 3 cm lesion in the right precentral gyrus, 0 2 cm lesion in the right parietal lobe, 0 5 cm lesion in the right parietal lobe and punctate lesion in the right parietal lobe  There was perilesional edema in the anterior left frontal lobe and less edema within the bilateral parietal lobes without mass effect  There was a questionable lesion in the posteromedial left temporal lobe  Radiation oncology has now been consulted for consideration of treatment options  Upon interview she endorses a mild headache for 3 weeks  She denies focal numbness, weakness, tingling or seizures  She denies changes in vision  She denies falls or dysequilibrium  She confirms she only previously received radiation to the right breast   This was done at [facility] in [place], [place]  She is without additional acute concerns  Inpatient consult to Radiation Oncology  Consult performed by: Doris Benavides MD  Consult ordered by: Phuc Miranda MD          Review of Systems Negative except as described above      Historical Information   Previous Oncology History:   Oncology History   Ovarian cancer ([facility] [address] )   1/13/2020 Initial Diagnosis Ovarian cancer on left [facility])     1/13/2020 -  Cancer Staged    Staging form: Ovary, Fallopian Tube, Primary Peritoneal, AJCC 8th Edition  - Pathologic stage from 1/13/2020: FIGO Stage IC3 (pN0, cM0) - Signed by Hemant Sheriff MD on 1/28/2020  Histologic grade (G): G3  Histologic grading system: 4 grade system  Residual tumor (R): R0 - None  Histopathologic type: Serous cystadenocarcinoma, NOS  Diagnostic confirmation: Positive histology PLUS positive immunophenotyping and/or positive genetic studies  Specimen type: Excision  Staged by: Managing physician  Cancer antigen 125 () (U/mL): 4,000  Gross residual tumor after primary cyto-reductive surgery: Absent  Residual tumor volume after primary cytoreductive surgery: No gross tumor  National guidelines used in treatment planning: Yes  Type of national guideline used in treatment planning: NCCN       1/13/2020 Surgery    Diagnostic laparoscopy, laparotomy, total hysterectomy, bilateral salpingo-oophorectomy with resection of pelvic mass, bilateral pelvic and periaortic lymphadenectomy, staging peritoneal biopsies, omentectomy, appendectomy  Final pathology consistent with 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  2/11/2020 - 5/26/2020 Chemotherapy    Six cycles of carboplatin/paclitaxel administered by Dr Yessenia Manriquez  Tolerated well  CT scan at completion of treatment demonstrates no evidence of measurable disease   amarilis near completion of chemotherapy 5 7 units/milliliter     8/9/2021 Recurrence     Elevated  triggered imaging  which demonstrated suspicious mediastinal lymphadenopathy  Endobronchial ultrasound-guided fine-needle aspiration on August 9, 2021 demonstrates metastatic carcinoma with immuno phenotype consistent with metastatic ovarian primary  9/1/2021 - 1/24/2022 Chemotherapy    Carboplatin + Doxil x 6 cycles (Dr Mame Montesinos)  Complete response  Amarilis Ca125 8 5 IU/mL  3/2022 -  Chemotherapy    Niraparib  Past Medical History:   Diagnosis Date    Breast cancer ([facility] [address] )     2004 and then 2009    Cancer [facility]) 2005,2009    breast    Cancer ([facility] [address] ) 2020    ovarian    Chicken pox     Colon polyp     CPAP (continuous positive airway pressure) dependence     Frequent UTI     Heart disease     High cholesterol     History of blood transfusion     2013 with Bilat Knee replacement    Hypertension     Irregular heart beat     PVC's    PONV (postoperative nausea and vomiting)     Sleep apnea      Past Surgical History:   Procedure Laterality Date    APPENDECTOMY      BREAST CYST EXCISION      CHOLECYSTECTOMY      COLONOSCOPY      CYSTOSCOPY Bilateral 1/13/2020    Procedure: CYSTOSCOPY: CYSTOSCOPY WITH PERIOPERATIVE URETERAL CATHETERS  PLACEMENT;  Surgeon: Faisal Griffith MD;  [facility];  Service: Gynecology Oncology    CYSTOSCOPY      HYSTERECTOMY N/A 1/13/2020    Procedure: EXPLORATORY LAPAROTOMY, OVARIAN CANCER STAGING WITH TOTAL HYSTERECTOMY, BILATERAL SALPINGO-OOPHORECTOMY, BILATERAL PELVIC AND PERIAORTIC LYMPHADENECTOMY, INDICATED APPENDECTOMY, OMENTECTOMY, STAGING PERITONEAL BIOPSIES ;  Surgeon: Faisal Griffith MD;  [facility];  Service: Gynecology Oncology    JOINT REPLACEMENT      Bilat Knees    MASTECTOMY Bilateral 2009    MI BRONCHOSCOPY NEEDLE BX TRACHEA MAIN STEM&/BRON N/A 8/9/2021    Procedure: ENDOBRONCHIAL ULTRASOUND (EBUS);   Surgeon: Todd Finley MD;  [facility];  Service: Thoracic    MI [address] N/A 8/9/2021    Procedure: Nehemiah Pry;  Surgeon: Todd Finley MD;  [facility];  Service: Thoracic    MI LAP, INCISIONAL HERNIA REPAIR,REDUCIBLE N/A 4/8/2021    Procedure: REPAIR HERNIA INCISIONAL LAPAROSCOPIC;  Surgeon: Elsie Chin MD;  [facility];  Service: General    MI LAP,DIAGNOSTIC ABDOMEN N/A 1/13/2020    Procedure: LAPAROSCOPY DIAGNOSTIC;  Surgeon: Faisal Griffith MD;  [facility];  Service: Gynecology Oncology    REPAIR KNEE LIGAMENT Bilateral     ROTATOR CUFF REPAIR       Family History   Problem Relation Age of Onset    Breast cancer Mother 50    Lung cancer Father     Prostate cancer Brother      Social History   Social History     Substance and Sexual Activity   Alcohol Use Yes    Comment: socially      Social History     Substance and Sexual Activity   Drug Use Never     Social History     Tobacco Use   Smoking Status Former Smoker    Types: Cigarettes    Quit date: 10/31/1987    Years since quittin 8   Smokeless Tobacco Never Used       Meds/Allergies     Current Facility-Administered Medications:     acetaminophen (TYLENOL) tablet 650 mg, 650 mg, Oral, Q6H PRN, Leela Sanders MD    carvedilol (COREG) tablet 6 25 mg, 6 25 mg, Oral, BID With Meals, Leela Sanders MD    cholecalciferol (VITAMIN D3) tablet 400 Units, 400 Units, Oral, Daily, Susan Leal MD    dexamethasone (DECADRON) tablet 4 mg, 4 mg, Oral, Q12H [address], Susan Leal MD, 4 mg at 22 1108    LORazepam (ATIVAN) tablet 1 mg, 1 mg, Oral, Q8H PRN, Leela Sanders MD    losartan potassium-hydrochlorothiazide (HYZAAR 50/12  5) combination, , Oral, Daily, Leela Sanders MD    oxyCODONE (ROXICODONE) IR tablet 5 mg, 5 mg, Oral, Q4H PRN, Leela Sanders MD    pravastatin (PRAVACHOL) tablet 20 mg, 20 mg, Oral, Daily With Dinner, Leela Sanders MD    venlafaxine [facility] ) 24 hr capsule 150 mg, 150 mg, Oral, Daily, Leela Sanders MD    zolpidem (AMBIEN) tablet 5 mg, 5 mg, Oral, HS PRN, Leela Sanders MD    No Known Allergies    Objective   No intake or output data in the 24 hours ending 22 0944  Invasive Devices  Report    None               Physical Exam  HENT:      Head: Normocephalic and atraumatic  Nose: Nose normal    Eyes:      General: No scleral icterus  Extraocular Movements: Extraocular movements intact        Pupils: Pupils are equal, round, and reactive to light  Cardiovascular:      Rate and Rhythm: Normal rate  Pulmonary:      Effort: Pulmonary effort is normal    Abdominal:      General: There is no distension  Musculoskeletal:         General: Normal range of motion  Cervical back: Normal range of motion  Right lower leg: No edema  Left lower leg: No edema  Skin:     General: Skin is warm and dry  Neurological:      General: No focal deficit present  Mental Status: She is alert  Cranial Nerves: No cranial nerve deficit  Sensory: No sensory deficit  Motor: No weakness  Coordination: Coordination normal    Psychiatric:         Mood and Affect: Mood normal             Lab Results: I have personally reviewed pertinent reports  Imaging Studies: I have personally reviewed pertinent films in PACS  EKG, Pathology, and Other Studies: I have personally reviewed pertinent reports  Assessment/Plan     Assessment and Plan:  Ms Acosta Riley is a 73year old woman with a prior history of right breast cancer (diagnosed 7/2006, stage IA pT1bN0(i-)M0, invasive ductal carcinoma, grade 2, measuring 1 2 cm, ER/OH negative and HER2 negative   BRCA1/BRCA2 negative) managed with breast conservation, adjuvant TC and radiation followed by left breast cancer (stage IA pT1bN0(i-)M0, grade 2, measuring 6 mm, 0/4 lymph nodes positive, ER/OH positive and Her2 negative, Oncotype Dx 27) managed with bilateral mastectomy and adjuvant endocrine therapy       She subsequently was diagnosed with 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 with evidence of ovarian surface involvement by tumor but otherwise no evidence of extraovarian disease  She later developed biopsy-proven mediastinal metastatic progression  More recently, she developed a rising  without evidence of extracranial disease  She began to experience mild headaches, prompting brain MRI now demonstrating fewer than 10 subcentimeter lesions      I reviewed the diagnosis of brain metastases and treatment options  Given the limited intracranial burden and symptoms, I have recommended stereotactic treatment as this will preserve relatively more normal brain parenchyma compared to either whole brain radiation or hippocampal sparing whole brain radiation, leading to improved neurocognitive outcomes without a difference in overall survival   She is interested in maximally sparing neurocognition and agrees with this approach  I discussed that [address] treatment will require ongoing MRI surveillance for distant progression  I described the logistics of SRS radiotherapy including evaluation in [facility] [address] with CT simulation and treatment beginning shortly thereafter  Treatments are delivered at our [facility] facility  She will be set up for evaluation and simulation on 8/24/22 with treatment following shortly thereafter  In the meantime, she can begin low dose dexamethasone 4 mg BID with PPI  She was urged to report to the ED if she experiences worsening headaches, visual disturbances or other neurologic deficits as treatment could be more expeditiously arranged  However, at this time, she is minimally symptomatic from these lesions and it is reasonable to proceed with a more technically complicated but potentially less toxic plan compared to urgent whole brain treatment  All questions were answered to her satisfaction  MRI images were reviewed with the patient  Please reach out if there are any further questions and thank you for involving me in Ms Squires's care  Code Status: Level 3 - DNAR and DNI  Advance Directive and Living Will:      Power of :    POLST:      Counseling / Coordination of Care  Total floor / unit time spent today 35 minutes  Greater than 50% of total time was spent with the patient and / or family counseling and / or coordination of care   A description of the counseling / coordination of care: Reviewed MRI findings and radiotherapy options for brain metastases, recommended SRS and facilitated outpatient evaluation/treatment

## 2022-08-18 ENCOUNTER — TELEPHONE (OUTPATIENT)
Dept: PALLIATIVE MEDICINE | Facility: CLINIC | Age: 72
End: 2022-08-18

## 2022-08-18 NOTE — TELEPHONE ENCOUNTER
Left a message for patient to call office back to schedule a hospital follow up appointment with Palliative Care

## 2022-08-23 ENCOUNTER — TELEPHONE (OUTPATIENT)
Dept: GYNECOLOGIC ONCOLOGY | Facility: CLINIC | Age: 72
End: 2022-08-23

## 2022-08-23 DIAGNOSIS — F41.9 ANXIETY: Primary | ICD-10-CM

## 2022-08-23 RX ORDER — LORAZEPAM 1 MG/1
1 TABLET ORAL EVERY 6 HOURS PRN
Qty: 20 TABLET | Refills: 0 | Status: SHIPPED | OUTPATIENT
Start: 2022-08-23 | End: 2023-08-17 | Stop reason: ALTCHOICE

## 2022-08-23 NOTE — TELEPHONE ENCOUNTER
Pt called in regarding her concerns and anxiety about her radiation appt tomorrow  Patient would like to speak with bernard  Please advise   Thank you     [phone] Sarna

## 2022-08-24 ENCOUNTER — RADIATION THERAPY TREATMENT (OUTPATIENT)
Dept: RADIATION ONCOLOGY | Facility: HOSPITAL | Age: 72
End: 2022-08-24
Attending: STUDENT IN AN ORGANIZED HEALTH CARE EDUCATION/TRAINING PROGRAM
Payer: MEDICARE

## 2022-08-24 VITALS
WEIGHT: 215.4 LBS | DIASTOLIC BLOOD PRESSURE: 90 MMHG | HEART RATE: 78 BPM | BODY MASS INDEX: 29.17 KG/M2 | RESPIRATION RATE: 18 BRPM | HEIGHT: 72 IN | SYSTOLIC BLOOD PRESSURE: 130 MMHG | OXYGEN SATURATION: 99 % | TEMPERATURE: 99.1 F

## 2022-08-24 DIAGNOSIS — C79.31 BRAIN METASTASES (HCC): Primary | ICD-10-CM

## 2022-08-24 PROCEDURE — 77399 UNLISTED PX MED RADJ PHYSICS: CPT | Performed by: STUDENT IN AN ORGANIZED HEALTH CARE EDUCATION/TRAINING PROGRAM

## 2022-08-24 PROCEDURE — 99211 OFF/OP EST MAY X REQ PHY/QHP: CPT | Performed by: STUDENT IN AN ORGANIZED HEALTH CARE EDUCATION/TRAINING PROGRAM

## 2022-08-24 PROCEDURE — 77334 RADIATION TREATMENT AID(S): CPT | Performed by: STUDENT IN AN ORGANIZED HEALTH CARE EDUCATION/TRAINING PROGRAM

## 2022-08-24 PROCEDURE — 77263 THER RADIOLOGY TX PLNG CPLX: CPT | Performed by: STUDENT IN AN ORGANIZED HEALTH CARE EDUCATION/TRAINING PROGRAM

## 2022-08-24 PROCEDURE — 77470 SPECIAL RADIATION TREATMENT: CPT | Performed by: STUDENT IN AN ORGANIZED HEALTH CARE EDUCATION/TRAINING PROGRAM

## 2022-08-24 PROCEDURE — 99215 OFFICE O/P EST HI 40 MIN: CPT | Performed by: STUDENT IN AN ORGANIZED HEALTH CARE EDUCATION/TRAINING PROGRAM

## 2022-08-24 PROCEDURE — 99204 OFFICE O/P NEW MOD 45 MIN: CPT | Performed by: STUDENT IN AN ORGANIZED HEALTH CARE EDUCATION/TRAINING PROGRAM

## 2022-08-24 RX ORDER — DEXAMETHASONE 1 MG
1 TABLET ORAL
Qty: 6 TABLET | Refills: 0 | Status: SHIPPED | OUTPATIENT
Start: 2022-08-24

## 2022-08-24 RX ORDER — DEXAMETHASONE 2 MG/1
TABLET ORAL
Qty: 18 TABLET | Refills: 0 | Status: SHIPPED | OUTPATIENT
Start: 2022-08-24 | End: 2022-09-05

## 2022-08-24 NOTE — PROGRESS NOTES
Consultation - Radiation Oncology      VLB:[id number] : 1950  Encounter: [id number]  Patient Information: [address]  Chief Complaint   Patient presents with    Consult     Cancer Staging  Ovarian cancer [facility])  Staging form: Ovary, Fallopian Tube, Primary Peritoneal, AJCC 8th Edition  - Pathologic stage from 2020: FIGO Stage IC3 (pN0, cM0) - Signed by Eduin Bermeo MD on 2020  Histopathologic type: Serous cystadenocarcinoma, NOS  Histologic grade (G): G3  Histologic grading system: 4 grade system  Residual tumor (R): R0 - None  Diagnostic confirmation: Positive histology PLUS positive immunophenotyping and/or positive genetic studies  Specimen type: Excision  Staged by: Managing physician  Cancer antigen 125 () (U/mL): 4,000  Gross residual tumor after primary cyto-reductive surgery: Absent  Residual tumor volume after primary cytoreductive surgery: No gross tumor  National guidelines used in treatment planning: Yes  Type of national guideline used in treatment planning: NCCN    Assessment and Plan:  Ms Piyush Shanks is a 73year old woman with a PMHx bilateral breast cancers (s/p initial right sided BCS followed by adjuvant TC and RT (), thereafter with left sided cancer s/p bilateral mastectomy and adjuvant endocrine therapy)  She was more recently diagnosed with 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, most recently on niraparib  She has since been found to have new intracranial lesions compatible with brain metastases and is seen today in consideration of SRS  We reviewed the patient's recent clinical history and her most recent MRI Brain, which shows multiple small subcentimeter lesions consistent with metastatic spread  This was additionally reviewed at neuro-oncology working group with neurosurgery, neuroradiology, and medical oncology   Based on the volume and distribution of her intracranial disease I would recommend multimet SRS to her most dominant well defined lesions  The purpose of this treatment would be to establish intracranial control and to prevent neurologic injury or need for neurosurgical intervention  We discussed the potential acute and late toxicities of SRS to include fatigue, headaches, anorexia, alopecia, and radiation necrosis  The alternative to this would be whole brain RT, which would provide improved distant CNS control, but would result in more significant neurocognitive toxicities  After consideration the patient was amenable to proceeding with SRS as described  An informed consent was obtained documenting this discussion  She will undergo CT simulation today with SRS to take place on 8/31  I will coordinate care with neurosurgery for treatment planning and delivery  She will hold niraparib the day prior to, the day of, and the day after SRS  Summary  - Multimet SRS recommended to well visualized sites of intracranial disease    - CT simulation today; SRS to occur on 8/31    - Minimal edema on MRI Brain; will start steroid taper tomorrow  History of Present Illness   Ms Piedad Eller is a 73year old woman with a PMHx right breast cancer (2006) s/p lumpectomy/SLNBx followed by adjuvant TC and RT, thereafter with left breast cancer breast cancer managed with bilateral mastectomy and adjuvant endocrine therapy  She was additionally diagnosed with Stage IC2 high grade serous ovarian cancer (1/2020) initially managed with NABILA/BSO/bilateral and periaortic LNDx, omenectomy followed by carbo/taxol x6 (completed 5/2021)  She was thereafter well until she had a rise in CA-125 (8/2021) and was found to have a biopsy proven mediastinal recurrence of her ovarian cancer  She was started on carboplatin/doxil (9/2021-1/2022) followed by niraparib (2/2022)  PET/CT (5/20/22) demonstrated no overt evidence of systemic disease       She underwent MRI Brain (8/16/22), which demonstrated multiple new enhancing lesions in the gray-white matter junction, the largest of which measured 0 7cm in the left frontal lobe with additional subcentimeter lesions in the bilateral parietal lobes and the right precentral gyrus  She was seen as an inpatient (8/17/22) and was referred for consideration of SRS  Currently she is doing well overall  She does have ongoing headaches despite initiation of dexamethasone 4mg BID  She notes that since starting this medication she additionally has had difficulty sleeping  She has tried tylenol to help with her headaches, which provides partial relief  She has no new focal weakness or numbness, no recent seizures  Historical Information   Oncology History   Ovarian cancer ([facility] [address] )   1/13/2020 Initial Diagnosis    Ovarian cancer on left [facility])     1/13/2020 -  Cancer Staged    Staging form: Ovary, Fallopian Tube, Primary Peritoneal, AJCC 8th Edition  - Pathologic stage from 1/13/2020: FIGO Stage IC3 (pN0, cM0) - Signed by Bonnie Antoine MD on 1/28/2020  Histologic grade (G): G3  Histologic grading system: 4 grade system  Residual tumor (R): R0 - None  Histopathologic type: Serous cystadenocarcinoma, NOS  Diagnostic confirmation: Positive histology PLUS positive immunophenotyping and/or positive genetic studies  Specimen type: Excision  Staged by: Managing physician  Cancer antigen 125 () (U/mL): 4,000  Gross residual tumor after primary cyto-reductive surgery: Absent  Residual tumor volume after primary cytoreductive surgery: No gross tumor  National guidelines used in treatment planning: Yes  Type of national guideline used in treatment planning: NCCN       1/13/2020 Surgery    Diagnostic laparoscopy, laparotomy, total hysterectomy, bilateral salpingo-oophorectomy with resection of pelvic mass, bilateral pelvic and periaortic lymphadenectomy, staging peritoneal biopsies, omentectomy, appendectomy    Final pathology consistent with 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      2/11/2020 - 5/26/2020 Chemotherapy    Six cycles of carboplatin/paclitaxel administered by Dr Ritesh Love  Tolerated well  CT scan at completion of treatment demonstrates no evidence of measurable disease   amarilis near completion of chemotherapy 5 7 units/milliliter     8/9/2021 Recurrence     Elevated  triggered imaging  which demonstrated suspicious mediastinal lymphadenopathy  Endobronchial ultrasound-guided fine-needle aspiration on August 9, 2021 demonstrates metastatic carcinoma with immuno phenotype consistent with metastatic ovarian primary  9/1/2021 - 1/24/2022 Chemotherapy    Carboplatin + Doxil x 6 cycles (Dr Anahi Neves)  Complete response  Amarilis Ca125 8 5 IU/mL  3/2022 -  Chemotherapy    Niraparib       Brain metastases ([facility] [address] )   8/17/2022 Initial Diagnosis    Brain metastases [facility])           Past Medical History:   Diagnosis Date    Breast cancer ([facility] [address] )     2004 and then 2009    Cancer [facility]) 2005,2009    breast    [facility] 2020    ovarian    Chicken pox     Colon polyp     CPAP (continuous positive airway pressure) dependence     Frequent UTI     Heart disease     High cholesterol     History of blood transfusion     2013 with Bilat Knee replacement    Hypertension     Irregular heart beat     PVC's    PONV (postoperative nausea and vomiting)     Sleep apnea      Past Surgical History:   Procedure Laterality Date    APPENDECTOMY      BREAST CYST EXCISION      CHOLECYSTECTOMY      COLONOSCOPY      CYSTOSCOPY Bilateral 1/13/2020    Procedure: CYSTOSCOPY: CYSTOSCOPY WITH PERIOPERATIVE URETERAL CATHETERS  PLACEMENT;  Surgeon: Janny Mckenna MD;  [facility];  Service: Gynecology Oncology    CYSTOSCOPY      HYSTERECTOMY N/A 1/13/2020    Procedure: EXPLORATORY LAPAROTOMY, OVARIAN CANCER STAGING WITH TOTAL HYSTERECTOMY, BILATERAL SALPINGO-OOPHORECTOMY, BILATERAL PELVIC AND PERIAORTIC LYMPHADENECTOMY, INDICATED APPENDECTOMY, OMENTECTOMY, STAGING PERITONEAL BIOPSIES ;  Surgeon: Romana Bledsoe MD;  [facility];  Service: Gynecology Oncology    JOINT REPLACEMENT      Bilat Knees    MASTECTOMY Bilateral 2009    [place] BRONCHOSCOPY NEEDLE BX TRACHEA MAIN STEM&/BRON N/A 2021    Procedure: ENDOBRONCHIAL ULTRASOUND (EBUS); Surgeon: Manish Turner MD;  Location[facility];  Service: Thoracic    [place] [address] N/A 2021    Procedure: Minna Ohms;  Surgeon: Manish Turner MD;  [facility];  Service: Thoracic    AZ LAP, INCISIONAL HERNIA REPAIR,REDUCIBLE N/A 2021    Procedure: REPAIR HERNIA INCISIONAL LAPAROSCOPIC;  Surgeon: Lexi Johnston MD;  [facility];  Service: General    [place] LAP,DIAGNOSTIC ABDOMEN N/A 2020    Procedure: LAPAROSCOPY DIAGNOSTIC;  Surgeon: Romana Bledsoe MD;  [facility];  Service: Gynecology Oncology    REPAIR KNEE LIGAMENT Bilateral     ROTATOR CUFF REPAIR         Family History   Problem Relation Age of Onset    Breast cancer Mother 50    Lung cancer Father     Prostate cancer Brother        Social History   Social History     Substance and Sexual Activity   Alcohol Use Yes    Comment: socially      Social History     Substance and Sexual Activity   Drug Use Never     Social History     Tobacco Use   Smoking Status Former Smoker    Types: Cigarettes    Quit date: 10/31/1987    Years since quittin 8   Smokeless Tobacco Never Used         Meds/Allergies     Current Outpatient Medications:     b complex vitamins capsule, Take 1 capsule by mouth daily, Disp: , Rfl:     carvedilol (COREG) 6 25 mg tablet, Take 1 tablet (6 25 mg total) by mouth 2 (two) times a day with meals, Disp: 180 tablet, Rfl: 3    cholecalciferol (VITAMIN D3) 400 units tablet, Take 400 Units by mouth daily, Disp: , Rfl:     dexamethasone (DECADRON) 1 mg tablet, Take 1 tablet (1 mg total) by mouth daily with breakfast Start after completion of all 2mg tablets  , Disp: 6 tablet, Rfl: 0    dexamethasone (DECADRON) 2 mg tablet, Take 1 tablet (2 mg total) by mouth 2 (two) times a day with meals for 6 days, THEN 1 tablet (2 mg total) daily with breakfast for 6 days  , Disp: 18 tablet, Rfl: 0    estradiol (ESTRACE) 0 1 mg/g vaginal cream, insert 1 gram vaginally two times a week, Disp: , Rfl: 0    LORazepam (ATIVAN) 1 mg tablet, Take 1 tablet (1 mg total) by mouth every 6 (six) hours as needed for anxiety (or nausea), Disp: 20 tablet, Rfl: 0    losartan-hydrochlorothiazide (HYZAAR) 50-12 5 mg per tablet, Take 1 tablet by mouth daily , Disp: , Rfl: 0    magnesium oxide (MAG-OX) 400 mg, Take 400 mg by mouth 2 (two) times a day , Disp: , Rfl:     prochlorperazine (COMPAZINE) 10 mg tablet, Take 10 mg by mouth every 6 (six) hours as needed, Disp: , Rfl:     simvastatin (ZOCOR) 20 mg tablet, Take 20 mg by mouth daily , Disp: , Rfl: 0    trimethoprim (PROLOPRIM) 100 mg tablet, Take 100 mg by mouth daily , Disp: , Rfl: 0    venlafaxine 150 MG TB24, Take 150 mg by mouth daily with breakfast, Disp: , Rfl: 0    zolpidem (AMBIEN CR) 12 5 MG CR tablet, Take 12 5 mg by mouth daily at bedtime , Disp: , Rfl: 0    gabapentin (NEURONTIN) 100 mg capsule, Take 100 mg by mouth if needed, Disp: , Rfl:     niraparib (ZEJULA) 100 MG CAPS, Take 300 mg by mouth daily at bedtime (Patient not taking: Reported on 8/24/2022), Disp: , Rfl:     nystatin-triamcinolone (MYCOLOG-II) cream, Apply topically 2 (two) times a day, Disp: 30 g, Rfl: 0  No Known Allergies    Review of Systems   Constitutional: Positive for activity change (decreased last few days) and fatigue (moderate)  HENT:        Taste is off  Dry mouth   Eyes:        Wears glasses     Respiratory: Positive for shortness of breath (LANDAVERDE)  Bipap at night     Cardiovascular: Negative  Gastrointestinal: Positive for constipation (mild) and nausea (past 2 days)  Negative for vomiting  Endocrine: Positive for heat intolerance  Genitourinary: Positive for frequency and urgency  Negative for dysuria, hematuria and vaginal bleeding  Hx of frequent UTI's   Musculoskeletal: Negative  Skin:        Small cuts/bruises on finger/toes ? R/t chemotherapy   Allergic/Immunologic: Negative  Neurological: Positive for dizziness, speech difficulty (occasional trouble word finding worse last few months), light-headedness (with getting up too quick), numbness (bilateral feet) and headaches (bilateral temporal/lobes and occipatal ongoing since Sunday - tylenol helps some)  Negative for tremors and seizures  Feels "off"   Hematological: Bruises/bleeds easily  Psychiatric/Behavioral: Positive for agitation (r/t to steroids), decreased concentration (mild) and sleep disturbance (trouble staying asleep)  The patient is nervous/anxious  Forgetful at times           OBJECTIVE:   /90 (BP Location: Right arm, Patient Position: Sitting, Cuff Size: Large)   Pulse 78   Temp 99 1 °F (37 3 °C) (Temporal)   Resp 18   Ht 6' (1 829 m)   Wt 97 7 kg (215 lb 6 4 oz)   LMP  (LMP Unknown)   SpO2 99%   BMI 29 21 kg/m²   Pain Assessment:  7  Performance Status: ECOG/Zubrod/WHO: 1 - Symptomatic but completely ambulatory    Physical Exam   Well appearing  NAD  No increased work of breathing  Extremities warm and well perfused  Speaking in complete sentences without any aphasia or overt neurologic deficits  Normal ambulation       RESULTS  Lab Results    Chemistry        Component Value Date/Time    K 3 4 (L) 01/19/2020 1251     01/19/2020 1251    CO2 25 01/19/2020 1251    BUN 18 03/18/2021 1251    CREATININE 0 94 03/18/2021 1251        Component Value Date/Time    CALCIUM 8 4 01/19/2020 1251    ALKPHOS 60 01/19/2020 1251    AST 25 01/19/2020 1251    ALT 27 01/19/2020 1251            Lab Results   Component Value Date    WBC 6 94 01/19/2020    HGB 10 5 (L) 01/19/2020    HCT 33 4 (L) 01/19/2020    MCV 95 01/19/2020     01/19/2020         Imaging Studies  MRI brain w wo contrast    Result Date: 8/16/2022  Narrative: MRI BRAIN WITH AND WITHOUT CONTRAST INDICATION: C56 9: Malignant neoplasm of unspecified ovary R97 1: Elevated cancer antigen 125 ()  COMPARISON:  Brain MRI 5/10/2022 TECHNIQUE: Sagittal T1, axial T2, axial FLAIR, axial T1, axial Isabela, axial diffusion  Sagittal, axial T1 postcontrast   Axial bravo postcontrast with coronal reconstructions  IV Contrast:  9 mL of Gadobutrol injection (SINGLE-DOSE)  IMAGE QUALITY:   Diagnostic  FINDINGS: BRAIN PARENCHYMA: New enhancing lesions involving gray-white matter junction of bilateral cerebral hemispheres including: -Left frontal lobe 0 7 x 0 5 cm lesion (series 12 image 55)  -Left parietal lobe 0 3 cm lesion (series 12 image 58) -Right precentral gyrus 0 3 cm lesion (series 12 image 75) -Right parietal lobe 0 2 cm lesion (series 12 image 67) -Right parietal lobe 0 5 cm lesion (series 12 image 58) -Punctuate right parietal lesion (series 2 images 65 and 63) There are perilesional edema in the anterior left frontal lobe and a smaller edema within bilateral parietal lobes  No mass effect  There is questionable punctuate lesion versus vascular enhancement in the posteromedial left temporal lobe (series 12 image 35)  Nonspecific  foci of T2/FLAIR hyperintensities involving periventricular and subcortical white matter, most compatible with mild microangiopathic change  No acute infarct  No intracranial hemorrhage VENTRICLES:  Normal for the patient's age  SELLA AND PITUITARY GLAND:  Normal  ORBITS:  Normal  PARANASAL SINUSES:  Normal  VASCULATURE:  Evaluation of the major intracranial vasculature demonstrates appropriate flow voids  CALVARIUM AND SKULL BASE:  Normal  EXTRACRANIAL SOFT TISSUES:  Normal      Impression: 1  Intracranial metastatic disease with new subcentimeter gray-white matter junction enhancing lesions as described    The largest in the anterior left frontal lobe measures 0 7 cm with mild vasogenic edema  2   Mild chronic microangiopathy  The study was marked in EPIC for significant notification  Workstation performed: [id number]     CT chest abdomen pelvis w contrast    Result Date: 8/16/2022  Narrative: CT CHEST, ABDOMEN AND PELVIS WITH IV CONTRAST INDICATION:   C56 9: Malignant neoplasm of unspecified ovary  COMPARISON:  CT 5/9/2022, PET/CT 5/20/2022 TECHNIQUE: CT examination of the chest, abdomen and pelvis was performed  Axial, sagittal, and coronal 2D reformatted images were created from the source data and submitted for interpretation  Radiation dose length product (DLP) for this visit:  635 mGy-cm   This examination, like all CT scans performed in the [facility], was performed utilizing techniques to minimize radiation dose exposure, including the use of iterative reconstruction and automated exposure control  IV Contrast:  65 mL of iohexol (OMNIPAQUE) Enteric Contrast: Enteric contrast was administered  FINDINGS: CHEST LUNGS:  Lungs are clear  There is no tracheal or endobronchial lesion  PLEURA:  Unremarkable  HEART/GREAT VESSELS: Heart is unremarkable for patient's age  No thoracic aortic aneurysm  MEDIASTINUM AND LISA:  Unremarkable  CHEST WALL AND LOWER NECK:  Patient is status post bilateral mastectomy  ABDOMEN LIVER/BILIARY TREE:  Unremarkable  GALLBLADDER:  Gallbladder is surgically absent  SPLEEN:  Unremarkable  PANCREAS:  Unremarkable  ADRENAL GLANDS:  Unremarkable  KIDNEYS/URETERS:  Unremarkable  No hydronephrosis  STOMACH AND BOWEL:  There is colonic diverticulosis without evidence of acute diverticulitis  APPENDIX:  No findings to suggest appendicitis  ABDOMINOPELVIC CAVITY:  No ascites  No pneumoperitoneum  No lymphadenopathy  VESSELS:  Unremarkable for patient's age  PELVIS REPRODUCTIVE ORGANS:  Surgical changes of prior hysterectomy  URINARY BLADDER:  Unremarkable   ABDOMINAL WALL/INGUINAL REGIONS:  Anterior abdominal wall hernia repair again present noted  OSSEOUS STRUCTURES:  No acute fracture or destructive osseous lesion  Impression: No CT evidence metastatic disease within the chest abdomen or pelvis  Workstation performed: [id number]     Pathology: As above  ASSESSMENT  1  Brain metastases ([facility])  dexamethasone (DECADRON) 2 mg tablet    dexamethasone (DECADRON) 1 mg tablet    Radiation Simulation Treatment     Cancer Staging  Ovarian cancer [facility])  Staging form: Ovary, Fallopian Tube, Primary Peritoneal, AJCC 8th Edition  - Pathologic stage from 1/13/2020: FIGO Stage IC3 (pN0, cM0) - Signed by Domitila Tijerina MD on 1/28/2020  Histopathologic type: Serous cystadenocarcinoma, NOS  Histologic grade (G): G3  Histologic grading system: 4 grade system  Residual tumor (R): R0 - None  Diagnostic confirmation: Positive histology PLUS positive immunophenotyping and/or positive genetic studies  Specimen type: Excision  Staged by: Managing physician  Cancer antigen 125 () (U/mL): 4,000  Gross residual tumor after primary cyto-reductive surgery: Absent  Residual tumor volume after primary cytoreductive surgery: No gross tumor  National guidelines used in treatment planning: Yes  Type of national guideline used in treatment planning: NCCN        PLAN/DISCUSSION  Orders Placed This Encounter   Procedures   [address] [facility]      Darling Abbott MD  3/68/1405,35:77 PM      Portions of the record may have been created with voice recognition software  Occasional wrong word or "sound a like" substitutions may have occurred due to the inherent limitations of voice recognition software  Read the chart carefully and recognize, using context, where substitutions have occurred

## 2022-08-24 NOTE — PROGRESS NOTES
Shakira Redman 1950 is a 73 y o  female  [facility], referred by Dr Teddy RILEY 30 year old woman with a prior history of right breast cancer diagnosed 2006 invasive ductal carcinoma, grade 2, triple negative  ER/CT,  BRCA1/BRCA2 negative, managed with breast conservation, adjuvant TC and radiation  Followed by left breast cancer 2009, grade 2, ER/CT positive and Her2 negative, Oncotype Dx 27) managed with bilateral mastectomy and adjuvant endocrine therapy   Followed by stage I C3 (2020) now recurrent, platinum resistant ovarian cancer   She completed treatment with carboplatin and Doxil for platinum sensitive recurrence   Treatment was completed in January 2022  Subsequently, she was started on Niraparib maintenance   Importantly, she has had now noticeable elevation  since May  She began to experience mild headaches prompting MRI demonstrating subcentimeter lesions  She was last seen in radiation oncology on 8/17/22 as inpatient by Dr Gladys Pandey       5/9/22 CT CAP wo contrast  1   New linear opacity in the inferior lingula probably atelectasis or inflammatory but recommend attention on follow-up imaging   Otherwise, no metastatic disease within the chest abdomen or pelvis within the limitations of noncontrast technique      5/10/22 MRI w wo contrast  White matter changes suggestive of chronic microangiopathy   No acute intracranial pathology     5/20/22 PET CT  1   Mild focal radiotracer uptake in the right perihilar region nonspecific and may be reactive   This region can be reassessed on follow-up contrast CT or PET/CT    2   No findings for hypermetabolic malignancy in the neck, abdomen and pelvis      8/16/22 CT CAP w contrast  No CT evidence metastatic disease within the chest abdomen or pelvis         8/16/22 MRI brain w wo contrast  1   Intracranial metastatic disease with new subcentimeter gray-white matter junction enhancing lesions as described   The largest in the anterior left frontal lobe measures 0 7 cm with mild vasogenic edema      2   Mild chronic microangiopathy      [facility] ([facility] [address] [facility])  5/25/22    61 9  6/22/22  120 1  7/26/22  167  6        8/30/22 Gyn/Onc   9/16/22 Palliative Care     Oncology History   Ovarian cancer ([facility] [address] )   1/13/2020 Initial Diagnosis    Ovarian cancer on left ([facility] [address] )     1/13/2020 -  Cancer Staged    Staging form: Ovary, Fallopian Tube, Primary Peritoneal, AJCC 8th Edition  - Pathologic stage from 1/13/2020: FIGO Stage IC3 (pN0, cM0) - Signed by Elizabeth Pierce MD on 1/28/2020  Histologic grade (G): G3  Histologic grading system: 4 grade system  Residual tumor (R): R0 - None  Histopathologic type: Serous cystadenocarcinoma, NOS  Diagnostic confirmation: Positive histology PLUS positive immunophenotyping and/or positive genetic studies  Specimen type: Excision  Staged by: Managing physician  Cancer antigen 125 () (U/mL): 4,000  Gross residual tumor after primary cyto-reductive surgery: Absent  Residual tumor volume after primary cytoreductive surgery: No gross tumor  National guidelines used in treatment planning: Yes  Type of national guideline used in treatment planning: NCCN       1/13/2020 Surgery    Diagnostic laparoscopy, laparotomy, total hysterectomy, bilateral salpingo-oophorectomy with resection of pelvic mass, bilateral pelvic and periaortic lymphadenectomy, staging peritoneal biopsies, omentectomy, appendectomy  Final pathology consistent with 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  2/11/2020 - 5/26/2020 Chemotherapy    Six cycles of carboplatin/paclitaxel administered by Dr Eligio Polanco  Tolerated well  CT scan at completion of treatment demonstrates no evidence of measurable disease     amarilis near completion of chemotherapy 5 7 units/milliliter     8/9/2021 Recurrence     Elevated  triggered imaging which demonstrated suspicious mediastinal lymphadenopathy  Endobronchial ultrasound-guided fine-needle aspiration on 2021 demonstrates metastatic carcinoma with immuno phenotype consistent with metastatic ovarian primary  2021 - 2022 Chemotherapy    Carboplatin + Doxil x 6 cycles (Dr Taras Dailey)  Complete response  Fadi Ca125 8 5 IU/mL  3/2022 -  Chemotherapy    Niraparib  Brain metastases ([facility] [address] )   2022 Initial Diagnosis    Brain metastases ([facility] [address] )         Review of Systems:  Review of Systems   Constitutional: Positive for activity change (decreased last few days) and fatigue (moderate)  HENT:        Taste is off  Dry mouth   Eyes:        Wears glasses     Respiratory: Positive for shortness of breath ([place]AN[place])  Bipap at night     Cardiovascular: Negative  Gastrointestinal: Positive for constipation (mild) and nausea (past 2 days)  Negative for vomiting  Endocrine: Positive for heat intolerance  Genitourinary: Positive for frequency and urgency  Negative for dysuria, hematuria and vaginal bleeding  Hx of frequent UTI's   Musculoskeletal: Negative  Skin:        Small cuts/bruises on finger/toes ? R/t chemotherapy   Allergic/Immunologic: Negative  Neurological: Positive for dizziness, speech difficulty (occasional trouble word finding worse last few months), light-headedness (with getting up too quick), numbness (bilateral feet) and headaches (bilateral temporal/lobes and occipatal ongoing since  - tylenol helps some)  Negative for tremors and seizures  Feels "off"   Hematological: Bruises/bleeds easily  Psychiatric/Behavioral: Positive for agitation (r/t to steroids), decreased concentration (mild) and sleep disturbance (trouble staying asleep)  The patient is nervous/anxious           Forgetful at times       Clinical Trial: no    Pregnancy test needed:  no        PFT: n/a    Prior Radiation: Right breast     [facility] Packet, SRS    MST: complete    Implantable Devices (Port, Pacemaker, pain stimulator): No    Hip Replacement: no    Covid Vaccine Status: Fully vaccinated including booster x2     [unfilled]  Health Maintenance   Topic Date Due    Medicare Annual Wellness Visit (AWV)  Never done    BMI: Followup Plan  Never done    Colorectal Cancer Screening  Never done    Osteoporosis Screening  Never done    Fall Risk  Never done    Urinary Incontinence Screening  Never done    Pneumococcal Vaccine: 65+ Years (2 - PCV) 12/17/2019    COVID-19 Vaccine (4 - Booster for Moderna series) 12/09/2021    Influenza Vaccine (1) 09/01/2022    BMI: Adult  08/16/2023    Hepatitis C Screening  Completed    HIB Vaccine  Aged Out    Hepatitis B Vaccine  Aged Out    IPV Vaccine  Aged Out    Hepatitis A Vaccine  Aged Out    Meningococcal ACWY Vaccine  Aged Out    HPV Vaccine  Aged Out     Past Medical History:   Diagnosis Date    Breast cancer ([facility] [address] )     2004 and then 2009    Cancer [facility]) 2005,2009    breast    Cancer ([facility] [address] ) 2020    ovarian    Chicken pox     Colon polyp     CPAP (continuous positive airway pressure) dependence     Frequent UTI     Heart disease     High cholesterol     History of blood transfusion     2013 with Bilat Knee replacement    Hypertension     Irregular heart beat     PVC's    PONV (postoperative nausea and vomiting)     Sleep apnea      Past Surgical History:   Procedure Laterality Date    APPENDECTOMY      BREAST CYST EXCISION      CHOLECYSTECTOMY      COLONOSCOPY      CYSTOSCOPY Bilateral 1/13/2020    Procedure: CYSTOSCOPY: CYSTOSCOPY WITH PERIOPERATIVE URETERAL CATHETERS  PLACEMENT;  Surgeon: Alondra Richmond MD;  Location: [facility];  Service: Gynecology Oncology    CYSTOSCOPY      HYSTERECTOMY N/A 1/13/2020    Procedure: EXPLORATORY LAPAROTOMY, OVARIAN CANCER STAGING WITH TOTAL HYSTERECTOMY, BILATERAL SALPINGO-OOPHORECTOMY, BILATERAL PELVIC AND PERIAORTIC LYMPHADENECTOMY, INDICATED APPENDECTOMY, OMENTECTOMY, STAGING PERITONEAL BIOPSIES ;  Surgeon: Colette Wilson MD;  [facility];  Service: Gynecology Oncology    JOINT REPLACEMENT      Bilat Knees    MASTECTOMY Bilateral     UT BRONCHOSCOPY NEEDLE BX TRACHEA MAIN STEM&/BRON N/A 2021    Procedure: ENDOBRONCHIAL ULTRASOUND (EBUS);   Surgeon: Kady Rodríguez MD;  Location[facility];  Service: Thoracic    [address] N/A 2021    Procedure: Marveen Fair;  Surgeon: Kady Rodríguez MD;  [facility];  Service: Thoracic    UT LAP, INCISIONAL HERNIA REPAIR,REDUCIBLE N/A 2021    Procedure: REPAIR HERNIA INCISIONAL LAPAROSCOPIC;  Surgeon: Domenic Jorge MD;  [facility];  Service: General    UT LAP,DIAGNOSTIC ABDOMEN N/A 2020    Procedure: LAPAROSCOPY DIAGNOSTIC;  Surgeon: Colette Wilson MD;  [facility];  Service: Gynecology Oncology    REPAIR KNEE LIGAMENT Bilateral     ROTATOR CUFF REPAIR       Family History   Problem Relation Age of Onset    Breast cancer Mother 50    Lung cancer Father     Prostate cancer Brother      Social History     Tobacco Use    Smoking status: Former Smoker     Types: Cigarettes     Quit date: 10/31/1987     Years since quittin 8    Smokeless tobacco: Never Used   Vaping Use    Vaping Use: Never used   Substance Use Topics    Alcohol use: Yes     Comment: socially     Drug use: Never        Current Outpatient Medications:     b complex vitamins capsule, Take 1 capsule by mouth daily, Disp: , Rfl:     carvedilol (COREG) 6 25 mg tablet, Take 1 tablet (6 25 mg total) by mouth 2 (two) times a day with meals, Disp: 180 tablet, Rfl: 3    cholecalciferol (VITAMIN D3) 400 units tablet, Take 400 Units by mouth daily, Disp: , Rfl:     dexamethasone (DECADRON) 4 mg tablet, Take 1 tablet (4 mg total) by mouth every 12 (twelve) hours for 14 days, Disp: 28 tablet, Rfl: 0    estradiol (ESTRACE) 0 1 mg/g vaginal cream, insert 1 gram vaginally two times a week, Disp: , Rfl: 0    gabapentin (NEURONTIN) 100 mg capsule, Take 100 mg by mouth if needed (Patient not taking: Reported on 8/17/2022), Disp: , Rfl:     LORazepam (ATIVAN) 1 mg tablet, Take 1 tablet (1 mg total) by mouth every 6 (six) hours as needed for anxiety (or nausea), Disp: 20 tablet, Rfl: 0    losartan-hydrochlorothiazide (HYZAAR) 50-12 5 mg per tablet, Take 1 tablet by mouth daily , Disp: , Rfl: 0    magnesium oxide (MAG-OX) 400 mg, Take 400 mg by mouth 2 (two) times a day , Disp: , Rfl:     niraparib (ZEJULA) 100 MG CAPS, Take 300 mg by mouth daily at bedtime, Disp: , Rfl:     nystatin-triamcinolone (MYCOLOG-II) cream, Apply topically 2 (two) times a day, Disp: 30 g, Rfl: 0    prochlorperazine (COMPAZINE) 10 mg tablet, Take 10 mg by mouth every 6 (six) hours as needed, Disp: , Rfl:     simvastatin (ZOCOR) 20 mg tablet, Take 20 mg by mouth daily , Disp: , Rfl: 0    trimethoprim (PROLOPRIM) 100 mg tablet, Take 100 mg by mouth daily , Disp: , Rfl: 0    venlafaxine 150 MG TB24, Take 150 mg by mouth daily with breakfast, Disp: , Rfl: 0    zolpidem (AMBIEN CR) 12 5 MG CR tablet, Take 12 5 mg by mouth daily at bedtime , Disp: , Rfl: 0  No Known Allergies   There were no vitals filed for this visit

## 2022-08-29 PROCEDURE — 77338 DESIGN MLC DEVICE FOR IMRT: CPT | Performed by: STUDENT IN AN ORGANIZED HEALTH CARE EDUCATION/TRAINING PROGRAM

## 2022-08-29 PROCEDURE — 77300 RADIATION THERAPY DOSE PLAN: CPT | Performed by: STUDENT IN AN ORGANIZED HEALTH CARE EDUCATION/TRAINING PROGRAM

## 2022-08-29 PROCEDURE — 77301 RADIOTHERAPY DOSE PLAN IMRT: CPT | Performed by: STUDENT IN AN ORGANIZED HEALTH CARE EDUCATION/TRAINING PROGRAM

## 2022-08-30 ENCOUNTER — PATIENT MESSAGE (OUTPATIENT)
Dept: GYNECOLOGIC ONCOLOGY | Facility: CLINIC | Age: 72
End: 2022-08-30

## 2022-08-31 ENCOUNTER — APPOINTMENT (OUTPATIENT)
Dept: RADIATION ONCOLOGY | Facility: HOSPITAL | Age: 72
End: 2022-08-31
Attending: STUDENT IN AN ORGANIZED HEALTH CARE EDUCATION/TRAINING PROGRAM
Payer: MEDICARE

## 2022-08-31 ENCOUNTER — PROCEDURE VISIT (OUTPATIENT)
Dept: NEUROSURGERY | Facility: CLINIC | Age: 72
End: 2022-08-31
Payer: MEDICARE

## 2022-08-31 ENCOUNTER — TELEPHONE (OUTPATIENT)
Dept: SURGICAL ONCOLOGY | Facility: CLINIC | Age: 72
End: 2022-08-31

## 2022-08-31 ENCOUNTER — APPOINTMENT (OUTPATIENT)
Dept: RADIATION ONCOLOGY | Facility: HOSPITAL | Age: 72
End: 2022-08-31
Payer: MEDICARE

## 2022-08-31 DIAGNOSIS — C79.31 BRAIN METASTASES (HCC): Primary | ICD-10-CM

## 2022-08-31 DIAGNOSIS — C56.9 OVARIAN CANCER (HCC): ICD-10-CM

## 2022-08-31 DIAGNOSIS — C79.31 BRAIN METASTASES: Primary | ICD-10-CM

## 2022-08-31 DIAGNOSIS — Z85.3 HISTORY OF BREAST CANCER: ICD-10-CM

## 2022-08-31 PROCEDURE — 61796 SRS CRANIAL LESION SIMPLE: CPT | Performed by: NEUROLOGICAL SURGERY

## 2022-08-31 PROCEDURE — 77372 SRS LINEAR BASED: CPT | Performed by: STUDENT IN AN ORGANIZED HEALTH CARE EDUCATION/TRAINING PROGRAM

## 2022-08-31 PROCEDURE — 77432 STEREOTACTIC RADIATION TRMT: CPT | Performed by: STUDENT IN AN ORGANIZED HEALTH CARE EDUCATION/TRAINING PROGRAM

## 2022-08-31 PROCEDURE — 99024 POSTOP FOLLOW-UP VISIT: CPT | Performed by: NEUROLOGICAL SURGERY

## 2022-08-31 PROCEDURE — 61797 SRS CRAN LES SIMPLE ADDL: CPT | Performed by: NEUROLOGICAL SURGERY

## 2022-08-31 NOTE — TELEPHONE ENCOUNTER
Patient states she was told by DR Libia Alvarez to call us after her radiation therapy to check in and see what her next step is

## 2022-08-31 NOTE — PROGRESS NOTES
PATIENT NAME: Karina Hussein  : 1950  MRN: [id number]  PROCEDURE DATE: 2022    Stereotactic Radiosurgery (SRS) Operative Note    Preop Diagnosis: Brain metastases    Postop Diagnosis: Same    Procedure Details: Frameless Stereotactic Radiosurgery for 4 brain metastases    Surgeon: Zehra Luna MD, PhD     Assistants: none    No qualified resident was available to assist with this case  Radiation Oncologist(s): Dr Claudia Cage    Estimated Blood Loss:  None           Specimens: None    Drains: None           Total IV Fluids: None              Findings: As above  Complications:  None    Anesthesia: None      DETAILS OF PROCEDURE    The patient presented to the outpatient area of the Department of Radiation Oncology where an open faced immobilization mask was created  The patient then underwent a stereotactic head CT while immobilized in the mask  The patient was then released from the Department  The patients stereotactic CT and previous stereotactic MRI scans were fused in the [address], which was used to develop the radiosurgical plan  The radiosurgical prescription called for a dose of 20 Gray to be delivered to the 4 PTVs  The PTVs was created by expanding the 4 GTVs, as contoured by myself and the Radiation Oncologist  The radiosurgical plan utilized the mini-multileaf columnator on the [employer] at [facility]  When the final treatment plan had been developed and approved by myself, the radiation oncologist and physicist, the patient returned to the Department for their frameless [facility] treatment  The patient was positioned on the treatment couch  The Optic Surface Monitoring System (OSMS) was used initially to align the patient  The patient was then immobilized in their open faced mask  kV and then cone beam CT imaging was used to further align the patient   Once the radiation oncologist, physicist and I agreed the patient was in correct position, the fields were treated sequentially without complications  The OSMS was used during the treatment to assure continued correct positioning of the patient, and if it detected patient motion, interrupted the treatment beam until the patient was back in alignment  When the [facility] treatment had been delivered, the patient was recovered from the treatment room, and discharged from the department  There was no blood loss and no specimen        SIGNATURE: Fareed Hayes MD, PhD  DATE: 8/31/2022   TIME: 12:51 PM

## 2022-09-01 ENCOUNTER — TELEPHONE (OUTPATIENT)
Dept: RADIATION ONCOLOGY | Facility: HOSPITAL | Age: 72
End: 2022-09-01

## 2022-09-01 NOTE — TELEPHONE ENCOUNTER
Patient returned phone call, she reports feeling okay s/p SRS yesterday  Denies n/v, dizziness, blurred vision, lightheaded, seizures, tremors, numbness, tingling or weakness  She reports that headaches are no worse, remains at her baseline  She does have more fatigue s/p SRS, she states that she slept well last night but still feels exhausted  She will continue steroid taper, currently on dexamethasone 2 mg daily for 5 more days then will decrease to 1 mg daily and then stop as previously directed by Dr Karina Duff  Patient reminded of MRI scheduled for 10/13/22 and a follow up appointment  on 10/19/22  Patient instructed to call office with any problems or concerns  Patient verbalized understanding of instructions given  Phone numbers provided yesterday with discharge instructions

## 2022-09-06 ENCOUNTER — PATIENT MESSAGE (OUTPATIENT)
Dept: GYNECOLOGIC ONCOLOGY | Facility: CLINIC | Age: 72
End: 2022-09-06

## 2022-09-06 DIAGNOSIS — C56.9 MALIGNANT NEOPLASM OF OVARY, UNSPECIFIED LATERALITY (HCC): Primary | ICD-10-CM

## 2022-09-06 DIAGNOSIS — R97.1 ELEVATED CA-125: ICD-10-CM

## 2022-09-30 ENCOUNTER — SOCIAL WORK (OUTPATIENT)
Dept: PALLIATIVE MEDICINE | Facility: CLINIC | Age: 72
End: 2022-09-30

## 2022-09-30 ENCOUNTER — OFFICE VISIT (OUTPATIENT)
Dept: PALLIATIVE MEDICINE | Facility: CLINIC | Age: 72
End: 2022-09-30
Payer: MEDICARE

## 2022-09-30 VITALS
DIASTOLIC BLOOD PRESSURE: 75 MMHG | SYSTOLIC BLOOD PRESSURE: 112 MMHG | HEART RATE: 73 BPM | OXYGEN SATURATION: 90 % | BODY MASS INDEX: 29.69 KG/M2 | WEIGHT: 218.92 LBS | TEMPERATURE: 97.3 F

## 2022-09-30 DIAGNOSIS — G47.00 INSOMNIA, UNSPECIFIED TYPE: ICD-10-CM

## 2022-09-30 DIAGNOSIS — F41.8 MIXED ANXIETY AND DEPRESSIVE DISORDER: ICD-10-CM

## 2022-09-30 DIAGNOSIS — C79.31 BRAIN METASTASES (HCC): ICD-10-CM

## 2022-09-30 DIAGNOSIS — C56.9 MALIGNANT NEOPLASM OF OVARY, UNSPECIFIED LATERALITY (HCC): Primary | ICD-10-CM

## 2022-09-30 PROCEDURE — 99214 OFFICE O/P EST MOD 30 MIN: CPT | Performed by: INTERNAL MEDICINE

## 2022-09-30 NOTE — PROGRESS NOTES
Outpatient Follow-Up - Palliative and Supportive Care   Isadora Chang 73 y o  female [id number]    Assessment & Plan  Problem List Items Addressed This Visit        Unprioritized    Ovarian cancer ([address] ) - Primary    Insomnia    Mixed anxiety and depressive disorder    Brain metastases (HCC)          Medications adjusted this encounter:  Requested Prescriptions      No prescriptions requested or ordered in this encounter     No orders of the defined types were placed in this encounter  There are no discontinued medications  Isadora Chang was seen today for symptoms and planning cares related to above illnesses  I have reviewed the patient's controlled substance dispensing history in the Prescription Drug Monitoring Program in compliance with the [facility] regulations before prescribing any controlled substances  They are invited to continue to follow with us  If there are questions or concerns, please contact us through our clinic/answering service 24 hours a day, seven days a week  [facility] Palliative and Supportive Care        Visit Information    Accompanied By: Spouse    Source of History: Self    History Limitations: None        History of Present Illness      Isadora Chnag is a 73 y o  female who presents as a hospital follow up of symptoms related to metastatic ovarian cancer    Pertinent issues include: symptom management, pain, neoplasm related, depression or anxiety, assessment of goals of care      Marcos Tilley is a 72 yo female who presents to the [facility] clinic for the first time after being seen as a consult in the [facility] in August  Marcos Tilley was first diagnosed with ovarian cancer in January 2020 --> underwent chemotherapy x 6 cycles with carboplatin and paclitaxel --> August 2021 found to have elevated Ca 125 and mediastinal lymphadenopathy --> underwent 6 cycles of carboplatin and doxil --> august 2022 presented to [facility] with HA and was found to have numerous sub-centimeter brain mets on MRI  On my encounter today, the patient is accompanied by her , who is hard of hearing  She is glad to reconnect after we saw her in the hospital the day she was diagnosed with her brain metastases  She reports feeling quite well the past few days, but admits that she had a sinus infection that was complicated by lower respiratory sx the last few weeks and felt so poorly that she thought she may be dying  This triggered a realization that she is not yet ready to die  We discuss her priorities in life at this time, including spending time at the beach, wrapping up a few things around the house, and seeing her son, who has been having difficulties with addiction  We also discuss her coping strategies, including meditation and yoga, breathing exercises  She is interested in following with us right now not as much for Sx mgmt as for support as she navigates the process of her illness  We discuss how Shamir Saunders relates to hospice and at what point we would consider making that transition  We discuss what things can be arranged ahead of time and what things to consider  She has a living will and her  and daughter will be her decision makers  Will f/u in 4 wks    Past medical, surgical, social, and family histories are reviewed and pertinent updates are made  Review of Systems   Constitutional: Positive for malaise/fatigue  Negative for decreased appetite and weight loss  HENT: Positive for sore throat  Negative for ear pain  Eyes: Negative for photophobia and visual disturbance  Cardiovascular: Negative for chest pain, near-syncope and syncope  Respiratory: Negative for cough, shortness of breath and sputum production  Skin: Negative for suspicious lesions  Gastrointestinal: Negative for abdominal pain, anorexia, constipation, diarrhea, hematochezia, nausea and vomiting  Genitourinary: Negative for dysuria     Neurological: Positive for headaches and light-headedness  Negative for seizures  Psychiatric/Behavioral: Negative for altered mental status and memory loss  The patient has insomnia and is nervous/anxious  Vital Signs    /75 (BP Location: Left arm, Patient Position: Sitting, Cuff Size: Standard)   Pulse 73   Temp (!) 97 3 °F (36 3 °C) (Temporal)   Wt 99 3 kg (218 lb 14 7 oz)   LMP  (LMP Unknown)   SpO2 90%   BMI 29 69 kg/m²     Physical Exam and Objective Data  Physical Exam  Constitutional:       General: She is not in acute distress  Appearance: She is normal weight  She is not ill-appearing or toxic-appearing  HENT:      Head: Normocephalic and atraumatic  Nose: Nose normal       Mouth/Throat:      Mouth: Mucous membranes are moist    Eyes:      Extraocular Movements: Extraocular movements intact  Cardiovascular:      Rate and Rhythm: Normal rate  Pulmonary:      Effort: Pulmonary effort is normal       Breath sounds: Normal breath sounds  Abdominal:      General: Bowel sounds are normal       Palpations: Abdomen is soft  Musculoskeletal:         General: Normal range of motion  Cervical back: Normal range of motion  Skin:     General: Skin is warm and dry  Neurological:      Mental Status: She is alert and oriented to person, place, and time  Psychiatric:         Mood and Affect: Mood normal          Behavior: Behavior normal            Radiology and Laboratory:  I personally reviewed and interpreted the following results: pertinent studies reviewed  60 minutes was spent face to face with her family with greater than 50% of the time spent in counseling or coordination of care including discussions of pathogenesis of diagnosis, prognosis of diagnosis, risks and benefits of treatment, instructions for disease self management and follow up requirements  All of the patient's or agent's questions were answered during this discussion

## 2022-09-30 NOTE — PROGRESS NOTES
Palliative Outpatient Assessment of Need    LSW completed an assessment of need which was completed with (patient, family, or both) in the office or via phone/video conference    Relationship status:    Duration of relationship: 36 years  Name of significant other: Jesse Wang and Ages: son (28), daughter (29)  Pets:[employer] Retriever   Other important family information: son is going to rehab today  Living situation (where and whom): with   Patient's primary caregiver:  self  Any limitations of caregiver:  Environmental concerns or barriers:   history: none  Employment history/source of income: retired   Disability:  no  Spirituality/ Sabianism:  [employer]  Patient's strengths, social supports, and resources: [address]  Cultural information:   [address] current or previous: no - Headspace may help breathing and relaxation   Substance use or history: no  Sleep: uses 1 pill at night as perscribed  Exercise: yoga and water aerobics   Diet/nutrition: good  Durable Medical Equipment needs: none  Transportation: self  Financial concerns: none  Advanced Directive: gave 5 wishes  Other medical or social work providers involved: none  Patient/caregiver current level of coping:  Understanding:  Patient/family concerns and areas of need: explained legacy work and it benefits   Patient's interests: reading     I have spent 60 minutes with Patient and family today in which greater than 50% of this time was spent in counseling/coordination of care regarding assessment and support  *All questions may not be answered due to constraints    Follow-up discussions may need to occur

## 2022-10-13 ENCOUNTER — HOSPITAL ENCOUNTER (OUTPATIENT)
Dept: MRI IMAGING | Facility: HOSPITAL | Age: 72
Discharge: HOME | End: 2022-10-13
Attending: STUDENT IN AN ORGANIZED HEALTH CARE EDUCATION/TRAINING PROGRAM
Payer: MEDICARE

## 2022-10-13 DIAGNOSIS — C79.31 BRAIN METASTASES (HCC): ICD-10-CM

## 2022-10-13 PROCEDURE — A9585 GADOBUTROL INJECTION: HCPCS | Performed by: STUDENT IN AN ORGANIZED HEALTH CARE EDUCATION/TRAINING PROGRAM

## 2022-10-13 PROCEDURE — G1004 CDSM NDSC: HCPCS

## 2022-10-13 PROCEDURE — 70553 MRI BRAIN STEM W/O & W/DYE: CPT

## 2022-10-13 RX ADMIN — GADOBUTROL 9.9 ML: 604.72 INJECTION INTRAVENOUS at 14:57

## 2022-10-19 ENCOUNTER — CLINICAL SUPPORT (OUTPATIENT)
Dept: RADIATION ONCOLOGY | Facility: HOSPITAL | Age: 72
End: 2022-10-19
Attending: STUDENT IN AN ORGANIZED HEALTH CARE EDUCATION/TRAINING PROGRAM
Payer: MEDICARE

## 2022-10-19 DIAGNOSIS — C79.31 BRAIN METASTASES (HCC): Primary | ICD-10-CM

## 2022-10-19 PROCEDURE — 99211 OFF/OP EST MAY X REQ PHY/QHP: CPT | Performed by: STUDENT IN AN ORGANIZED HEALTH CARE EDUCATION/TRAINING PROGRAM

## 2022-10-19 PROCEDURE — 99024 POSTOP FOLLOW-UP VISIT: CPT | Performed by: STUDENT IN AN ORGANIZED HEALTH CARE EDUCATION/TRAINING PROGRAM

## 2022-10-19 NOTE — PROGRESS NOTES
Virtual Brief Visit    Problem List Items Addressed This Visit    None               Reason for visit is follow up  Encounter provider Wilhelmena Dancer, MD    Provider located at [address] [id number]      Recent Visits  No visits were found meeting these conditions  Showing recent visits within past 7 days and meeting all other requirements  Today's Visits  Date Type Provider Dept   10/19/22 Telemedicine Wilhelmena Dancer, MD [facility]   Showing today's visits and meeting all other requirements  Future Appointments  No visits were found meeting these conditions  Showing future appointments within next 150 days and meeting all other requirements       After connecting through Telephone, the patient was identified by name and date of birth  Henna Marmolejo was informed that this is a telemedicine visit and that the visit is being conducted through Telephone  My office door was closed  No one else was in the room  She acknowledged consent and understanding of privacy and security of the video platform  The patient has agreed to participate and understands they can discontinue the visit at any time  Patient is aware this is a billable service  Subjective  Henna Marmolejo is a 73 y o  female  with a PMHx bilateral breast cancers (s/p initial right sided BCS followed by adjuvant TC and RT (2006), thereafter with left sided cancer s/p bilateral mastectomy and adjuvant endocrine therapy)  She was more recently diagnosed with 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, most recently on niraparib  She has since been found to have intracranial lesions compatible with brain metastases  She completed SRS on 8/31/22, she returns today for her 2 month follow up       9/2/22 Medical Oncology - Dr Raeann Mohs ([facility])  Gemcitabine plus bevacitzumab   MRI by Taylor Lucia       9/28/22  Medical Oncology - Dr Elda Alpers ([facility])  Completed Neil Arbour for sinus infection symptoms did not resolve therefore started on Augmentin  Covid negative (tested 3 times)  Continue Gemzar and Mvasi(Avastin)    Patient following palliative care for symptom management    10/13/22 MRI brain w wo contrast  No evidence of enhancing intracranial metastatic lesions, compatible with positive treatment-related response  No new or enlarging intracranial lesions  Resolved perilesional vasogenic edema in left frontal and bilateral parietal lobes  11/29/22 CT CAP w contrast  11/29/22 Gyn/Onc         Past Medical History:   Diagnosis Date   • [facility] ([facility] )     2004 and then 2009   • Cancer [facility]) 2005,2009    breast   • Cancer ([facility] ) 2020    ovarian   • Chicken pox    • Colon polyp    • CPAP (continuous positive airway pressure) dependence    • Frequent UTI    • Heart disease    • High cholesterol    • History of blood transfusion     2013 with Bilat Knee replacement   • Hypertension    • Irregular heart beat     PVC's   • PONV (postoperative nausea and vomiting)    • Sleep apnea        Past Surgical History:   Procedure Laterality Date   • APPENDECTOMY     • BREAST CYST EXCISION     • CHOLECYSTECTOMY     • COLONOSCOPY     • CYSTOSCOPY Bilateral 1/13/2020    Procedure: CYSTOSCOPY: CYSTOSCOPY WITH PERIOPERATIVE URETERAL CATHETERS  PLACEMENT;  Surgeon: Pablo Lo MD;  [facility];  Service: Gynecology Oncology   • CYSTOSCOPY     • HYSTERECTOMY N/A 1/13/2020    Procedure: EXPLORATORY LAPAROTOMY, OVARIAN CANCER STAGING WITH TOTAL HYSTERECTOMY, BILATERAL SALPINGO-OOPHORECTOMY, BILATERAL PELVIC AND PERIAORTIC LYMPHADENECTOMY, INDICATED APPENDECTOMY, OMENTECTOMY, STAGING PERITONEAL BIOPSIES ;  Surgeon: Pablo Lo MD;  [facility];  Service: Gynecology Oncology   • JOINT REPLACEMENT      Bilat Knees   • MASTECTOMY Bilateral 2009   • NC BRONCHOSCOPY NEEDLE BX TRACHEA MAIN STEM&/BRON N/A 8/9/2021    Procedure: ENDOBRONCHIAL ULTRASOUND (EBUS);   Surgeon: Allie Guerra MD;  [facility];  Service: Thoracic   • NC BRONCHOSCOPY,DIAGNOSTIC N/A 8/9/2021    Procedure: Ty Jerilyn;  Surgeon: Allie Guerra MD;  [facility];  Service: Thoracic   • NC LAP, INCISIONAL HERNIA REPAIR,REDUCIBLE N/A 4/8/2021    Procedure: REPAIR HERNIA INCISIONAL LAPAROSCOPIC;  Surgeon: Asif Colunga MD;  [facility];  Service: General   • NC LAP,DIAGNOSTIC ABDOMEN N/A 1/13/2020    Procedure: LAPAROSCOPY DIAGNOSTIC;  Surgeon: Katie Gamez MD;  [facility];  Service: Gynecology Oncology   • REPAIR KNEE LIGAMENT Bilateral    • ROTATOR CUFF REPAIR         Current Outpatient Medications   Medication Sig Dispense Refill   • b complex vitamins capsule Take 1 capsule by mouth daily     • carvedilol (COREG) 6 25 mg tablet Take 1 tablet (6 25 mg total) by mouth 2 (two) times a day with meals 180 tablet 3   • cholecalciferol (VITAMIN D3) 400 units tablet Take 400 Units by mouth daily     • estradiol (ESTRACE) 0 1 mg/g vaginal cream insert 1 gram vaginally two times a week  0   • LORazepam (ATIVAN) 1 mg tablet Take 1 tablet (1 mg total) by mouth every 6 (six) hours as needed for anxiety (or nausea) 20 tablet 0   • losartan-hydrochlorothiazide (HYZAAR) 50-12 5 mg per tablet Take 1 tablet by mouth daily   0   • magnesium oxide (MAG-OX) 400 mg Take 400 mg by mouth 2 (two) times a day      • prochlorperazine (COMPAZINE) 10 mg tablet Take 10 mg by mouth every 6 (six) hours as needed     • simvastatin (ZOCOR) 20 mg tablet Take 20 mg by mouth daily   0   • trimethoprim (PROLOPRIM) 100 mg tablet Take 100 mg by mouth daily   0   • venlafaxine 150 MG TB24 Take 150 mg by mouth daily with breakfast  0   • zolpidem (AMBIEN CR) 12 5 MG CR tablet Take 12 5 mg by mouth daily at bedtime   0   • dexamethasone (DECADRON) 1 mg tablet Take 1 tablet (1 mg total) by mouth daily with breakfast Start after completion of all 2mg tablets  (Patient not taking: No sig reported) 6 tablet 0   • gabapentin (NEURONTIN) 100 mg capsule Take 100 mg by mouth if needed (Patient not taking: No sig reported)     • niraparib (ZEJULA) 100 MG CAPS Take 300 mg by mouth daily at bedtime (Patient not taking: No sig reported)     • nystatin-triamcinolone (MYCOLOG-II) cream Apply topically 2 (two) times a day 30 g 0     No current facility-administered medications for this visit  No Known Allergies    I spent   minutes with the patient during this visit  Follow up visit     Oncology History   Ovarian cancer ([facility] )   1/13/2020 Initial Diagnosis    Ovarian cancer on left [facility])     1/13/2020 -  Cancer Staged    Staging form: Ovary, Fallopian Tube, Primary Peritoneal, AJCC 8th Edition  - Pathologic stage from 1/13/2020: FIGO Stage IC3 (pN0, cM0) - Signed by Shaheed Lynch MD on 1/28/2020  Histologic grade (G): G3  Histologic grading system: 4 grade system  Residual tumor (R): R0 - None  Histopathologic type: Serous cystadenocarcinoma, NOS  Diagnostic confirmation: Positive histology PLUS positive immunophenotyping and/or positive genetic studies  Specimen type: Excision  Staged by: Managing physician  Cancer antigen 125 () (U/mL): 4,000  Gross residual tumor after primary cyto-reductive surgery: Absent  Residual tumor volume after primary cytoreductive surgery: No gross tumor  National guidelines used in treatment planning: Yes  Type of national guideline used in treatment planning: NCCN       1/13/2020 Surgery    Diagnostic laparoscopy, laparotomy, total hysterectomy, bilateral salpingo-oophorectomy with resection of pelvic mass, bilateral pelvic and periaortic lymphadenectomy, staging peritoneal biopsies, omentectomy, appendectomy  Final pathology consistent with 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       2/11/2020 - 5/26/2020 Chemotherapy    Six cycles of carboplatin/paclitaxel administered by Dr Albin Ford  Tolerated well  CT scan at completion of treatment demonstrates no evidence of measurable disease   amarilis near completion of chemotherapy 5 7 units/milliliter     8/9/2021 Recurrence     Elevated  triggered imaging  which demonstrated suspicious mediastinal lymphadenopathy  Endobronchial ultrasound-guided fine-needle aspiration on August 9, 2021 demonstrates metastatic carcinoma with immuno phenotype consistent with metastatic ovarian primary  9/1/2021 - 1/24/2022 Chemotherapy    Carboplatin + Doxil x 6 cycles (Dr Brian Rodriguez)  Complete response  Amarilis Ca125 8 5 IU/mL  3/2022 -  Chemotherapy    Niraparib  Brain metastases ([facility] )   8/17/2022 Initial Diagnosis    Brain metastases ([facility] )     8/31/2022 - 8/31/2022 Radiation      Plan ID Energy Fractions Dose per Fraction (cGy) Dose Correction (cGy) Total Dose Delivered (cGy) Elapsed Days   SRS 4 PTVs 6X-FFF 1 / 1 2,000 0 2,000 0      Treatment dates:  C1 SRS: 8/31/2022 - 8/31/2022         Review of Systems:  Review of Systems   Constitutional: Positive for activity change (decreased) and fatigue (mild)  HENT: Negative  Eyes:        Wears glasses     Respiratory: Positive for shortness of breath (LANDAVERDE)  Bipap at night     Cardiovascular: Negative  Gastrointestinal: Positive for nausea (occasional)  Negative for vomiting  Endocrine: Negative  Genitourinary: Positive for frequency (occasional) and urgency (occasional)  Negative for dysuria, hematuria and vaginal bleeding  Hx of frequent UTI's  Stress incontinence at times   Musculoskeletal: Negative  Skin: Negative  Allergic/Immunologic: Negative  Neurological: Positive for speech difficulty (occasional trouble word finding improving) and numbness (bilateral feet)  Negative for dizziness, tremors, seizures, light-headedness and headaches     Hematological: Bruises/bleeds easily  Psychiatric/Behavioral: Positive for sleep disturbance Bhargavi Fagan helps)  The patient is nervous/anxious           Forgetful (not much)       Clinical Trial: no      Teaching:     Covid Vaccine Status: Fully Vaccinated     Health Maintenance   Topic Date Due   • Medicare Annual Wellness Visit (AWV)  Never done   • BMI: Followup Plan  Never done   • Colorectal Cancer Screening  Never done   • Osteoporosis Screening  Never done   • Fall Risk  Never done   • Urinary Incontinence Screening  Never done   • Pneumococcal Vaccine: 65+ Years (2 - PCV) 12/17/2019   • COVID-19 Vaccine (4 - Booster for Moderna series) 12/09/2021   • Influenza Vaccine (1) 09/01/2022   • BMI: Adult  09/30/2023   • Hepatitis C Screening  Completed   • HIB Vaccine  Aged Out   • Hepatitis B Vaccine  Aged Out   • IPV Vaccine  Aged Out   • Hepatitis A Vaccine  Aged Out   • Meningococcal ACWY Vaccine  Aged Out   • HPV Vaccine  Aged Out     Patient Active Problem List   Diagnosis   • Encounter for gynecological examination without abnormal finding   • GERD (gastroesophageal reflux disease)   • HTN (hypertension), benign   • Obesity, Class I, BMI 30 0-34 9 (see actual BMI)   • Obstructive sleep apnea   • Myopia, bilateral   • Elevated CA-125   • History of breast cancer   • Ovarian cancer ([facility] )   • Anomalous coronary artery origin   • Frequent unifocal PVCs   • Liver lesion   • Mediastinal lymphadenopathy   • PONV (postoperative nausea and vomiting)   • Aneurysm of thoracic aorta   • Bradycardia   • Cardiomyopathy [facility])   • Cardiovascular stress test abnormal   • Cervical arthritis   • Congenital anomaly of coronary artery   • Hyperlipidemia   • Insomnia   • Mixed anxiety and depressive disorder   • Myopia   • Senile incipient cataract   • Ventricular arrhythmia   • Brain metastases [facility])     Past Medical History:   Diagnosis Date   • Breast cancer ([place] [address] )     2004 and then 2009   • Cancer [facility]) 2005,2009    breast   • Cancer ([place] [address] ) 2020    ovarian • Chicken pox    • Colon polyp    • CPAP (continuous positive airway pressure) dependence    • Frequent UTI    • Heart disease    • High cholesterol    • History of blood transfusion     2013 with Bilat Knee replacement   • Hypertension    • Irregular heart beat     PVC's   • PONV (postoperative nausea and vomiting)    • Sleep apnea      Past Surgical History:   Procedure Laterality Date   • APPENDECTOMY     • BREAST CYST EXCISION     • CHOLECYSTECTOMY     • COLONOSCOPY     • CYSTOSCOPY Bilateral 1/13/2020    Procedure: CYSTOSCOPY: CYSTOSCOPY WITH PERIOPERATIVE URETERAL CATHETERS  PLACEMENT;  Surgeon: Emily Irby MD;  [facility];  Service: Gynecology Oncology   • CYSTOSCOPY     • HYSTERECTOMY N/A 1/13/2020    Procedure: EXPLORATORY LAPAROTOMY, OVARIAN CANCER STAGING WITH TOTAL HYSTERECTOMY, BILATERAL SALPINGO-OOPHORECTOMY, BILATERAL PELVIC AND PERIAORTIC LYMPHADENECTOMY, INDICATED APPENDECTOMY, OMENTECTOMY, STAGING PERITONEAL BIOPSIES ;  Surgeon: Emily Irby MD;  [facility];  Service: Gynecology Oncology   • JOINT REPLACEMENT      Bilat Knees   • MASTECTOMY Bilateral 2009   • MD BRONCHOSCOPY NEEDLE BX TRACHEA MAIN STEM&/BRON N/A 8/9/2021    Procedure: ENDOBRONCHIAL ULTRASOUND (EBUS);   Surgeon: Estephanie Dawkins MD;  [facility];  Service: Thoracic   • MD BRONCHOSCOPY,DIAGNOSTIC N/A 8/9/2021    Procedure: Kelsie Solorio;  Surgeon: Estephanie Dawkins MD;  [facility];  Service: Thoracic   • MD LAP, INCISIONAL HERNIA REPAIR,REDUCIBLE N/A 4/8/2021    Procedure: REPAIR HERNIA INCISIONAL LAPAROSCOPIC;  Surgeon: Parke Fothergill, MD;  [facility];  Service: General   • MD LAP,DIAGNOSTIC ABDOMEN N/A 1/13/2020    Procedure: LAPAROSCOPY DIAGNOSTIC;  Surgeon: Emily Irby MD;  [facility];  Service: Gynecology Oncology   • REPAIR KNEE LIGAMENT Bilateral    • ROTATOR CUFF REPAIR       Family History   Problem Relation Age of Onset   • Breast cancer Mother 50   • Lung cancer Father    • Prostate cancer Brother      Social History     Socioeconomic History   • Marital status: /Civil Union     Spouse name: Not on file   • Number of children: Not on file   • Years of education: Not on file   • Highest education level: Not on file   Occupational History   • Not on file   Tobacco Use   • Smoking status: Former Smoker     Types: Cigarettes     Quit date: 10/31/1987     Years since quittin 9   • Smokeless tobacco: Never Used   Vaping Use   • Vaping Use: Never used   Substance and Sexual Activity   • Alcohol use: Yes     Comment: socially    • Drug use: Never   • Sexual activity: Not Currently     Partners: Male     Birth control/protection: Post-menopausal     Comment: same partner-few times a year   Other Topics Concern   • Not on file   Social History Narrative   • Not on file     Social Determinants of Health     Financial Resource Strain: Not on file   Food Insecurity: Not on file   Transportation Needs: Not on file   Physical Activity: Not on file   Stress: Not on file   Social Connections: Not on file   Intimate Partner Violence: Not on file   Housing Stability: Not on file       Current Outpatient Medications:   •  b complex vitamins capsule, Take 1 capsule by mouth daily, Disp: , Rfl:   •  carvedilol (COREG) 6 25 mg tablet, Take 1 tablet (6 25 mg total) by mouth 2 (two) times a day with meals, Disp: 180 tablet, Rfl: 3  •  cholecalciferol (VITAMIN D3) 400 units tablet, Take 400 Units by mouth daily, Disp: , Rfl:   •  estradiol (ESTRACE) 0 1 mg/g vaginal cream, insert 1 gram vaginally two times a week, Disp: , Rfl: 0  •  LORazepam (ATIVAN) 1 mg tablet, Take 1 tablet (1 mg total) by mouth every 6 (six) hours as needed for anxiety (or nausea), Disp: 20 tablet, Rfl: 0  •  losartan-hydrochlorothiazide (HYZAAR) 50-12 5 mg per tablet, Take 1 tablet by mouth daily , Disp: , Rfl: 0  •  magnesium oxide (MAG-OX) 400 mg, Take 400 mg by mouth 2 (two) times a day , Disp: , Rfl:   •  prochlorperazine (COMPAZINE) 10 mg tablet, Take 10 mg by mouth every 6 (six) hours as needed, Disp: , Rfl:   •  simvastatin (ZOCOR) 20 mg tablet, Take 20 mg by mouth daily , Disp: , Rfl: 0  •  trimethoprim (PROLOPRIM) 100 mg tablet, Take 100 mg by mouth daily , Disp: , Rfl: 0  •  venlafaxine 150 MG TB24, Take 150 mg by mouth daily with breakfast, Disp: , Rfl: 0  •  zolpidem (AMBIEN CR) 12 5 MG CR tablet, Take 12 5 mg by mouth daily at bedtime , Disp: , Rfl: 0  •  dexamethasone (DECADRON) 1 mg tablet, Take 1 tablet (1 mg total) by mouth daily with breakfast Start after completion of all 2mg tablets  (Patient not taking: No sig reported), Disp: 6 tablet, Rfl: 0  •  gabapentin (NEURONTIN) 100 mg capsule, Take 100 mg by mouth if needed (Patient not taking: No sig reported), Disp: , Rfl:   •  niraparib (ZEJULA) 100 MG CAPS, Take 300 mg by mouth daily at bedtime (Patient not taking: No sig reported), Disp: , Rfl:   •  nystatin-triamcinolone (MYCOLOG-II) cream, Apply topically 2 (two) times a day, Disp: 30 g, Rfl: 0  No Known Allergies  There were no vitals filed for this visit     Pain Score: 0-No pain

## 2022-10-19 NOTE — PROGRESS NOTES
Virtual Brief Visit     Problem List Items Addressed This Visit    None         Reason for visit is follow up      Encounter provider Kev Jacobs MD     Provider located at [address] [address]      Recent Visits  No visits were found meeting these conditions  Showing recent visits within past 7 days and meeting all other requirements  Today's Visits  Date Type Provider Dept   10/19/22 Telemedicine Kev Jacobs MD [facility]   Showing today's visits and meeting all other requirements  Future Appointments  No visits were found meeting these conditions  Showing future appointments within next 150 days and meeting all other requirements      After connecting through Telephone, the patient was identified by name and date of birth  Salinas Bender was informed that this is a telemedicine visit and that the visit is being conducted through Telephone  My office door was closed  No one else was in the room  She acknowledged consent and understanding of privacy and security of the video platform  The patient has agreed to participate and understands they can discontinue the visit at any time  Patient is aware this is a billable service       Subjective  Salinas Bender is a 73 y o  female  with a PMHx bilateral breast cancers (s/p initial right sided BCS followed by adjuvant TC and RT (2006), thereafter with left sided cancer s/p bilateral mastectomy and adjuvant endocrine therapy)  She was more recently diagnosed with 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, most recently on niraparib  She has since been found to have intracranial lesions compatible with brain metastases   She completed SRS on 8/31/22, she returns today for her 2 month follow up       Interval History:  10/13/22 MRI brain w wo contrast  No evidence of enhancing intracranial metastatic lesions, compatible with positive treatment-related response   No new or enlarging intracranial lesions  Resolved perilesional vasogenic edema in left frontal and bilateral parietal lobes        She is currently doing well overall  She has had no headaches, no nausea, no vomiting, no unusual fatigue       11/29/22 CT CAP w contrast  11/29/22 Gyn/Onc     Historical Information   Oncology History   Ovarian cancer ([facility] [address] )   1/13/2020 Initial Diagnosis    Ovarian cancer on left ([facility] [address] )     1/13/2020 -  Cancer Staged    Staging form: Ovary, Fallopian Tube, Primary Peritoneal, AJCC 8th Edition  - Pathologic stage from 1/13/2020: FIGO Stage IC3 (pN0, cM0) - Signed by Patricia Leach MD on 1/28/2020  Histologic grade (G): G3  Histologic grading system: 4 grade system  Residual tumor (R): R0 - None  Histopathologic type: Serous cystadenocarcinoma, NOS  Diagnostic confirmation: Positive histology PLUS positive immunophenotyping and/or positive genetic studies  Specimen type: Excision  Staged by: Managing physician  Cancer antigen 125 () (U/mL): 4,000  Gross residual tumor after primary cyto-reductive surgery: Absent  Residual tumor volume after primary cytoreductive surgery: No gross tumor  National guidelines used in treatment planning: Yes  Type of national guideline used in treatment planning: NCCN       1/13/2020 Surgery    Diagnostic laparoscopy, laparotomy, total hysterectomy, bilateral salpingo-oophorectomy with resection of pelvic mass, bilateral pelvic and periaortic lymphadenectomy, staging peritoneal biopsies, omentectomy, appendectomy  Final pathology consistent with 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  2/11/2020 - 5/26/2020 Chemotherapy    Six cycles of carboplatin/paclitaxel administered by Dr Aleyda Ford  Tolerated well  CT scan at completion of treatment demonstrates no evidence of measurable disease     amarilis near completion of chemotherapy 5 7 units/milliliter     8/9/2021 Recurrence     Elevated  triggered imaging  which demonstrated suspicious mediastinal lymphadenopathy  Endobronchial ultrasound-guided fine-needle aspiration on August 9, 2021 demonstrates metastatic carcinoma with immuno phenotype consistent with metastatic ovarian primary  9/1/2021 - 1/24/2022 Chemotherapy    Carboplatin + Doxil x 6 cycles (Dr Diane Kramer)  Complete response  Fadi Ca125 8 5 IU/mL  3/2022 -  Chemotherapy    Niraparib       Brain metastases ([facility] [address] )   8/17/2022 Initial Diagnosis    Brain metastases ([facility] [address] )     8/31/2022 - 8/31/2022 Radiation      Plan ID Energy Fractions Dose per Fraction (cGy) Dose Correction (cGy) Total Dose Delivered (cGy) Elapsed Days   SRS 4 PTVs 6X-FFF 1 / 1 2,000 0 2,000 0      Treatment dates:  C1 SRS: 8/31/2022 - 8/31/2022         Past Medical History:   Diagnosis Date   • Breast cancer ([facility] [address] )     2004 and then 2009   • Cancer [facility]) 2005,2009    breast   • Cancer ([facility] [address] ) 2020    ovarian   • Chicken pox    • Colon polyp    • CPAP (continuous positive airway pressure) dependence    • Frequent UTI    • Heart disease    • High cholesterol    • History of blood transfusion     2013 with Bilat Knee replacement   • Hypertension    • Irregular heart beat     PVC's   • PONV (postoperative nausea and vomiting)    • Sleep apnea      Past Surgical History:   Procedure Laterality Date   • APPENDECTOMY     • BREAST CYST EXCISION     • CHOLECYSTECTOMY     • COLONOSCOPY     • CYSTOSCOPY Bilateral 1/13/2020    Procedure: CYSTOSCOPY: CYSTOSCOPY WITH PERIOPERATIVE URETERAL CATHETERS  PLACEMENT;  Surgeon: Domitila Tijerina MD;  [facility];  Service: Gynecology Oncology   • CYSTOSCOPY     • HYSTERECTOMY N/A 1/13/2020    Procedure: EXPLORATORY LAPAROTOMY, OVARIAN CANCER STAGING WITH TOTAL HYSTERECTOMY, BILATERAL SALPINGO-OOPHORECTOMY, BILATERAL PELVIC AND PERIAORTIC LYMPHADENECTOMY, INDICATED APPENDECTOMY, OMENTECTOMY, STAGING PERITONEAL BIOPSIES ;  Surgeon: Domitila Tijerina MD; Location[facility];  Service: Gynecology Oncology   • JOINT REPLACEMENT      Bilat Knees   • MASTECTOMY Bilateral    • KY BRONCHOSCOPY NEEDLE BX TRACHEA MAIN STEM&/BRON N/A 2021    Procedure: ENDOBRONCHIAL ULTRASOUND (EBUS);   Surgeon: Allie Guerra MD;  [facility];  Service: Thoracic   • KY BRONCHOSCOPY,DIAGNOSTIC N/A 2021    Procedure: Ty Jerilyn;  Surgeon: Allie Guerra MD;  [facility];  Service: Thoracic   • KY LAP, INCISIONAL HERNIA REPAIR,REDUCIBLE N/A 2021    Procedure: REPAIR HERNIA INCISIONAL LAPAROSCOPIC;  Surgeon: Asif Colunga MD;  [facility];  Service: General   • KY LAP,DIAGNOSTIC ABDOMEN N/A 2020    Procedure: LAPAROSCOPY DIAGNOSTIC;  Surgeon: Katie Gamez MD;  [facility];  Service: Gynecology Oncology   • REPAIR KNEE LIGAMENT Bilateral    • ROTATOR CUFF REPAIR         Social History   Social History     Substance and Sexual Activity   Alcohol Use Yes    Comment: socially      Social History     Substance and Sexual Activity   Drug Use Never     Social History     Tobacco Use   Smoking Status Former Smoker   • Types: Cigarettes   • Quit date: 10/31/1987   • Years since quittin 9   Smokeless Tobacco Never Used     Meds/Allergies     Current Outpatient Medications:   •  b complex vitamins capsule, Take 1 capsule by mouth daily, Disp: , Rfl:   •  carvedilol (COREG) 6 25 mg tablet, Take 1 tablet (6 25 mg total) by mouth 2 (two) times a day with meals, Disp: 180 tablet, Rfl: 3  •  cholecalciferol (VITAMIN D3) 400 units tablet, Take 400 Units by mouth daily, Disp: , Rfl:   •  estradiol (ESTRACE) 0 1 mg/g vaginal cream, insert 1 gram vaginally two times a week, Disp: , Rfl: 0  •  LORazepam (ATIVAN) 1 mg tablet, Take 1 tablet (1 mg total) by mouth every 6 (six) hours as needed for anxiety (or nausea), Disp: 20 tablet, Rfl: 0  •  losartan-hydrochlorothiazide (HYZAAR) 50-12 5 mg per tablet, Take 1 tablet by mouth daily , Disp: , Rfl: 0  •  magnesium oxide (MAG-OX) 400 mg, Take 400 mg by mouth 2 (two) times a day , Disp: , Rfl:   •  prochlorperazine (COMPAZINE) 10 mg tablet, Take 10 mg by mouth every 6 (six) hours as needed, Disp: , Rfl:   •  simvastatin (ZOCOR) 20 mg tablet, Take 20 mg by mouth daily , Disp: , Rfl: 0  •  trimethoprim (PROLOPRIM) 100 mg tablet, Take 100 mg by mouth daily , Disp: , Rfl: 0  •  venlafaxine 150 MG TB24, Take 150 mg by mouth daily with breakfast, Disp: , Rfl: 0  •  zolpidem (AMBIEN CR) 12 5 MG CR tablet, Take 12 5 mg by mouth daily at bedtime , Disp: , Rfl: 0  •  dexamethasone (DECADRON) 1 mg tablet, Take 1 tablet (1 mg total) by mouth daily with breakfast Start after completion of all 2mg tablets  (Patient not taking: No sig reported), Disp: 6 tablet, Rfl: 0  •  gabapentin (NEURONTIN) 100 mg capsule, Take 100 mg by mouth if needed (Patient not taking: No sig reported), Disp: , Rfl:   •  niraparib (ZEJULA) 100 MG CAPS, Take 300 mg by mouth daily at bedtime (Patient not taking: No sig reported), Disp: , Rfl:   •  nystatin-triamcinolone (MYCOLOG-II) cream, Apply topically 2 (two) times a day, Disp: 30 g, Rfl: 0  No Known Allergies      OBJECTIVE:   LMP  (LMP Unknown)   No exam performed for this telephone follow-up  RESULTS    Lab Results: No results found for this or any previous visit (from the past 672 hour(s))  Imaging Studies:MRI brain w wo contrast    Result Date: 10/17/2022  Narrative: MRI BRAIN WITH AND WITHOUT CONTRAST INDICATION: C79 31: Secondary malignant neoplasm of brain  Brain metastases s/p SRS COMPARISON:  MRI brain with and without contrast August 16, 2022  TECHNIQUE: Sagittal T1, axial T2, axial FLAIR, axial T1, axial Piru, axial diffusion  Sagittal, axial T1 postcontrast   Axial bravo postcontrast with coronal reconstructions  IV Contrast:  9 9 mL of Gadobutrol injection (SINGLE-DOSE)  IMAGE QUALITY:   Diagnostic   FINDINGS: BRAIN PARENCHYMA: Resolved enhancing metastatic lesions in bilateral cerebral hemispheres with interval development of chronic microhemorrhages in the region regions of prior enhancing metastatic lesions, compatible with treatment response  Resolved perilesional vasogenic  edema in left frontal and bilateral parietal lobes  No new or enlarging enhancing intracranial lesions  No mass effect or midline shift  No acute intracranial hemorrhage  Normal posterior fossa  No diffusion-weighted signal abnormality to suggest acute infarction  Small scattered hyperintensities on T2/FLAIR imaging are noted in the periventricular and subcortical white matter demonstrating an appearance that is statistically most likely to represent mild microangiopathic change  Postcontrast imaging of the brain demonstrates no abnormal enhancement  VENTRICLES:  Normal for the patient's age  SELLA AND PITUITARY GLAND:  Normal  ORBITS:  Normal  PARANASAL SINUSES:  Sinonasal surgery  No significant mucosal thickening  VASCULATURE:  Evaluation of the major intracranial vasculature demonstrates appropriate flow voids  CALVARIUM AND SKULL BASE:  Normal  EXTRACRANIAL SOFT TISSUES:  Small subgaleal lipoma in left parietal region  Impression: No evidence of enhancing intracranial metastatic lesions, compatible with positive treatment-related response  No new or enlarging intracranial lesions  Resolved perilesional vasogenic edema in left frontal and bilateral parietal lobes  Workstation performed: [id number]       Assessment/Plan:  No orders of the defined types were placed in this encounter  Approximately 2 months following completion of SRS, the patient is doing well overall  We reviewed her most recent MRI Brain which shows a very good response to treatment with interval resolution of her previously present brain metastases  She will require continued close follow-up with plan for repeat MRI Brain in 3 months  I will plan to see her back in clinic thereafter   I will remain available in the interim should any new questions arise  Kalen Kuhn  10/19/2022,1:52 PM    Portions of the record may have been created with voice recognition software   Occasional wrong word or "sound a like" substitutions may have occurred due to the inherent limitations of voice recognition software   Read the chart carefully and recognize, using context, where substitutions have occurred

## 2022-10-27 RX ORDER — LORAZEPAM 1 MG/1
1 TABLET ORAL EVERY 6 HOURS PRN
Qty: 120 TABLET | Refills: 0 | Status: CANCELLED | OUTPATIENT
Start: 2022-10-27

## 2022-10-28 ENCOUNTER — OFFICE VISIT (OUTPATIENT)
Dept: PALLIATIVE MEDICINE | Facility: CLINIC | Age: 72
End: 2022-10-28

## 2022-10-28 VITALS
WEIGHT: 227.07 LBS | BODY MASS INDEX: 30.76 KG/M2 | HEIGHT: 72 IN | SYSTOLIC BLOOD PRESSURE: 112 MMHG | HEART RATE: 76 BPM | DIASTOLIC BLOOD PRESSURE: 78 MMHG | OXYGEN SATURATION: 97 % | TEMPERATURE: 97.4 F

## 2022-10-28 DIAGNOSIS — C56.9 MALIGNANT NEOPLASM OF OVARY, UNSPECIFIED LATERALITY (HCC): Primary | ICD-10-CM

## 2022-10-28 DIAGNOSIS — F41.9 ANXIETY: ICD-10-CM

## 2022-10-28 DIAGNOSIS — C79.31 BRAIN METASTASES (HCC): ICD-10-CM

## 2022-10-28 DIAGNOSIS — Z85.3 HISTORY OF BREAST CANCER: ICD-10-CM

## 2022-10-28 NOTE — PROGRESS NOTES
Outpatient Follow-Up - Palliative and Supportive Care   Merlin Brumfield 81 y o  female [id number]    Assessment & Plan  Problem List Items Addressed This Visit        Unprioritized    History of breast cancer    Ovarian cancer ([facility] [address] ) - Primary    Brain metastases ([facility] [address] )      Other Visit Diagnoses     Anxiety              Medications adjusted this encounter:  Requested Prescriptions      No prescriptions requested or ordered in this encounter     No orders of the defined types were placed in this encounter  There are no discontinued medications  Merlin Brumfield was seen today for symptoms and planning cares related to above illnesses  I have reviewed the patient's controlled substance dispensing history in the Prescription Drug Monitoring Program in compliance with the [facility] regulations before prescribing any controlled substances  They are invited to continue to follow with us  If there are questions or concerns, please contact us through our clinic/answering service 24 hours a day, seven days a week  [facility]        Visit Information    Accompanied By: Spouse    Source of History: Self, Spouse    History Limitations: Hearing Impaired (spouse)    Chief Complaint  Chief Complaint   Patient presents with   • Follow-up       History of Present Illness      Merlin Brumfield is a 81 y o  female who presents in follow up of symptoms related to ovarian cancer with mets to brain  Pertinent issues include: assessment of goals of care       Calos Amezquita is a 74 yo female with Hx of breast cancer who presents to the [facility] [place], operated by [facility] for a 4 wk f/u, second visit   Calos Amezquita was first diagnosed with ovarian cancer in January 2020 --> underwent chemotherapy x 6 cycles with carboplatin and paclitaxel --> August 2021 found to have elevated Ca 125 and mediastinal lymphadenopathy --> underwent 6 cycles of carboplatin and doxil --> august 2022 presented to [facility]"" with HA and was found to have numerous sub-centimeter brain mets on MRI  At our last visit, we discussed her emotional process around understanding she was not prepared to die  We discussed her desire to spend time with her son, who has been struggling with addiction, as well as her love of the beach  She uses meditation, yoga and breathing exercises to help her cope      In the interim, Rima Gasca had a repeat MRI of the brain and met with her oncologist, Dr Mir Quick, who felt that the results showed good response to her radiation therapy  She will have a repeat scan in 3 months and follow up with him at that time  On my encounter today, the patient and her  report that they are very pleased with the results of her therapy and hope that she may have a little more time than they thought  She had chemotherapy on Wednesday and tolerated it well, though they are monitoring her for leukopenia and thrombocytopenia she has not had any signs of infection  She is feeling better overall and feeling optimistic  She is going on a trip to [facility] with her best friends this month  We call her daughter Natalio Wilkinson on the phone and she participates in the conversation  Her son finished his rehab today and they are hoping he might come home after the holidays  She c/o insomnia, finds her mind is racing and she is worrying about what will happen to her family when she is gone  She takes [place] and ativan prescribed by her PCP  We will meet again after the holidays or if both of her kids come home and want to come into the office with her we may move the appointment up so that they can attend  Past medical, surgical, social, and family histories are reviewed and pertinent updates are made  Review of Systems   Constitutional: Positive for decreased appetite, malaise/fatigue and weight gain  Negative for weight loss  HENT: Negative for ear pain and sore throat  Eyes: Negative for pain and visual disturbance     Cardiovascular: Positive for dyspnea on exertion  Negative for chest pain  Respiratory: Negative for cough and shortness of breath  Musculoskeletal: Negative for back pain and falls  Gastrointestinal: Positive for constipation  Negative for abdominal pain, anorexia, change in bowel habit, diarrhea, nausea and vomiting  Last BM Tuesday, usually goes q2 days   Genitourinary: Positive for bladder incontinence  Negative for dysuria  Neurological: Negative for focal weakness, light-headedness and loss of balance  Psychiatric/Behavioral: Negative for depression, hallucinations and memory loss  The patient has insomnia and is nervous/anxious  Vital Signs    /78 (BP Location: Left arm, Patient Position: Sitting, Cuff Size: Standard)   Pulse 76   Temp (!) 97 4 °F (36 3 °C) (Temporal)   Ht 6' (1 829 m)   Wt 103 kg (227 lb 1 2 oz)   LMP  (LMP Unknown)   SpO2 97%   BMI 30 80 kg/m²     Physical Exam and Objective Data  Physical Exam  Constitutional:       General: She is not in acute distress  Appearance: She is not ill-appearing  HENT:      Head: Normocephalic and atraumatic  Nose: Nose normal       Mouth/Throat:      Mouth: Mucous membranes are moist    Eyes:      Extraocular Movements: Extraocular movements intact  Pupils: Pupils are equal, round, and reactive to light  Cardiovascular:      Rate and Rhythm: Normal rate  Pulmonary:      Effort: Pulmonary effort is normal    Abdominal:      General: There is no distension  Palpations: Abdomen is soft  Musculoskeletal:      Cervical back: Normal range of motion  Right lower leg: No edema  Left lower leg: No edema  Skin:     General: Skin is warm and dry  Neurological:      Mental Status: She is alert and oriented to person, place, and time     Psychiatric:         Mood and Affect: Mood normal          Behavior: Behavior normal            Radiology and Laboratory:  I personally reviewed and interpreted the following results:     10/13/22 MRI brain : Resolved enhancing metastatic lesions in bilateral cerebral hemispheres with interval development of chronic microhemorrhages in the region regions of prior enhancing metastatic lesions, compatible with treatment response  Resolved perilesional vasogenic   edema in left frontal and bilateral parietal lobes  No new or enlarging enhancing intracranial lesions        60 minutes was spent face to face with her spouse with greater than 50% of the time spent in counseling or coordination of care including discussions of prognosis of diagnosis, diagnostic results, impression, and recommendations and risks and benefits of treatment  All of the patient's or agent's questions were answered during this discussion

## 2022-11-29 ENCOUNTER — TELEPHONE (OUTPATIENT)
Dept: HEMATOLOGY ONCOLOGY | Facility: CLINIC | Age: 72
End: 2022-11-29

## 2022-11-29 ENCOUNTER — HOSPITAL ENCOUNTER (OUTPATIENT)
Dept: CT IMAGING | Facility: HOSPITAL | Age: 72
Discharge: HOME | End: 2022-11-29

## 2022-11-29 ENCOUNTER — OFFICE VISIT (OUTPATIENT)
Dept: GYNECOLOGIC ONCOLOGY | Facility: CLINIC | Age: 72
End: 2022-11-29

## 2022-11-29 VITALS
DIASTOLIC BLOOD PRESSURE: 80 MMHG | WEIGHT: 227 LBS | HEIGHT: 72 IN | TEMPERATURE: 99.9 F | SYSTOLIC BLOOD PRESSURE: 122 MMHG | BODY MASS INDEX: 30.75 KG/M2

## 2022-11-29 DIAGNOSIS — R97.1 ELEVATED CA-125: ICD-10-CM

## 2022-11-29 DIAGNOSIS — C56.9 MALIGNANT NEOPLASM OF OVARY, UNSPECIFIED LATERALITY (HCC): Primary | ICD-10-CM

## 2022-11-29 DIAGNOSIS — C56.9 MALIGNANT NEOPLASM OF OVARY, UNSPECIFIED LATERALITY (HCC): ICD-10-CM

## 2022-11-29 DIAGNOSIS — C79.31 BRAIN METASTASES (HCC): ICD-10-CM

## 2022-11-29 RX ADMIN — IOHEXOL 50 ML: 240 INJECTION, SOLUTION INTRATHECAL; INTRAVASCULAR; INTRAVENOUS; ORAL at 12:41

## 2022-11-29 RX ADMIN — IOHEXOL 100 ML: 350 INJECTION, SOLUTION INTRAVENOUS at 12:42

## 2022-11-29 NOTE — PATIENT INSTRUCTIONS
Continue treatment with Dr Noni Perry  Recommend CT scan of the chest, abdomen and pelvis after cycle 6  MRI prior radiation oncology

## 2022-11-29 NOTE — ASSESSMENT & PLAN NOTE
Patient is receiving chemotherapy under the direction of Dr Moises Rush  After 3 cycles of gemcitabine and bevacizumab CT scan demonstrates no evidence of measurable disease   levels not available to me at this time  Patient reports some decreased  She is tolerating treatment well  Reports some fatigue and generalized aches and discomfort  Ongoing alopecia  Recommend completion of 6 cycles of gemcitabine and bevacizumab and repeat CT scan to evaluate response  If complete response and normalization of  can be achieved, maintenance single agent bevacizumab could be considered  Otherwise, will discuss potential treatment options pending results

## 2022-11-29 NOTE — LETTER
November 29, 2022     Kane Shah [address] [place] [id number]    Patient: Masood Severino   YOB: 1950   Date of Visit: 11/29/2022       Dear Dr Magaly Vogel: Thank you for referring Maurice Lovelace to me for evaluation  Below are my notes for this consultation  If you have questions, please do not hesitate to call me  I look forward to following your patient along with you  Sincerely,        Maryse Le MD        CC: No Recipients  Maryse Le MD  11/29/2022  3:12 PM  Sign when Signing Visit  Assessment/Plan:    Problem List Items Addressed This Visit        Endocrine    Ovarian cancer [facility]) - Primary     Patient is receiving chemotherapy under the direction of Dr Noni Perry  After 3 cycles of gemcitabine and bevacizumab CT scan demonstrates no evidence of measurable disease   levels not available to me at this time  Patient reports some decreased  She is tolerating treatment well  Reports some fatigue and generalized aches and discomfort  Ongoing alopecia  Recommend completion of 6 cycles of gemcitabine and bevacizumab and repeat CT scan to evaluate response  If complete response and normalization of  can be achieved, maintenance single agent bevacizumab could be considered  Otherwise, will discuss potential treatment options pending results  Nervous and Auditory    Brain metastases [facility])     Status post [facility]   Post treatment MRI demonstrates what appears to represent a complete response  Scheduled for repeat MRI in January  Other    Elevated CA-125         CHIEF COMPLAINT:   Follow-up during chemotherapy for recurrent ovarian cancer      Problem:  Cancer Staging   Ovarian cancer [facility])  Staging form: Ovary, Fallopian Tube, Primary Peritoneal, AJCC 8th Edition  - Pathologic stage from 1/13/2020: FIGO Stage IC3 (pN0, cM0) - Signed by Maryse Le MD on 1/28/2020        Previous therapy:  Oncology History Ovarian cancer ([facility] [address] )   1/13/2020 Initial Diagnosis    Ovarian cancer on left [facility])     1/13/2020 -  Cancer Staged    Staging form: Ovary, Fallopian Tube, Primary Peritoneal, AJCC 8th Edition  - Pathologic stage from 1/13/2020: FIGO Stage IC3 (pN0, cM0) - Signed by Supa Rosales MD on 1/28/2020  Histologic grade (G): G3  Histologic grading system: 4 grade system  Residual tumor (R): R0 - None  Histopathologic type: Serous cystadenocarcinoma, NOS  Diagnostic confirmation: Positive histology PLUS positive immunophenotyping and/or positive genetic studies  Specimen type: Excision  Staged by: Managing physician  Cancer antigen 125 () (U/mL): 4,000  Gross residual tumor after primary cyto-reductive surgery: Absent  Residual tumor volume after primary cytoreductive surgery: No gross tumor  National guidelines used in treatment planning: Yes  Type of national guideline used in treatment planning: NCCN       1/13/2020 Surgery    Diagnostic laparoscopy, laparotomy, total hysterectomy, bilateral salpingo-oophorectomy with resection of pelvic mass, bilateral pelvic and periaortic lymphadenectomy, staging peritoneal biopsies, omentectomy, appendectomy  Final pathology consistent with 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  2/11/2020 - 5/26/2020 Chemotherapy    Six cycles of carboplatin/paclitaxel administered by Dr Guera Hanks  Tolerated well  CT scan at completion of treatment demonstrates no evidence of measurable disease   amarilis near completion of chemotherapy 5 7 units/milliliter     8/9/2021 Recurrence     Elevated  triggered imaging  which demonstrated suspicious mediastinal lymphadenopathy  Endobronchial ultrasound-guided fine-needle aspiration on August 9, 2021 demonstrates metastatic carcinoma with immuno phenotype consistent with metastatic ovarian primary  9/1/2021 - 1/24/2022 Chemotherapy    Carboplatin + Doxil x 6 cycles (Dr Joseph Cherry)  Complete response   Amarilis Ca125 8 5 IU/mL  3/2022 - 8/2022 Chemotherapy    Niraparib  8/31/2022 -  Radiation    C1 - SRS 4 PTV 6X-FFF 1 1/1 fx 2,000 cGy     9/14/2022 -  Chemotherapy    Started on gemcitabine and bevacizumab  On November 29, 2022 after 3 cycles CT scan demonstrates no evidence of measurable disease   reportedly decreasing somewhat  Patient tolerating well  Brain metastases ([facility] [address] )   8/17/2022 Initial Diagnosis    Brain metastases ([facility] [address] )     8/31/2022 - 8/31/2022 Radiation      Plan ID Energy Fractions Dose per Fraction (cGy) Dose Correction (cGy) Total Dose Delivered (cGy) Elapsed Days   SRS 4 PTVs 6X-FFF 1 / 1 2,000 0 2,000 0      Treatment dates:  C1 SRS: 8/31/2022 - 8/31/2022           Patient ID: Rah Cabrera is a 81 y o  female  HPI  Patient undergoing gemcitabine and bevacizumab for recurrent metastatic ovarian cancer  She was treated for brain metastases with [facility] and August 31st   Most recent MRI showed complete response  She is tolerating treatment well  Per her report  has decreased  Denies nausea vomiting  Denies pelvic or abdominal pain  Denies vaginal bleeding, drainage or discharge  Constipation at baseline managed with over-the-counter products  She is been treated locally by Dr Christofer Harris    The following portions of the patient's history were reviewed and updated as appropriate: allergies, current medications, past family history, past medical history, past social history, past surgical history and problem list     Review of Systems    Current Outpatient Medications   Medication Sig Dispense Refill   • b complex vitamins capsule Take 1 capsule by mouth daily     • carvedilol (COREG) 6 25 mg tablet Take 1 tablet (6 25 mg total) by mouth 2 (two) times a day with meals 180 tablet 3   • cholecalciferol (VITAMIN D3) 400 units tablet Take 400 Units by mouth daily     • estradiol (ESTRACE) 0 1 mg/g vaginal cream insert 1 gram vaginally two times a week  0   • gabapentin (NEURONTIN) 100 mg capsule Take 100 mg by mouth if needed (Patient not taking: No sig reported)     • LORazepam (ATIVAN) 1 mg tablet Take 1 tablet (1 mg total) by mouth every 6 (six) hours as needed for anxiety (or nausea) 20 tablet 0   • losartan-hydrochlorothiazide (HYZAAR) 50-12 5 mg per tablet Take 1 tablet by mouth daily   0   • magnesium oxide (MAG-OX) 400 mg Take 400 mg by mouth 2 (two) times a day      • nystatin-triamcinolone (MYCOLOG-II) cream Apply topically 2 (two) times a day (Patient not taking: Reported on 10/28/2022) 30 g 0   • prochlorperazine (COMPAZINE) 10 mg tablet Take 10 mg by mouth every 6 (six) hours as needed     • simvastatin (ZOCOR) 20 mg tablet Take 20 mg by mouth daily   0   • trimethoprim (PROLOPRIM) 100 mg tablet Take 100 mg by mouth daily   0   • venlafaxine 150 MG TB24 Take 150 mg by mouth daily with breakfast  0   • zolpidem (AMBIEN CR) 12 5 MG CR tablet Take 12 5 mg by mouth daily at bedtime   0     No current facility-administered medications for this visit  Objective:    Blood pressure 122/80, temperature 99 9 °F (37 7 °C), temperature source Tympanic Core, height 6' (1 829 m), weight 103 kg (227 lb), not currently breastfeeding  Body mass index is 30 79 kg/m²  Body surface area is 2 25 meters squared  Physical Exam  Vitals reviewed  Exam conducted with a chaperone present  Constitutional:       General: She is not in acute distress  Appearance: She is obese  She is not ill-appearing or toxic-appearing  Comments: Alopecia  Eyes:      General: No scleral icterus  Right eye: No discharge  Left eye: No discharge  Conjunctiva/sclera: Conjunctivae normal    Cardiovascular:      Rate and Rhythm: Normal rate and regular rhythm  Heart sounds: Normal heart sounds  No murmur heard  Pulmonary:      Effort: Pulmonary effort is normal  No respiratory distress  Breath sounds: Normal breath sounds  No wheezing     Abdominal:      General: There is no distension  Palpations: Abdomen is soft  There is no mass  Tenderness: There is no abdominal tenderness  There is no guarding  Genitourinary:     Comments: Normal external female genitalia  Normal Bartholin's and [place]'s glands  Normal urethral meatus and no evidence of urethral discharge or masses  Bladder without fullness mass or tenderness  Vagina without lesion or discharge  No significant pelvic organ prolapse noted  Cervix and uterus are surgically absent  Bimanual exam demonstrates no evidence of pelvic masses  Anus without fissure of lesion  Musculoskeletal:      Right lower leg: No edema  Left lower leg: No edema            Nohemy Hartmann MD, [address] [address]  11/29/2022  3:10 PM

## 2022-11-29 NOTE — ASSESSMENT & PLAN NOTE
Status post [facility] August [facility]st   Post treatment MRI demonstrates what appears to represent a complete response  Scheduled for repeat MRI in January

## 2022-11-29 NOTE — PROGRESS NOTES
Assessment/Plan:    Problem List Items Addressed This Visit        Endocrine    Ovarian cancer [facility]) - Primary     Patient is receiving chemotherapy under the direction of Dr Jaime Childress  After 3 cycles of gemcitabine and bevacizumab CT scan demonstrates no evidence of measurable disease   levels not available to me at this time  Patient reports some decreased  She is tolerating treatment well  Reports some fatigue and generalized aches and discomfort  Ongoing alopecia  Recommend completion of 6 cycles of gemcitabine and bevacizumab and repeat CT scan to evaluate response  If complete response and normalization of  can be achieved, maintenance single agent bevacizumab could be considered  Otherwise, will discuss potential treatment options pending results  Nervous and Auditory    Brain metastases [facility])     Status post [facility] August [facility]   Post treatment MRI demonstrates what appears to represent a complete response  Scheduled for repeat MRI in January  Other    Elevated CA-125         CHIEF COMPLAINT:   Follow-up during chemotherapy for recurrent ovarian cancer      Problem:  Cancer Staging   Ovarian cancer [facility])  Staging form: Ovary, Fallopian Tube, Primary Peritoneal, AJCC 8th Edition  - Pathologic stage from 1/13/2020: FIGO Stage IC3 (pN0, cM0) - Signed by Debi Vale MD on 1/28/2020        Previous therapy:  Oncology History   Ovarian cancer ([facility] [address] )   1/13/2020 Initial Diagnosis    Ovarian cancer on left ([facility] [address] )     1/13/2020 -  Cancer Staged    Staging form: Ovary, Fallopian Tube, Primary Peritoneal, AJCC 8th Edition  - Pathologic stage from 1/13/2020: FIGO Stage IC3 (pN0, cM0) - Signed by Debi Vale MD on 1/28/2020  Histologic grade (G): G3  Histologic grading system: 4 grade system  Residual tumor (R): R0 - None  Histopathologic type: Serous cystadenocarcinoma, NOS  Diagnostic confirmation: Positive histology PLUS positive immunophenotyping and/or positive genetic studies  Specimen type: Excision  Staged by: Managing physician  Cancer antigen 125 () (U/mL): 4,000  Gross residual tumor after primary cyto-reductive surgery: Absent  Residual tumor volume after primary cytoreductive surgery: No gross tumor  National guidelines used in treatment planning: Yes  Type of national guideline used in treatment planning: NCCN       1/13/2020 Surgery    Diagnostic laparoscopy, laparotomy, total hysterectomy, bilateral salpingo-oophorectomy with resection of pelvic mass, bilateral pelvic and periaortic lymphadenectomy, staging peritoneal biopsies, omentectomy, appendectomy  Final pathology consistent with 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  2/11/2020 - 5/26/2020 Chemotherapy    Six cycles of carboplatin/paclitaxel administered by Dr Brayan Boswell  Tolerated well  CT scan at completion of treatment demonstrates no evidence of measurable disease   amarilis near completion of chemotherapy 5 7 units/milliliter     8/9/2021 Recurrence     Elevated  triggered imaging  which demonstrated suspicious mediastinal lymphadenopathy  Endobronchial ultrasound-guided fine-needle aspiration on August 9, 2021 demonstrates metastatic carcinoma with immuno phenotype consistent with metastatic ovarian primary  9/1/2021 - 1/24/2022 Chemotherapy    Carboplatin + Doxil x 6 cycles (Dr Nae Lim)  Complete response  Amarilis Ca125 8 5 IU/mL  3/2022 - 8/2022 Chemotherapy    Niraparib  8/31/2022 -  Radiation    C1 - SRS 4 PTV 6X-FFF 1 1/1 fx 2,000 cGy     9/14/2022 -  Chemotherapy    Started on gemcitabine and bevacizumab  On November 29, 2022 after 3 cycles CT scan demonstrates no evidence of measurable disease   reportedly decreasing somewhat  Patient tolerating well       Brain metastases ([address] )   8/17/2022 Initial Diagnosis    Brain metastases ([address] )     8/31/2022 - 8/31/2022 Radiation      Plan ID Energy Fractions Dose per Fraction (cGy) Dose Correction (cGy) Total Dose Delivered (cGy) Elapsed Days   SRS 4 PTVs 6X-FFF 1 / 1 2,000 0 2,000 0      Treatment dates:  C1 SRS: 8/31/2022 - 8/31/2022           Patient ID: Kristi Horn is a 81 y o  female  HPI  Patient undergoing gemcitabine and bevacizumab for recurrent metastatic ovarian cancer  She was treated for brain metastases with [facility] and August 31st   Most recent MRI showed complete response  She is tolerating treatment well  Per her report  has decreased  Denies nausea vomiting  Denies pelvic or abdominal pain  Denies vaginal bleeding, drainage or discharge  Constipation at baseline managed with over-the-counter products  She is been treated locally by Dr Tejada President    The following portions of the patient's history were reviewed and updated as appropriate: allergies, current medications, past family history, past medical history, past social history, past surgical history and problem list     Review of Systems    Current Outpatient Medications   Medication Sig Dispense Refill   • b complex vitamins capsule Take 1 capsule by mouth daily     • carvedilol (COREG) 6 25 mg tablet Take 1 tablet (6 25 mg total) by mouth 2 (two) times a day with meals 180 tablet 3   • cholecalciferol (VITAMIN D3) 400 units tablet Take 400 Units by mouth daily     • estradiol (ESTRACE) 0 1 mg/g vaginal cream insert 1 gram vaginally two times a week  0   • gabapentin (NEURONTIN) 100 mg capsule Take 100 mg by mouth if needed (Patient not taking: No sig reported)     • LORazepam (ATIVAN) 1 mg tablet Take 1 tablet (1 mg total) by mouth every 6 (six) hours as needed for anxiety (or nausea) 20 tablet 0   • losartan-hydrochlorothiazide (HYZAAR) 50-12 5 mg per tablet Take 1 tablet by mouth daily   0   • magnesium oxide (MAG-OX) 400 mg Take 400 mg by mouth 2 (two) times a day      • nystatin-triamcinolone (MYCOLOG-II) cream Apply topically 2 (two) times a day (Patient not taking: Reported on 10/28/2022) 30 g 0   • prochlorperazine (COMPAZINE) 10 mg tablet Take 10 mg by mouth every 6 (six) hours as needed     • simvastatin (ZOCOR) 20 mg tablet Take 20 mg by mouth daily   0   • trimethoprim (PROLOPRIM) 100 mg tablet Take 100 mg by mouth daily   0   • venlafaxine 150 MG TB24 Take 150 mg by mouth daily with breakfast  0   • zolpidem (AMBIEN CR) 12 5 MG CR tablet Take 12 5 mg by mouth daily at bedtime   0     No current facility-administered medications for this visit  Objective:    Blood pressure 122/80, temperature 99 9 °F (37 7 °C), temperature source Tympanic Core, height 6' (1 829 m), weight 103 kg (227 lb), not currently breastfeeding  Body mass index is 30 79 kg/m²  Body surface area is 2 25 meters squared  Physical Exam  Vitals reviewed  Exam conducted with a chaperone present  Constitutional:       General: She is not in acute distress  Appearance: She is obese  She is not ill-appearing or toxic-appearing  Comments: Alopecia  Eyes:      General: No scleral icterus  Right eye: No discharge  Left eye: No discharge  Conjunctiva/sclera: Conjunctivae normal    Cardiovascular:      Rate and Rhythm: Normal rate and regular rhythm  Heart sounds: Normal heart sounds  No murmur heard  Pulmonary:      Effort: Pulmonary effort is normal  No respiratory distress  Breath sounds: Normal breath sounds  No wheezing  Abdominal:      General: There is no distension  Palpations: Abdomen is soft  There is no mass  Tenderness: There is no abdominal tenderness  There is no guarding  Genitourinary:     Comments: Normal external female genitalia  Normal Bartholin's and [place]'s glands  Normal urethral meatus and no evidence of urethral discharge or masses  Bladder without fullness mass or tenderness  Vagina without lesion or discharge  No significant pelvic organ prolapse noted  Cervix and uterus are surgically absent    Bimanual exam demonstrates no evidence of pelvic masses  Anus without fissure of lesion  Musculoskeletal:      Right lower leg: No edema  Left lower leg: No edema            Girtha MD Monica, [facility] 132  11/29/2022  3:10 PM

## 2022-12-06 ENCOUNTER — ANNUAL EXAM (OUTPATIENT)
Dept: OBGYN CLINIC | Facility: CLINIC | Age: 72
End: 2022-12-06

## 2022-12-06 VITALS
BODY MASS INDEX: 30.2 KG/M2 | SYSTOLIC BLOOD PRESSURE: 102 MMHG | WEIGHT: 223 LBS | DIASTOLIC BLOOD PRESSURE: 66 MMHG | HEIGHT: 72 IN

## 2022-12-06 DIAGNOSIS — Z91.89 GYN EXAM FOR HIGH-RISK MEDICARE PATIENT: Primary | ICD-10-CM

## 2022-12-06 DIAGNOSIS — Z85.3 HISTORY OF BREAST CANCER: ICD-10-CM

## 2022-12-06 DIAGNOSIS — C56.9 MALIGNANT NEOPLASM OF OVARY, UNSPECIFIED LATERALITY (HCC): ICD-10-CM

## 2022-12-06 DIAGNOSIS — Z01.419 ENCOUNTER FOR GYNECOLOGICAL EXAMINATION WITHOUT ABNORMAL FINDING: ICD-10-CM

## 2022-12-06 PROBLEM — R35.1 NOCTURIA: Status: ACTIVE | Noted: 2022-03-08

## 2022-12-06 NOTE — PROGRESS NOTES
Assessment/Plan:    Encounter for gynecological examination without abnormal finding  Pap/HPV not indicated  Mammogram: bilateral mastectomy  Colonoscopy current  DEXA ordered    Encourage healthy diet, exercise, Calcium 1200mg per day and at least 800 iu Vitamin D daily  Diagnoses and all orders for this visit:    GYN exam for high-risk Medicare patient    Encounter for gynecological examination without abnormal finding    History of breast cancer    Malignant neoplasm of ovary, unspecified laterality ([facility] [address] )          Subjective:      Patient ID: Daniel Sandoval is a 81 y o  female  Patient presents for a routine annual visit  Menarche- 15Y/O  Hysterectomy  Mammogram-b/l masectomy  Colonoscopy-2018 in [place]  recall 5 years  Dexa-overdue  Reminded pt to schedule  Order in system    Non smoker  Social drinker  Currently sexually active  Personal hx breast,ovarian cancer--Chemo currently for ovarian cancer  Mother with breast cancer    No concerns/questions for today's visit   Gynecologic Exam  She reports no genital itching, genital lesions, genital odor, genital rash, pelvic pain, vaginal bleeding or vaginal discharge  The patient is experiencing no pain  Pertinent negatives include no chills, constipation, diarrhea, fever, nausea, sore throat or vomiting  The following portions of the patient's history were reviewed and updated as appropriate: She  has a past medical history of Anemia (3/23/2020), Breast cancer ([facility] [address] ), Cancer ([address] ) ([id number]), Cancer ([address] ) (), Chicken pox, Colon polyp, CPAP (continuous positive airway pressure) dependence, Frequent UTI, GERD (gastroesophageal reflux disease), Heart disease, High cholesterol, History of blood transfusion, Hypertension, Irregular heart beat, Ovarian cancer ([address] ) (2020), PONV (postoperative nausea and vomiting), and Sleep apnea    She   Patient Active Problem List    Diagnosis Date Noted   • Brain metastases ([address] ) 2022   • Nocturia 03/08/2022   • PONV (postoperative nausea and vomiting) 08/09/2021   • Mediastinal lymphadenopathy 08/04/2021   • Liver lesion 06/17/2021   • 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   • Frequent unifocal PVCs 03/31/2021   • 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   • Cardiovascular stress test abnormal 03/10/2021   • Cardiomyopathy ([facility] [address] ) 03/01/2021   • Ventricular arrhythmia 03/01/2021   • Aneurysm of thoracic aorta 01/12/2021   • Ovarian cancer ([facility] [address] ) 01/13/2020   • Elevated CA-125 01/08/2020   • History of breast cancer 01/08/2020   • Encounter for gynecological examination without abnormal finding 10/31/2018   • Cervical arthritis 09/12/2018   • Myopia, bilateral 12/18/2017   • Bradycardia 12/17/2015   • Hyperlipidemia 04/02/2015   • Chronic insomnia 04/02/2015   • Myopia 12/08/2014   • GERD (gastroesophageal reflux disease) 09/21/2011   • HTN (hypertension), benign 09/21/2011   • Obesity, Class I, BMI 30 0-34 9 (see actual BMI) 09/21/2011   • Obstructive sleep apnea 09/21/2011     She  has a past surgical history that includes Repair knee ligament (Bilateral); Rotator cuff repair; Appendectomy; Breast cyst excision; Mastectomy (Bilateral, 2009); Colonoscopy; pr lap,diagnostic abdomen (N/A, 01/13/2020); CYSTOSCOPY (Bilateral, 01/13/2020); Hysterectomy (N/A, 01/13/2020); Cystoscopy; Cholecystectomy; pr lap, incisional hernia repair,reducible (N/A, 04/08/2021); Joint replacement; pr bronchoscopy needle bx trachea main stem&/bron (N/A, 08/09/2021); pr bronchoscopy,diagnostic (N/A, 08/09/2021); Hernia repair; and Breast lumpectomy  Her family history includes Breast cancer in her mother; Lung cancer in her father; Prostate cancer in her brother  She  reports that she quit smoking about 35 years ago  Her smoking use included cigarettes  She has never used smokeless tobacco  She reports that she does not currently use alcohol  She reports that she does not use drugs    Current Outpatient Medications   Medication Sig Dispense Refill   • b complex vitamins capsule Take 1 capsule by mouth daily     • carvedilol (COREG) 6 25 mg tablet Take 1 tablet (6 25 mg total) by mouth 2 (two) times a day with meals 180 tablet 3   • cholecalciferol (VITAMIN D3) 400 units tablet Take 400 Units by mouth daily     • estradiol (ESTRACE) 0 1 mg/g vaginal cream insert 1 gram vaginally two times a week  0   • LORazepam (ATIVAN) 1 mg tablet Take 1 tablet (1 mg total) by mouth every 6 (six) hours as needed for anxiety (or nausea) 20 tablet 0   • losartan-hydrochlorothiazide (HYZAAR) 50-12 5 mg per tablet Take 1 tablet by mouth daily   0   • magnesium oxide (MAG-OX) 400 mg Take 400 mg by mouth 2 (two) times a day      • prochlorperazine (COMPAZINE) 10 mg tablet Take 10 mg by mouth every 6 (six) hours as needed     • simvastatin (ZOCOR) 20 mg tablet Take 20 mg by mouth daily   0   • trimethoprim (PROLOPRIM) 100 mg tablet Take 100 mg by mouth daily   0   • venlafaxine 150 MG TB24 Take 150 mg by mouth daily with breakfast  0   • zolpidem (AMBIEN CR) 12 5 MG CR tablet Take 12 5 mg by mouth daily at bedtime   0   • gabapentin (NEURONTIN) 100 mg capsule Take 100 mg by mouth if needed (Patient not taking: Reported on 9/30/2022)     • nystatin-triamcinolone (MYCOLOG-II) cream Apply topically 2 (two) times a day (Patient not taking: Reported on 10/28/2022) 30 g 0     No current facility-administered medications for this visit       Current Outpatient Medications on File Prior to Visit   Medication Sig   • b complex vitamins capsule Take 1 capsule by mouth daily   • carvedilol (COREG) 6 25 mg tablet Take 1 tablet (6 25 mg total) by mouth 2 (two) times a day with meals   • cholecalciferol (VITAMIN D3) 400 units tablet Take 400 Units by mouth daily   • estradiol (ESTRACE) 0 1 mg/g vaginal cream insert 1 gram vaginally two times a week   • LORazepam (ATIVAN) 1 mg tablet Take 1 tablet (1 mg total) by mouth every 6 (six) hours as needed for anxiety (or nausea)   • losartan-hydrochlorothiazide (HYZAAR) 50-12 5 mg per tablet Take 1 tablet by mouth daily    • magnesium oxide (MAG-OX) 400 mg Take 400 mg by mouth 2 (two) times a day    • prochlorperazine (COMPAZINE) 10 mg tablet Take 10 mg by mouth every 6 (six) hours as needed   • simvastatin (ZOCOR) 20 mg tablet Take 20 mg by mouth daily    • trimethoprim (PROLOPRIM) 100 mg tablet Take 100 mg by mouth daily    • venlafaxine 150 MG TB24 Take 150 mg by mouth daily with breakfast   • zolpidem (AMBIEN CR) 12 5 MG CR tablet Take 12 5 mg by mouth daily at bedtime    • gabapentin (NEURONTIN) 100 mg capsule Take 100 mg by mouth if needed (Patient not taking: Reported on 9/30/2022)   • nystatin-triamcinolone (MYCOLOG-II) cream Apply topically 2 (two) times a day (Patient not taking: Reported on 10/28/2022)     No current facility-administered medications on file prior to visit  She has No Known Allergies       Review of Systems   Constitutional: Negative for activity change, appetite change, chills, fatigue and fever  HENT: Negative for rhinorrhea, sneezing and sore throat  Eyes: Negative for visual disturbance  Respiratory: Negative for cough, shortness of breath and wheezing  Cardiovascular: Negative for chest pain, palpitations and leg swelling  Gastrointestinal: Negative for abdominal distention, constipation, diarrhea, nausea and vomiting  Genitourinary: Negative for difficulty urinating, pelvic pain and vaginal discharge  Neurological: Negative for syncope and light-headedness  Objective:      /66 (BP Location: Left arm, Patient Position: Sitting, Cuff Size: Large)   Ht 6' (1 829 m)   Wt 101 kg (223 lb)   LMP  (LMP Unknown)   BMI 30 24 kg/m²          Physical Exam  Constitutional:       General: She is not in acute distress  Appearance: Normal appearance  She is well-developed and well-nourished  She is not diaphoretic     Chest:   Breasts:  No discharge from either breast    No tenderness and bleeding  Breasts are symmetrical       Right: No inverted nipple, mass, nipple discharge, skin change or tenderness  Left: No inverted nipple, mass, nipple discharge, skin change or tenderness  Abdominal:      Palpations: Abdomen is soft  Abdomen is not rigid  Tenderness: There is no abdominal tenderness  There is no guarding or rebound  Hernia: There is no hernia in the right inguinal area or left inguinal area  Genitourinary:     Labia:         Right: No rash, tenderness, lesion or injury  Left: No rash, tenderness, lesion or injury  Vagina: No vaginal discharge or bleeding  Adnexa:         Right: No mass, tenderness or fullness  Left: No mass, tenderness or fullness  Comments: Urethral meatus: no lesions, non tender  Urethra: non tender    Vaginal mucosa atrophic  Lymphadenopathy:      Lower Body: No right inguinal adenopathy  No left inguinal adenopathy  Skin:     General: Skin is warm and dry  Coloration: Skin is not pale  Findings: No erythema or rash     Psychiatric:         Mood and Affect: Mood and affect normal

## 2022-12-06 NOTE — PATIENT INSTRUCTIONS

## 2022-12-06 NOTE — ASSESSMENT & PLAN NOTE
Pap/HPV not indicated  Mammogram: bilateral mastectomy  Colonoscopy current  DEXA ordered    Encourage healthy diet, exercise, Calcium 1200mg per day and at least 800 iu Vitamin D daily

## 2022-12-30 ENCOUNTER — OFFICE VISIT (OUTPATIENT)
Dept: PALLIATIVE MEDICINE | Facility: CLINIC | Age: 72
End: 2022-12-30

## 2022-12-30 VITALS
SYSTOLIC BLOOD PRESSURE: 122 MMHG | DIASTOLIC BLOOD PRESSURE: 86 MMHG | HEART RATE: 87 BPM | WEIGHT: 231.48 LBS | TEMPERATURE: 97.1 F | OXYGEN SATURATION: 93 % | BODY MASS INDEX: 31.39 KG/M2

## 2022-12-30 DIAGNOSIS — F51.04 CHRONIC INSOMNIA: ICD-10-CM

## 2022-12-30 DIAGNOSIS — Z85.3 HISTORY OF BREAST CANCER: ICD-10-CM

## 2022-12-30 DIAGNOSIS — C79.31 BRAIN METASTASES (HCC): ICD-10-CM

## 2022-12-30 DIAGNOSIS — C56.9 MALIGNANT NEOPLASM OF OVARY, UNSPECIFIED LATERALITY (HCC): Primary | ICD-10-CM

## 2022-12-30 NOTE — PROGRESS NOTES
Outpatient Follow-Up - Palliative and Supportive Care   Lloyd Lopez 81 y o  female [id number]    Assessment & Plan  Problem List Items Addressed This Visit        Unprioritized    History of breast cancer    Ovarian cancer ([facility] [address] ) - Primary    Chronic insomnia    Brain metastases ([facility] [address] )       Medications adjusted this encounter:  Requested Prescriptions      No prescriptions requested or ordered in this encounter     No orders of the defined types were placed in this encounter  There are no discontinued medications  Lloyd Lopez was seen today for symptoms and planning cares related to above illnesses  I have reviewed the patient's controlled substance dispensing history in the Prescription Drug Monitoring Program in compliance with the [facility] regulations before prescribing any controlled substances  They are invited to continue to follow with us  If there are questions or concerns, please contact us through our clinic/answering service 24 hours a day, seven days a week  Lisbeth Prasad MD  [facility] Palliative and Supportive [facility]  [phone]      Visit Information    Accompanied By: Spouse    Source of History: Self, Spouse    History Limitations: None      Chief Complaint  No chief complaint on file  History of Present Illness      Lloyd Lopez is a 81 y o  female who presents for a two month follow up of symptoms related to metastatic ovarian cancer  Pertinent issues include: symptom management, assessment of goals of care, disease process education and discussion of prognosis      [facility] is a 80 yo female with who had breast cancer previously and was treated and then was diagnosed with ovarian cancer in January of 2020  Despite 6 cycles of carboplatin and paclitaxel she developed mediastinal lymphadenopathy and underwent 6 additional cycles with carboplatin and doxil  In august of 2022, she presented with HA and found to have brain metastases   She did RT and MRI showed good response and she is due for a follow up scan next month  She is now being  treatment with gemcitabine and bevacizumab x 6 cycles and another treatment has been proposed but they are awaiting labwork to see if she is eligible  We have been discussing her goals for the time that she has left, and she has been hoping to connect with her son, who has struggled with recovery from addiction  She uses yoga, breathing and meditation to help her cope  She struggles with insomnia and anxiety about what her family will do when she is gone  On my encounter today, Jordan Ashley is in good spirits and reports that she went to [facility] and had a blast with her friends  Unfortunately, her son was not able to come for the holidays as he had a relapse and overdosed on fentanyl and this is a huge source of stress and anxiety for her  She has been through alanon and years of therapy yet of course it is still a struggle to see her son in such a difficult place  She reports she still has a good QOL, though she is less active than in the summer and uncertain if this is related to the weather or to dz progression  She asks what will happen when we cannot keep her comfortable anymore, wonders how she will face the suffering she imagines for herself  I explain that this is why we continue to meet and to discuss her wishes and her QOL, so that when the time comes we can shift strategies  She asks what that looks like and I explain transition to comfort care and what hospice offers  Jordan Ashley is due for an MRI in late January 2/2 frequent HA and may have a PET scan as well 2/2 increased cancer markers  She states that she is enjoying eating and has gained some weight  She has had no further UTIs  Her goal is to make it to summer to enjoy the warm weather again, and we hope that will be possible for her      No change to plan of care    F/U 1/27/23 or prn    Past medical, surgical, social, and family histories are reviewed and pertinent updates are made     Review of Systems   Constitutional: Positive for malaise/fatigue and weight gain  Negative for fever  HENT: Positive for congestion  Negative for ear pain and sore throat  Eyes: Positive for discharge  Negative for pain  Cardiovascular: Negative for chest pain and leg swelling  Respiratory: Negative for cough and shortness of breath  Musculoskeletal: Positive for joint pain  Gastrointestinal: Positive for constipation and nausea  Negative for abdominal pain and vomiting  Genitourinary: Negative for dysuria  Neurological: Positive for headaches and light-headedness  Psychiatric/Behavioral: Negative for depression  The patient is nervous/anxious  The patient does not have insomnia  Vital Signs    /86 (BP Location: Right arm, Patient Position: Sitting, Cuff Size: Standard)   Pulse 87   Temp (!) 97 1 °F (36 2 °C) (Temporal)   Wt 105 kg (231 lb 7 7 oz)   LMP  (LMP Unknown)   SpO2 93%   BMI 31 39 kg/m²     Physical Exam and Objective Data  Physical Exam      Radiology and Laboratory:  I personally reviewed and interpreted the following results: pertinent studies reviewed    60 minutes was spent face to face with Lm Farrar and her spouse with greater than 50% of the time spent in counseling or coordination of care including discussions of prognosis of diagnosis, diagnostic results, impression, and recommendations, risks and benefits of treatment, instructions for disease self management, treatment instructions, follow up requirements and patient and family counseling/involvement in care  All of the patient's or agent's questions were answered during this discussion

## 2023-01-04 ENCOUNTER — PATIENT MESSAGE (OUTPATIENT)
Dept: GYNECOLOGIC ONCOLOGY | Facility: CLINIC | Age: 73
End: 2023-01-04

## 2023-01-04 DIAGNOSIS — C56.2 MALIGNANT NEOPLASM OF LEFT OVARY (HCC): ICD-10-CM

## 2023-01-04 DIAGNOSIS — R97.8 ELEVATED TUMOR MARKERS: ICD-10-CM

## 2023-01-04 DIAGNOSIS — C56.9 MALIGNANT NEOPLASM OF OVARY, UNSPECIFIED LATERALITY (HCC): Primary | ICD-10-CM

## 2023-01-16 ENCOUNTER — HOSPITAL ENCOUNTER (OUTPATIENT)
Dept: RADIOLOGY | Age: 73
Discharge: HOME | End: 2023-01-16

## 2023-01-16 DIAGNOSIS — C56.2 MALIGNANT NEOPLASM OF LEFT OVARY (HCC): ICD-10-CM

## 2023-01-16 DIAGNOSIS — R97.8 ELEVATED TUMOR MARKERS: ICD-10-CM

## 2023-01-16 DIAGNOSIS — C56.9 MALIGNANT NEOPLASM OF OVARY, UNSPECIFIED LATERALITY (HCC): ICD-10-CM

## 2023-01-16 LAB — GLUCOSE SERPL-MCNC: 117 MG/DL (ref 65–140)

## 2023-01-20 ENCOUNTER — HOSPITAL ENCOUNTER (OUTPATIENT)
Dept: MRI IMAGING | Facility: HOSPITAL | Age: 73
Discharge: HOME | End: 2023-01-20
Attending: STUDENT IN AN ORGANIZED HEALTH CARE EDUCATION/TRAINING PROGRAM

## 2023-01-20 DIAGNOSIS — C79.31 BRAIN METASTASES (HCC): ICD-10-CM

## 2023-01-20 RX ADMIN — GADOBUTROL 10 ML: 604.72 INJECTION INTRAVENOUS at 13:39

## 2023-01-25 ENCOUNTER — DOCUMENTATION (OUTPATIENT)
Dept: RADIATION ONCOLOGY | Facility: HOSPITAL | Age: 73
End: 2023-01-25

## 2023-01-25 ENCOUNTER — CLINICAL SUPPORT (OUTPATIENT)
Dept: RADIATION ONCOLOGY | Facility: HOSPITAL | Age: 73
End: 2023-01-25
Attending: STUDENT IN AN ORGANIZED HEALTH CARE EDUCATION/TRAINING PROGRAM

## 2023-01-25 VITALS — BODY MASS INDEX: 31.29 KG/M2 | WEIGHT: 231 LBS | HEIGHT: 72 IN

## 2023-01-25 DIAGNOSIS — C79.31 BRAIN METASTASES (HCC): Primary | ICD-10-CM

## 2023-01-25 NOTE — PROGRESS NOTES
Virtual Brief Visit    Problem List Items Addressed This Visit    None            Reason for visit is  Follow up  Encounter provider Kevin Gill MD    Provider located at [address] [id number]      Recent Visits  No visits were found meeting these conditions  Showing recent visits within past 7 days and meeting all other requirements  Today's Visits  Date Type Provider Dept   01/25/23 Telemedicine Kevin Gill MD An Rad Onc   Showing today's visits and meeting all other requirements  Future Appointments  No visits were found meeting these conditions  Showing future appointments within next 150 days and meeting all other requirements       After connecting through Telephone, the patient was identified by name and date of birth  Leti Eubanks was informed that this is a telemedicine visit and that the visit is being conducted through Telephone  My office door was closed  No one else was in the room  She acknowledged consent and understanding of privacy and security of the video platform  The patient has agreed to participate and understands they can discontinue the visit at any time  Patient is aware this is a billable service  Laura Tubbs is a 81 y o  female  81year old woman with a history of bilateral breast cancers and more recently diagnosed with 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 and niraparib  She has since been found to have intracranial brain metastases  She completed SRS on 8/31/22, she returns today for routine follow-up s/p repeat MRI brain   Last follow-up visit on 10/19/22          11/29/22 CT chest abd pelvis  1   No evidence of metastatic disease in the chest, abdomen, or pelvis    2   Ectatic ascending aorta measuring 4 2 cm, unchanged   Continued annual surveillance recommended         11/29/22 Félix Berger Dr  Mel  Pt is receiving chemotherapy under the direction of Dr Greta Pinto  After 3 cycles, CT scan demonstrates no evidence of measurable disease    levels not available at this time      Recommend completion of 6 cycles and repeat CT scan to evaluate response   If complete response and normalization of  can be achieved, maintenance single agent bevacizumab could be considered           [facility]  Pt currently receiving Gemzar and Oxaliplatin  Plan to recheck  today and if rising, consider PET/CT  Continue gemzar and oxaliplatin at this time      22 : 274  22: 249 6  22:  131 7  22:  167  7        23 PET/CT  1   Linear activity in the region of the vulva/vaginal introitus   This is nonspecific and may be simply related to urine contamination but correlate clinically for any possible lesion  Otherwise no findings for hypermetabolic malignancy  2   No focal uptake remaining in the right perihilar region         23 [facility], Dr Greta Pinto   continues to rise  PET is unremarkable except for possible urine contamination vs disease at introitus  Recommend repeat , pelvic exam with gyn onc, ordered urine culture   Continue current therapy with gemzar and bioequivalent bevacizumab          23 MRI brain w wo contrast  There are multiple new enhancing lesions scattered within the brain parenchyma all 5 mm or less in size consistent with parenchymal metastasis   No surrounding edema, hemorrhage or mass effect      This examination was marked "immediate notification" in Epic in order to begin the standard process by which the radiology reading room liaison alerts the referring practitioner           Upcomin23 Palliative care appt and GYN Onc       Past Medical History:   Diagnosis Date   • Anemia 3/23/2020    During chemo   • Breast cancer ([facility] [address] )      and then    • Cancer ([address] ) [id number]    breast   • [facility] 2020    ovarian   • Chicken pox    • Colon polyp    • CPAP (continuous positive airway pressure) dependence    • Frequent UTI    • GERD (gastroesophageal reflux disease)    • Heart disease    • High cholesterol    • History of blood transfusion     2013 with Bilat Knee replacement   • Hypertension    • Irregular heart beat     PVC's   • Ovarian cancer ([facility] [address] ) 9/13/2020   • PONV (postoperative nausea and vomiting)    • Sleep apnea        Past Surgical History:   Procedure Laterality Date   • APPENDECTOMY     • BREAST CYST EXCISION     • BREAST LUMPECTOMY     • CHOLECYSTECTOMY     • COLONOSCOPY     • CYSTOSCOPY Bilateral 01/13/2020    Procedure: CYSTOSCOPY: CYSTOSCOPY WITH PERIOPERATIVE URETERAL CATHETERS  PLACEMENT;  Surgeon: Estefany Francisco MD;  [facility];  Service: Gynecology Oncology   • CYSTOSCOPY     • HERNIA REPAIR     • HYSTERECTOMY N/A 01/13/2020    Procedure: EXPLORATORY LAPAROTOMY, OVARIAN CANCER STAGING WITH TOTAL HYSTERECTOMY, BILATERAL SALPINGO-OOPHORECTOMY, BILATERAL PELVIC AND PERIAORTIC LYMPHADENECTOMY, INDICATED APPENDECTOMY, OMENTECTOMY, STAGING PERITONEAL BIOPSIES ;  Surgeon: Estefany Francisco MD;  [facility];  Service: Gynecology Oncology   • JOINT REPLACEMENT      Bilat Knees   • MASTECTOMY Bilateral 2009   • [place] [address] INCL FLUOR GDNCE DX W/CELL WASHG 100 [facility] N/A 08/09/2021    Procedure: BRONCHOSCOPY FLEXIBLE;  Surgeon: Julián Berger MD;  [facility]: [facility];  Service: Thoracic   • [place] BRONCHOSCOPY NEEDLE BX TRACHEA MAIN STEM&/BRON N/A 08/09/2021    Procedure: ENDOBRONCHIAL ULTRASOUND (EBUS);   Surgeon: Julián Berger MD;  [facility];  Service: Thoracic   • [place] LAP RPR HRNA XCPT INCAL/INGUN NCRC8/STRANGULATED N/A 04/08/2021    Procedure: REPAIR HERNIA INCISIONAL LAPAROSCOPIC;  Surgeon: Alan Ramos MD;  Location: [facility];  Service: General   • [place] LAPS ABD PRTM&OMENTUM DX W/WO SPEC BR/WA SPX N/A 01/13/2020 Procedure: LAPAROSCOPY DIAGNOSTIC;  Surgeon: Regino Wilburn MD;  [facility];  Service: Gynecology Oncology   • REPAIR KNEE LIGAMENT Bilateral    • ROTATOR CUFF REPAIR         Current Outpatient Medications   Medication Sig Dispense Refill   • b complex vitamins capsule Take 1 capsule by mouth daily     • carvedilol (COREG) 6 25 mg tablet Take 1 tablet (6 25 mg total) by mouth 2 (two) times a day with meals 180 tablet 3   • cholecalciferol (VITAMIN D3) 400 units tablet Take 400 Units by mouth daily     • estradiol (ESTRACE) 0 1 mg/g vaginal cream insert 1 gram vaginally two times a week  0   • gabapentin (NEURONTIN) 100 mg capsule Take 100 mg by mouth if needed (Patient not taking: Reported on 9/30/2022)     • LORazepam (ATIVAN) 1 mg tablet Take 1 tablet (1 mg total) by mouth every 6 (six) hours as needed for anxiety (or nausea) 20 tablet 0   • losartan-hydrochlorothiazide (HYZAAR) 50-12 5 mg per tablet Take 1 tablet by mouth daily   0   • magnesium oxide (MAG-OX) 400 mg Take 400 mg by mouth 2 (two) times a day      • nystatin-triamcinolone (MYCOLOG-II) cream Apply topically 2 (two) times a day (Patient not taking: Reported on 10/28/2022) 30 g 0   • prochlorperazine (COMPAZINE) 10 mg tablet Take 10 mg by mouth every 6 (six) hours as needed     • simvastatin (ZOCOR) 20 mg tablet Take 20 mg by mouth daily   0   • trimethoprim (PROLOPRIM) 100 mg tablet Take 100 mg by mouth daily   0   • venlafaxine 150 MG TB24 Take 150 mg by mouth daily with breakfast  0   • zolpidem (AMBIEN CR) 12 5 MG CR tablet Take 12 5 mg by mouth daily at bedtime   0     No current facility-administered medications for this visit  No Known Allergies    I spent  minutes with the patient during this visit      Follow up visit     Oncology History   Ovarian cancer ([facility] [address] )   1/13/2020 Initial Diagnosis    Ovarian cancer on left [facility])     1/13/2020 -  Cancer Staged    Staging form: Ovary, Fallopian Tube, Primary Peritoneal, AJCC 8th Edition  - Pathologic stage from 1/13/2020: FIGO Stage IC3 (pN0, cM0) - Signed by Liz Hall MD on 1/28/2020  Histologic grade (G): G3  Histologic grading system: 4 grade system  Residual tumor (R): R0 - None  Histopathologic type: Serous cystadenocarcinoma, NOS  Diagnostic confirmation: Positive histology PLUS positive immunophenotyping and/or positive genetic studies  Specimen type: Excision  Staged by: Managing physician  Cancer antigen 125 () (U/mL): 4,000  Gross residual tumor after primary cyto-reductive surgery: Absent  Residual tumor volume after primary cytoreductive surgery: No gross tumor  National guidelines used in treatment planning: Yes  Type of national guideline used in treatment planning: NCCN       1/13/2020 Surgery    Diagnostic laparoscopy, laparotomy, total hysterectomy, bilateral salpingo-oophorectomy with resection of pelvic mass, bilateral pelvic and periaortic lymphadenectomy, staging peritoneal biopsies, omentectomy, appendectomy  Final pathology consistent with 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  2/11/2020 - 5/26/2020 Chemotherapy    Six cycles of carboplatin/paclitaxel administered by Dr Kristi Gee  Tolerated well  CT scan at completion of treatment demonstrates no evidence of measurable disease   amarilis near completion of chemotherapy 5 7 units/milliliter     8/9/2021 Recurrence     Elevated  triggered imaging  which demonstrated suspicious mediastinal lymphadenopathy  Endobronchial ultrasound-guided fine-needle aspiration on August 9, 2021 demonstrates metastatic carcinoma with immuno phenotype consistent with metastatic ovarian primary  9/1/2021 - 1/24/2022 Chemotherapy    Carboplatin + Doxil x 6 cycles (Dr Danielle Bynum)  Complete response  Amarilis Ca125 8 5 IU/mL  3/2022 - 8/2022 Chemotherapy    Niraparib  9/14/2022 -  Chemotherapy    Started on gemcitabine and bevacizumab    On November 29, 2022 after 3 cycles CT scan demonstrates no evidence of measurable disease   reportedly decreasing somewhat  Patient tolerating well  Brain metastases ([facility] [address] )   8/17/2022 Initial Diagnosis    Brain metastases ([facility] [address] )     8/31/2022 - 8/31/2022 Radiation      Plan ID Energy Fractions Dose per Fraction (cGy) Dose Correction (cGy) Total Dose Delivered (cGy) Elapsed Days   SRS 4 PTVs 6X-FFF 1 / 1 2,000 0 2,000 0      Treatment dates:  C1 SRS: 8/31/2022 - 8/31/2022         Review of Systems:  Review of Systems   Constitutional: Negative  HENT: Negative  Nose is running a lot    Eyes: Negative  Wears contacts   Respiratory: Positive for shortness of breath (on exertion)  Cardiovascular: Negative  Gastrointestinal: Positive for nausea  Endocrine: Negative  Genitourinary: Positive for frequency  Currently has UTI    Musculoskeletal: Negative  Skin: Negative  Allergic/Immunologic: Negative  Neurological: Positive for headaches  Hematological: Negative  Psychiatric/Behavioral: Negative          Clinical Trial: no       Teaching no     Health Maintenance   Topic Date Due   • Medicare Annual Wellness Visit (AWV)  Never done   • Depression Screening  Never done   • BMI: Followup Plan  Never done   • Colorectal Cancer Screening  Never done   • Osteoporosis Screening  Never done   • Fall Risk  Never done   • Urinary Incontinence Screening  Never done   • Pneumococcal Vaccine: 65+ Years (2 - PCV) 12/17/2019   • COVID-19 Vaccine (4 - Booster for Moderna series) 11/04/2021   • Influenza Vaccine (1) 09/01/2022   • BMI: Adult  01/25/2024   • Hepatitis C Screening  Completed   • HIB Vaccine  Aged Out   • IPV Vaccine  Aged Out   • Hepatitis A Vaccine  Aged Out   • Meningococcal ACWY Vaccine  Aged Out   • HPV Vaccine  Aged Out     Patient Active Problem List   Diagnosis   • Encounter for gynecological examination without abnormal finding   • GERD (gastroesophageal reflux disease)   • HTN (hypertension), benign • Obesity, Class I, BMI 30 0-34 9 (see actual BMI)   • Obstructive sleep apnea   • Myopia, bilateral   • Elevated CA-125   • History of breast cancer   • Ovarian cancer ([place] [address] )   • Anomalous coronary artery origin   • Frequent unifocal PVCs   • Liver lesion   • Mediastinal lymphadenopathy   • PONV (postoperative nausea and vomiting)   • Aneurysm of thoracic aorta   • Bradycardia   • Cardiomyopathy [facility])   • Cardiovascular stress test abnormal   • Cervical arthritis   • Congenital anomaly of coronary artery   • Hyperlipidemia   • Chronic insomnia   • Myopia   • Ventricular arrhythmia   • Brain metastases [facility])   • Nocturia     Past Medical History:   Diagnosis Date   • Anemia 3/23/2020    During chemo   • Breast cancer ([place] [address] )     2004 and then 2009   • Cancer [facility]) 2005,2009    breast   • Cancer ([place] [address] ) 2020    ovarian   • Chicken pox    • Colon polyp    • CPAP (continuous positive airway pressure) dependence    • Frequent UTI    • GERD (gastroesophageal reflux disease)    • Heart disease    • High cholesterol    • History of blood transfusion     2013 with Bilat Knee replacement   • Hypertension    • Irregular heart beat     PVC's   • Ovarian cancer ([place] [address] ) 9/13/2020   • PONV (postoperative nausea and vomiting)    • Sleep apnea      Past Surgical History:   Procedure Laterality Date   • APPENDECTOMY     • BREAST CYST EXCISION     • BREAST LUMPECTOMY     • CHOLECYSTECTOMY     • COLONOSCOPY     • CYSTOSCOPY Bilateral 01/13/2020    Procedure: CYSTOSCOPY: CYSTOSCOPY WITH PERIOPERATIVE URETERAL CATHETERS  PLACEMENT;  Surgeon: Daiana Matamoros MD;  [facility];  Service: Gynecology Oncology   • CYSTOSCOPY     • HERNIA REPAIR     • HYSTERECTOMY N/A 01/13/2020    Procedure: EXPLORATORY LAPAROTOMY, OVARIAN CANCER STAGING WITH TOTAL HYSTERECTOMY, BILATERAL SALPINGO-OOPHORECTOMY, BILATERAL PELVIC AND PERIAORTIC LYMPHADENECTOMY, INDICATED APPENDECTOMY, OMENTECTOMY, STAGING PERITONEAL BIOPSIES ;  Surgeon: Susan MD Mel;  [facility];  Service: Gynecology Oncology   • JOINT REPLACEMENT      Bilat Knees   • MASTECTOMY Bilateral    • [place] [address] INCL FLUOR GDNCE DX W/CELL WASHG 100 [facility] N/A 2021    Procedure: BRONCHOSCOPY FLEXIBLE;  Surgeon: Marino Head MD;  Location: [facility];  Service: Thoracic   • [place] BRONCHOSCOPY NEEDLE BX TRACHEA MAIN STEM&/BRON N/A 2021    Procedure: ENDOBRONCHIAL ULTRASOUND (EBUS);   Surgeon: Marino Head MD;  Location: [facility];  Service: Thoracic   • [place] LAP RPR HRNA XCPT INCAL/INGUN NCRC8/STRANGULATED N/A 2021    Procedure: REPAIR HERNIA INCISIONAL LAPAROSCOPIC;  Surgeon: Arturo Rodríguez MD;  Location: [facility];  Service: General   • [place] LAPS ABD PRTM&OMENTUM DX W/WO SPEC BR/WA SPX N/A 2020    Procedure: LAPAROSCOPY DIAGNOSTIC;  Surgeon: Marily Bishop MD;  [facility]: [facility];  Service: Gynecology Oncology   • REPAIR KNEE LIGAMENT Bilateral    • ROTATOR CUFF REPAIR       Family History   Problem Relation Age of Onset   • Breast cancer Mother    • Lung cancer Father    • Prostate cancer Brother      Social History     Socioeconomic History   • Marital status: /Civil Union     Spouse name: Not on file   • Number of children: Not on file   • Years of education: Not on file   • Highest education level: Not on file   Occupational History   • Not on file   Tobacco Use   • Smoking status: Former     Types: Cigarettes     Quit date: 10/31/1987     Years since quittin 2   • Smokeless tobacco: Never   Vaping Use   • Vaping Use: Never used   Substance and Sexual Activity   • Alcohol use: Not Currently     Comment: socially    • Drug use: Never   • Sexual activity: Not Currently     Partners: Male     Birth control/protection: Post-menopausal     Comment: same partner-few times a year   Other Topics Concern   • Not on file   Social History Narrative   • Not on file     Social Determinants of Health     Financial Resource Strain: Not on file Food Insecurity: Not on file   Transportation Needs: Not on file   Physical Activity: Not on file   Stress: Not on file   Social Connections: Not on file   Intimate Partner Violence: Not on file   Housing Stability: Not on file       Current Outpatient Medications:   •  b complex vitamins capsule, Take 1 capsule by mouth daily, Disp: , Rfl:   •  carvedilol (COREG) 6 25 mg tablet, Take 1 tablet (6 25 mg total) by mouth 2 (two) times a day with meals, Disp: 180 tablet, Rfl: 3  •  cholecalciferol (VITAMIN D3) 400 units tablet, Take 400 Units by mouth daily, Disp: , Rfl:   •  estradiol (ESTRACE) 0 1 mg/g vaginal cream, insert 1 gram vaginally two times a week, Disp: , Rfl: 0  •  gabapentin (NEURONTIN) 100 mg capsule, Take 100 mg by mouth if needed (Patient not taking: Reported on 9/30/2022), Disp: , Rfl:   •  LORazepam (ATIVAN) 1 mg tablet, Take 1 tablet (1 mg total) by mouth every 6 (six) hours as needed for anxiety (or nausea), Disp: 20 tablet, Rfl: 0  •  losartan-hydrochlorothiazide (HYZAAR) 50-12 5 mg per tablet, Take 1 tablet by mouth daily , Disp: , Rfl: 0  •  magnesium oxide (MAG-OX) 400 mg, Take 400 mg by mouth 2 (two) times a day , Disp: , Rfl:   •  nystatin-triamcinolone (MYCOLOG-II) cream, Apply topically 2 (two) times a day (Patient not taking: Reported on 10/28/2022), Disp: 30 g, Rfl: 0  •  prochlorperazine (COMPAZINE) 10 mg tablet, Take 10 mg by mouth every 6 (six) hours as needed, Disp: , Rfl:   •  simvastatin (ZOCOR) 20 mg tablet, Take 20 mg by mouth daily , Disp: , Rfl: 0  •  trimethoprim (PROLOPRIM) 100 mg tablet, Take 100 mg by mouth daily , Disp: , Rfl: 0  •  venlafaxine 150 MG TB24, Take 150 mg by mouth daily with breakfast, Disp: , Rfl: 0  •  zolpidem (AMBIEN CR) 12 5 MG CR tablet, Take 12 5 mg by mouth daily at bedtime , Disp: , Rfl: 0  No Known Allergies  Vitals:    01/25/23 1425   Weight: 105 kg (231 lb)   Height: 6' (1 829 m)      Pain Score:   2

## 2023-01-25 NOTE — PROGRESS NOTES
NEURO ONCOLOGY CASE REVIEW     DATE: 1/25/2023  PRESENTING DOCTOR: Dr Elizabeth Campo    DIAGNOSIS: Ovarian cancer; Brain metastases        Camille Hardy is a 81 y o  female who was presented at Neuro Oncology Case Review today  History of bilateral breast cancers and more recently diagnosed with 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 and niraparib  She was found to have have intracranial brain metastases and completed SRS on 8/31/2022  PHYSICIAN RECOMMENDED PLAN:   - Discuss stereotactic radiosurgery [facility]) to larger brain lesions versus whole brain radiation therapy  - Refer to genetics  - Discuss potential systemic therapy with gyn onc    Patient is scheduled for telemedicine radiation follow up with Dr Ari Lopez today  1/27/2023 Gynecology Oncology follow up       Imaging Reviewed:   1/20/2023 MRI brain: There are multiple new enhancing lesions scattered within the brain parenchyma all 5 mm or less in size consistent with parenchymal metastasis  No surrounding edema, hemorrhage or mass effect  10/13/2022 MRI brain      Team agreed to plan  NCCN guidelines were readily available for review at this discussion  The final treatment plan will be left at the discretion of the patient and the treating physician  DISCLAIMERS:  TO THE TREATING PHYSICIAN:  This conference is a meeting of clinicians from various specialty areas who evaluate and discuss patients for whom a multidisciplinary treatment approach is being considered  Please note that the above opinion was a consensus of the conference attendees and is intended only to assist in quality care of the discussed patient  The responsibility for follow up on the input given during the conference, along with any final decisions regarding plan of care, is that of the patient and the patient's provider   Accordingly, appointments have only been recommended based on this information; and have NOT been scheduled unless otherwise noted  TO THE PATIENT:  This summary is a brief record of major aspects of your cancer treatment  You may choose to can share a your copy with any of your doctors or nurses  However, this is not a detailed or comprehensive record of your care

## 2023-01-25 NOTE — PROGRESS NOTES
Telephone Follow-up - Radiation Oncology   Lisa Dasilva 1950 81 y o  female [id number]      History of Present Illness   Cancer Staging   Ovarian cancer [facility])  Staging form: Ovary, Fallopian Tube, Primary Peritoneal, AJCC 8th Edition  - Pathologic stage from 1/13/2020: FIGO Stage IC3 (pN0, cM0) - Signed by Carissa Sanchez MD on 1/28/2020  Histopathologic type: Serous cystadenocarcinoma, NOS  Histologic grade (G): G3  Histologic grading system: 4 grade system  Residual tumor (R): R0 - None  Diagnostic confirmation: Positive histology PLUS positive immunophenotyping and/or positive genetic studies  Specimen type: Excision  Staged by: Managing physician  Cancer antigen 125 () (U/mL): 4,000  Gross residual tumor after primary cyto-reductive surgery: Absent  Residual tumor volume after primary cytoreductive surgery: No gross tumor  National guidelines used in treatment planning: Yes  Type of national guideline used in treatment planning: NCCN    Ms Alayna Basilio is a 81year old woman with a PMHx bilateral breast cancer (s/p initial right sided BCS followed by adjuvant TC and RT (2006), thereafter with left sided cancer s/p bilateral mastectomy and adjuvant endocrine therapy)  She was more recently found to have 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 of systemic therapy, most recently on gemcitabine/bevacizumab  She was found to have evidence of brain metastases on MRI Brain and on 8/31/22 underwent SRS to four sites of disease, each to 2,000 cGy in 1 fraction  She returns today for follow-up  Interval History:  The patient was last seen in clinic on 10/19/22, at that time with repeat MRI Brain showing an excellent response to therapy  Since her last visit the patient has continued to follow with her primary medical oncologist Dr Jenni Willis in [facility] and remains on gemcitabine/bevacizumab   She underwent PET/CT, which demonstrated no measurable systemic disease apart from mild uptake near the introitus which may or may not be disease related (clinical correlation advised)  Repeat MRI Brain (1/20/23) demonstrated interval development of multiple new enhancing lesions scattered within the brain parenchyma consistent with new parenchymal metastatic disease; no surrounding edema or mass effect   has simultaneously increased to 274  Historical Information   Oncology History   Ovarian cancer ([facility] [address] )   1/13/2020 Initial Diagnosis    Ovarian cancer on left [facility])     1/13/2020 -  Cancer Staged    Staging form: Ovary, Fallopian Tube, Primary Peritoneal, AJCC 8th Edition  - Pathologic stage from 1/13/2020: FIGO Stage IC3 (pN0, cM0) - Signed by Moody Barker MD on 1/28/2020  Histologic grade (G): G3  Histologic grading system: 4 grade system  Residual tumor (R): R0 - None  Histopathologic type: Serous cystadenocarcinoma, NOS  Diagnostic confirmation: Positive histology PLUS positive immunophenotyping and/or positive genetic studies  Specimen type: Excision  Staged by: Managing physician  Cancer antigen 125 () (U/mL): 4,000  Gross residual tumor after primary cyto-reductive surgery: Absent  Residual tumor volume after primary cytoreductive surgery: No gross tumor  National guidelines used in treatment planning: Yes  Type of national guideline used in treatment planning: NCCN       1/13/2020 Surgery    Diagnostic laparoscopy, laparotomy, total hysterectomy, bilateral salpingo-oophorectomy with resection of pelvic mass, bilateral pelvic and periaortic lymphadenectomy, staging peritoneal biopsies, omentectomy, appendectomy  Final pathology consistent with 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  2/11/2020 - 5/26/2020 Chemotherapy    Six cycles of carboplatin/paclitaxel administered by Dr Juan Ventura  Tolerated well  CT scan at completion of treatment demonstrates no evidence of measurable disease     amarilis near completion of chemotherapy 5 7 units/milliliter     8/9/2021 Recurrence     Elevated  triggered imaging  which demonstrated suspicious mediastinal lymphadenopathy  Endobronchial ultrasound-guided fine-needle aspiration on August 9, 2021 demonstrates metastatic carcinoma with immuno phenotype consistent with metastatic ovarian primary  9/1/2021 - 1/24/2022 Chemotherapy    Carboplatin + Doxil x 6 cycles (Dr Dang Robertson)  Complete response  Fadi Ca125 8 5 IU/mL  3/2022 - 8/2022 Chemotherapy    Niraparib  9/14/2022 -  Chemotherapy    Started on gemcitabine and bevacizumab  On November 29, 2022 after 3 cycles CT scan demonstrates no evidence of measurable disease   reportedly decreasing somewhat  Patient tolerating well       Brain metastases ([address] )   8/17/2022 Initial Diagnosis    Brain metastases ([facility] [address] )     8/31/2022 - 8/31/2022 Radiation      Plan ID Energy Fractions Dose per Fraction (cGy) Dose Correction (cGy) Total Dose Delivered (cGy) Elapsed Days   SRS 4 PTVs 6X-FFF 1 / 1 2,000 0 2,000 0      Treatment dates:  C1 SRS: 8/31/2022 - 8/31/2022         Past Medical History:   Diagnosis Date   • Anemia 3/23/2020    During chemo   • Breast cancer ([facility] [address] )     2004 and then 2009   • Cancer [facility]) 2005,2009    breast   • Cancer ([address] ) 2020    ovarian   • Chicken pox    • Colon polyp    • CPAP (continuous positive airway pressure) dependence    • Frequent UTI    • GERD (gastroesophageal reflux disease)    • Heart disease    • High cholesterol    • History of blood transfusion     2013 with Bilat Knee replacement   • Hypertension    • Irregular heart beat     PVC's   • Ovarian cancer ([address] ) 9/13/2020   • PONV (postoperative nausea and vomiting)    • Sleep apnea      Past Surgical History:   Procedure Laterality Date   • APPENDECTOMY     • BREAST CYST EXCISION     • BREAST LUMPECTOMY     • CHOLECYSTECTOMY     • COLONOSCOPY     • CYSTOSCOPY Bilateral 01/13/2020    Procedure: CYSTOSCOPY: CYSTOSCOPY WITH PERIOPERATIVE URETERAL CATHETERS PLACEMENT;  Surgeon: Estefany Francisco MD;  [facility];  Service: Gynecology Oncology   • CYSTOSCOPY     • HERNIA REPAIR     • HYSTERECTOMY N/A 2020    Procedure: EXPLORATORY LAPAROTOMY, OVARIAN CANCER STAGING WITH TOTAL HYSTERECTOMY, BILATERAL SALPINGO-OOPHORECTOMY, BILATERAL PELVIC AND PERIAORTIC LYMPHADENECTOMY, INDICATED APPENDECTOMY, OMENTECTOMY, STAGING PERITONEAL BIOPSIES ;  Surgeon: Estefany Francisco MD;  Location[facility];  Service: Gynecology Oncology   • JOINT REPLACEMENT      Bilat Knees   • MASTECTOMY Bilateral    • MI [address] INCL FLUOR GDNCE DX W/CELL WASHG [address] [facility] N/A 2021    Procedure: BRONCHOSCOPY FLEXIBLE;  Surgeon: Julián Berger MD;  [facility];  Service: Thoracic   • MI BRONCHOSCOPY NEEDLE BX TRACHEA MAIN STEM&/BRON N/A 2021    Procedure: ENDOBRONCHIAL ULTRASOUND (EBUS);   Surgeon: Julián Berger MD;  [facility];  Service: Thoracic   • MI LAP RPR HRNA XCPT INCAL/INGUN NCRC8/STRANGULATED N/A 2021    Procedure: REPAIR HERNIA INCISIONAL LAPAROSCOPIC;  Surgeon: Alan Ramos MD;  [facility];  Service: General   • MI LAPS ABD PRTM&OMENTUM DX W/WO SPEC BR/WA SPX N/A 2020    Procedure: LAPAROSCOPY DIAGNOSTIC;  Surgeon: Estefany Francisco MD;  [facility];  Service: Gynecology Oncology   • REPAIR KNEE LIGAMENT Bilateral    • ROTATOR CUFF REPAIR         Social History   Social History     Substance and Sexual Activity   Alcohol Use Not Currently    Comment: socially      Social History     Substance and Sexual Activity   Drug Use Never     Social History     Tobacco Use   Smoking Status Former   • Types: Cigarettes   • Quit date: 10/31/1987   • Years since quittin 2   Smokeless Tobacco Never         Meds/Allergies     Current Outpatient Medications:   •  b complex vitamins capsule, Take 1 capsule by mouth daily, Disp: , Rfl:   •  carvedilol (COREG) 6 25 mg tablet, Take 1 tablet (6 25 mg total) by mouth 2 (two) times a day with meals, Disp: 180 tablet, Rfl: 3  •  cholecalciferol (VITAMIN D3) 400 units tablet, Take 400 Units by mouth daily, Disp: , Rfl:   •  estradiol (ESTRACE) 0 1 mg/g vaginal cream, insert 1 gram vaginally two times a week, Disp: , Rfl: 0  •  gabapentin (NEURONTIN) 100 mg capsule, Take 100 mg by mouth if needed (Patient not taking: Reported on 9/30/2022), Disp: , Rfl:   •  LORazepam (ATIVAN) 1 mg tablet, Take 1 tablet (1 mg total) by mouth every 6 (six) hours as needed for anxiety (or nausea), Disp: 20 tablet, Rfl: 0  •  losartan-hydrochlorothiazide (HYZAAR) 50-12 5 mg per tablet, Take 1 tablet by mouth daily , Disp: , Rfl: 0  •  magnesium oxide (MAG-OX) 400 mg, Take 400 mg by mouth 2 (two) times a day , Disp: , Rfl:   •  nystatin-triamcinolone (MYCOLOG-II) cream, Apply topically 2 (two) times a day (Patient not taking: Reported on 10/28/2022), Disp: 30 g, Rfl: 0  •  prochlorperazine (COMPAZINE) 10 mg tablet, Take 10 mg by mouth every 6 (six) hours as needed, Disp: , Rfl:   •  simvastatin (ZOCOR) 20 mg tablet, Take 20 mg by mouth daily , Disp: , Rfl: 0  •  trimethoprim (PROLOPRIM) 100 mg tablet, Take 100 mg by mouth daily , Disp: , Rfl: 0  •  venlafaxine 150 MG TB24, Take 150 mg by mouth daily with breakfast, Disp: , Rfl: 0  •  zolpidem (AMBIEN CR) 12 5 MG CR tablet, Take 12 5 mg by mouth daily at bedtime , Disp: , Rfl: 0  No Known Allergies    Review of Systems   Constitutional: Negative  HENT: Negative  Nose is running a lot    Eyes: Negative  Wears contacts   Respiratory: Positive for shortness of breath (on exertion)  Cardiovascular: Negative  Gastrointestinal: Positive for nausea  Endocrine: Negative  Genitourinary: Positive for frequency  Currently has UTI    Musculoskeletal: Negative  Skin: Negative  Allergic/Immunologic: Negative  Neurological: Positive for headaches  Hematological: Negative  Psychiatric/Behavioral: Negative       OBJECTIVE:   Ht 6' (1 829 m)   Wt 105 kg (231 lb)   LMP  (LMP Unknown)   BMI 31 33 kg/m²   No exam performed for this telephone follow-up  RESULTS    Lab Results:   Recent Results (from the past 672 hour(s))   Fingerstick Glucose (POCT)    Collection Time: 01/16/23 10:39 AM   Result Value Ref Range    POC Glucose 117 65 - 140 mg/dl       Imaging Studies:MRI brain w wo contrast    Result Date: 1/23/2023  Narrative: MRI BRAIN WITH AND WITHOUT CONTRAST INDICATION: C79 31: Secondary malignant neoplasm of brain  COMPARISON:  10/13/2022 TECHNIQUE: Multiplanar, multisequence imaging of the brain was performed before and after gadolinium administration  IV Contrast:  10 mL of Gadobutrol injection (SINGLE-DOSE)  IMAGE QUALITY:   Diagnostic  FINDINGS: BRAIN PARENCHYMA:  Interval development of multiple less than 5 mm enhancing lesions within the brain parenchyma including within the left posterior frontal and parietal vertex on series 11, images 114, 111 and 112  Similar enhancing lesions, Left left posterior parietal lobe on image 85, Left cerebellum, series 11 image 24 and 30, Left anterior medial frontal lobe series 11 image 69  A few small white matter hyperintensities are scattered within the cerebral hemispheres suggestive of chronic microangiopathic change  No significant edema surrounding the above described small metastasis  No associated hemorrhage  VENTRICLES:  Normal for the patient's age  SELLA AND PITUITARY GLAND:  Normal  ORBITS:  Normal  PARANASAL SINUSES:  Normal  VASCULATURE:  Evaluation of the major intracranial vasculature demonstrates appropriate flow voids  CALVARIUM AND SKULL BASE:  Normal  EXTRACRANIAL SOFT TISSUES:  Normal      Impression: There are multiple new enhancing lesions scattered within the brain parenchyma all 5 mm or less in size consistent with parenchymal metastasis  No surrounding edema, hemorrhage or mass effect   This examination was marked "immediate notification" in Epic in order to begin the standard process by which the radiology reading room liaison alerts the referring practitioner  Workstation performed: [id number]     NM PET CT skull base to mid thigh    Result Date: 1/16/2023  Narrative: PET/CT SCAN INDICATION: Metastatic ovarian cancer  Restaging exam  C56 9: Malignant neoplasm of unspecified ovary R97 8: Other abnormal tumor markers C56 2: Malignant neoplasm of left ovary MODIFIER: PS COMPARISON: PET/CT 5/20/2022, MRI brain 10/13/2022 CELL TYPE:  High-grade serous carcinoma TECHNIQUE:   7 6 mCi F-18-FDG administered IV  Multiplanar attenuation corrected and non attenuation corrected PET images are available for interpretation, and contiguous, low dose, axial CT sections were obtained from the skull vertex through the femurs  following the administration of oral contrast material (7 5 cc Omnipaque-240 in 300 cc water)  Intravenous contrast material was not utilized  This examination, like all CT scans performed in the [facility], was performed utilizing techniques to minimize radiation dose exposure, including the use of iterative reconstruction and automated exposure control  Fasting serum glucose: 117 mg/dl FINDINGS: VISUALIZED BRAIN:   No acute abnormalities are seen  Limited evaluation for intracranial lesions due to normal FDG uptake in the brain parenchyma  HEAD/NECK:   There is a physiologic distribution of FDG  No FDG avid cervical adenopathy is seen  CT images: Unremarkable  CHEST:   No FDG avid soft tissue lesions are seen  No focal uptake in the right perihilar region as previously seen  CT images: Stable mildly ectatic ascending thoracic aorta 4 2 cm in diameter  Mild/moderate coronary artery calcifications  Bilateral mastectomy  ABDOMEN:   No FDG avid soft tissue lesions are seen  CT images: Status post cholecystectomy  Prominent left extrarenal pelvis  Prior abdominal wall repair    Less conspicuous midline herniation of bowel compared to the prior exam  PELVIS: New linear activity in the region of the vulva/vaginal introitus, SUV max of 6 3  This is nonspecific and may be related to urine contamination but correlate clinically for any possible lesion  No FDG avid lymph nodes  CT images: Status post hysterectomy  Small lipoma of the left lateral thigh musculature again seen  OSSEOUS STRUCTURES: No FDG avid lesions are seen  CT images: No significant findings  Impression: 1  Linear activity in the region of the vulva/vaginal introitus  This is nonspecific and may be simply related to urine contamination but correlate clinically for any possible lesion  Otherwise no findings for hypermetabolic malignancy  2   No focal uptake remaining in the right perihilar region  Workstation performed: [id number]       Assessment/Plan:  Orders Placed This Encounter   Procedures   • Radiation Simulation Treatment      We reviewed the patient's recent MRI Brain in detail in comparison to prior imaging studies  This was additionally reviewed this morning as part of neuro-oncology working group with neuro-radiology, neurosurgery, and medical oncology present  On my review, she has multiple areas of pathology enhancement (approximately 7-8) concerning for new metastases  These are all distant from her previously treated lesions, which are no longer visible on this scan  We reviewed treatment options to include repeat multimetastasis SRS vs whole brain RT vs close observation  Given the nature of these metastases, multimetastasis treatment would only aim at selectively targeting those large enough to be safely and easily treated  As such we discussed that whole brain RT would offer a more comprehensive treatment, though would be associated with significantly more pronounced side effects   Close observation would also be appropriate with repeat MRI Brain in 6 weeks; this would allow indeterminate areas of enhancement to declare themselves though may also confuse matters by introducing additional small lesions  After consideration the patient would like to proceed with upfront selective multi-met SRS  I believe this is reasonable  We reviewed side effects of treatment, which could include fatigue, alopecia, headache, nausea, and radionecrosis  The patient is currently receiving gemcitabine/bevacizumab, which should be held immediately pre- and post-SRS; this was discussed by telephone with the patient's local medical oncology (Dr Maria Antonia Louie, [phone])  Discussion also entailed the possible change in systemic therapy to a new regiment with more CNS penetrance, though it is unclear what options would be available at present  She will come for CT simulation later this week with SRS to proceed on 2/1/23  I will remain available in the interim should any questions arise  Pato Chapa MD  9/79/4413,1:54 PM    Total time spent reviewing EMR, seeing patient in consultation, documenting visit, placing treatment orders, and communicating with other medical providers: 55 minutes    Portions of the record may have been created with voice recognition software   Occasional wrong word or "sound a like" substitutions may have occurred due to the inherent limitations of voice recognition software   Read the chart carefully and recognize, using context, where substitutions have occurred

## 2023-01-26 ENCOUNTER — TELEPHONE (OUTPATIENT)
Dept: SURGICAL ONCOLOGY | Facility: CLINIC | Age: 73
End: 2023-01-26

## 2023-01-26 DIAGNOSIS — C79.31 BRAIN METASTASES (HCC): Primary | ICD-10-CM

## 2023-01-26 NOTE — TELEPHONE ENCOUNTER
Patient called to reschedule appointment with Sun Martinez for tomorrow  Rescheduled to 2/3/23 at 11am at [place] office  She was agreeable

## 2023-01-27 ENCOUNTER — RADIATION THERAPY TREATMENT (OUTPATIENT)
Dept: RADIATION ONCOLOGY | Facility: CLINIC | Age: 73
End: 2023-01-27
Attending: STUDENT IN AN ORGANIZED HEALTH CARE EDUCATION/TRAINING PROGRAM

## 2023-01-31 ENCOUNTER — APPOINTMENT (OUTPATIENT)
Dept: RADIATION ONCOLOGY | Facility: HOSPITAL | Age: 73
End: 2023-01-31
Attending: STUDENT IN AN ORGANIZED HEALTH CARE EDUCATION/TRAINING PROGRAM

## 2023-02-01 ENCOUNTER — PROCEDURE VISIT (OUTPATIENT)
Dept: NEUROSURGERY | Facility: CLINIC | Age: 73
End: 2023-02-01

## 2023-02-01 ENCOUNTER — APPOINTMENT (OUTPATIENT)
Dept: RADIATION ONCOLOGY | Facility: HOSPITAL | Age: 73
End: 2023-02-01

## 2023-02-01 ENCOUNTER — TELEPHONE (OUTPATIENT)
Dept: RADIATION ONCOLOGY | Facility: HOSPITAL | Age: 73
End: 2023-02-01

## 2023-02-01 ENCOUNTER — APPOINTMENT (OUTPATIENT)
Dept: RADIATION ONCOLOGY | Facility: HOSPITAL | Age: 73
End: 2023-02-01
Attending: STUDENT IN AN ORGANIZED HEALTH CARE EDUCATION/TRAINING PROGRAM

## 2023-02-01 DIAGNOSIS — C79.31 BRAIN METASTASES (HCC): Primary | ICD-10-CM

## 2023-02-01 DIAGNOSIS — C79.31 BRAIN METASTASES: Primary | ICD-10-CM

## 2023-02-01 NOTE — PROGRESS NOTES
PATIENT NAME: Lm Farrar  : 1950  MRN: [id number]  PROCEDURE DATE: 2023     Stereotactic Radiotherapy (SRT) Operative Note     Preop Diagnosis: Brain metastases     Postop Diagnosis: same     Procedure Details: Frameless Stereotactic Radiotherapy for multiple brain metastases     Surgeon: Annemarie LIRA  Assistants: None, no qualified resident was available to assist with this case     Radiation Oncologist(s): Dr Imani Hope      Estimated Blood Loss:  None           Specimens: None     Drains: None           Total IV Fluids: None              Findings: As above     Complications:  None     Anesthesia: None        DETAILS OF PROCEDURE     The patient presented to the outpatient area of the Department of Radiation Oncology where an open faced immobilization mask was created  The patient then underwent a stereotactic head CT while immobilized in the mask  The patient was then released from the Department  The patient’s stereotactic CT and previous stereotactic MRI scans were fused in the  [facility], which was used to develop the SRT plan  The [facility] prescription called for a dose of 2000 cGy to be delivered to two brain regions of interest  The PTV was created by expanding the GTV, as contoured by myself and the Radiation Oncologist  The SRT plan utilized the mini-multileaf columnator on the [employer] machine at the [address] [place] [address]  When the final treatment plan had been developed and approved by myself, the radiation oncologist and physicist, the patient returned to the Department for the frameless SRT treatment  The patient was positioned on the treatment couch  The Optic Surface Monitoring System (OSMS) was used initially to align the patient  The patient was immobilized in their open faced mask  kV and then cone beam CT imaging was used to align the patient       Once the radiation oncologist, physicist and I agreed the patient was in correct position, the fields were treated sequentially without complications  The OSMS was used during the treatment to assure correct positioning of the patient, and if it detected patient motion, interrupted the treatment beam until the patient was back in alignment  There was no blood loss and no specimen  After the SRT treatment was delivered, the patient was recovered in the treatment room and discharged from the Department when neurologically at baseline and stable          SIGNATURE: Doretha Councilman M D

## 2023-02-02 NOTE — TELEPHONE ENCOUNTER
Call to patient s/p [facility]  2/1/2023   Patient reports feeling well  She denies headache, fatigue, nausea/vomiting, lightheadedness, dizziness, seizures, tremors, no new neurologic changes  Patient was reminded of radiation follow up on 4/12/2023 and MRI of the brain on  4/3/2023  Patient instructed to call if any problems/concerns, phone number provided  Patient verbalized understanding  Patient was appreciative of call

## 2023-02-03 ENCOUNTER — OFFICE VISIT (OUTPATIENT)
Dept: GYNECOLOGIC ONCOLOGY | Facility: CLINIC | Age: 73
End: 2023-02-03

## 2023-02-03 ENCOUNTER — OFFICE VISIT (OUTPATIENT)
Dept: PALLIATIVE MEDICINE | Facility: CLINIC | Age: 73
End: 2023-02-03

## 2023-02-03 VITALS
TEMPERATURE: 98.3 F | SYSTOLIC BLOOD PRESSURE: 160 MMHG | DIASTOLIC BLOOD PRESSURE: 90 MMHG | HEART RATE: 74 BPM | BODY MASS INDEX: 31.99 KG/M2 | WEIGHT: 235.89 LBS | OXYGEN SATURATION: 95 %

## 2023-02-03 VITALS
SYSTOLIC BLOOD PRESSURE: 134 MMHG | HEIGHT: 72 IN | OXYGEN SATURATION: 98 % | WEIGHT: 235 LBS | HEART RATE: 75 BPM | BODY MASS INDEX: 31.83 KG/M2 | DIASTOLIC BLOOD PRESSURE: 68 MMHG

## 2023-02-03 DIAGNOSIS — F51.04 CHRONIC INSOMNIA: ICD-10-CM

## 2023-02-03 DIAGNOSIS — C79.31 BRAIN METASTASES (HCC): Primary | ICD-10-CM

## 2023-02-03 DIAGNOSIS — Z85.3 HISTORY OF BREAST CANCER: ICD-10-CM

## 2023-02-03 DIAGNOSIS — N90.89 VULVAR IRRITATION: Primary | ICD-10-CM

## 2023-02-03 DIAGNOSIS — C56.9 MALIGNANT NEOPLASM OF OVARY, UNSPECIFIED LATERALITY (HCC): ICD-10-CM

## 2023-02-03 RX ORDER — LORAZEPAM 0.5 MG/1
0.5 TABLET ORAL 2 TIMES DAILY PRN
COMMUNITY
Start: 2023-01-17

## 2023-02-03 RX ORDER — CIPROFLOXACIN 500 MG/1
500 TABLET, FILM COATED ORAL EVERY 12 HOURS
COMMUNITY
Start: 2023-01-20

## 2023-02-03 RX ORDER — NITROFURANTOIN 25; 75 MG/1; MG/1
CAPSULE ORAL
COMMUNITY
Start: 2023-01-23

## 2023-02-03 NOTE — PROGRESS NOTES
Assessment/Plan:    Problem List Items Addressed This Visit        Endocrine    Ovarian cancer ([facility] [address] )       Other    Vulvar irritation - Primary     81-year-old with recurrent, progressive stage IC3 ovarian cancer with brain metastases  Unfortunately, her MRI done 1/20/23 shows multiple new, small brain lesions  Her PET is without evidence of metastatic disease  She has a new c/o vulvar irritation over the past several weeks  She was treated for UTI and this resolved some, so she feels that it was UTI related  She is also concerned due to mention of nonspecific FDG activity in the vaginal introitus area on her PET done 1/16/23  Pelvic exam reveals no skin changes or lesions  Her PS is 0  Patient will return to the office in 8 weeks after next brain MRI for plan of care discussion with Dr Mai Atwood  Thus far, she has completed 4 cycles of Gemzar + Avasin under the care of Dr Nikhil Marshall  It is unclear at this time if there will be a change of therapy                    CHIEF COMPLAINT: Vulvar irritation      Subjective:     Problem:  Cancer Staging   Ovarian cancer [facility])  Staging form: Ovary, Fallopian Tube, Primary Peritoneal, AJCC 8th Edition  - Pathologic stage from 1/13/2020: FIGO Stage IC3 (pN0, cM0) - Signed by Liane Rudolph MD on 1/28/2020      Previous therapy:  Oncology History   [facility]varian [facility] ([facility] )   1/13/2020 Initial Diagnosis    Ovarian cancer on left ([facility] )     1/13/2020 -  Cancer Staged    Staging form: Ovary, Fallopian Tube, Primary Peritoneal, AJCC 8th Edition  - Pathologic stage from 1/13/2020: FIGO Stage IC3 (pN0, cM0) - Signed by Liane Rudolph MD on 1/28/2020  Histologic grade (G): G3  Histologic grading system: 4 grade system  Residual tumor (R): R0 - None  Histopathologic type: Serous cystadenocarcinoma, NOS  Diagnostic confirmation: Positive histology PLUS positive immunophenotyping and/or positive genetic studies  Specimen type: Excision  Staged by: Managing physician  Cancer antigen 125 () (U/mL): 4,000  Gross residual tumor after primary cyto-reductive surgery: Absent  Residual tumor volume after primary cytoreductive surgery: No gross tumor  National guidelines used in treatment planning: Yes  Type of national guideline used in treatment planning: NCCN       1/13/2020 Surgery    Diagnostic laparoscopy, laparotomy, total hysterectomy, bilateral salpingo-oophorectomy with resection of pelvic mass, bilateral pelvic and periaortic lymphadenectomy, staging peritoneal biopsies, omentectomy, appendectomy  Final pathology consistent with 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  2/11/2020 - 5/26/2020 Chemotherapy    Six cycles of carboplatin/paclitaxel administered by Dr Adarsh Nash  Tolerated well  CT scan at completion of treatment demonstrates no evidence of measurable disease   fadi near completion of chemotherapy 5 7 units/milliliter     8/9/2021 Recurrence     Elevated  triggered imaging  which demonstrated suspicious mediastinal lymphadenopathy  Endobronchial ultrasound-guided fine-needle aspiration on August 9, 2021 demonstrates metastatic carcinoma with immuno phenotype consistent with metastatic ovarian primary  9/1/2021 - 1/24/2022 Chemotherapy    Carboplatin + Doxil x 6 cycles (Dr Camelia Moncada)  Complete response  Fadi Ca125 8 5 IU/mL  3/2022 - 8/2022 Chemotherapy    Niraparib  9/14/2022 -  Chemotherapy    Started on gemcitabine and bevacizumab  On November 29, 2022 after 3 cycles CT scan demonstrates no evidence of measurable disease   reportedly decreasing somewhat  Patient tolerating well       Brain metastases ([address] )   8/17/2022 Initial Diagnosis    Brain metastases ([address] )     8/31/2022 - 8/31/2022 Radiation      Plan ID Energy Fractions Dose per Fraction (cGy) Dose Correction (cGy) Total Dose Delivered (cGy) Elapsed Days   SRS 4 PTVs 6X-FFF 1 / 1 2,000 0 2,000 0      Treatment dates:  C1 SRS: 8/31/2022 - 8/31/2022 Patient ID: Mary Gutierres is a 81 y o  female     This is a 81 y o  female last evaluated in November 2022 who presents to the office for vulvar irritation concerns  She first notice this sensation several weeks ago  Feels like a burning or pressure  She is voiding and moving her bowels without difficulty  She denies abdominal or pelvic pain  No vulvar or vaginal pain, bleeding, or discharge  From a treatment standpoint she has been afebrile, with a good appetite, and has no nausea or vomiting  She is ambulatory  Review of Systems   Constitutional: Negative for activity change, appetite change, chills, fatigue, fever and unexpected weight change  HENT: Negative for mouth sores  Eyes: Negative  Respiratory: Negative for cough, chest tightness, shortness of breath and wheezing  Cardiovascular: Negative for chest pain, palpitations and leg swelling  Gastrointestinal: Negative for abdominal distention, abdominal pain, anal bleeding, blood in stool, constipation, diarrhea, nausea and vomiting  Endocrine: Negative  Genitourinary: Negative for difficulty urinating, dysuria, flank pain, frequency, hematuria, pelvic pain, urgency, vaginal bleeding, vaginal discharge and vaginal pain  Vulvar irritation   Musculoskeletal: Negative for arthralgias and myalgias  Skin: Negative for color change, pallor and rash  Neurological: Negative for dizziness, weakness, numbness and headaches  Hematological: Negative  Psychiatric/Behavioral: The patient is not nervous/anxious          Current Outpatient Medications   Medication Sig Dispense Refill   • b complex vitamins capsule Take 1 capsule by mouth daily     • carvedilol (COREG) 6 25 mg tablet Take 1 tablet (6 25 mg total) by mouth 2 (two) times a day with meals 180 tablet 3   • cholecalciferol (VITAMIN D3) 400 units tablet Take 400 Units by mouth daily     • ciprofloxacin (CIPRO) 500 mg tablet Take 500 mg by mouth every 12 (twelve) hours • estradiol (ESTRACE) 0 1 mg/g vaginal cream insert 1 gram vaginally two times a week  0   • LORazepam (ATIVAN) 0 5 mg tablet Take 0 5 mg by mouth 2 (two) times a day as needed     • LORazepam (ATIVAN) 1 mg tablet Take 1 tablet (1 mg total) by mouth every 6 (six) hours as needed for anxiety (or nausea) 20 tablet 0   • losartan-hydrochlorothiazide (HYZAAR) 50-12 5 mg per tablet Take 1 tablet by mouth daily   0   • magnesium oxide (MAG-OX) 400 mg Take 400 mg by mouth 2 (two) times a day      • nitrofurantoin (MACROBID) 100 mg capsule take 1 capsule by mouth twice a day take WITH A MEAL     • nystatin-triamcinolone (MYCOLOG-II) cream Apply topically 2 (two) times a day 30 g 0   • prochlorperazine (COMPAZINE) 10 mg tablet Take 10 mg by mouth every 6 (six) hours as needed     • simvastatin (ZOCOR) 20 mg tablet Take 20 mg by mouth daily   0   • trimethoprim (PROLOPRIM) 100 mg tablet Take 100 mg by mouth daily   0   • venlafaxine 150 MG TB24 Take 150 mg by mouth daily with breakfast  0   • zolpidem (AMBIEN CR) 12 5 MG CR tablet Take 12 5 mg by mouth daily at bedtime   0   • gabapentin (NEURONTIN) 100 mg capsule Take 100 mg by mouth if needed (Patient not taking: Reported on 9/30/2022)       No current facility-administered medications for this visit         No Known Allergies    Past Medical History:   Diagnosis Date   • Anemia 3/23/2020    During chemo   • Breast cancer ([facility] )     2004 and then 2009   • Cancer [facility]) 2005,2009    breast   • Cancer ([facility] ) 2020    ovarian   • Chicken pox    • Colon polyp    • CPAP (continuous positive airway pressure) dependence    • Frequent UTI    • GERD (gastroesophageal reflux disease)    • Heart disease    • High cholesterol    • History of blood transfusion     2013 with Bilat Knee replacement   • Hypertension    • Irregular heart beat     PVC's   • Ovarian cancer ([facility] ) 9/13/2020   • PONV (postoperative nausea and vomiting)    • Sleep apnea        Past Surgical History:   Procedure Laterality Date   • APPENDECTOMY     • BREAST CYST EXCISION     • BREAST LUMPECTOMY     • CHOLECYSTECTOMY     • COLONOSCOPY     • CYSTOSCOPY Bilateral 2020    Procedure: CYSTOSCOPY: CYSTOSCOPY WITH PERIOPERATIVE URETERAL CATHETERS  PLACEMENT;  Surgeon: Anastasia Jimenez MD;  Location: [facility];  Service: Gynecology Oncology   • CYSTOSCOPY     • HERNIA REPAIR     • HYSTERECTOMY N/A 2020    Procedure: EXPLORATORY LAPAROTOMY, OVARIAN CANCER STAGING WITH TOTAL HYSTERECTOMY, BILATERAL SALPINGO-OOPHORECTOMY, BILATERAL PELVIC AND PERIAORTIC LYMPHADENECTOMY, INDICATED APPENDECTOMY, OMENTECTOMY, STAGING PERITONEAL BIOPSIES ;  Surgeon: Anastasia Jimenez MD;  Location: [facility];  Service: Gynecology Oncology   • JOINT REPLACEMENT      Bilat Knees   • MASTECTOMY Bilateral    • [place] [address] INCL FLUOR GDNCE DX W/CELL WASHG [facility] N/A 2021    Procedure: BRONCHOSCOPY FLEXIBLE;  Surgeon: Theodore Wright MD;  Location: [facility];  Service: Thoracic   • [place] BRONCHOSCOPY NEEDLE BX TRACHEA MAIN STEM&/BRON N/A 2021    Procedure: ENDOBRONCHIAL ULTRASOUND (EBUS);   Surgeon: Theodore Wright MD;  Location: [facility];  Service: Thoracic   • [place] LAP RPR HRNA XCPT INCAL/INGUN NCRC8/STRANGULATED N/A 2021    Procedure: REPAIR HERNIA INCISIONAL LAPAROSCOPIC;  Surgeon: Scar Lyon MD;  Location: [facility];  Service: General   • [place] LAPS ABD PRTM&OMENTUM DX W/WO SPEC BR/WA SPX N/A 2020    Procedure: LAPAROSCOPY DIAGNOSTIC;  Surgeon: Anastasia Jimenez MD;  Location: [facility];  Service: Gynecology Oncology   • REPAIR KNEE LIGAMENT Bilateral    • ROTATOR CUFF REPAIR         OB History        2    Para   2    Term   0       0    AB   0    Living   2       SAB   0    IAB   0    Ectopic   0    Multiple   0    Live Births   2           Obstetric Comments   Menarche: 17 y/o   Both vag deliveries              Family History   Problem Relation Age of Onset   • Breast cancer Mother    • Lung cancer Father    • Prostate cancer Brother        The following portions of the patient's history were reviewed and updated as appropriate: allergies, current medications, past family history, past medical history, past social history, past surgical history and problem list       Objective:    Blood pressure 134/68, pulse 75, height 6' (1 829 m), weight 107 kg (235 lb), SpO2 98 %, not currently breastfeeding  Body mass index is 31 87 kg/m²  Physical Exam  Vitals reviewed  Exam conducted with a chaperone present  Constitutional:       General: She is not in acute distress  Appearance: Normal appearance  She is not ill-appearing  HENT:      Head: Normocephalic and atraumatic  Mouth/Throat:      Mouth: Mucous membranes are moist    Eyes:      General:         Right eye: No discharge  Left eye: No discharge  Conjunctiva/sclera: Conjunctivae normal    Pulmonary:      Effort: Pulmonary effort is normal    Abdominal:      Palpations: Abdomen is soft  There is no mass  Tenderness: There is no abdominal tenderness  Hernia: No hernia is present  Genitourinary:     Comments: The external female genitalia is normal  The bartholin's, uretheral and skenes glands are normal  The urethral meatus is normal (midline with no lesions)  Anus without fissure or lesion  Speculum exam reveals a grossly normal vagina  No masses, lesions,discharge or bleeding  No significant cystocele or rectocele noted  Bimanual exam notes a surgical absent cervix, uterus and adnexal structures  No masses or fullness  Bladder is without fullness, mass or tenderness  Musculoskeletal:      Right lower leg: No edema  Left lower leg: No edema  Skin:     General: Skin is warm and dry  Coloration: Skin is not jaundiced  Findings: No rash  Neurological:      General: No focal deficit present  Mental Status: She is alert and oriented to person, place, and time        Cranial Nerves: No cranial nerve deficit  Sensory: No sensory deficit  Motor: No weakness  Gait: Gait normal    Psychiatric:         Mood and Affect: Mood normal          Behavior: Behavior normal          Thought Content:  Thought content normal          Judgment: Judgment normal            Lab Results   Component Value Date     6 3 03/18/2021     Lab Results   Component Value Date    WBC 6 94 01/19/2020    HGB 10 5 (L) 01/19/2020    HCT 33 4 (L) 01/19/2020    MCV 95 01/19/2020     01/19/2020     Lab Results   Component Value Date    K 3 4 (L) 01/19/2020     01/19/2020    CO2 25 01/19/2020    BUN 18 03/18/2021    CREATININE 0 94 03/18/2021    CALCIUM 8 4 01/19/2020    AST 25 01/19/2020    ALT 27 01/19/2020    ALKPHOS 60 01/19/2020    EGFR 63 03/18/2021        Trend:  Lab Results   Component Value Date     6 3 03/18/2021     4,086 7 (H) 01/06/2020

## 2023-02-03 NOTE — ASSESSMENT & PLAN NOTE
79-year-old with recurrent, progressive stage IC3 ovarian cancer with brain metastases  Unfortunately, her MRI done 1/20/23 shows multiple new, small brain lesions  Her PET is without evidence of metastatic disease  She has a new c/o vulvar irritation over the past several weeks  She was treated for UTI and this resolved some, so she feels that it was UTI related  She is also concerned due to mention of nonspecific FDG activity in the vaginal introitus area on her PET done 1/16/23  Pelvic exam reveals no skin changes or lesions  Her PS is 0  Patient will return to the office in 8 weeks after next brain MRI for plan of care discussion with Dr Luis E Ramirez  Thus far, she has completed 4 cycles of Gemzar + Avasin under the care of Dr Adarsh Nash  It is unclear at this time if there will be a change of therapy

## 2023-02-03 NOTE — PATIENT INSTRUCTIONS
1  Return to the office in 8 weeks after MRI    2  Contact the office in the interim if you develop any any new or concerning symptoms, including vaginal bleeding, discharge, pain, etc

## 2023-02-03 NOTE — PROGRESS NOTES
Outpatient Follow-Up - Palliative and Supportive Care   Vinicius Mejia 81 y o  female [id number]    Assessment & Plan  Problem List Items Addressed This Visit        Unprioritized    History of breast cancer    Ovarian cancer ([facility] [address] )    Chronic insomnia    Brain metastases ([facility] [address] ) - Primary       Medications adjusted this encounter:  Requested Prescriptions      No prescriptions requested or ordered in this encounter     No orders of the defined types were placed in this encounter  There are no discontinued medications  Vinicius Mejia was seen today for symptoms and planning cares related to above illnesses  I have reviewed the patient's controlled substance dispensing history in the Prescription Drug Monitoring Program in compliance with the [facility] regulations before prescribing any controlled substances  They are invited to continue to follow with us  If there are questions or concerns, please contact us through our clinic/answering service 24 hours a day, seven days a week  Sirena Joy MD  [facility] Palliative and Supportive Care  [phone]      Visit Information    Accompanied By: brother Kati Jodi of History: Self    History Limitations: None      Chief Complaint  Chief Complaint   Patient presents with   • Follow-up   • Headache       History of Present Illness      Vinicius Mejia is a 81 y o  female who presents in follow up of symptoms related to metastatic ovarian cancer  Pertinent issues include: Goals of care    Isiah Reyes is a 80 yo female with who had breast cancer previously and was treated and then was diagnosed with ovarian cancer in January of 2020  Despite 6 cycles of carboplatin and paclitaxel she developed mediastinal lymphadenopathy and underwent 6 additional cycles with carboplatin and doxil  She is now undergoing a second round of chemotherapy with good response on CT scan   However this is complicate by the appearance of brain metastases In august of 2022, for which she did RT and MRI showed good response originally, but most recent MRI reveals multiple new lesions and she underwent stereotactic RT earlier this week  On my encounter today, Danielle Delcid is accompanied by her brother Brijesh Sandoval for support  She reports that she has a mild HA 2/2 RT a few days ago, otherwise physically doing well  We spend our visit discussing her new medical situation since the resurgence of the brain mets and how she would like to approach this  She still feels she wishes to proceed with chemotherapy at this time  She reflects on how her older brother prepared when he was dying and said goodbye to his friends and mentions that her friends seem to have difficulty discussing her prognosis  She is focused on getting some logistical things taken care of and perhaps another trip to the beach  We discuss the difficulty of living with uncertainty and the fact that brain mets can lead to AMS and thus she should enjoy the things she does now as the future is uncertain  She feels she has come to a place where fear is not the issue, she has come to acceptance about her disease  She has shin and believes she will go to [facility]  She has asked her brother to say her eulogy  We will continue to follow closely for support  No change to plan of care  Past medical, surgical, social, and family histories are reviewed and pertinent updates are made  Review of Systems   Constitutional: Positive for malaise/fatigue  Negative for decreased appetite  HENT: Negative for ear pain and sore throat  Eyes: Negative for pain  Cardiovascular: Negative for chest pain  Respiratory: Negative for cough and shortness of breath  Skin: Negative for suspicious lesions  Musculoskeletal: Negative for back pain and joint pain  Gastrointestinal: Negative for bloating, constipation, diarrhea, nausea and vomiting  Genitourinary: Negative for dysuria  Neurological: Positive for headaches  Negative for loss of balance  Psychiatric/Behavioral: Negative for depression  The patient has insomnia and is nervous/anxious  Vital Signs    /90 (BP Location: Left arm, Cuff Size: Standard)   Pulse 74   Temp 98 3 °F (36 8 °C) (Temporal)   Wt 107 kg (235 lb 14 3 oz)   LMP  (LMP Unknown)   SpO2 95%   BMI 31 99 kg/m²     Physical Exam and Objective Data  Physical Exam  HENT:      Head: Normocephalic  Nose: Nose normal       Mouth/Throat:      Mouth: Mucous membranes are moist    Eyes:      Extraocular Movements: Extraocular movements intact  Cardiovascular:      Rate and Rhythm: Normal rate  Pulmonary:      Effort: Pulmonary effort is normal  No respiratory distress  Abdominal:      General: There is no distension  Palpations: Abdomen is soft  Musculoskeletal:      Cervical back: Normal range of motion  Right lower leg: No edema  Left lower leg: No edema  Skin:     General: Skin is warm and dry  Neurological:      Mental Status: She is alert and oriented to person, place, and time  Psychiatric:         Mood and Affect: Mood normal          Behavior: Behavior normal            Radiology and Laboratory:  I personally reviewed and interpreted the following results: pertinent studies reviewed (MRI brain)    45 minutes was spent face to face with Baron Ward and her brother with greater than 50% of the time spent in counseling or coordination of care including goals of care discussion  All of the patient's or agent's questions were answered during this discussion

## 2023-02-15 ENCOUNTER — TELEPHONE (OUTPATIENT)
Dept: OBGYN CLINIC | Facility: CLINIC | Age: 73
End: 2023-02-15

## 2023-04-03 ENCOUNTER — HOSPITAL ENCOUNTER (OUTPATIENT)
Dept: MRI IMAGING | Facility: HOSPITAL | Age: 73
Discharge: HOME | End: 2023-04-03
Attending: STUDENT IN AN ORGANIZED HEALTH CARE EDUCATION/TRAINING PROGRAM

## 2023-04-03 DIAGNOSIS — C79.31 MALIGNANT NEOPLASM METASTATIC TO BRAIN (HCC): ICD-10-CM

## 2023-04-03 RX ADMIN — GADOBUTROL 10 ML: 604.72 INJECTION INTRAVENOUS at 15:02

## 2023-04-23 PROBLEM — Z51.5 PALLIATIVE CARE PATIENT: Status: ACTIVE | Noted: 2023-04-23

## 2023-04-23 RX ORDER — LORAZEPAM 1 MG/1
1 TABLET ORAL EVERY 6 HOURS PRN
Qty: 20 TABLET | Refills: 0 | Status: CANCELLED | OUTPATIENT
Start: 2023-04-23

## 2023-04-23 RX ORDER — PROCHLORPERAZINE MALEATE 10 MG
10 TABLET ORAL EVERY 6 HOURS PRN
Qty: 30 TABLET | Refills: 0 | Status: CANCELLED | OUTPATIENT
Start: 2023-04-23

## 2023-04-23 NOTE — PROGRESS NOTES
Outpatient Follow-Up - Palliative and Supportive Care   Bobbi Barnett 81 y o  female [id number]    Assessment & Plan  Problem List Items Addressed This Visit        Unprioritized    Ovarian cancer [facility]) - Primary    Brain metastases    Palliative care patient   Other Visit Diagnoses     Anxiety        Nausea and vomiting, unspecified vomiting type              Medications adjusted this encounter:  Requested Prescriptions      No prescriptions requested or ordered in this encounter     No orders of the defined types were placed in this encounter  There are no discontinued medications  Bobbi Barnett was seen today for symptoms and planning cares related to above illnesses  I have reviewed the patient's controlled substance dispensing history in the Prescription Drug Monitoring Program in compliance with the [facility] regulations before prescribing any controlled substances  They are invited to continue to follow with us  If there are questions or concerns, please contact us through our clinic/answering service 24 hours a day, seven days a week  Mikayla Manriquez MD  [facility] Palliative and Supportive Care  [phone]      Visit Information    Accompanied By: Spouse    Source of History: Self, Spouse    History Limitations: None      Chief Complaint  No chief complaint on file  History of Present Illness    Bobbi Barnett is a 81 y o  female who presents in follow up of symptoms related to metastatic ovarian cancer  Pertinent issues include: Goals of care     Ghanshyam Strange is a 82 yo female with who had breast cancer previously and was treated and then was diagnosed with ovarian cancer in January of 2020  Despite 6 cycles of carboplatin and paclitaxel she developed mediastinal lymphadenopathy and underwent 6 additional cycles with carboplatin and doxil  She is now undergoing a second round of chemotherapy with good response on CT scan   However this is complicate by the appearance of brain metastases In "august of 2022, for which she did RT and MRI showed good response originally, but later found to have multiple new lesions and subsequently underwent stereotactic RT  Latest scan reveals decrease in size of lesions       On my encounter today, Sara Garcia is accompanied by her  Yayo Ibrahim  She reports that her chemo session yesterday was canceled due to scan yesterday revealing LAD and increased CA-125  She is uncertain what this will mean for her treatment plan, expresses overwhelm from all of the ups and downs and the uncertainty about her prognosis  Recommended to read Bailey Lees book \"Die Christopher\"  Their son came home from [facility] and is living with them again  He is not doing drugs but occasionally goes on a drinking binge and she states that although she has shin her daughter will do well after she is gone, she is unsettled about her son  Her daughter is pregnant and she states her new goal is to make it to October to meet the baby  She is experiencing some fatigue, has been switched from ativan to klonopin by her PCP and sleeping a bit better  Her uncertainty about her prognosis is something that she brings up repeatedly, also expressing she does not know how to make the best use of the time she does have  She asks what transition to hospice will look like when the time comes  She does not wish to prolong her life if she is very sick  She is reassured our team will help her to navigate this transition when she arrives at a point where she no longer wishes to go to the hospital or seek further treatment and we review what hospice looks like  She also expresses she fears suffering at the end and would like to know about MAD and I explain that although there is no MAD in [place], there is always a possibility to undergo palliative sedation if her distress is unmanageable  All questions answered and will f/u in 1 month      Past medical, surgical, social, and family histories are reviewed and pertinent " updates are made  Review of Systems   Constitutional: Positive for malaise/fatigue  Negative for decreased appetite  HENT: Negative for ear pain and odynophagia  Eyes: Negative for pain  Cardiovascular: Positive for dyspnea on exertion  Respiratory: Negative for cough and shortness of breath  Musculoskeletal: Negative for back pain and joint pain  Gastrointestinal: Negative for abdominal pain, constipation, diarrhea, nausea and vomiting  Genitourinary: Negative for dysuria  Neurological: Negative for headaches and light-headedness  Psychiatric/Behavioral: The patient does not have insomnia  Vital Signs    /82 (BP Location: Left arm, Patient Position: Sitting, Cuff Size: Standard)   Pulse 73   Temp (!) 96 2 °F (35 7 °C) (Temporal)   Resp 18   Wt 111 kg (243 lb 13 3 oz)   LMP  (LMP Unknown)   SpO2 95%   BMI 33 07 kg/m²     Physical Exam and Objective Data  Physical Exam  Constitutional:       General: She is not in acute distress  Appearance: She is obese  She is not ill-appearing  HENT:      Head: Normocephalic  Nose: Nose normal       Mouth/Throat:      Mouth: Mucous membranes are moist    Eyes:      Extraocular Movements: Extraocular movements intact  Cardiovascular:      Rate and Rhythm: Normal rate  Pulmonary:      Effort: Pulmonary effort is normal  No respiratory distress  Abdominal:      General: There is no distension  Palpations: Abdomen is soft  Musculoskeletal:         General: Normal range of motion  Cervical back: Normal range of motion  Right lower leg: No edema  Left lower leg: No edema  Skin:     General: Skin is warm and dry  Neurological:      Mental Status: She is alert and oriented to person, place, and time     Psychiatric:         Mood and Affect: Mood normal          Behavior: Behavior normal            Radiology and Laboratory:  I personally reviewed and interpreted the following results: pertinent studies reviewed    40 minutes was spent face to face with Ana Rice and her family with greater than 50% of the time spent in counseling or coordination of care including discussions of risks and benefits of treatment, instructions for disease self management, treatment instructions, follow up requirements, risk factors and risk reduction of disease and patient and family counseling/involvement in care  All of the patient's or agent's questions were answered during this discussion

## 2023-04-28 ENCOUNTER — OFFICE VISIT (OUTPATIENT)
Dept: PALLIATIVE MEDICINE | Facility: CLINIC | Age: 73
End: 2023-04-28

## 2023-04-28 VITALS
OXYGEN SATURATION: 95 % | RESPIRATION RATE: 18 BRPM | TEMPERATURE: 96.2 F | WEIGHT: 243.83 LBS | HEART RATE: 73 BPM | BODY MASS INDEX: 33.07 KG/M2 | SYSTOLIC BLOOD PRESSURE: 140 MMHG | DIASTOLIC BLOOD PRESSURE: 82 MMHG

## 2023-04-28 DIAGNOSIS — C56.9 MALIGNANT NEOPLASM OF OVARY, UNSPECIFIED LATERALITY (HCC): Primary | ICD-10-CM

## 2023-04-28 DIAGNOSIS — C79.31 MALIGNANT NEOPLASM METASTATIC TO BRAIN (HCC): ICD-10-CM

## 2023-04-28 DIAGNOSIS — F41.9 ANXIETY: ICD-10-CM

## 2023-04-28 DIAGNOSIS — Z51.5 PALLIATIVE CARE PATIENT: ICD-10-CM

## 2023-04-28 DIAGNOSIS — R11.2 NAUSEA AND VOMITING, UNSPECIFIED VOMITING TYPE: ICD-10-CM

## 2023-05-09 ENCOUNTER — TELEPHONE (OUTPATIENT)
Dept: GYNECOLOGIC ONCOLOGY | Facility: CLINIC | Age: 73
End: 2023-05-09

## 2023-05-10 ENCOUNTER — TELEPHONE (OUTPATIENT)
Dept: GYNECOLOGIC ONCOLOGY | Facility: CLINIC | Age: 73
End: 2023-05-10

## 2023-05-10 DIAGNOSIS — C56.9 MALIGNANT NEOPLASM OF OVARY, UNSPECIFIED LATERALITY (HCC): Primary | ICD-10-CM

## 2023-05-10 NOTE — TELEPHONE ENCOUNTER
FOLR1 testing submitted to [employer] via fax, #[phone]  Message sent to oncology finance group regarding assistance for Keytruda/Cytoxan/Avastin regimen

## 2023-05-11 NOTE — TELEPHONE ENCOUNTER
Patient states that her oncology has an appeal in process with her insurance company for pembro/cytoxan/frances regimen  I let her know that our finance team is also investigating

## 2023-05-12 ENCOUNTER — TELEPHONE (OUTPATIENT)
Dept: HEMATOLOGY ONCOLOGY | Facility: CLINIC | Age: 73
End: 2023-05-12

## 2023-05-12 ENCOUNTER — TELEPHONE (OUTPATIENT)
Dept: GYNECOLOGIC ONCOLOGY | Facility: CLINIC | Age: 73
End: 2023-05-12

## 2023-05-12 NOTE — TELEPHONE ENCOUNTER
Place phone call to patient to review drug company application process  Left voicemail for return call  My direct number [phone]    Patient returned phone call and stated that she spoke with the location she gets her chemotherapy treatment told her that her drug will be free and that she will be okay to proceed with treatment on Wednesday  Henny Álvarez that I would reach out to her care team here at [place] [id number] to confirm that patient will be getting all drugs listed on treatment plan

## 2023-05-12 NOTE — TELEPHONE ENCOUNTER
----- Message from Judy Harvey sent at 2023 10:57 AM EDT -----  Regardin Perla Martinez returned my call and advised that she spoke with the facility she goes for her treatment and they were able to get her medications free  She would like to confirm that they will be treating her with all the same medications that Dr Jeff De Los Santos has recommended for her  If this could be done prior to her treatment on Wednesday and a return call to the patient, she would appreciate it  Thanks!

## 2023-05-15 ENCOUNTER — TELEPHONE (OUTPATIENT)
Dept: HEMATOLOGY ONCOLOGY | Facility: CLINIC | Age: 73
End: 2023-05-15

## 2023-05-15 NOTE — TELEPHONE ENCOUNTER
Patient Call    Who are you speaking with? Gregoria Chavez    If it is not the patient, are they listed on an active communication consent form? N/A   What is the reason for this call? Gregoria Chavez from [employer] calling in stating that they would need a new referral sent for the patient  Reference # [id number]    Does this require a call back? Yes   If a call back is required, please list best call back number [phone]   If a call back is required, advise that a message will be forwarded to their care team and someone will return their call as soon as possible  Did you relay this information to the patient?  Yes

## 2023-05-16 ENCOUNTER — TELEPHONE (OUTPATIENT)
Dept: HEMATOLOGY ONCOLOGY | Facility: CLINIC | Age: 73
End: 2023-05-16

## 2023-05-16 NOTE — TELEPHONE ENCOUNTER
Patient Call    Who are you speaking with? Wily Main    If it is not the patient, are they listed on an active communication consent form? N/A   What is the reason for this call? Wily Main from [address] [place] [address],[address] requesting updated rec for patient  She notes that this must be signed by the ordering provider, Dr Cayetano Romano  This is for [facility]  Please send to     Fax: [phone]  Attn: [id number]   Does this require a call back? No   If a call back is required, please list best call back number For any questions, [phone]   If a call back is required, advise that a message will be forwarded to their care team and someone will return their call as soon as possible  Did you relay this information to the patient?  N/A

## 2023-05-19 NOTE — TELEPHONE ENCOUNTER
Another new req faxed, per request from [facility] [employer]  Uploaded to media tab

## 2023-05-24 ENCOUNTER — LAB REQUISITION (OUTPATIENT)
Dept: LAB | Facility: HOSPITAL | Age: 73
End: 2023-05-24

## 2023-05-24 DIAGNOSIS — R19.00 INTRA-ABDOMINAL AND PELVIC SWELLING, MASS AND LUMP, UNSPECIFIED SITE: ICD-10-CM

## 2023-05-24 DIAGNOSIS — R97.1 ELEVATED CANCER ANTIGEN 125 (CA 125): ICD-10-CM

## 2023-06-02 ENCOUNTER — OFFICE VISIT (OUTPATIENT)
Dept: PALLIATIVE MEDICINE | Facility: CLINIC | Age: 73
End: 2023-06-02

## 2023-06-02 VITALS
SYSTOLIC BLOOD PRESSURE: 128 MMHG | WEIGHT: 239.64 LBS | HEART RATE: 63 BPM | OXYGEN SATURATION: 96 % | BODY MASS INDEX: 32.46 KG/M2 | DIASTOLIC BLOOD PRESSURE: 90 MMHG | TEMPERATURE: 97 F | HEIGHT: 72 IN

## 2023-06-02 DIAGNOSIS — Z51.5 PALLIATIVE CARE PATIENT: ICD-10-CM

## 2023-06-02 DIAGNOSIS — F41.9 ANXIETY: ICD-10-CM

## 2023-06-02 DIAGNOSIS — C56.9 MALIGNANT NEOPLASM OF OVARY, UNSPECIFIED LATERALITY (HCC): Primary | ICD-10-CM

## 2023-06-02 NOTE — PROGRESS NOTES
Outpatient Follow-Up - Palliative and Supportive Care   Libby Carrero 81 y o  female [id number]    Assessment & Plan  Problem List Items Addressed This Visit        Unprioritized    Ovarian cancer [facility]) - Primary    Palliative care patient   Other Visit Diagnoses     Anxiety              Medications adjusted this encounter:  Requested Prescriptions      No prescriptions requested or ordered in this encounter     No orders of the defined types were placed in this encounter  There are no discontinued medications  Libby Carrero was seen today for symptoms and planning cares related to above illnesses  I have reviewed the patient's controlled substance dispensing history in the Prescription Drug Monitoring Program in compliance with the [facility] regulations before prescribing any controlled substances  They are invited to continue to follow with us  If there are questions or concerns, please contact us through our clinic/answering service 24 hours a day, seven days a week  Amanuel Hammond MD  [facility] Palliative and Supportive Care        Visit Information    Accompanied By: no one    Source of History: Self    History Limitations: None      Chief Complaint  Chief Complaint   Patient presents with   • Follow-up       History of Present Illness      Libby Carrero is a 81 y o  female who presents in follow up of symptoms related to metastatic ovarian cancer  Pertinent issues include: symptom management, depression or anxiety, fatigue, assessment of goals of care      Rui Jacob is a 82 yo female with who had breast cancer previously and was treated and then was diagnosed with ovarian cancer in January of 2020  Despite 6 cycles of carboplatin and paclitaxel she developed mediastinal lymphadenopathy and underwent 6 additional cycles with carboplatin and doxil  She is now undergoing a second round of chemotherapy with good response on CT scan   However this is complicate by the appearance of brain metastases In august of 2022, for which she did RT and MRI showed good response originally, but later found to have multiple new lesions and subsequently underwent stereotactic RT  Latest scan reveals decrease in size of lesions      On my encounter today, Nena Lane is feeling overwhelmed  Her son is staying with her and doing fairly well  She is confused about her prognosis and uncertain how best to take advantage of the time  She has a Kim due in October and hopes to make it to that  She is recommended to read Natrix [employer]arations book [employer]  She is having aches and pains that are fairly non-specific and is encouraged to do more gentle movement  She is now on a daily PO chemo regimen and receiving infusions every other week  She feels exhausted but otherwise is alright  Plan:    Continue same plan of care     F/u in 2-3 mos or prn    Past medical, surgical, social, and family histories are reviewed and pertinent updates are made  Review of Systems   Constitutional: Negative for decreased appetite, malaise/fatigue, weight gain and weight loss  HENT: Negative for ear pain and sore throat  Eyes: Negative for pain  Cardiovascular: Positive for dyspnea on exertion  Negative for chest pain  Respiratory: Positive for cough  Negative for shortness of breath  Skin: Negative for suspicious lesions  Musculoskeletal: Positive for back pain, joint pain and stiffness  Gastrointestinal: Negative for abdominal pain, nausea and vomiting  Genitourinary: Negative for dysuria  Neurological: Negative for headaches and light-headedness  Psychiatric/Behavioral: Negative for depression  The patient has insomnia and is nervous/anxious            Vital Signs    /90 (BP Location: Right arm, Patient Position: Sitting, Cuff Size: Standard)   Pulse 63   Temp (!) 97 °F (36 1 °C) (Temporal)   Ht 6' (1 829 m)   Wt 109 kg (239 lb 10 2 oz)   LMP  (LMP Unknown)   SpO2 96%   BMI 32 50 kg/m²     Physical Exam and Objective Data  Physical Exam  Constitutional:       Appearance: She is obese  HENT:      Head: Normocephalic  Nose: Nose normal       Mouth/Throat:      Mouth: Mucous membranes are moist    Eyes:      Extraocular Movements: Extraocular movements intact  Cardiovascular:      Rate and Rhythm: Normal rate  Pulses: Normal pulses  Pulmonary:      Effort: Pulmonary effort is normal  No respiratory distress  Abdominal:      General: There is no distension  Palpations: Abdomen is soft  Musculoskeletal:         General: Normal range of motion  Cervical back: Normal range of motion  Right lower leg: No edema  Left lower leg: No edema  Skin:     General: Skin is warm and dry  Neurological:      Mental Status: She is alert and oriented to person, place, and time  Psychiatric:         Mood and Affect: Mood normal          Behavior: Behavior normal            Radiology and Laboratory:  I personally reviewed and interpreted the following results: pathology report    30 minutes was spent face to face with Sandro Abbott and her spouse with greater than 50% of the time spent in counseling or coordination of care including discussions of prognosis of diagnosis, diagnostic results, impression, and recommendations, risks and benefits of treatment, instructions for disease self management, treatment instructions and patient and family counseling/involvement in care  All of the patient's or agent's questions were answered during this discussion

## 2023-06-11 DIAGNOSIS — I42.0 DILATED CARDIOMYOPATHY (HCC): ICD-10-CM

## 2023-06-11 DIAGNOSIS — I49.3 FREQUENT UNIFOCAL PVCS: ICD-10-CM

## 2023-06-12 RX ORDER — CARVEDILOL 6.25 MG/1
TABLET ORAL
Qty: 180 TABLET | Refills: 3 | Status: SHIPPED | OUTPATIENT
Start: 2023-06-12

## 2023-07-07 ENCOUNTER — HOSPITAL ENCOUNTER (OUTPATIENT)
Dept: MRI IMAGING | Facility: HOSPITAL | Age: 73
End: 2023-07-07
Attending: STUDENT IN AN ORGANIZED HEALTH CARE EDUCATION/TRAINING PROGRAM
Payer: MEDICARE

## 2023-07-07 DIAGNOSIS — C79.31 MALIGNANT NEOPLASM METASTATIC TO BRAIN (HCC): ICD-10-CM

## 2023-07-07 PROCEDURE — A9585 GADOBUTROL INJECTION: HCPCS | Performed by: STUDENT IN AN ORGANIZED HEALTH CARE EDUCATION/TRAINING PROGRAM

## 2023-07-07 PROCEDURE — G1004 CDSM NDSC: HCPCS

## 2023-07-07 PROCEDURE — 70553 MRI BRAIN STEM W/O & W/DYE: CPT

## 2023-07-07 RX ADMIN — GADOBUTROL 10 ML: 604.72 INJECTION INTRAVENOUS at 12:33

## 2023-07-11 ENCOUNTER — DOCUMENTATION (OUTPATIENT)
Dept: HEMATOLOGY ONCOLOGY | Facility: CLINIC | Age: 73
End: 2023-07-11

## 2023-07-11 NOTE — PROGRESS NOTES
In-basket message received from Nada Fabry, RN to add patient to [facility] on 7/12/2023. Chart reviewed and prep completed.

## 2023-07-12 ENCOUNTER — RADIATION ONCOLOGY FOLLOW-UP (OUTPATIENT)
Dept: RADIATION ONCOLOGY | Facility: HOSPITAL | Age: 73
End: 2023-07-12
Attending: STUDENT IN AN ORGANIZED HEALTH CARE EDUCATION/TRAINING PROGRAM
Payer: MEDICARE

## 2023-07-12 ENCOUNTER — DOCUMENTATION (OUTPATIENT)
Dept: RADIATION ONCOLOGY | Facility: HOSPITAL | Age: 73
End: 2023-07-12

## 2023-07-12 VITALS
RESPIRATION RATE: 18 BRPM | BODY MASS INDEX: 32.41 KG/M2 | HEIGHT: 72 IN | HEART RATE: 95 BPM | SYSTOLIC BLOOD PRESSURE: 138 MMHG | DIASTOLIC BLOOD PRESSURE: 88 MMHG | OXYGEN SATURATION: 96 % | TEMPERATURE: 98.8 F

## 2023-07-12 DIAGNOSIS — C79.31 MALIGNANT NEOPLASM METASTATIC TO BRAIN (HCC): Primary | ICD-10-CM

## 2023-07-12 PROCEDURE — 99211 OFF/OP EST MAY X REQ PHY/QHP: CPT | Performed by: STUDENT IN AN ORGANIZED HEALTH CARE EDUCATION/TRAINING PROGRAM

## 2023-07-12 PROCEDURE — 99213 OFFICE O/P EST LOW 20 MIN: CPT | Performed by: STUDENT IN AN ORGANIZED HEALTH CARE EDUCATION/TRAINING PROGRAM

## 2023-07-12 NOTE — PROGRESS NOTES
NEURO ONCOLOGY CASE REVIEW     DATE: 7/12/2023  PRESENTING DOCTOR: Dr Brayan Renee    DIAGNOSIS: Ovarian cancer; Brain metastases        Kelvin [place] is a 81 y.o. female who was presented at Neuro Oncology Case Review today. History of bilateral breast cancers and more recently diagnosed with metastatic high grade ovarian serous carcinoma s/p s/p multiple courses of systemic therapy, most recently on cytoxan/bevacizumab/keytruda. She was found to have intracranial brain metastases and completed SRS on 8/31/2022. On 2/1/23 she underwent SRS to six additional sites of intracranial lesions for progression. Patient was presented today to review new MRI brain findings. PHYSICIAN RECOMMENDED PLAN:   - Offer hippocampal sparing whole brain radiation therapy    Patient is scheduled for radiation follow up today with Dr Patricia Alarcon    Imaging Reviewed:   7/7/2023 MRI brain: Multiple punctate scattered foci of parenchymal metastasis, some of which are stable to slightly improved suggesting treatment response. However, there are multiple (at least 10) new 1 to 3 mm supratentorial and infratentorial lesions suggesting   progression of metastatic disease. Stable mild cerebral chronic microangiopathic changes. 4/3/2023 MRI brain     Team agreed to plan. NCCN guidelines were readily available for review at this discussion. The final treatment plan will be left at the discretion of the patient and the treating physician. DISCLAIMERS:  TO THE TREATING PHYSICIAN:  This conference is a meeting of clinicians from various specialty areas who evaluate and discuss patients for whom a multidisciplinary treatment approach is being considered. Please note that the above opinion was a consensus of the conference attendees and is intended only to assist in quality care of the discussed patient.   The responsibility for follow up on the input given during the conference, along with any final decisions regarding plan of care, is that of the patient and the patient's provider. Accordingly, appointments have only been recommended based on this information; and have NOT been scheduled unless otherwise noted. TO THE PATIENT:  This summary is a brief record of major aspects of your cancer treatment. You may choose to can share a your copy with any of your doctors or nurses. However, this is not a detailed or comprehensive record of your care.

## 2023-07-12 NOTE — PROGRESS NOTES
Michael Wells 1950 is a 81 y.o. female with prior bilateral breast cancers s/p initial BCS and adjuvant TC and RT (left, ), thereafter with right breast cancer s/p bilateral mastectomies. 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 S/p prior SRS (8.31.22), now with intracranial POD. On 23 she underwent SRS to 6 sites of intracranial disease to a dose of 2,000 cGy in a single fraction. She was last seen for telemedicine follow up 23.     23 - Pema Quispe - Dr. Sandeep Londono  Will obtain PET CT   Will try to get approval for combination pembrolizumab oral cytoxan and continue bevacizumab  Continue gemzar frances in the interim     23 -  PET CT  Impression:  There is very vague activity at the pretracheal and subcarinal lymph node. Finding could be due to benign etiology although attention on follow up scan is recommended. 23 - Pema Quispe  Will initiate fifth line of therapy with a regimen of metronomic oral cytoxan/bioequivilant bevacizumab/pembrolizumab.     23 - Pema Quispe  Will continue Cytoxan, Gweneth Amas and [place]    23 - MRI brain w wo contrast  IMPRESSION:  Multiple punctate scattered foci of parenchymal metastasis, some of which are stable to slightly improved suggesting treatment response. However, there are multiple (at least 10) new 1 to 3 mm supratentorial and infratentorial lesions suggesting   progression of metastatic disease. Stable mild cerebral chronic microangiopathic changes.        Upcomin23 - Palliative  23 - OBGYN    Oncology History   Ovarian cancer ([address])   2020 Initial Diagnosis    Ovarian cancer on left [facility])     2020 -  Cancer Staged    Staging form: Ovary, Fallopian Tube, Primary Peritoneal, AJCC 8th Edition  - Pathologic stage from 2020: FIGO Stage IC3 (pN0, cM0) - Signed by Shannan Mccord MD on 1/28/2020  Histologic grade (G): G3  Histologic grading system: 4 grade system  Residual tumor (R): R0 - None  Histopathologic type: Serous cystadenocarcinoma, NOS  Diagnostic confirmation: Positive histology PLUS positive immunophenotyping and/or positive genetic studies  Specimen type: Excision  Staged by: Managing physician  Cancer antigen 125 () (U/mL): 4,000  Gross residual tumor after primary cyto-reductive surgery: Absent  Residual tumor volume after primary cytoreductive surgery: No gross tumor  National guidelines used in treatment planning: Yes  Type of national guideline used in treatment planning: NCCN       1/13/2020 Surgery    Diagnostic laparoscopy, laparotomy, total hysterectomy, bilateral salpingo-oophorectomy with resection of pelvic mass, bilateral pelvic and periaortic lymphadenectomy, staging peritoneal biopsies, omentectomy, appendectomy. Final pathology consistent with 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. 2/11/2020 - 5/26/2020 Chemotherapy    Six cycles of carboplatin/paclitaxel administered by Dr. Tho Carroll. Tolerated well. CT scan at completion of treatment demonstrates no evidence of measurable disease.  amarilis near completion of chemotherapy 5.7 units/milliliter     8/9/2021 Recurrence     Elevated  triggered imaging  which demonstrated suspicious mediastinal lymphadenopathy. Endobronchial ultrasound-guided fine-needle aspiration on August 9, 2021 demonstrates metastatic carcinoma with immuno phenotype consistent with metastatic ovarian primary. 9/1/2021 - 1/24/2022 Chemotherapy    Carboplatin + Doxil x 6 cycles (Dr. Shantell Conway). Complete response. Amarilis Ca125 8.5 IU/mL. 3/2022 - 8/2022 Chemotherapy    Niraparib. 9/14/2022 -  Chemotherapy    Started on gemcitabine and bevacizumab. On November 29, 2022 after 3 cycles CT scan demonstrates no evidence of measurable disease.  reportedly decreasing somewhat.   Patient tolerating well.     5/30/2023 - 5/30/2023 Chemotherapy    bevacizumab (AVASTIN) IVPB, 15 mg/kg = 1,665 mg, Intravenous, Once, 0 of 4 cycles  pembrolizumab (KEYTRUDA) IVPB, 200 mg, Intravenous, Once, 0 of 4 cycles     Brain metastases   8/17/2022 Initial Diagnosis    Brain metastases ([address])     8/31/2022 - 8/31/2022 Radiation      Plan ID Energy Fractions Dose per Fraction (cGy) Dose Correction (cGy) Total Dose Delivered (cGy) Elapsed Days   SRS 4 PTVs 6X-FFF 1 / 1 2,000 0 2,000 0      Treatment dates:  C1 SRS: 8/31/2022 - 8/31/2022 2/1/2023 - 2/1/2023 Radiation    Plan ID Energy Fractions Dose per Fraction (cGy) Dose Correction (cGy) Total Dose Delivered (cGy) Elapsed Days   SRS Inf 3PTVs 6X-FFF 1 / 1 2,000 0 2,000 0   SRS Sup 3PTVs 6X-FFF 1 / 1 2,000 0 2,000 0            Review of Systems:  Review of Systems   Constitutional: Positive for appetite change and fatigue. HENT: Negative. Eyes:        Soreness due to wearing contacts   Respiratory: Positive for cough (Daily dry cough) and shortness of breath (Chronic on exertion). Choking: Daily dry cough. Cardiovascular: Negative. Gastrointestinal: Negative. Endocrine: Negative. Genitourinary: Positive for dysuria and frequency. Waiting for urologist to call back, per patient suspected UTI   Musculoskeletal: Positive for back pain (Lower, radiating down left side). Right knee   Skin: Negative. Allergic/Immunologic: Negative. Neurological: Positive for dizziness (Occasional with postural changes) and numbness (Neuropathy to feet). Negative for seizures, speech difficulty and headaches. Hematological: Bruises/bleeds easily. Psychiatric/Behavioral: Negative. Clinical Trial: no      Prior Radiation:  SRS (8.31.22),  2/1/23 SRS to 6 sites of intracranial disease to a dose of 2,000 cGy in a single fraction.     Teaching: NCI radiation packet     MST: Completed    Implantable Devices (Port, Pacemaker, pain stimulator): No    Hip Replacement: No        Health Maintenance   Topic Date Due   • Medicare Annual Wellness Visit (AWV)  Never done   • BMI: Followup Plan  Never done   • Colorectal Cancer Screening  Never done   • Osteoporosis Screening  Never done   • Fall Risk  Never done   • Urinary Incontinence Screening  Never done   • Pneumococcal Vaccine: 65+ Years (2 - PCV) 12/17/2019   • COVID-19 Vaccine (4 - Booster for Moderna series) 11/04/2021   • Influenza Vaccine (1) 09/01/2023   • BMI: Adult  07/07/2024   • Depression Screening  07/12/2024   • Hepatitis C Screening  Completed   • HIB Vaccine  Aged Out   • IPV Vaccine  Aged Out   • Hepatitis A Vaccine  Aged Out   • Meningococcal ACWY Vaccine  Aged Out   • HPV Vaccine  Aged Out     Past Medical History:   Diagnosis Date   • Anemia 3/23/2020    During chemo   • Brain cancer ([address])    • Breast cancer ([address])     2004 and then 2009   • Cancer ([address]) 2005,2009    breast   • Cancer ([address]) 2020    ovarian   • Chicken pox    • Colon polyp    • CPAP (continuous positive airway pressure) dependence    • Frequent UTI    • GERD (gastroesophageal reflux disease)    • Heart disease    • High cholesterol    • History of blood transfusion     2013 with Bilat Knee replacement   • Hypertension    • Irregular heart beat     PVC's   • Ovarian cancer ([address]) 9/13/2020   • PONV (postoperative nausea and vomiting)    • Sleep apnea      Past Surgical History:   Procedure Laterality Date   • APPENDECTOMY     • BREAST CYST EXCISION     • BREAST LUMPECTOMY     • CHOLECYSTECTOMY     • COLONOSCOPY     • CYSTOSCOPY Bilateral 01/13/2020    Procedure: CYSTOSCOPY: CYSTOSCOPY WITH PERIOPERATIVE URETERAL CATHETERS  PLACEMENT;  Surgeon: Sridhar Lopez MD;  [facility]: [facility];  Service: Gynecology Oncology   • CYSTOSCOPY     • HERNIA REPAIR     • HYSTERECTOMY N/A 01/13/2020    Procedure: EXPLORATORY LAPAROTOMY, OVARIAN CANCER STAGING WITH TOTAL HYSTERECTOMY, BILATERAL SALPINGO-OOPHORECTOMY, BILATERAL PELVIC AND PERIAORTIC LYMPHADENECTOMY, INDICATED APPENDECTOMY, OMENTECTOMY, STAGING PERITONEAL BIOPSIES.;  Surgeon: Socrates Kline MD;  Location: [facility];  Service: Gynecology Oncology   • JOINT REPLACEMENT      Bilat Knees   • MASTECTOMY Bilateral    •  [address] INCL FLUOR GDNCE DX W/CELL WASHG [address] N/A 2021    Procedure: Lisa Rosenberg;  Surgeon: Anthony Neff MD;  [facility];  Service: Thoracic   • TX BRONCHOSCOPY NEEDLE BX TRACHEA MAIN STEM&/BRON N/A 2021    Procedure: ENDOBRONCHIAL ULTRASOUND (EBUS);   Surgeon: Anthony Neff MD;  [facility];  Service: Thoracic   • TX LAP RPR HRNA XCPT INCAL/INGUN NCRC8/STRANGULATED N/A 2021    Procedure: REPAIR HERNIA INCISIONAL LAPAROSCOPIC;  Surgeon: Pat Stevens MD;  [facility];  Service: General   • TX LAPS ABD PRTM&OMENTUM DX W/WO SPEC BR/WA SPX N/A 2020    Procedure: LAPAROSCOPY DIAGNOSTIC;  Surgeon: Socrates Kline MD;  [facility];  Service: Gynecology Oncology   • REPAIR KNEE LIGAMENT Bilateral    • ROTATOR CUFF REPAIR       Family History   Problem Relation Age of Onset   • Breast cancer Mother    • Cancer Mother    • Lung cancer Father    • Hypertension Father    • Prostate cancer Brother      Social History     Tobacco Use   • Smoking status: Former     Packs/day: 1.00     Years: 15.00     Total pack years: 15.00     Types: Cigarettes     Quit date: 10/31/1987     Years since quittin.7   • Smokeless tobacco: Former   Vaping Use   • Vaping Use: Never used   Substance Use Topics   • Alcohol use: Yes     Comment: socially    • Drug use: Never        Current Outpatient Medications:   •  b complex vitamins capsule, Take 1 capsule by mouth daily, Disp: , Rfl:   •  carvedilol (COREG) 6.25 mg tablet, take 1 tablet by mouth twice a day with meals, Disp: 180 tablet, Rfl: 3  •  cholecalciferol (VITAMIN D3) 400 units tablet, Take 400 Units by mouth daily, Disp: , Rfl:   •  clonazePAM (KlonoPIN) 0.5 mg tablet, Take 0.5 mg by mouth 2 (two) times a day as needed, Disp: , Rfl:   •  estradiol (ESTRACE) 0.1 mg/g vaginal cream, insert 1 gram vaginally two times a week, Disp: , Rfl: 0  •  losartan-hydrochlorothiazide (HYZAAR) 50-12.5 mg per tablet, Take 1 tablet by mouth daily , Disp: , Rfl: 0  •  magnesium oxide (MAG-OX) 400 mg, Take 400 mg by mouth 2 (two) times a day , Disp: , Rfl:   •  prochlorperazine (COMPAZINE) 10 mg tablet, Take 10 mg by mouth every 6 (six) hours as needed, Disp: , Rfl:   •  simvastatin (ZOCOR) 20 mg tablet, Take 20 mg by mouth daily , Disp: , Rfl: 0  •  trimethoprim (PROLOPRIM) 100 mg tablet, Take 100 mg by mouth daily , Disp: , Rfl: 0  •  venlafaxine 150 MG TB24, Take 150 mg by mouth daily with breakfast, Disp: , Rfl: 0  •  zolpidem (AMBIEN CR) 12.5 MG CR tablet, Take 12.5 mg by mouth daily at bedtime , Disp: , Rfl: 0  •  ciprofloxacin (CIPRO) 500 mg tablet, Take 500 mg by mouth every 12 (twelve) hours (Patient not taking: Reported on 6/2/2023), Disp: , Rfl:   •  gabapentin (NEURONTIN) 100 mg capsule, Take 100 mg by mouth if needed (Patient not taking: Reported on 9/30/2022), Disp: , Rfl:   •  LORazepam (ATIVAN) 1 mg tablet, Take 1 tablet (1 mg total) by mouth every 6 (six) hours as needed for anxiety (or nausea) (Patient not taking: Reported on 6/2/2023), Disp: 20 tablet, Rfl: 0  •  nitrofurantoin (MACROBID) 100 mg capsule, take 1 capsule by mouth twice a day take WITH A MEAL (Patient not taking: Reported on 6/2/2023), Disp: , Rfl:   •  nystatin-triamcinolone (MYCOLOG-II) cream, Apply topically 2 (two) times a day (Patient not taking: Reported on 6/2/2023), Disp: 30 g, Rfl: 0  No Known Allergies   Vitals:    07/12/23 1242   BP: 138/88   BP Location: Right arm   Patient Position: Sitting   Cuff Size: Standard   Pulse: 95   Resp: 18   Temp: 98.8 °F (37.1 °C)   TempSrc: Temporal   SpO2: 96%   Height: 6' (1.829 m)     Pain Score:   5

## 2023-07-13 NOTE — PROGRESS NOTES
Follow-up - Radiation Oncology   Mio Wiley 1950 81 y.o. female [id number]      History of Present Illness   Cancer Staging   Ovarian cancer [facility])  Staging form: Ovary, Fallopian Tube, Primary Peritoneal, AJCC 8th Edition  - Pathologic stage from 1/13/2020: FIGO Stage IC3 (pN0, cM0) - Signed by Andrei Brooks MD on 1/28/2020  Histopathologic type: Serous cystadenocarcinoma, NOS  Histologic grade (G): G3  Histologic grading system: 4 grade system  Residual tumor (R): R0 - None  Diagnostic confirmation: Positive histology PLUS positive immunophenotyping and/or positive genetic studies  Specimen type: Excision  Staged by: Managing physician  Cancer antigen 125 () (U/mL): 4,000  Gross residual tumor after primary cyto-reductive surgery: Absent  Residual tumor volume after primary cytoreductive surgery: No gross tumor  National guidelines used in treatment planning: Yes  Type of national guideline used in treatment planning: NCCN    Ms. Inna Yang is a 81year old woman with a PMHx bilateral breast cancer (s/p initial right sided BCS followed by adjuvant TC and RT (2006), thereafter with left sided cancer s/p bilateral mastectomy and adjuvant endocrine therapy). 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 She was found to have evidence of brain metastases. On 8/31/22 she underwent SRS to four sites of disease each to 2,000 cGy in 1 fraction. On 2/1/23 she underwent SRS to six additional sites of intracranial progression, each to 2,000cGy in 1 fraction. She returns today for follow-up. Interval History:   The patient was last seen via telemedicine on 4/12/23, at that time with MRI Brain showing improvement in her previously treated metastatic lesions. Since then she has continued on systemic therapy with Dr. Kurt Sandoval ([facility]).  Most recently she was started on metronomic oral cytoxan/mvasi/pembrolizumab. MRI Brain (7/7/23) demonstrated multiple punctate scattered foci of parenchymal metastases, with at least 10 new 1-3mm supra- and infratentorial enhancing lesions. Currently she is doing fair overall. She has just started her new medical therapy without any significant issues at present. She notes no ongoing issues from prior [facility] though does have some word finding difficulties and forgetfulness at baseline. Historical Information   Oncology History   Ovarian cancer ([address])   1/13/2020 Initial Diagnosis    Ovarian cancer on left [facility])     1/13/2020 -  Cancer Staged    Staging form: Ovary, Fallopian Tube, Primary Peritoneal, AJCC 8th Edition  - Pathologic stage from 1/13/2020: FIGO Stage IC3 (pN0, cM0) - Signed by Rosario Anderson MD on 1/28/2020  Histologic grade (G): G3  Histologic grading system: 4 grade system  Residual tumor (R): R0 - None  Histopathologic type: Serous cystadenocarcinoma, NOS  Diagnostic confirmation: Positive histology PLUS positive immunophenotyping and/or positive genetic studies  Specimen type: Excision  Staged by: Managing physician  Cancer antigen 125 () (U/mL): 4,000  Gross residual tumor after primary cyto-reductive surgery: Absent  Residual tumor volume after primary cytoreductive surgery: No gross tumor  National guidelines used in treatment planning: Yes  Type of national guideline used in treatment planning: NCCN       1/13/2020 Surgery    Diagnostic laparoscopy, laparotomy, total hysterectomy, bilateral salpingo-oophorectomy with resection of pelvic mass, bilateral pelvic and periaortic lymphadenectomy, staging peritoneal biopsies, omentectomy, appendectomy. Final pathology consistent with 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. 2/11/2020 - 5/26/2020 Chemotherapy    Six cycles of carboplatin/paclitaxel administered by Dr. Sofia Gutierrez. Tolerated well.   CT scan at completion of treatment demonstrates no evidence of measurable disease.  amarilis near completion of chemotherapy 5.7 units/milliliter     8/9/2021 Recurrence     Elevated  triggered imaging  which demonstrated suspicious mediastinal lymphadenopathy. Endobronchial ultrasound-guided fine-needle aspiration on August 9, 2021 demonstrates metastatic carcinoma with immuno phenotype consistent with metastatic ovarian primary. 9/1/2021 - 1/24/2022 Chemotherapy    Carboplatin + Doxil x 6 cycles (Dr. Fernando Hernandez). Complete response. Amarilis Ca125 8.5 IU/mL. 3/2022 - 8/2022 Chemotherapy    Niraparib. 9/14/2022 -  Chemotherapy    Started on gemcitabine and bevacizumab. On November 29, 2022 after 3 cycles CT scan demonstrates no evidence of measurable disease.  reportedly decreasing somewhat.   Patient tolerating well.     5/30/2023 - 5/30/2023 Chemotherapy    bevacizumab (AVASTIN) IVPB, 15 mg/kg = 1,665 mg, Intravenous, Once, 0 of 4 cycles  pembrolizumab (KEYTRUDA) IVPB, 200 mg, Intravenous, Once, 0 of 4 cycles     Brain metastases   8/17/2022 Initial Diagnosis    Brain metastases ([address])     8/31/2022 - 8/31/2022 Radiation      Plan ID Energy Fractions Dose per Fraction (cGy) Dose Correction (cGy) Total Dose Delivered (cGy) Elapsed Days   SRS 4 PTVs 6X-FFF 1 / 1 2,000 0 2,000 0      Treatment dates:  C1 SRS: 8/31/2022 - 8/31/2022 2/1/2023 - 2/1/2023 Radiation    Plan ID Energy Fractions Dose per Fraction (cGy) Dose Correction (cGy) Total Dose Delivered (cGy) Elapsed Days   SRS Inf 3PTVs 6X-FFF 1 / 1 2,000 0 2,000 0   SRS Sup 3PTVs 6X-FFF 1 / 1 2,000 0 2,000 0            Past Medical History:   Diagnosis Date   • Anemia 3/23/2020    During chemo   • Brain cancer [facility])    • Breast cancer ([address] )     2004 and then 2009   • Cancer ([address]) 2005,2009    breast   • Cancer ([address]) 2020    ovarian   • Chicken pox    • Colon polyp    • CPAP (continuous positive airway pressure) dependence    • Frequent UTI    • GERD (gastroesophageal reflux disease)    • Heart disease    • High cholesterol    • History of blood transfusion     2013 with Bilat Knee replacement   • Hypertension    • Irregular heart beat     PVC's   • Ovarian cancer ([address]) 9/13/2020   • PONV (postoperative nausea and vomiting)    • Sleep apnea      Past Surgical History:   Procedure Laterality Date   • APPENDECTOMY     • BREAST CYST EXCISION     • BREAST LUMPECTOMY     • CHOLECYSTECTOMY     • COLONOSCOPY     • CYSTOSCOPY Bilateral 01/13/2020    Procedure: CYSTOSCOPY: CYSTOSCOPY WITH PERIOPERATIVE URETERAL CATHETERS  PLACEMENT;  Surgeon: Liliane De Jesus MD;  Location: [facility];  Service: Gynecology Oncology   • CYSTOSCOPY     • HERNIA REPAIR     • HYSTERECTOMY N/A 01/13/2020    Procedure: EXPLORATORY LAPAROTOMY, OVARIAN CANCER STAGING WITH TOTAL HYSTERECTOMY, BILATERAL SALPINGO-OOPHORECTOMY, BILATERAL PELVIC AND PERIAORTIC LYMPHADENECTOMY, INDICATED APPENDECTOMY, OMENTECTOMY, STAGING PERITONEAL BIOPSIES.;  Surgeon: Liliane De Jesus MD;  Location[facility];  Service: Gynecology Oncology   • JOINT REPLACEMENT      Bilat Knees   • MASTECTOMY Bilateral 2009   •  [address] INCL FLUOR GDNCE DX W/CELL WASHG [facility] N/A 08/09/2021    Procedure: BRONCHOSCOPY FLEXIBLE;  Surgeon: Lula Sharpe MD;  [facility];  Service: Thoracic   • [place] BRONCHOSCOPY NEEDLE BX TRACHEA MAIN STEM&/BRON N/A 08/09/2021    Procedure: ENDOBRONCHIAL ULTRASOUND (EBUS);   Surgeon: Lula Sharpe MD;  [facility];  Service: Thoracic   • AZ LAP RPR HRNA XCPT INCAL/INGUN NCRC8/STRANGULATED N/A 04/08/2021    Procedure: REPAIR HERNIA INCISIONAL LAPAROSCOPIC;  Surgeon: Masood Cortes MD;  [facility];  Service: General   • AZ LAPS ABD PRTM&OMENTUM DX W/WO SPEC BR/WA SPX N/A 01/13/2020    Procedure: LAPAROSCOPY DIAGNOSTIC;  Surgeon: Liliane De Jesus MD;  [facility];  Service: Gynecology Oncology   • REPAIR KNEE LIGAMENT Bilateral    • ROTATOR CUFF REPAIR         Social History   Social History Substance and Sexual Activity   Alcohol Use Yes    Comment: socially      Social History     Substance and Sexual Activity   Drug Use Never     Social History     Tobacco Use   Smoking Status Former   • Packs/day: 1.00   • Years: 15.00   • Total pack years: 15.00   • Types: Cigarettes   • Quit date: 10/31/1987   • Years since quittin.7   Smokeless Tobacco Former         Meds/Allergies     Current Outpatient Medications:   •  b complex vitamins capsule, Take 1 capsule by mouth daily, Disp: , Rfl:   •  carvedilol (COREG) 6.25 mg tablet, take 1 tablet by mouth twice a day with meals, Disp: 180 tablet, Rfl: 3  •  cholecalciferol (VITAMIN D3) 400 units tablet, Take 400 Units by mouth daily, Disp: , Rfl:   •  clonazePAM (KlonoPIN) 0.5 mg tablet, Take 0.5 mg by mouth 2 (two) times a day as needed, Disp: , Rfl:   •  estradiol (ESTRACE) 0.1 mg/g vaginal cream, insert 1 gram vaginally two times a week, Disp: , Rfl: 0  •  losartan-hydrochlorothiazide (HYZAAR) 50-12.5 mg per tablet, Take 1 tablet by mouth daily , Disp: , Rfl: 0  •  magnesium oxide (MAG-OX) 400 mg, Take 400 mg by mouth 2 (two) times a day , Disp: , Rfl:   •  prochlorperazine (COMPAZINE) 10 mg tablet, Take 10 mg by mouth every 6 (six) hours as needed, Disp: , Rfl:   •  simvastatin (ZOCOR) 20 mg tablet, Take 20 mg by mouth daily , Disp: , Rfl: 0  •  trimethoprim (PROLOPRIM) 100 mg tablet, Take 100 mg by mouth daily , Disp: , Rfl: 0  •  venlafaxine 150 MG TB24, Take 150 mg by mouth daily with breakfast, Disp: , Rfl: 0  •  zolpidem (AMBIEN CR) 12.5 MG CR tablet, Take 12.5 mg by mouth daily at bedtime , Disp: , Rfl: 0  •  ciprofloxacin (CIPRO) 500 mg tablet, Take 500 mg by mouth every 12 (twelve) hours (Patient not taking: Reported on 2023), Disp: , Rfl:   •  gabapentin (NEURONTIN) 100 mg capsule, Take 100 mg by mouth if needed (Patient not taking: Reported on 2022), Disp: , Rfl:   •  LORazepam (ATIVAN) 1 mg tablet, Take 1 tablet (1 mg total) by mouth every 6 (six) hours as needed for anxiety (or nausea) (Patient not taking: Reported on 6/2/2023), Disp: 20 tablet, Rfl: 0  •  nitrofurantoin (MACROBID) 100 mg capsule, take 1 capsule by mouth twice a day take WITH A MEAL (Patient not taking: Reported on 6/2/2023), Disp: , Rfl:   •  nystatin-triamcinolone (MYCOLOG-II) cream, Apply topically 2 (two) times a day (Patient not taking: Reported on 6/2/2023), Disp: 30 g, Rfl: 0  No Known Allergies    Review of Systems   Constitutional: Positive for appetite change and fatigue. HENT: Negative. Eyes:        Soreness due to wearing contacts   Respiratory: Positive for cough (Daily dry cough) and shortness of breath (Chronic on exertion). Choking: Daily dry cough. Cardiovascular: Negative. Gastrointestinal: Negative. Endocrine: Negative. Genitourinary: Positive for dysuria and frequency. Waiting for urologist to call back, per patient suspected UTI   Musculoskeletal: Positive for back pain (Lower, radiating down left side). Right knee   Skin: Negative. Allergic/Immunologic: Negative. Neurological: Positive for dizziness (Occasional with postural changes) and numbness (Neuropathy to feet). Negative for seizures, speech difficulty and headaches. Hematological: Bruises/bleeds easily. Psychiatric/Behavioral: Negative.         OBJECTIVE:   /88 (BP Location: Right arm, Patient Position: Sitting, Cuff Size: Standard)   Pulse 95   Temp 98.8 °F (37.1 °C) (Temporal)   Resp 18   Ht 6' (1.829 m)   LMP  (LMP Unknown)   SpO2 96%   BMI 32.41 kg/m²   Pain Assessment:  0  ECOG/Zubrod/WHO: 1 - Symptomatic but completely ambulatory    Physical Exam   Well appearing. NAD. No increased work of breathing. .  Extremities warm and well perfused. RESULTS    Lab Results: No results found for this or any previous visit (from the past 672 hour(s)).     Imaging Studies:MRI brain w wo contrast    Result Date: 7/7/2023  Narrative: MRI BRAIN WITH AND WITHOUT CONTRAST INDICATION: C79.31: Secondary malignant neoplasm of brain. COMPARISON: MRI of the brain from April 3, 2023. TECHNIQUE: Multiplanar, multisequence imaging of the brain was performed before and after gadolinium administration. Imaging performed on 1.5T MRI IV Contrast:  10 mL of Gadobutrol injection (SINGLE-DOSE) IMAGE QUALITY:   Diagnostic. FINDINGS: BRAIN PARENCHYMA: There are multiple punctate supratentorial and anterior tentorial foci of metastatic lesions. Some of these have been treated and are stable in appearance or have resolved. Others are new or slightly more conspicuous as detailed below. Stable punctate foci of parenchymal enhancement again noted in the high left frontal (11:123), biparietal (11:114, 110, 98, 85 and 75), right periatrial (11:74), right thalamus (11:73), right occipital and posterior temporal (11:56 and 57), left occipital lobe (11:54). The previously described 4 reference lesions include: Left precentral gyrus (previously noted on 11:125)  has resolved. 3 mm left posterior parietal cortical 3 mm lesion stable (11:81). Tiny right posterior temporal lobe lesion is unchanged (11:57). Tiny left medial cerebellar lesion (previously noted on 11:43) is not appreciated on this study. New punctate cortical metastatic lesion in the right parietal lobe (11:109). Additional 2-3 punctate foci of right parasagittal posterior parietal metastatic lesions (11:95, 96, 97). New punctate right frontal metastasis (11:107, 101, 83). New left posterior frontal cortical metastasis (11:105). New left frontal centrum semiovale metastatic lesion (11:95). New left cerebellar punctate cortical metastasis (11:43), more conspicuous left anterior and inferior cerebellar punctate metastasis (11:29, 33, 34). New right cerebellar punctate metastasis (11:33, 35 and 38). Few scattered punctate foci of nonspecific FLAIR hyperintense signal within the frontal lobes.  Statistically, this is likely to represent chronic microangiopathic disease. Punctate focus of gradient susceptibility artifact in the left frontal, parietal, and medial left cerebellar regions likely represent posttreatment change and chronic microhemorrhage. No acute intracranial abnormality. No mass effect or midline shift. Diffusion weighted imaging is unremarkable. VENTRICLES:  Normal for the patient's age. SELLA AND PITUITARY GLAND:  Normal. ORBITS:  Normal. PARANASAL SINUSES: Redemonstrated nasal septal defect. Paranasal sinuses are clear. VASCULATURE:  Evaluation of the major intracranial vasculature demonstrates appropriate flow voids. CALVARIUM AND SKULL BASE:  Normal. EXTRACRANIAL SOFT TISSUES:  Normal.     Impression: Multiple punctate scattered foci of parenchymal metastasis, some of which are stable to slightly improved suggesting treatment response. However, there are multiple (at least 10) new 1 to 3 mm supratentorial and infratentorial lesions suggesting progression of metastatic disease. Stable mild cerebral chronic microangiopathic changes. The study was marked in EPIC for significant notification. Workstation performed: [id number]           Assessment/Plan:  Orders Placed This Encounter   Procedures   • MRI brain w wo contrast      We reviewed the patient's recent MRI Brain, which shows overall POD with multiple small new enhancing lesions. Careful identification of each of the previously [facility] treated enhancing lesions shows resolution of all prior sites of treated metastatic disease on current MRI. Based on this we discussed multiple treatment options including hippocampal sparing WBRT vs selective SRS to the largest intracranial targets with close observation vs close observation on change in systemic therapy (given potential intracranial response with pembrolizumab). We reviewed the advantages and disadvantages of each option in detail.  Given the small size of her new enhancing lesions I feel it is relatively unlikely any will pose imminent danger with short term observation. Given the anticipated difficulty with comprehensive SRS at this point and the possible irreversible neurocognitive effects with whole brain RT, the patient would like to opt for close observation on pembrolizumab. I will plan for repeat MRI Brain in 2 months with RTC thereafter; pending these results we will revisit RT options at that point. Akua Atkinson MD  7/13/2023,8:00 AM    Portions of the record may have been created with voice recognition software.  Occasional wrong word or "sound a like" substitutions may have occurred due to the inherent limitations of voice recognition software.  Read the chart carefully and recognize, using context, where substitutions have occurred.

## 2023-07-28 LAB — SCAN RESULT: NORMAL

## 2023-08-04 ENCOUNTER — TELEPHONE (OUTPATIENT)
Dept: HEMATOLOGY ONCOLOGY | Facility: CLINIC | Age: 73
End: 2023-08-04

## 2023-08-10 ENCOUNTER — OFFICE VISIT (OUTPATIENT)
Dept: PALLIATIVE MEDICINE | Facility: CLINIC | Age: 73
End: 2023-08-10
Payer: MEDICARE

## 2023-08-10 VITALS
HEART RATE: 81 BPM | DIASTOLIC BLOOD PRESSURE: 82 MMHG | RESPIRATION RATE: 16 BRPM | OXYGEN SATURATION: 99 % | SYSTOLIC BLOOD PRESSURE: 130 MMHG | HEIGHT: 72 IN | TEMPERATURE: 97.5 F | BODY MASS INDEX: 32.41 KG/M2

## 2023-08-10 DIAGNOSIS — C79.31 MALIGNANT NEOPLASM METASTATIC TO BRAIN (HCC): ICD-10-CM

## 2023-08-10 DIAGNOSIS — Z51.5 PALLIATIVE CARE PATIENT: ICD-10-CM

## 2023-08-10 DIAGNOSIS — F51.04 CHRONIC INSOMNIA: ICD-10-CM

## 2023-08-10 DIAGNOSIS — C56.9 MALIGNANT NEOPLASM OF OVARY, UNSPECIFIED LATERALITY (HCC): Primary | ICD-10-CM

## 2023-08-10 DIAGNOSIS — I42.0 DILATED CARDIOMYOPATHY (HCC): ICD-10-CM

## 2023-08-10 PROCEDURE — 99214 OFFICE O/P EST MOD 30 MIN: CPT | Performed by: STUDENT IN AN ORGANIZED HEALTH CARE EDUCATION/TRAINING PROGRAM

## 2023-08-10 NOTE — PROGRESS NOTES
Outpatient Follow-Up - Palliative and Supportive Care   Jelly Rao 81 y.o. female [id number]    Assessment & Plan  Problem List Items Addressed This Visit        Endocrine    Ovarian cancer [facility]) - Primary       Cardiovascular and Mediastinum    Cardiomyopathy ([address])       Nervous and Auditory    Brain metastases       Other    Chronic insomnia    Palliative care patient     #symptom management  #neuropathic pain   - venlafaxine 150 mg PO QDaily    #insomnia/anxiety   - continue clonazepam 0.5 mg PO BID PRN   - zolpidem 12.5 mg PO QHS    #nausea   - continue prochlorperazine 10 mg PO Q6H PRN    #goals of care   - MRI Brain on 07/07 with multiple punctate scattered foci of parenchymal mets which are stable suggestive of treatment response; however, multiple supra/infratentorial lesions suggesting progression of metastatic disease   - major goal to see birth of 1st granddaughter on 10/04/2023   - treatment focused care with no limitations at this time    #psychosocial support   - emotional support provided   - Marcella Mcmahan,  40 years] [phone]   - two adult children   - Jamari Shepard [daughter] [phone]   - Maritza More   - resides with spouse   - Yazidi shin background      Next [address] [place] [address] Follow up in 4 weeks. Controlled Substance Review    [place]MP or [place]  reviewed: No red flags were identified; safe to proceed with prescription. Nnamdi Bell PDMP Review       Value Time User    PDMP Reviewed  Yes (P)  8/14/2023  3:01 PM Diego Contreras MD          Medications adjusted this encounter:  Requested Prescriptions      No prescriptions requested or ordered in this encounter     No orders of the defined types were placed in this encounter.       Medications Discontinued During This Encounter   Medication Reason   • gabapentin (NEURONTIN) 100 mg capsule Therapy completed   • ciprofloxacin (CIPRO) 500 mg tablet Therapy completed   • LORazepam (ATIVAN) 1 mg tablet Therapy completed   • nitrofurantoin (MACROBID) 100 mg capsule Therapy completed   • nystatin-triamcinolone (MYCOLOG-II) cream Therapy completed   • b complex vitamins capsule Therapy completed   • cholecalciferol (VITAMIN D3) 400 units tablet Therapy completed   • magnesium oxide (MAG-OX) 400 mg Therapy completed         Karina Mathew was seen today for symptoms and planning cares related to above illnesses. I have reviewed the patient's controlled substance dispensing history in the Prescription Drug Monitoring Program in compliance with the [facility] regulations before prescribing any controlled substances. They are invited to continue to follow with us. If there are questions or concerns, please contact us through our clinic/answering service 24 hours a day, seven days a week. Aliyah Esteves MD  [facility] Palliative and Supportive Care  [phone]      Visit Information    Accompanied By: Spouse    Source of History: Self, Spouse, Medical record    History Limitations: None      History of Present Illness    Karina Mathew is a 81 y.o. female who presents in follow up of symptoms related to metastatic ovarian cancer. Pertinent issues include: symptom management, pain, neoplasm related, assessment of goals of care, disease process education and discussion of prognosis. Patient reports overall doing well, feels active and "living a productive life". Noticed increased fatigue over the previous 6 months, however, able to remain active throughout the day. Otherwise, reports no other symptoms of distress. Denies nausea, vomiting. No pain requiring medication management. Appetite intact. No reported weight change. Adequate sleep. Notes recurrent UTI with close urology follow up. Upcoming procedure to repair bladder injury on 08/22. Past medical, surgical, social, and family histories are reviewed and pertinent updates are made. Review of Systems   Constitutional: Positive for malaise/fatigue.  Negative for chills, decreased appetite, fever and weight loss. HENT: Negative for congestion. Cardiovascular: Negative for chest pain. Respiratory: Negative for shortness of breath. Musculoskeletal: Negative for falls and neck pain. Gastrointestinal: Negative for abdominal pain, constipation, nausea and vomiting. Genitourinary: Negative for frequency. Neurological: Negative for headaches. Psychiatric/Behavioral: The patient does not have insomnia. All other systems reviewed and are negative. Vital Signs    /82 (BP Location: Left arm, Patient Position: Sitting, Cuff Size: Standard)   Pulse 81   Temp 97.5 °F (36.4 °C) (Tympanic)   Resp 16   Ht 6' (1.829 m)   LMP  (LMP Unknown)   SpO2 99%   BMI 32.41 kg/m²     Physical Exam and Objective Data  Physical Exam  Vitals and nursing note reviewed. Constitutional:       General: She is awake. Appearance: She is not diaphoretic. Comments: Sitting up comfortably in NAD  BMI 32.4  Non-toxic appearing   HENT:      Head: Normocephalic and atraumatic. Right Ear: External ear normal.      Left Ear: External ear normal.      Nose: No rhinorrhea. Eyes:      Comments: No gaze preference   Cardiovascular:      Rate and Rhythm: Normal rate. Pulmonary:      Effort: No tachypnea, accessory muscle usage or respiratory distress. Comments: Completes full sentences without difficulty  Musculoskeletal:      Cervical back: Normal range of motion. Neurological:      General: No focal deficit present. Mental Status: She is alert and oriented to person, place, and time.    Psychiatric:         Attention and Perception: Attention normal.         Mood and Affect: Mood and affect normal.         Speech: Speech normal.         Cognition and Memory: Cognition and memory normal.           Radiology and Laboratory:  I personally reviewed and interpreted the following results:    Most Recent COVID-19 Results:  Lab Results   Component Value Date/Time    SARSCOV2 Negative 03/23/2021 12:00 AM    SARSCOV2 NOT DETECTED 12/14/2020 11:24 AM       Most Recent Lab Work:  Lab Results   Component Value Date/Time    SODIUM 137 01/19/2020 12:51 PM    K 3.4 (L) 01/19/2020 12:51 PM    BUN 18 03/18/2021 12:51 PM    CREATININE 0.94 03/18/2021 12:51 PM    GLUC 133 01/19/2020 12:51 PM     Lab Results   Component Value Date/Time    AST 25 01/19/2020 12:51 PM    ALT 27 01/19/2020 12:51 PM    ALB 2.9 (L) 01/19/2020 12:51 PM     Lab Results   Component Value Date/Time    HGB 10.5 (L) 01/19/2020 12:51 PM    WBC 6.94 01/19/2020 12:51 PM     01/19/2020 12:51 PM    INR 1.05 01/13/2020 09:22 AM       Most Recent Imaging [last 30 days]:  No results found. 35 minutes was spent face to face with Genesis Yarbrough and her spouse with greater than 50% of the time spent in counseling or coordination of care including discussions of provided medical updates, discussed palliative care, determined goals of care, determined social/family support, discussed plans of care, discussed symptom management, provided psychosocial support. PDMP Reviewed. All of the patient's or agent's questions were answered during this discussion.

## 2023-09-08 ENCOUNTER — HOSPITAL ENCOUNTER (OUTPATIENT)
Dept: MRI IMAGING | Facility: HOSPITAL | Age: 73
End: 2023-09-08
Attending: STUDENT IN AN ORGANIZED HEALTH CARE EDUCATION/TRAINING PROGRAM
Payer: MEDICARE

## 2023-09-08 DIAGNOSIS — C79.31 MALIGNANT NEOPLASM METASTATIC TO BRAIN (HCC): ICD-10-CM

## 2023-09-08 PROCEDURE — G1004 CDSM NDSC: HCPCS

## 2023-09-08 PROCEDURE — 70553 MRI BRAIN STEM W/O & W/DYE: CPT

## 2023-09-08 PROCEDURE — A9585 GADOBUTROL INJECTION: HCPCS | Performed by: STUDENT IN AN ORGANIZED HEALTH CARE EDUCATION/TRAINING PROGRAM

## 2023-09-08 RX ORDER — GADOBUTROL 604.72 MG/ML
10 INJECTION INTRAVENOUS
Status: COMPLETED | OUTPATIENT
Start: 2023-09-08 | End: 2023-09-08

## 2023-09-08 RX ADMIN — GADOBUTROL 10 ML: 604.72 INJECTION INTRAVENOUS at 13:27

## 2023-09-14 ENCOUNTER — CLINICAL SUPPORT (OUTPATIENT)
Dept: RADIATION ONCOLOGY | Facility: CLINIC | Age: 73
End: 2023-09-14
Attending: STUDENT IN AN ORGANIZED HEALTH CARE EDUCATION/TRAINING PROGRAM
Payer: MEDICARE

## 2023-09-14 VITALS
BODY MASS INDEX: 32.14 KG/M2 | OXYGEN SATURATION: 99 % | WEIGHT: 237 LBS | DIASTOLIC BLOOD PRESSURE: 80 MMHG | TEMPERATURE: 98.1 F | RESPIRATION RATE: 16 BRPM | SYSTOLIC BLOOD PRESSURE: 130 MMHG | HEART RATE: 83 BPM

## 2023-09-14 DIAGNOSIS — C79.31 MALIGNANT NEOPLASM METASTATIC TO BRAIN (HCC): Primary | ICD-10-CM

## 2023-09-14 PROCEDURE — 99211 OFF/OP EST MAY X REQ PHY/QHP: CPT | Performed by: STUDENT IN AN ORGANIZED HEALTH CARE EDUCATION/TRAINING PROGRAM

## 2023-09-14 PROCEDURE — 99213 OFFICE O/P EST LOW 20 MIN: CPT | Performed by: STUDENT IN AN ORGANIZED HEALTH CARE EDUCATION/TRAINING PROGRAM

## 2023-09-14 NOTE — PROGRESS NOTES
Thelma Mehta 1950 is a 81 y.o. female with a PMHx bilateral breast cancer (s/p initial right sided BCS followed by adjuvant TC and RT (2006), thereafter with left sided cancer s/p bilateral mastectomy and adjuvant endocrine therapy). 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 She was found to have evidence of brain metastases. On 8/31/22 she underwent SRS to four sites of disease each to 2,000 cGy in 1 fraction. On 2/1/23 she underwent SRS to six additional sites of intracranial progression, each to 2,000cGy in 1 fraction. She was last seen 7/12/23 and returns today for follow-up. 7/14/23 Dr. Venus Donald, Urology  Cysto   The left ureteral orifice was in normal position but was surrounded by acute inflammatory changes and appeared wide open, possibly refluxiveI see a well mucosalized opening just posterior to this which may be a fistula. 8/10/23 Palliative Care    8/28/23 PET CT- [facility]  1. Previously seen vague activity at the pretracheal and subcarinal lymph  2. There is a new FDG avid activity at the left acillary lymph node, suspicious for possible malignant lymphadenopathy      8/30/23 Dr. Jaquelin Skelton, Medical Oncology  Cytoxan/bioequivalent bevacizumab/pembrolizumab will be stopped today  She will move to letrozole as 6th line of therapy    9/8/23 MRI brain w wo contrast  Numerous punctate barely visible foci of postcontrast enhancement compatible with metastatic disease similar to the prior study. No appreciable parenchymal edema identified. No definite new lesions identified.     9/13/23 Dr. Venus Donald  found bladder lesion which wwas ovarian cancerDr Jaquelin Skelton is treating her and asked me to look into her bladder again after treatmentsShe has bacteriuria but no sx's and I don't think taht antibiotic treatment will be successful as it has not been so far      Follow up visit     Oncology History   Ovarian cancer ([address])   1/13/2020 Initial Diagnosis    Ovarian cancer on left [facility])     1/13/2020 -  Cancer Staged    Staging form: Ovary, Fallopian Tube, Primary Peritoneal, AJCC 8th Edition  - Pathologic stage from 1/13/2020: FIGO Stage IC3 (pN0, cM0) - Signed by Balbina Bowman MD on 1/28/2020  Histologic grade (G): G3  Histologic grading system: 4 grade system  Residual tumor (R): R0 - None  Histopathologic type: Serous cystadenocarcinoma, NOS  Diagnostic confirmation: Positive histology PLUS positive immunophenotyping and/or positive genetic studies  Specimen type: Excision  Staged by: Managing physician  Cancer antigen 125 () (U/mL): 4,000  Gross residual tumor after primary cyto-reductive surgery: Absent  Residual tumor volume after primary cytoreductive surgery: No gross tumor  National guidelines used in treatment planning: Yes  Type of national guideline used in treatment planning: NCCN       1/13/2020 Surgery    Diagnostic laparoscopy, laparotomy, total hysterectomy, bilateral salpingo-oophorectomy with resection of pelvic mass, bilateral pelvic and periaortic lymphadenectomy, staging peritoneal biopsies, omentectomy, appendectomy. Final pathology consistent with 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. 2/11/2020 - 5/26/2020 Chemotherapy    Six cycles of carboplatin/paclitaxel administered by Dr. Peyman Aleman. Tolerated well. CT scan at completion of treatment demonstrates no evidence of measurable disease.  amarilis near completion of chemotherapy 5.7 units/milliliter     8/9/2021 Recurrence     Elevated  triggered imaging  which demonstrated suspicious mediastinal lymphadenopathy. Endobronchial ultrasound-guided fine-needle aspiration on August 9, 2021 demonstrates metastatic carcinoma with immuno phenotype consistent with metastatic ovarian primary. 9/1/2021 - 1/24/2022 Chemotherapy    Carboplatin + Doxil x 6 cycles (Dr. Fabiola James). Complete response. Amarilis Ca125 8.5 IU/mL. 3/2022 - 8/2022 Chemotherapy    Niraparib. 9/14/2022 -  Chemotherapy    Started on gemcitabine and bevacizumab. On November 29, 2022 after 3 cycles CT scan demonstrates no evidence of measurable disease.  reportedly decreasing somewhat. Patient tolerating well.     5/30/2023 - 5/30/2023 Chemotherapy    bevacizumab (AVASTIN) IVPB, 15 mg/kg = 1,665 mg, Intravenous, Once, 0 of 4 cycles  pembrolizumab (KEYTRUDA) IVPB, 200 mg, Intravenous, Once, 0 of 4 cycles     Brain metastases   8/17/2022 Initial Diagnosis    Brain metastases ([address])     8/31/2022 - 8/31/2022 Radiation      Plan ID Energy Fractions Dose per Fraction (cGy) Dose Correction (cGy) Total Dose Delivered (cGy) Elapsed Days   SRS 4 PTVs 6X-FFF 1 / 1 2,000 0 2,000 0      Treatment dates:  C1 SRS: 8/31/2022 - 8/31/2022 2/1/2023 - 2/1/2023 Radiation    Plan ID Energy Fractions Dose per Fraction (cGy) Dose Correction (cGy) Total Dose Delivered (cGy) Elapsed Days   SRS Inf 3PTVs 6X-FFF 1 / 1 2,000 0 2,000 0   SRS Sup 3PTVs 6X-FFF 1 / 1 2,000 0 2,000 0            Review of Systems:  Review of Systems   Constitutional: Positive for fatigue. HENT: Positive for hearing loss. Eyes:        Burning sensation in eyes end of day after wearing contacts   Respiratory: Positive for cough (dry) and shortness of breath (occasional). Cardiovascular: Negative. Gastrointestinal: Positive for nausea (occasional). Genitourinary: Positive for frequency and urgency. Negative for hematuria, pelvic pain, vaginal bleeding, vaginal discharge and vaginal pain. Musculoskeletal: Positive for arthralgias and back pain. Skin: Negative. Allergic/Immunologic: Negative. Neurological: Positive for dizziness and numbness (B/L feet). Negative for tremors, seizures and syncope. Hematological: Bruises/bleeds easily. Psychiatric/Behavioral: Positive for sleep disturbance.        Clinical Trial: no        Health Maintenance   Topic Date Due   • Medicare Annual Wellness Visit (AWV)  Never done   • BMI: Followup Plan  Never done   • Colorectal Cancer Screening  Never done   • Osteoporosis Screening  Never done   • Fall Risk  Never done   • Urinary Incontinence Screening  Never done   • Pneumococcal Vaccine: 65+ Years (2 - PCV) 12/17/2019   • Influenza Vaccine (1) 09/01/2023   • Depression Screening  07/12/2024   • BMI: Adult  09/08/2024   • Hepatitis C Screening  Completed   • COVID-19 Vaccine  Completed   • HIB Vaccine  Aged Out   • IPV Vaccine  Aged Out   • Hepatitis A Vaccine  Aged Out   • Meningococcal ACWY Vaccine  Aged Out   • HPV Vaccine  Aged Out     Patient Active Problem List   Diagnosis   • Encounter for gynecological examination without abnormal finding   • GERD (gastroesophageal reflux disease)   • HTN (hypertension), benign   • Obesity, Class I, BMI 30.0-34.9 (see actual BMI)   • Obstructive sleep apnea   • Myopia, bilateral   • Elevated CA-125   • History of breast cancer   • Ovarian cancer ([address])   • Anomalous coronary artery origin   • Frequent unifocal PVCs   • Liver lesion   • Mediastinal lymphadenopathy   • PONV (postoperative nausea and vomiting)   • Aneurysm of thoracic aorta (HCC)   • Bradycardia   • Cardiomyopathy [facility])   • Cardiovascular stress test abnormal   • Cervical arthritis   • Congenital anomaly of coronary artery   • Hyperlipidemia   • Chronic insomnia   • Myopia   • Ventricular arrhythmia   • Brain metastases   • Nocturia   • Vulvar irritation   • Palliative care patient     Past Medical History:   Diagnosis Date   • Anemia 3/23/2020    During chemo   • Brain cancer [facility])    • Breast cancer ([address])     2004 and then 2009   • Cancer ([address]) 2005,2009    breast   • Cancer ([address]) 2020    ovarian   • Chicken pox    • Colon polyp    • CPAP (continuous positive airway pressure) dependence    • Frequent UTI    • GERD (gastroesophageal reflux disease)    • Heart disease    • High cholesterol    • History of blood transfusion     2013 with Bilat Knee replacement   • Hypertension    • Irregular heart beat     PVC's   • Ovarian cancer ([address]) 9/13/2020   • PONV (postoperative nausea and vomiting)    • Sleep apnea      Past Surgical History:   Procedure Laterality Date   • APPENDECTOMY     • BREAST CYST EXCISION     • BREAST LUMPECTOMY     • CHOLECYSTECTOMY     • COLONOSCOPY     • CYSTOSCOPY Bilateral 01/13/2020    Procedure: CYSTOSCOPY: CYSTOSCOPY WITH PERIOPERATIVE URETERAL CATHETERS  PLACEMENT;  Surgeon: Amanuel Simeon MD;  [facility];  Service: Gynecology Oncology   • CYSTOSCOPY     • HERNIA REPAIR     • HYSTERECTOMY N/A 01/13/2020    Procedure: EXPLORATORY LAPAROTOMY, OVARIAN CANCER STAGING WITH TOTAL HYSTERECTOMY, BILATERAL SALPINGO-OOPHORECTOMY, BILATERAL PELVIC AND PERIAORTIC LYMPHADENECTOMY, INDICATED APPENDECTOMY, OMENTECTOMY, STAGING PERITONEAL BIOPSIES.;  Surgeon: Amanuel Simeon MD;  [facility];  Service: Gynecology Oncology   • JOINT REPLACEMENT      Bilat Knees   • MASTECTOMY Bilateral 2009   •  [address] INCL FLUOR GDNCE DX W/CELL WASHG [facility] N/A 08/09/2021    Procedure: BRONCHOSCOPY FLEXIBLE;  Surgeon: Lisa Clemons MD;  [facility];  Service: Thoracic   • IL BRONCHOSCOPY NEEDLE BX TRACHEA MAIN STEM&/BRON N/A 08/09/2021    Procedure: ENDOBRONCHIAL ULTRASOUND (EBUS);   Surgeon: Lisa Clemons MD;  [facility];  Service: Thoracic   • IL LAP RPR HRNA XCPT INCAL/INGUN NCRC8/STRANGULATED N/A 04/08/2021    Procedure: REPAIR HERNIA INCISIONAL LAPAROSCOPIC;  Surgeon: Traci Salazar MD;  [facility];  Service: General   • IL LAPS ABD PRTM&OMENTUM DX W/WO SPEC BR/WA SPX N/A 01/13/2020    Procedure: LAPAROSCOPY DIAGNOSTIC;  Surgeon: Amanuel Simeon MD;  [facility];  Service: Gynecology Oncology   • REPAIR KNEE LIGAMENT Bilateral    • ROTATOR CUFF REPAIR       Family History   Problem Relation Age of Onset   • Breast cancer Mother    • Cancer Mother    • Lung cancer Father    • Hypertension Father    • Prostate cancer Brother      Social History     Socioeconomic History   • Marital status: /Civil Union     Spouse name: Not on file   • Number of children: Not on file   • Years of education: Not on file   • Highest education level: Not on file   Occupational History   • Not on file   Tobacco Use   • Smoking status: Former     Packs/day: 1.00     Years: 15.00     Total pack years: 15.00     Types: Cigarettes     Quit date: 10/31/1987     Years since quittin.8   • Smokeless tobacco: Former   Vaping Use   • Vaping Use: Never used   Substance and Sexual Activity   • Alcohol use: Yes     Comment: socially    • Drug use: Never   • Sexual activity: Not Currently     Partners: Male     Birth control/protection: Post-menopausal     Comment: same partner-few times a year   Other Topics Concern   • Not on file   Social History Narrative   • Not on file     Social Determinants of Health     Financial Resource Strain: Not on file   Food Insecurity: Not on file   Transportation Needs: Not on file   Physical Activity: Not on file   Stress: Not on file   Social Connections: Not on file   Intimate Partner Violence: Not on file   Housing Stability: Not on file       Current Outpatient Medications:   •  carvedilol (COREG) 6.25 mg tablet, take 1 tablet by mouth twice a day with meals, Disp: 180 tablet, Rfl: 3  •  clonazePAM (KlonoPIN) 0.5 mg tablet, Take 0.5 mg by mouth 2 (two) times a day as needed, Disp: , Rfl:   •  estradiol (ESTRACE) 0.1 mg/g vaginal cream, insert 1 gram vaginally two times a week, Disp: , Rfl: 0  •  losartan-hydrochlorothiazide (HYZAAR) 50-12.5 mg per tablet, Take 1 tablet by mouth daily , Disp: , Rfl: 0  •  prochlorperazine (COMPAZINE) 10 mg tablet, Take 10 mg by mouth every 6 (six) hours as needed, Disp: , Rfl:   •  simvastatin (ZOCOR) 20 mg tablet, Take 20 mg by mouth daily , Disp: , Rfl: 0  •  trimethoprim (PROLOPRIM) 100 mg tablet, Take 100 mg by mouth daily , Disp: , Rfl: 0  •  venlafaxine 150 MG TB24, Take 150 mg by mouth daily with breakfast, Disp: , Rfl: 0  •  zolpidem (AMBIEN CR) 12.5 MG CR tablet, Take 12.5 mg by mouth daily at bedtime , Disp: , Rfl: 0  No Known Allergies  There were no vitals filed for this visit.

## 2023-09-15 NOTE — PROGRESS NOTES
Follow-up - Radiation Oncology   Nick Pinedo 1950 81 y.o. female [id number]      History of Present Illness   Cancer Staging   Ovarian cancer [facility])  Staging form: Ovary, Fallopian Tube, Primary Peritoneal, AJCC 8th Edition  - Pathologic stage from 1/13/2020: FIGO Stage IC3 (pN0, cM0) - Signed by Amanuel Simeon MD on 1/28/2020  Histopathologic type: Serous cystadenocarcinoma, NOS  Histologic grade (G): G3  Histologic grading system: 4 grade system  Residual tumor (R): R0 - None  Diagnostic confirmation: Positive histology PLUS positive immunophenotyping and/or positive genetic studies  Specimen type: Excision  Staged by: Managing physician  Cancer antigen 125 () (U/mL): 4,000  Gross residual tumor after primary cyto-reductive surgery: Absent  Residual tumor volume after primary cytoreductive surgery: No gross tumor  National guidelines used in treatment planning: Yes  Type of national guideline used in treatment planning: NCCN    Ms. Albesa Hammans is a 81year old woman with a PMHx bilateral breast cancer (s/p initial right sided BCS followed by adjuvant TC and RT (2006), thereafter with left sided cancer s/p bilateral mastectomy and adjuvant endocrine therapy). 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 She was found to have evidence of brain metastases. On 8/31/22 she underwent SRS to four sites of disease each to 2,000 cGy in 1 fraction. On 2/1/23 she underwent SRS to six additional sites of intracranial progression, each to 2,000cGy in 1 fraction.  She returns today for follow-up.     Radiation Summary:  Right breast post-lumpectomy RT (2006)  SRS to four PTVs (L Frontal, L Parietal, R, Parietal, R Precentral Gyrus) on 8/31/22  SRS to six PTVs (L Cerebellum inf, L Cerebellum Med, L Front Parasag, L Frontal Sup x2, R Temp) on 2/1/23     Interval History:   The patient was last seen in follow-up on 7/12/23, at that time with MRI Brain demonstrating multiple punctate scattered parenchymal metastases in the 1-3mm range. Since then she has continued to follow with Dr. Cesia Sahni initially on fifth line cytoxan/bevacizumab/pembro, however this was discontinued after possible progression and finding of cystitis on bladder biopsy. She was transitioned to 6th line systemic therapy with letrozole. MRI Brain (9/8/23) demonstrated:   Numerous punctate barely visible foci of postcontrast enhancement compatible with metastatic disease similar to the prior study. No appreciable parenchymal edema identified. No definite new lesions identified. Historical Information   Oncology History   Ovarian cancer ([address])   1/13/2020 Initial Diagnosis    Ovarian cancer on left [facility])     1/13/2020 -  Cancer Staged    Staging form: Ovary, Fallopian Tube, Primary Peritoneal, AJCC 8th Edition  - Pathologic stage from 1/13/2020: FIGO Stage IC3 (pN0, cM0) - Signed by Asia Grayson MD on 1/28/2020  Histologic grade (G): G3  Histologic grading system: 4 grade system  Residual tumor (R): R0 - None  Histopathologic type: Serous cystadenocarcinoma, NOS  Diagnostic confirmation: Positive histology PLUS positive immunophenotyping and/or positive genetic studies  Specimen type: Excision  Staged by: Managing physician  Cancer antigen 125 () (U/mL): 4,000  Gross residual tumor after primary cyto-reductive surgery: Absent  Residual tumor volume after primary cytoreductive surgery: No gross tumor  National guidelines used in treatment planning: Yes  Type of national guideline used in treatment planning: NCCN       1/13/2020 Surgery    Diagnostic laparoscopy, laparotomy, total hysterectomy, bilateral salpingo-oophorectomy with resection of pelvic mass, bilateral pelvic and periaortic lymphadenectomy, staging peritoneal biopsies, omentectomy, appendectomy. Final pathology consistent with 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.      2/11/2020 - 5/26/2020 Chemotherapy    Six cycles of carboplatin/paclitaxel administered by Dr. Mich Zaidi. Tolerated well. CT scan at completion of treatment demonstrates no evidence of measurable disease.  amarilis near completion of chemotherapy 5.7 units/milliliter     8/9/2021 Recurrence     Elevated  triggered imaging  which demonstrated suspicious mediastinal lymphadenopathy. Endobronchial ultrasound-guided fine-needle aspiration on August 9, 2021 demonstrates metastatic carcinoma with immuno phenotype consistent with metastatic ovarian primary. 9/1/2021 - 1/24/2022 Chemotherapy    Carboplatin + Doxil x 6 cycles (Dr. America Starkey). Complete response. Amarilis Ca125 8.5 IU/mL. 3/2022 - 8/2022 Chemotherapy    Niraparib. 9/14/2022 -  Chemotherapy    Started on gemcitabine and bevacizumab. On November 29, 2022 after 3 cycles CT scan demonstrates no evidence of measurable disease.  reportedly decreasing somewhat.   Patient tolerating well.     5/30/2023 - 5/30/2023 Chemotherapy    bevacizumab (AVASTIN) IVPB, 15 mg/kg = 1,665 mg, Intravenous, Once, 0 of 4 cycles  pembrolizumab (KEYTRUDA) IVPB, 200 mg, Intravenous, Once, 0 of 4 cycles     Brain metastases   8/17/2022 Initial Diagnosis    Brain metastases ([address])     8/31/2022 - 8/31/2022 Radiation      Plan ID Energy Fractions Dose per Fraction (cGy) Dose Correction (cGy) Total Dose Delivered (cGy) Elapsed Days   SRS 4 PTVs 6X-FFF 1 / 1 2,000 0 2,000 0      Treatment dates:  C1 SRS: 8/31/2022 - 8/31/2022 2/1/2023 - 2/1/2023 Radiation    Plan ID Energy Fractions Dose per Fraction (cGy) Dose Correction (cGy) Total Dose Delivered (cGy) Elapsed Days   SRS Inf 3PTVs 6X-FFF 1 / 1 2,000 0 2,000 0   SRS Sup 3PTVs 6X-FFF 1 / 1 2,000 0 2,000 0            Past Medical History:   Diagnosis Date   • Anemia 3/23/2020    During chemo   • Brain cancer [facility])    • Breast cancer ([facility])     2004 and then 2009   • Cancer [facility]) 2005,2009    breast   • Cancer ([facility]) 2020 ovarian   • Chicken pox    • Colon polyp    • CPAP (continuous positive airway pressure) dependence    • Frequent UTI    • GERD (gastroesophageal reflux disease)    • Heart disease    • High cholesterol    • History of blood transfusion     2013 with Bilat Knee replacement   • Hypertension    • Irregular heart beat     PVC's   • Ovarian cancer ([address]) 9/13/2020   • PONV (postoperative nausea and vomiting)    • Sleep apnea      Past Surgical History:   Procedure Laterality Date   • APPENDECTOMY     • BREAST CYST EXCISION     • BREAST LUMPECTOMY     • CHOLECYSTECTOMY     • COLONOSCOPY     • CYSTOSCOPY Bilateral 01/13/2020    Procedure: CYSTOSCOPY: CYSTOSCOPY WITH PERIOPERATIVE URETERAL CATHETERS  PLACEMENT;  Surgeon: August Duffy MD;  Location: [facility];  Service: Gynecology Oncology   • CYSTOSCOPY     • HERNIA REPAIR     • HYSTERECTOMY N/A 01/13/2020    Procedure: EXPLORATORY LAPAROTOMY, OVARIAN CANCER STAGING WITH TOTAL HYSTERECTOMY, BILATERAL SALPINGO-OOPHORECTOMY, BILATERAL PELVIC AND PERIAORTIC LYMPHADENECTOMY, INDICATED APPENDECTOMY, OMENTECTOMY, STAGING PERITONEAL BIOPSIES.;  Surgeon: August Duffy MD;  Location: [facility];  Service: Gynecology Oncology   • JOINT REPLACEMENT      Bilat Knees   • MASTECTOMY Bilateral 2009   •  [address] INCL FLUOR GDNCE DX W/CELL WASHG [facility] N/A 08/09/2021    Procedure: BRONCHOSCOPY FLEXIBLE;  Surgeon: Jeremiah Leon MD;  [facility];  Service: Thoracic   • IL BRONCHOSCOPY NEEDLE BX TRACHEA MAIN STEM&/BRON N/A 08/09/2021    Procedure: ENDOBRONCHIAL ULTRASOUND (EBUS);   Surgeon: Jeremiah Leon MD;  [facility]: [facility];  Service: Thoracic   • IL LAP RPR HRNA XCPT INCAL/INGUN NCRC8/STRANGULATED N/A 04/08/2021    Procedure: REPAIR HERNIA INCISIONAL LAPAROSCOPIC;  Surgeon: João Cruz MD;  Location[facility];  Service: General   • IL LAPS ABD PRTM&OMENTUM DX W/WO SPEC BR/WA SPX N/A 01/13/2020    Procedure: LAPAROSCOPY DIAGNOSTIC;  Surgeon: Angela MD Mel;  Location: [facility];  Service: Gynecology Oncology   • REPAIR KNEE LIGAMENT Bilateral    • ROTATOR CUFF REPAIR         Social History   Social History     Substance and Sexual Activity   Alcohol Use Yes    Comment: socially      Social History     Substance and Sexual Activity   Drug Use Never     Social History     Tobacco Use   Smoking Status Former   • Packs/day: 1.00   • Years: 15.00   • Total pack years: 15.00   • Types: Cigarettes   • Quit date: 10/31/1987   • Years since quittin.8   Smokeless Tobacco Former         Meds/Allergies     Current Outpatient Medications:   •  carvedilol (COREG) 6.25 mg tablet, take 1 tablet by mouth twice a day with meals, Disp: 180 tablet, Rfl: 3  •  clonazePAM (KlonoPIN) 0.5 mg tablet, Take 0.5 mg by mouth 2 (two) times a day as needed, Disp: , Rfl:   •  LETROZOLE PO, Take by mouth, Disp: , Rfl:   •  losartan-hydrochlorothiazide (HYZAAR) 50-12.5 mg per tablet, Take 1 tablet by mouth daily , Disp: , Rfl: 0  •  prochlorperazine (COMPAZINE) 10 mg tablet, Take 10 mg by mouth every 6 (six) hours as needed, Disp: , Rfl:   •  simvastatin (ZOCOR) 20 mg tablet, Take 20 mg by mouth daily , Disp: , Rfl: 0  •  trimethoprim (PROLOPRIM) 100 mg tablet, Take 100 mg by mouth daily , Disp: , Rfl: 0  •  venlafaxine 150 MG TB24, Take 150 mg by mouth daily with breakfast, Disp: , Rfl: 0  •  zolpidem (AMBIEN CR) 12.5 MG CR tablet, Take 12.5 mg by mouth daily at bedtime , Disp: , Rfl: 0  •  estradiol (ESTRACE) 0.1 mg/g vaginal cream, insert 1 gram vaginally two times a week (Patient not taking: Reported on 2023), Disp: , Rfl: 0  No Known Allergies    Review of Systems   Constitutional: Positive for fatigue. HENT: Positive for hearing loss. Eyes:        Burning sensation in eyes end of day after wearing contacts   Respiratory: Positive for cough (dry) and shortness of breath (occasional). Cardiovascular: Negative. Gastrointestinal: Positive for nausea (occasional). Genitourinary: Positive for frequency and urgency. Negative for hematuria, pelvic pain, vaginal bleeding, vaginal discharge and vaginal pain. Musculoskeletal: Positive for arthralgias and back pain. Skin: Negative. Allergic/Immunologic: Negative. Neurological: Positive for dizziness and numbness (B/L feet). Negative for tremors, seizures and syncope. Hematological: Bruises/bleeds easily. Psychiatric/Behavioral: Positive for sleep disturbance.        OBJECTIVE:   /80   Pulse 83   Temp 98.1 °F (36.7 °C)   Resp 16   Wt 108 kg (237 lb)   LMP  (LMP Unknown)   SpO2 99%   BMI 32.14 kg/m²   Pain Assessment:  0  ECOG/Zubrod/WHO: 1 - Symptomatic but completely ambulatory    Physical Exam   Well appearing. NAD. No increased work of breathing. .  Extremities warm and well perfused. RESULTS    Lab Results: No results found for this or any previous visit (from the past 672 hour(s)). Imaging Studies:MRI brain w wo contrast    Result Date: 9/13/2023  Narrative: MRI BRAIN WITH AND WITHOUT CONTRAST INDICATION: C79.31: Secondary malignant neoplasm of brain. COMPARISON: Prior MRI July 7, 2023 TECHNIQUE: Multiplanar, multisequence imaging of the brain was performed before and after gadolinium administration. IV Contrast:  10 mL of Gadobutrol injection (SINGLE-DOSE) IMAGE QUALITY:   Diagnostic. FINDINGS: BRAIN PARENCHYMA:  There is no discrete mass, mass effect or midline shift. There is no intracranial hemorrhage. Normal posterior fossa. Diffusion imaging is unremarkable. Small scattered hyperintensities on T2/FLAIR imaging are noted in the periventricular and subcortical white matter demonstrating an appearance that is statistically most likely to represent mild microangiopathic change. There are numerous punctate foci of enhancement in the supra and infratentorial brain which are similar to the prior study. There are no clearly new lesions identified. Many of the lesions are barely visible.  No significant parenchymal edema identified. VENTRICLES:  Normal for the patient's age. SELLA AND PITUITARY GLAND:  Normal. ORBITS:  Normal. PARANASAL SINUSES:  Normal. VASCULATURE:  Evaluation of the major intracranial vasculature demonstrates appropriate flow voids. CALVARIUM AND SKULL BASE:  Normal. EXTRACRANIAL SOFT TISSUES:  Normal.     Impression: Numerous punctate barely visible foci of postcontrast enhancement compatible with metastatic disease similar to the prior study. No appreciable parenchymal edema identified. No definite new lesions identified. Workstation performed: [id number]           Assessment/Plan:  Orders Placed This Encounter   Procedures   • MRI brain w wo contrast      We reviewed the patient's repeat MRI Brain in detail in comparison to multiple prior imaging studies. On my review all of her ten previously treated intracranial enhancing lesions appear well controlled. Among her untreated enhancing punctate metastases she has had no apparent progression with some even appearing smaller in size (potentially attributable to interval systemic therapy). Given their overall stability and small size I would not recommend treatment at this time. Ultimately the patient may require either selective SRS to growing metastases or whole brain RT, the latter of which we hope to avoid if possible. With regard to her systemic disease we did discuss that consolidative SBRT could be offered areas of recalcitrant or progressive disease in order to extend her time on medical regimens prior to switching. I will plan to discuss this option with her medical oncologist Dr. Edith Weiss, On my review of her PET she does have overall limited sites of disease. Otherwise I will plan to see her back in 3 months with repeat MRI Brain.      Casandra Chun MD  7/01/7875,3:39 AM    Portions of the record may have been created with voice recognition software.  Occasional wrong word or "sound a like" substitutions may have occurred due to the inherent limitations of voice recognition software.  Read the chart carefully and recognize, using context, where substitutions have occurred.

## 2023-09-20 ENCOUNTER — OFFICE VISIT (OUTPATIENT)
Dept: PALLIATIVE MEDICINE | Facility: CLINIC | Age: 73
End: 2023-09-20
Payer: MEDICARE

## 2023-09-20 VITALS
RESPIRATION RATE: 14 BRPM | HEIGHT: 72 IN | BODY MASS INDEX: 32.07 KG/M2 | OXYGEN SATURATION: 98 % | TEMPERATURE: 97.6 F | DIASTOLIC BLOOD PRESSURE: 72 MMHG | WEIGHT: 236.8 LBS | SYSTOLIC BLOOD PRESSURE: 124 MMHG | HEART RATE: 78 BPM

## 2023-09-20 DIAGNOSIS — C56.9 MALIGNANT NEOPLASM OF OVARY, UNSPECIFIED LATERALITY (HCC): Primary | ICD-10-CM

## 2023-09-20 DIAGNOSIS — C79.31 MALIGNANT NEOPLASM METASTATIC TO BRAIN (HCC): ICD-10-CM

## 2023-09-20 DIAGNOSIS — Z51.5 PALLIATIVE CARE PATIENT: ICD-10-CM

## 2023-09-20 PROCEDURE — 99214 OFFICE O/P EST MOD 30 MIN: CPT | Performed by: STUDENT IN AN ORGANIZED HEALTH CARE EDUCATION/TRAINING PROGRAM

## 2023-09-20 NOTE — PROGRESS NOTES
Outpatient Follow-Up - Palliative and Supportive Care   Rhina Carrasco 81 y.o. female [id number]    Assessment & Plan  Problem List Items Addressed This Visit          Endocrine    Ovarian cancer ([address]) - Primary       Nervous and Auditory    Brain metastases       Other    Palliative care patient     #symptom management  #neuropathic pain   - venlafaxine 150 mg PO QDaily     #insomnia/anxiety   - continue clonazepam 0.5 mg PO BID PRN   - zolpidem 12.5 mg PO QHS     #nausea   - continue prochlorperazine 10 mg PO Q6H PRN    #goals of care   - MRI Brain 09/08/2023 numerous punctate lesions c/w metastatic disease similar to prior studies with no definite lesions identified   - clearly discussed goal to be present at birth of 1st grandchild [estimated birth on 10/04/2023]   - communicated understanding of life limiting disease process with all plan of care and medical decisions to prolong life until birth of grandchild, afterwards, overall focus to transition to balance of QOL v treatment with no invasive/heroic escalations   - will continue follow up with [facility] for supportive cares    Discussed hospice model of care at home, resides in [facility], will need to verify coverage area for hospice organizations. #psychosocial support   - emotional support provided   - Sony Landry,  40 years] [phone]   - two adult children              - Claudette Daniel [daughter] [phone]              - Rosario Gardner   - resides with spouse   - Jewish shin background      Next [address] [place] [address] Follow up in 4 weeks. Controlled Substance Review    [place] PDMP or [place]  reviewed: No red flags were identified; safe to proceed with prescription. Светлана Mendoza     PDMP Review         Value Time User    PDMP Reviewed  Yes (P)  9/21/2023  8:07 AM Thierno Campbell MD            Medications adjusted this encounter:  Requested Prescriptions      No prescriptions requested or ordered in this encounter     No orders of the defined types were placed in this encounter. There are no discontinued medications. Jose Raul Small was seen today for symptoms and planning cares related to above illnesses. I have reviewed the patient's controlled substance dispensing history in the Prescription Drug Monitoring Program in compliance with the [facility] regulations before prescribing any controlled substances. They are invited to continue to follow with us. If there are questions or concerns, please contact us through our clinic/answering service 24 hours a day, seven days a week. Sherron Mitchell MD  [facility] Palliative and Supportive Care  [phone]      Visit Information    Accompanied By: Spouse    Source of History: Self, Spouse, Medical record    History Limitations: None      History of Present Illness    Jose Raul Small is a 81 y.o. female who presents in follow up of symptoms related to metastatic ovarian cancer. Pertinent issues include: symptom management, assessment of goals of care, disease process education and discussion of prognosis, advance care planning. Patient reports overall doing well. No symptoms of distress reported. Discussed recent MRI brain on 09/08 which was similar to prior. 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 Primary discussion today focused on supportive listening and outlining plan of care goals as documented in above MDM section. Past medical, surgical, social, and family histories are reviewed and pertinent updates are made. Review of Systems   Constitutional: Positive for malaise/fatigue. Negative for chills, decreased appetite, fever and weight loss. HENT:  Negative for congestion. Eyes:  Negative for visual disturbance. Cardiovascular:  Negative for chest pain. Respiratory:  Negative for shortness of breath. Musculoskeletal:  Negative for falls and neck pain. Gastrointestinal:  Negative for abdominal pain, constipation, nausea and vomiting. Genitourinary:  Negative for frequency. Neurological:  Negative for headaches. Psychiatric/Behavioral:  The patient does not have insomnia. All other systems reviewed and are negative. Vital Signs    /72 (BP Location: Right arm, Patient Position: Sitting, Cuff Size: Standard)   Pulse 78   Temp 97.6 °F (36.4 °C) (Tympanic)   Resp 14   Ht 6' (1.829 m)   Wt 107 kg (236 lb 12.8 oz)   LMP  (LMP Unknown)   SpO2 98%   BMI 32.12 kg/m²     Physical Exam and Objective Data  Physical Exam  Vitals and nursing note reviewed. Constitutional:       General: She is awake. Appearance: She is not diaphoretic. Comments: Sitting up comfortably in NAD  BMI 32.1  Non-toxic appearing   HENT:      Head: Normocephalic and atraumatic. Right Ear: External ear normal.      Left Ear: External ear normal.      Nose: No rhinorrhea. Eyes:      Comments: No gaze preference   Cardiovascular:      Rate and Rhythm: Normal rate. Pulmonary:      Effort: No tachypnea, accessory muscle usage or respiratory distress. Comments: Completes full sentences without difficulty  Musculoskeletal:      Cervical back: Normal range of motion. Neurological:      General: No focal deficit present. Mental Status: She is alert and oriented to person, place, and time.    Psychiatric:         Attention and Perception: Attention normal.         Mood and Affect: Mood and affect normal.         Speech: Speech normal.         Cognition and Memory: Cognition and memory normal.           Radiology and Laboratory:  I personally reviewed and interpreted the following results:    Most Recent COVID-19 Results:  Lab Results   Component Value Date/Time    SARSCOV2 Negative 03/23/2021 12:00 AM    SARSCOV2 NOT DETECTED 12/14/2020 11:24 AM       Most Recent Lab Work:  Lab Results   Component Value Date/Time    SODIUM 137 01/19/2020 12:51 PM    K 3.4 (L) 01/19/2020 12:51 PM    BUN 18 03/18/2021 12:51 PM    CREATININE 0.94 03/18/2021 12:51 PM    GLUC 133 01/19/2020 12:51 PM     Lab Results   Component Value Date/Time    AST 25 01/19/2020 12:51 PM    ALT 27 01/19/2020 12:51 PM    ALB 2.9 (L) 01/19/2020 12:51 PM     Lab Results   Component Value Date/Time    HGB 10.5 (L) 01/19/2020 12:51 PM    WBC 6.94 01/19/2020 12:51 PM     01/19/2020 12:51 PM    INR 1.05 01/13/2020 09:22 AM       Most Recent Imaging [last 30 days]:  Procedure: MRI brain w wo contrast    Result Date: 9/13/2023  Narrative: MRI BRAIN WITH AND WITHOUT CONTRAST INDICATION: C79.31: Secondary malignant neoplasm of brain. COMPARISON: Prior MRI July 7, 2023 TECHNIQUE: Multiplanar, multisequence imaging of the brain was performed before and after gadolinium administration. IV Contrast:  10 mL of Gadobutrol injection (SINGLE-DOSE) IMAGE QUALITY:   Diagnostic. FINDINGS: BRAIN PARENCHYMA:  There is no discrete mass, mass effect or midline shift. There is no intracranial hemorrhage. Normal posterior fossa. Diffusion imaging is unremarkable. Small scattered hyperintensities on T2/FLAIR imaging are noted in the periventricular and subcortical white matter demonstrating an appearance that is statistically most likely to represent mild microangiopathic change. There are numerous punctate foci of enhancement in the supra and infratentorial brain which are similar to the prior study. There are no clearly new lesions identified. Many of the lesions are barely visible. No significant parenchymal edema identified. VENTRICLES:  Normal for the patient's age. SELLA AND PITUITARY GLAND:  Normal. ORBITS:  Normal. PARANASAL SINUSES:  Normal. VASCULATURE:  Evaluation of the major intracranial vasculature demonstrates appropriate flow voids. CALVARIUM AND SKULL BASE:  Normal. EXTRACRANIAL SOFT TISSUES:  Normal.     Impression: Numerous punctate barely visible foci of postcontrast enhancement compatible with metastatic disease similar to the prior study.  No appreciable parenchymal edema identified. No definite new lesions identified. Workstation performed: [id number]        30 minutes was spent face to face with her spouse with greater than 50% of the time spent in counseling or coordination of care including discussions of provided medical updates, discussed palliative care, determined goals of care, determined social/family support, discussed plans of care, discussed symptom management, provided psychosocial support. PDMP Reviewed. All of the patient's or agent's questions were answered during this discussion.

## 2023-11-01 ENCOUNTER — OFFICE VISIT (OUTPATIENT)
Dept: PALLIATIVE MEDICINE | Facility: CLINIC | Age: 73
End: 2023-11-01

## 2023-11-01 VITALS
HEIGHT: 72 IN | RESPIRATION RATE: 16 BRPM | BODY MASS INDEX: 32.12 KG/M2 | TEMPERATURE: 97.7 F | HEART RATE: 72 BPM | DIASTOLIC BLOOD PRESSURE: 92 MMHG | SYSTOLIC BLOOD PRESSURE: 138 MMHG | OXYGEN SATURATION: 97 %

## 2023-11-01 DIAGNOSIS — I42.0 DILATED CARDIOMYOPATHY (HCC): ICD-10-CM

## 2023-11-01 DIAGNOSIS — C56.9 MALIGNANT NEOPLASM OF OVARY, UNSPECIFIED LATERALITY (HCC): Primary | ICD-10-CM

## 2023-11-01 DIAGNOSIS — C79.31 MALIGNANT NEOPLASM METASTATIC TO BRAIN (HCC): ICD-10-CM

## 2023-11-01 DIAGNOSIS — Z51.5 PALLIATIVE CARE PATIENT: ICD-10-CM

## 2023-11-01 NOTE — PROGRESS NOTES
Outpatient Follow-Up - Palliative and Supportive Care   Lacey Hamilton 72 y.o. female [id number]    Assessment & Plan  Problem List Items Addressed This Visit          Endocrine    Ovarian cancer [facility]) - Primary       Cardiovascular and Mediastinum    Cardiomyopathy ([address])       Nervous and Auditory    Brain metastases       Other    Palliative care patient     #symptom management  #neuropathic pain   - venlafaxine 150 mg PO QDaily     #insomnia/anxiety   - continue clonazepam 0.5 mg PO BID PRN   - zolpidem 12.5 mg PO QHS     #nausea   - continue prochlorperazine 10 mg PO Q6H PRN    #PET Scan 10/24/2023       - plan for axillary lymph node biopsy tomorrow [11/02]    - plan for MRI Brain scheduled for 12/15/2023    #goals of care   - expanded discussion regarding upcoming evaluation for axillary lymph node biopsy and MRI Brain; patient is clear to no further chemotherapy, however, open to discuss treatment plans   - expanded discussion regarding hospice model of care, brief description of general hospice supports under [facility] at home, all questions/concerns addressed. - at this moment, patient has not required hospitalization for management of symptoms, so unclear on how to connect with the concept of [address] Will continue discussion as clinical condition evolves. - overall does not wish to suffer, as defined by intolerable and unmanaged symptoms of distress    #psychosocial support   - emotional support provided   - Slava Luna,  40 years] [phone]   - two adult children              - Geeta Medrano [daughter] [phone]              - Raquel Garcia   - resides with spouse   - [employer] background      Next [address] [place] [address] Follow up in 2-3 months. Controlled Substance Review    [place]MP or [place]  reviewed: No red flags were identified; safe to proceed with prescription. Heladio Stapleton     PDMP Review         Value Time User    PDMP Reviewed  Yes (P)  11/1/2023 11:17 AM Martha Smith MD            Medications adjusted this encounter:  Requested Prescriptions      No prescriptions requested or ordered in this encounter     No orders of the defined types were placed in this encounter. There are no discontinued medications. Nick Lieberman was seen today for symptoms and planning cares related to above illnesses. I have reviewed the patient's controlled substance dispensing history in the Prescription Drug Monitoring Program in compliance with the [facility] regulations before prescribing any controlled substances. They are invited to continue to follow with us. If there are questions or concerns, please contact us through our clinic/answering service 24 hours a day, seven days a week. Rohan Chun MD  [facility] Palliative and Supportive [facility]  [phone]      Visit Information    Accompanied By: Spouse    Source of History: Self, Spouse, Medical record    History Limitations: None      History of Present Illness    Nick Lieberman is a 72 y.o. female who presents in follow up of symptoms related to metastatic ovarian cancer. Pertinent issues include: symptom management, assessment of goals of care, disease process education and discussion of prognosis. Patient reports overall doing well. Was able to see grandson Baljinder Signs,  10/12/2023, visited daughter in [place]. No reported pain. Denies nausea, vomiting. Appetite intact, weight stable. BM regular. Sleep adequate, use of zolpidem and clonazepam, managed per PCP; obtains 6-8 hours/night. No other symptoms of distress reported. Past medical, surgical, social, and family histories are reviewed and pertinent updates are made. Review of Systems   Constitutional: Positive for malaise/fatigue. Negative for chills, decreased appetite, fever and weight loss. HENT:  Negative for congestion. Eyes:  Negative for visual disturbance. Cardiovascular:  Negative for chest pain. Respiratory:  Negative for shortness of breath. Musculoskeletal:  Negative for falls and neck pain. Gastrointestinal:  Negative for abdominal pain, constipation, nausea and vomiting. Genitourinary:  Negative for frequency. Neurological:  Negative for headaches. Psychiatric/Behavioral:  The patient does not have insomnia. All other systems reviewed and are negative. Vital Signs    /92 (BP Location: Right arm, Patient Position: Sitting, Cuff Size: Standard)   Pulse 72   Temp 97.7 °F (36.5 °C) (Tympanic)   Resp 16   Ht 6' (1.829 m)   LMP  (LMP Unknown)   SpO2 97%   BMI 32.12 kg/m²     Physical Exam and Objective Data  Physical Exam  Vitals and nursing note reviewed. Constitutional:       General: She is awake. Appearance: She is not diaphoretic. Comments: Sitting up in NAD  BMI 32.1  Non-toxic appearing   HENT:      Head: Normocephalic and atraumatic. Right Ear: External ear normal.      Left Ear: External ear normal.      Nose: No rhinorrhea. Eyes:      Comments: No gaze preference   Cardiovascular:      Rate and Rhythm: Normal rate. Pulmonary:      Effort: No tachypnea, accessory muscle usage or respiratory distress. Comments: Completes full sentences without difficulty  Musculoskeletal:      Cervical back: Normal range of motion. Neurological:      General: No focal deficit present. Mental Status: She is alert and oriented to person, place, and time.    Psychiatric:         Attention and Perception: Attention normal.         Mood and Affect: Mood and affect normal.         Speech: Speech normal.         Cognition and Memory: Cognition and memory normal.           Radiology and Laboratory:  I personally reviewed and interpreted the following results:    Most Recent COVID-19 Results:  Lab Results   Component Value Date/Time    SARSCOV2 Negative 03/23/2021 12:00 AM    SARSCOV2 NOT DETECTED 12/14/2020 11:24 AM       Most Recent Lab Work:  Lab Results   Component Value Date/Time    SODIUM 137 01/19/2020 12:51 PM    K 3.4 (L) 01/19/2020 12:51 PM    BUN 18 03/18/2021 12:51 PM    CREATININE 0.94 03/18/2021 12:51 PM    GLUC 133 01/19/2020 12:51 PM     Lab Results   Component Value Date/Time    AST 25 01/19/2020 12:51 PM    ALT 27 01/19/2020 12:51 PM    ALB 2.9 (L) 01/19/2020 12:51 PM     Lab Results   Component Value Date/Time    HGB 10.5 (L) 01/19/2020 12:51 PM    WBC 6.94 01/19/2020 12:51 PM     01/19/2020 12:51 PM    INR 1.05 01/13/2020 09:22 AM       Most Recent Imaging [last 30 days]:  No results found. 45 minutes was spent face to face with Murali Schneider with greater than 50% of the time spent in counseling or coordination of care including discussions of provided medical updates, discussed palliative care, determined goals of care, determined social/family support, discussed plans of care, discussed symptom management, provided psychosocial support. Hospice discussion. End-of-life supports discussion. PDMP Reviewed. All of the patient's or agent's questions were answered during this discussion.

## 2023-11-24 NOTE — ANESTHESIA PREPROCEDURE EVALUATION
Noted and attempted to reach pt by phone to discuss. Left message for pt to return call.   [facility]: Please reach out to the [id number] Triage pool with patient call back.      Procedure:  REPAIR HERNIA INCISIONAL LAPAROSCOPIC (N/A Abdomen)    Relevant Problems   CARDIO   (+) Frequent unifocal PVCs   (+) HTN (hypertension), benign      GI/HEPATIC   (+) GERD (gastroesophageal reflux disease)      NEURO/PSYCH   (+) Encounter for follow-up surveillance of ovarian cancer   (+) History of breast cancer      PULMONARY   (+) Obstructive sleep apnea        Physical Exam    Airway    Mallampati score: II  TM Distance: >3 FB  Neck ROM: full     Dental   Comment: Bridge  Patient in process of getting an implant done,     Cardiovascular  Cardiovascular exam normal    Pulmonary  Pulmonary exam normal     Other Findings        Anesthesia Plan  ASA Score- 3     Anesthesia Type- general with ASA Monitors  Additional Monitors:   Airway Plan: ETT  Plan Factors-Exercise tolerance (METS): >4 METS  Patient is not a current smoker  Patient not instructed to abstain from smoking on day of procedure  Patient did not smoke on day of surgery  Induction- intravenous  Postoperative Plan- Plan for postoperative opioid use  Informed Consent- Anesthetic plan and risks discussed with patient  I personally reviewed this patient with the CRNA  Discussed and agreed on the Anesthesia Plan with the CRNA  Gerhardt Sep

## 2023-12-11 NOTE — PROGRESS NOTES
Assessment/Plan:    Encounter for gynecological examination without abnormal finding  Yearly visit-   Ovarian cancer metastatic - Cooperative care between [facility]. [facility]  Breast cancer s/p mastectomy  Colonoscopy 2018  DEXA ordered    Encourage healthy diet, exercise, Calcium 1200mg per day and at least 800 iu Vitamin D daily. Diagnoses and all orders for this visit:    GYN exam for high-risk Medicare patient    Encounter for osteoporosis screening in asymptomatic postmenopausal patient  -     DXA bone density spine hip and pelvis; Future    Encounter for gynecological examination without abnormal finding    History of breast cancer    Malignant neoplasm of ovary, unspecified laterality ([address])    Other orders  -     gabapentin (NEURONTIN) 100 mg capsule; take 1 capsule by mouth three times a day after FOOD  -     sulfamethoxazole-trimethoprim (BACTRIM DS) 800-160 mg per tablet          Subjective:      Patient ID: Fidel Dwyer is a 72 y.o. female. Patient presents for a routine annual visit  Hysterectomy  Mammogram-b/l masectomy  Colonoscopy-2018. recall 10 years  DEXA-ordered  [id number]  Non smoker  Social drinker  Currently sexually active  Mother with breast cancer  Personal hx breast and ovarian cancer    New finding of malignant ovarian tumor. Recently changed chemo medications    Adair Deal is 2 months old. Living in [place] ( Daughter and son in law)      Gynecologic Exam  She reports no genital itching, genital lesions, genital odor, genital rash, pelvic pain, vaginal bleeding or vaginal discharge.        The following portions of the patient's history were reviewed and updated as appropriate: She  has a past medical history of Anemia (3/23/2020), Brain cancer [facility]), Breast cancer ([address]), Cancer ([address]) ([phone]), Cancer ([address]) (2020), Chicken pox, Colon polyp, CPAP (continuous positive airway pressure) dependence, Frequent UTI, GERD (gastroesophageal reflux disease), Heart disease, High cholesterol, History of blood transfusion, Hypertension, Irregular heart beat, Ovarian cancer ([address]) (9/13/2020), PONV (postoperative nausea and vomiting), and Sleep apnea. 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    Cardiovascular stress test abnormal 03/10/2021    Cardiomyopathy ([address]) 03/01/2021    Ventricular arrhythmia 03/01/2021    Aneurysm of thoracic aorta ([address]) 01/12/2021    Ovarian cancer ([address]) 01/13/2020    Elevated CA-125 01/08/2020    History of breast cancer 01/08/2020    Encounter for gynecological examination without abnormal finding 10/31/2018    Cervical arthritis 09/12/2018    Myopia, bilateral 12/18/2017    Bradycardia 12/17/2015    Hyperlipidemia 04/02/2015    Chronic insomnia 04/02/2015    Myopia 12/08/2014    GERD (gastroesophageal reflux disease) 09/21/2011    HTN (hypertension), benign 09/21/2011    Obesity, Class I, BMI 30.0-34.9 (see actual BMI) 09/21/2011    Obstructive sleep apnea 09/21/2011     She  has a past surgical history that includes Repair knee ligament (Bilateral); Rotator cuff repair; Appendectomy; Breast cyst excision; Mastectomy (Bilateral, 2009); Colonoscopy; pr laps abd prtm&omentum dx w/wo spec br/wa spx (N/A, 01/13/2020); CYSTOSCOPY (Bilateral, 01/13/2020); Hysterectomy (N/A, 01/13/2020); Cystoscopy; Cholecystectomy; pr lap rpr hrna xcpt incal/ingun ncrc8/strangulated (N/A, 04/08/2021); Joint replacement; pr bronchoscopy needle bx trachea main stem&/bron (N/A, 08/09/2021); pr brnchsc incl fluor gdnce dx w/cell washg spx (N/A, 08/09/2021);  Hernia repair; and Breast lumpectomy. Her family history includes Breast cancer in her mother; Cancer in her mother; Hypertension in her father; Lung cancer in her father; Prostate cancer in her brother. She  reports that she quit smoking about 36 years ago. Her smoking use included cigarettes. She has a 15.00 pack-year smoking history. She has quit using smokeless tobacco. She reports current alcohol use. She reports that she does not use drugs. Current Outpatient Medications   Medication Sig Dispense Refill    carvedilol (COREG) 6.25 mg tablet take 1 tablet by mouth twice a day with meals 180 tablet 3    clonazePAM (KlonoPIN) 0.5 mg tablet Take 0.5 mg by mouth 2 (two) times a day as needed      gabapentin (NEURONTIN) 100 mg capsule take 1 capsule by mouth three times a day after FOOD      LETROZOLE PO Take by mouth      losartan-hydrochlorothiazide (HYZAAR) 50-12.5 mg per tablet Take 1 tablet by mouth daily   0    prochlorperazine (COMPAZINE) 10 mg tablet Take 10 mg by mouth every 6 (six) hours as needed      simvastatin (ZOCOR) 20 mg tablet Take 20 mg by mouth daily   0    sulfamethoxazole-trimethoprim (BACTRIM DS) 800-160 mg per tablet       trimethoprim (PROLOPRIM) 100 mg tablet Take 100 mg by mouth daily   0    venlafaxine 150 MG TB24 Take 150 mg by mouth daily with breakfast  0    zolpidem (AMBIEN CR) 12.5 MG CR tablet Take 12.5 mg by mouth daily at bedtime   0    estradiol (ESTRACE) 0.1 mg/g vaginal cream insert 1 gram vaginally two times a week (Patient not taking: Reported on 9/14/2023)  0     No current facility-administered medications for this visit.      Current Outpatient Medications on File Prior to Visit   Medication Sig    carvedilol (COREG) 6.25 mg tablet take 1 tablet by mouth twice a day with meals    clonazePAM (KlonoPIN) 0.5 mg tablet Take 0.5 mg by mouth 2 (two) times a day as needed    gabapentin (NEURONTIN) 100 mg capsule take 1 capsule by mouth three times a day after FOOD    LETROZOLE PO Take by mouth losartan-hydrochlorothiazide (HYZAAR) 50-12.5 mg per tablet Take 1 tablet by mouth daily     prochlorperazine (COMPAZINE) 10 mg tablet Take 10 mg by mouth every 6 (six) hours as needed    simvastatin (ZOCOR) 20 mg tablet Take 20 mg by mouth daily     sulfamethoxazole-trimethoprim (BACTRIM DS) 800-160 mg per tablet     trimethoprim (PROLOPRIM) 100 mg tablet Take 100 mg by mouth daily     venlafaxine 150 MG TB24 Take 150 mg by mouth daily with breakfast    zolpidem (AMBIEN CR) 12.5 MG CR tablet Take 12.5 mg by mouth daily at bedtime     estradiol (ESTRACE) 0.1 mg/g vaginal cream insert 1 gram vaginally two times a week (Patient not taking: Reported on 9/14/2023)     No current facility-administered medications on file prior to visit. She has No Known Allergies. .    Review of Systems   Genitourinary:  Negative for pelvic pain and vaginal discharge. Objective:      /68 (BP Location: Left arm, Patient Position: Sitting, Cuff Size: Standard)   LMP  (LMP Unknown)          Physical Exam  Constitutional:       General: She is not in acute distress. Appearance: Normal appearance. She is well-developed. She is not diaphoretic. Chest:   Breasts:     Breasts are symmetrical.      Right: No mass, skin change or tenderness. Left: No mass, skin change or tenderness. Comments: Bilateral mastectomy  Tender right along scar line, no masses felt, skin intact  Abdominal:      Palpations: Abdomen is soft. Abdomen is not rigid. Tenderness: There is no abdominal tenderness. There is no guarding or rebound. Hernia: There is no hernia in the left inguinal area or right inguinal area. Genitourinary:     Pubic Area: No rash. Labia:         Right: No rash, tenderness, lesion or injury. Left: No rash, tenderness, lesion or injury. Urethra: No prolapse, urethral pain, urethral swelling or urethral lesion.       Vagina: No vaginal discharge, erythema, tenderness, bleeding or prolapsed vaginal walls. Adnexa:         Right: No mass, tenderness or fullness. Left: No mass, tenderness or fullness. Comments: Urethral meatus: no lesions, non tender  Urethra: non tender    Vaginal mucosa atrophic  Skin:     General: Skin is warm and dry. Coloration: Skin is not pale. Findings: No erythema or rash.

## 2023-12-12 ENCOUNTER — ANNUAL EXAM (OUTPATIENT)
Dept: OBGYN CLINIC | Facility: CLINIC | Age: 73
End: 2023-12-12
Payer: MEDICARE

## 2023-12-12 VITALS — SYSTOLIC BLOOD PRESSURE: 112 MMHG | DIASTOLIC BLOOD PRESSURE: 68 MMHG

## 2023-12-12 DIAGNOSIS — C56.9 MALIGNANT NEOPLASM OF OVARY, UNSPECIFIED LATERALITY (HCC): ICD-10-CM

## 2023-12-12 DIAGNOSIS — C79.31 MALIGNANT NEOPLASM METASTATIC TO BRAIN (HCC): Primary | ICD-10-CM

## 2023-12-12 DIAGNOSIS — Z01.419 ENCOUNTER FOR GYNECOLOGICAL EXAMINATION WITHOUT ABNORMAL FINDING: ICD-10-CM

## 2023-12-12 DIAGNOSIS — Z78.0 ENCOUNTER FOR OSTEOPOROSIS SCREENING IN ASYMPTOMATIC POSTMENOPAUSAL PATIENT: ICD-10-CM

## 2023-12-12 DIAGNOSIS — Z85.3 HISTORY OF BREAST CANCER: ICD-10-CM

## 2023-12-12 DIAGNOSIS — Z13.820 ENCOUNTER FOR OSTEOPOROSIS SCREENING IN ASYMPTOMATIC POSTMENOPAUSAL PATIENT: ICD-10-CM

## 2023-12-12 DIAGNOSIS — Z91.89 GYN EXAM FOR HIGH-RISK MEDICARE PATIENT: Primary | ICD-10-CM

## 2023-12-12 PROCEDURE — G0101 CA SCREEN;PELVIC/BREAST EXAM: HCPCS | Performed by: OBSTETRICS & GYNECOLOGY

## 2023-12-12 RX ORDER — SULFAMETHOXAZOLE AND TRIMETHOPRIM 800; 160 MG/1; MG/1
TABLET ORAL
COMMUNITY
Start: 2023-12-11

## 2023-12-12 RX ORDER — GABAPENTIN 100 MG/1
CAPSULE ORAL
COMMUNITY
Start: 2023-10-06

## 2023-12-12 NOTE — ASSESSMENT & PLAN NOTE
Yearly visit-   Ovarian cancer metastatic - Cooperative care between [facility]. [facility] and [facility]  Breast cancer s/p mastectomy  Colonoscopy 2018  DEXA ordered    Encourage healthy diet, exercise, Calcium 1200mg per day and at least 800 iu Vitamin D daily.

## 2023-12-15 ENCOUNTER — HOSPITAL ENCOUNTER (OUTPATIENT)
Dept: MRI IMAGING | Facility: HOSPITAL | Age: 73
End: 2023-12-15
Attending: STUDENT IN AN ORGANIZED HEALTH CARE EDUCATION/TRAINING PROGRAM
Payer: MEDICARE

## 2023-12-15 DIAGNOSIS — C79.31 MALIGNANT NEOPLASM METASTATIC TO BRAIN (HCC): ICD-10-CM

## 2023-12-15 PROCEDURE — 70553 MRI BRAIN STEM W/O & W/DYE: CPT

## 2023-12-15 PROCEDURE — A9585 GADOBUTROL INJECTION: HCPCS | Performed by: STUDENT IN AN ORGANIZED HEALTH CARE EDUCATION/TRAINING PROGRAM

## 2023-12-15 PROCEDURE — G1004 CDSM NDSC: HCPCS

## 2023-12-15 RX ORDER — GADOBUTROL 604.72 MG/ML
11 INJECTION INTRAVENOUS
Status: COMPLETED | OUTPATIENT
Start: 2023-12-15 | End: 2023-12-15

## 2023-12-15 RX ADMIN — GADOBUTROL 11 ML: 604.72 INJECTION INTRAVENOUS at 13:49

## 2023-12-28 ENCOUNTER — CLINICAL SUPPORT (OUTPATIENT)
Dept: RADIATION ONCOLOGY | Facility: CLINIC | Age: 73
End: 2023-12-28
Attending: STUDENT IN AN ORGANIZED HEALTH CARE EDUCATION/TRAINING PROGRAM
Payer: MEDICARE

## 2023-12-28 VITALS
TEMPERATURE: 97.7 F | SYSTOLIC BLOOD PRESSURE: 140 MMHG | DIASTOLIC BLOOD PRESSURE: 78 MMHG | RESPIRATION RATE: 16 BRPM | HEART RATE: 85 BPM | OXYGEN SATURATION: 95 %

## 2023-12-28 DIAGNOSIS — C79.31 MALIGNANT NEOPLASM METASTATIC TO BRAIN (HCC): ICD-10-CM

## 2023-12-28 DIAGNOSIS — C56.9 MALIGNANT NEOPLASM OF OVARY, UNSPECIFIED LATERALITY (HCC): Primary | ICD-10-CM

## 2023-12-28 DIAGNOSIS — C79.31 MALIGNANT NEOPLASM METASTATIC TO BRAIN (HCC): Primary | ICD-10-CM

## 2023-12-28 PROCEDURE — 99214 OFFICE O/P EST MOD 30 MIN: CPT | Performed by: STUDENT IN AN ORGANIZED HEALTH CARE EDUCATION/TRAINING PROGRAM

## 2023-12-28 PROCEDURE — 99211 OFF/OP EST MAY X REQ PHY/QHP: CPT | Performed by: STUDENT IN AN ORGANIZED HEALTH CARE EDUCATION/TRAINING PROGRAM

## 2023-12-28 NOTE — PROGRESS NOTES
Lydia Squires 1950 is a 73 y.o. female with a PMHx bilateral breast cancer (s/p initial right sided BCS followed by adjuvant TC and RT (), thereafter with left sided cancer s/p bilateral mastectomy and adjuvant endocrine therapy). 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 She was found to have evidence of brain metastases. On 22 she underwent SRS to four sites of disease each to 2,000 cGy in 1 fraction. On 23 she underwent SRS to six additional sites of intracranial progression, each to 2,000cGy in 1 fraction. She returns today for follow-up.          23 Palliative Care    23 Dr. Benjamin, Urology  previously found ovarian cancer in the bladder. Dr. Lamas gave her chemotherapy and I see no recurrence of that lesion today.         12/15/23 MRI brain w wo contrast  Numerous bilateral enhancing lesions again noted in the supratentorial and infratentorial brain. Interval increase in size of multiple lesions as noted with examples detailed above.   There are additional new lesions noted with a 7 mm right occipital lobe lesion associated with mild surrounding vasogenic edema.       Upcomin24 Palliative Care    Follow up visit     Oncology History   Ovarian cancer (HCC)   2020 Initial Diagnosis    Ovarian cancer on left (HCC)     2020 -  Cancer Staged    Staging form: Ovary, Fallopian Tube, Primary Peritoneal, AJCC 8th Edition  - Pathologic stage from 2020: FIGO Stage IC3 (pN0, cM0) - Signed by Justin Leal MD on 2020  Histologic grade (G): G3  Histologic grading system: 4 grade system  Residual tumor (R): R0 - None  Histopathologic type: Serous cystadenocarcinoma, NOS  Diagnostic confirmation: Positive histology PLUS positive immunophenotyping and/or positive genetic studies  Specimen type: Excision  Staged by: Managing physician  Cancer antigen 125 () (U/mL):  4,000  Gross residual tumor after primary cyto-reductive surgery: Absent  Residual tumor volume after primary cytoreductive surgery: No gross tumor  National guidelines used in treatment planning: Yes  Type of national guideline used in treatment planning: NCCN       1/13/2020 Surgery    Diagnostic laparoscopy, laparotomy, total hysterectomy, bilateral salpingo-oophorectomy with resection of pelvic mass, bilateral pelvic and periaortic lymphadenectomy, staging peritoneal biopsies, omentectomy, appendectomy.  Final pathology consistent with 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.     2/11/2020 - 5/26/2020 Chemotherapy    Six cycles of carboplatin/paclitaxel administered by Dr. Lamas.  Tolerated well.  CT scan at completion of treatment demonstrates no evidence of measurable disease.   amarilis near completion of chemotherapy 5.7 units/milliliter     8/9/2021 Recurrence     Elevated  triggered imaging  which demonstrated suspicious mediastinal lymphadenopathy.  Endobronchial ultrasound-guided fine-needle aspiration on August 9, 2021 demonstrates metastatic carcinoma with immuno phenotype consistent with metastatic ovarian primary.     9/1/2021 - 1/24/2022 Chemotherapy    Carboplatin + Doxil x 6 cycles (Dr. Bob). Complete response. Amarilis Ca125 8.5 IU/mL.     3/2022 - 8/2022 Chemotherapy    Niraparib.     9/14/2022 -  Chemotherapy    Started on gemcitabine and bevacizumab.  On November 29, 2022 after 3 cycles CT scan demonstrates no evidence of measurable disease.   reportedly decreasing somewhat.  Patient tolerating well.     5/30/2023 - 5/30/2023 Chemotherapy    bevacizumab (AVASTIN) IVPB, 15 mg/kg = 1,665 mg, Intravenous, Once, 0 of 4 cycles  pembrolizumab (KEYTRUDA) IVPB, 200 mg, Intravenous, Once, 0 of 4 cycles     Brain metastases   8/17/2022 Initial Diagnosis    Brain metastases (HCC)     8/31/2022 - 8/31/2022 Radiation      Plan ID Energy Fractions Dose per Fraction (cGy)  Dose Correction (cGy) Total Dose Delivered (cGy) Elapsed Days   SRS 4 PTVs 6X-FFF 1 / 1 2,000 0 2,000 0      Treatment dates:  C1 SRS: 8/31/2022 - 8/31/2022 2/1/2023 - 2/1/2023 Radiation    Plan ID Energy Fractions Dose per Fraction (cGy) Dose Correction (cGy) Total Dose Delivered (cGy) Elapsed Days   SRS Inf 3PTVs 6X-FFF 1 / 1 2,000 0 2,000 0   SRS Sup 3PTVs 6X-FFF 1 / 1 2,000 0 2,000 0            Review of Systems:  Review of Systems   Constitutional:  Positive for fatigue.   HENT:  Positive for hearing loss.    Eyes:         Burning sensation in eyes end of day after wearing contacts   Respiratory:  Positive for cough (dry) and shortness of breath (occasional).    Cardiovascular: Negative.    Gastrointestinal:  Positive for abdominal pain (intermittent) and nausea (occasional).   Genitourinary:  Positive for frequency and urgency. Negative for hematuria, pelvic pain, vaginal bleeding, vaginal discharge and vaginal pain.   Musculoskeletal:  Positive for back pain.   Skin: Negative.    Allergic/Immunologic: Positive for immunocompromised state.   Neurological:  Positive for dizziness, syncope (1 week ago), numbness (B/L feet) and headaches. Negative for tremors and seizures.   Hematological:  Bruises/bleeds easily.   Psychiatric/Behavioral:  Positive for sleep disturbance. The patient is nervous/anxious (sometimes).        Clinical Trial: no        Health Maintenance   Topic Date Due    Medicare Annual Wellness Visit (AWV)  Never done    BMI: Followup Plan  Never done    Colorectal Cancer Screening  Never done    Osteoporosis Screening  Never done    Fall Risk  Never done    Urinary Incontinence Screening  Never done    Pneumococcal Vaccine: 65+ Years (2 - PCV) 12/17/2019    Influenza Vaccine (1) 09/01/2023    COVID-19 Vaccine (5 - 2023-24 season) 09/01/2023    Depression Screening  09/14/2024    BMI: Adult  09/20/2024    Hepatitis C Screening  Completed    HIB Vaccine  Aged Out    IPV Vaccine  Aged Out     Hepatitis A Vaccine  Aged Out    Meningococcal ACWY Vaccine  Aged Out    HPV Vaccine  Aged Out     Patient Active Problem List   Diagnosis    Encounter for gynecological examination without abnormal finding    GERD (gastroesophageal reflux disease)    HTN (hypertension), benign    Obesity, Class I, BMI 30.0-34.9 (see actual BMI)    Obstructive sleep apnea    Myopia, bilateral    Elevated CA-125    History of breast cancer    Ovarian cancer (HCC)    Anomalous coronary artery origin    Frequent unifocal PVCs    Liver lesion    Mediastinal lymphadenopathy    PONV (postoperative nausea and vomiting)    Aneurysm of thoracic aorta (HCC)    Bradycardia    Cardiomyopathy (HCC)    Cardiovascular stress test abnormal    Cervical arthritis    Congenital anomaly of coronary artery    Hyperlipidemia    Chronic insomnia    Myopia    Ventricular arrhythmia    Brain metastases    Nocturia    Vulvar irritation    Palliative care patient     Past Medical History:   Diagnosis Date    Anemia 3/23/2020    During chemo    Brain cancer (HCC)     Breast cancer (HCC)     2004 and then 2009    Cancer (HCC) 2005,2009    breast    Cancer (HCC) 2020    ovarian    Chicken pox     Colon polyp     CPAP (continuous positive airway pressure) dependence     Frequent UTI     GERD (gastroesophageal reflux disease)     Heart disease     High cholesterol     History of blood transfusion     2013 with Bilat Knee replacement    Hypertension     Irregular heart beat     PVC's    Ovarian cancer (HCC) 9/13/2020    PONV (postoperative nausea and vomiting)     Sleep apnea      Past Surgical History:   Procedure Laterality Date    APPENDECTOMY      BREAST CYST EXCISION      BREAST LUMPECTOMY      CHOLECYSTECTOMY      COLONOSCOPY      CYSTOSCOPY Bilateral 01/13/2020    Procedure: CYSTOSCOPY: CYSTOSCOPY WITH PERIOPERATIVE URETERAL CATHETERS  PLACEMENT;  Surgeon: Justin Leal MD;  Location: [facility];  Service: Gynecology Oncology    CYSTOSCOPY      HERNIA  REPAIR      HYSTERECTOMY N/A 2020    Procedure: EXPLORATORY LAPAROTOMY, OVARIAN CANCER STAGING WITH TOTAL HYSTERECTOMY, BILATERAL SALPINGO-OOPHORECTOMY, BILATERAL PELVIC AND PERIAORTIC LYMPHADENECTOMY, INDICATED APPENDECTOMY, OMENTECTOMY, STAGING PERITONEAL BIOPSIES.;  Surgeon: Justin Leal MD;  Location: [facility];  Service: Gynecology Oncology    JOINT REPLACEMENT      Bilat Knees    MASTECTOMY Bilateral 2009    VT BRNCHSC INCL FLUOR GDNCE DX W/CELL WASHG SPX N/A 2021    Procedure: BRONCHOSCOPY FLEXIBLE;  Surgeon: Shiv Taylor MD;  Location: [facility];  Service: Thoracic    VT BRONCHOSCOPY NEEDLE BX TRACHEA MAIN STEM&/BRON N/A 2021    Procedure: ENDOBRONCHIAL ULTRASOUND (EBUS);  Surgeon: Shiv Taylor MD;  Location: [facility];  Service: Thoracic    VT LAP RPR HRNA XCPT INCAL/INGUN NCRC8/STRANGULATED N/A 2021    Procedure: REPAIR HERNIA INCISIONAL LAPAROSCOPIC;  Surgeon: Dariel Wang MD;  Location: [facility];  Service: General    VT LAPS ABD PRTM&OMENTUM DX W/WO SPEC BR/WA SPX N/A 2020    Procedure: LAPAROSCOPY DIAGNOSTIC;  Surgeon: Justin Leal MD;  Location: [facility];  Service: Gynecology Oncology    REPAIR KNEE LIGAMENT Bilateral     ROTATOR CUFF REPAIR       Family History   Problem Relation Age of Onset    Breast cancer Mother 48    Cancer Mother     Lung cancer Father     Hypertension Father     Prostate cancer Brother      Social History     Socioeconomic History    Marital status: /Civil Union     Spouse name: Not on file    Number of children: Not on file    Years of education: Not on file    Highest education level: Not on file   Occupational History    Not on file   Tobacco Use    Smoking status: Former     Current packs/day: 0.00     Average packs/day: 1 pack/day for 15.0 years (15.0 ttl pk-yrs)     Types: Cigarettes     Start date: 10/31/1972     Quit date: 10/31/1987     Years since quittin.1    Smokeless tobacco: Former    Vaping Use    Vaping status: Never Used   Substance and Sexual Activity    Alcohol use: Yes     Comment: socially     Drug use: Never    Sexual activity: Not Currently     Partners: Male     Birth control/protection: Post-menopausal     Comment: same partner-few times a year   Other Topics Concern    Not on file   Social History Narrative    Not on file     Social Determinants of Health     Financial Resource Strain: Not on file   Food Insecurity: Not on file   Transportation Needs: Not on file   Physical Activity: Not on file   Stress: Not on file   Social Connections: Not on file   Intimate Partner Violence: Not on file   Housing Stability: Not on file       Current Outpatient Medications:     carvedilol (COREG) 6.25 mg tablet, take 1 tablet by mouth twice a day with meals, Disp: 180 tablet, Rfl: 3    clonazePAM (KlonoPIN) 0.5 mg tablet, Take 0.5 mg by mouth 2 (two) times a day as needed, Disp: , Rfl:     estradiol (ESTRACE) 0.1 mg/g vaginal cream, insert 1 gram vaginally two times a week (Patient not taking: Reported on 9/14/2023), Disp: , Rfl: 0    gabapentin (NEURONTIN) 100 mg capsule, take 1 capsule by mouth three times a day after FOOD, Disp: , Rfl:     LETROZOLE PO, Take by mouth, Disp: , Rfl:     losartan-hydrochlorothiazide (HYZAAR) 50-12.5 mg per tablet, Take 1 tablet by mouth daily , Disp: , Rfl: 0    prochlorperazine (COMPAZINE) 10 mg tablet, Take 10 mg by mouth every 6 (six) hours as needed, Disp: , Rfl:     simvastatin (ZOCOR) 20 mg tablet, Take 20 mg by mouth daily , Disp: , Rfl: 0    sulfamethoxazole-trimethoprim (BACTRIM DS) 800-160 mg per tablet, , Disp: , Rfl:     trimethoprim (PROLOPRIM) 100 mg tablet, Take 100 mg by mouth daily , Disp: , Rfl: 0    venlafaxine 150 MG TB24, Take 150 mg by mouth daily with breakfast, Disp: , Rfl: 0    zolpidem (AMBIEN CR) 12.5 MG CR tablet, Take 12.5 mg by mouth daily at bedtime , Disp: , Rfl: 0  No Known Allergies  There were no vitals filed for this visit.

## 2023-12-29 NOTE — PROGRESS NOTES
Follow-up - Radiation Oncology   Lydia Squires 1950 73 y.o. female [id number]      History of Present Illness   Cancer Staging   Ovarian cancer (HCC)  Staging form: Ovary, Fallopian Tube, Primary Peritoneal, AJCC 8th Edition  - Pathologic stage from 1/13/2020: FIGO Stage IC3 (pN0, cM0) - Signed by Justin Leal MD on 1/28/2020  Histopathologic type: Serous cystadenocarcinoma, NOS  Histologic grade (G): G3  Histologic grading system: 4 grade system  Residual tumor (R): R0 - None  Diagnostic confirmation: Positive histology PLUS positive immunophenotyping and/or positive genetic studies  Specimen type: Excision  Staged by: Managing physician  Cancer antigen 125 () (U/mL): 4,000  Gross residual tumor after primary cyto-reductive surgery: Absent  Residual tumor volume after primary cytoreductive surgery: No gross tumor  National guidelines used in treatment planning: Yes  Type of national guideline used in treatment planning: NCCN    Ms. Lydia Squires is a 73 year old woman with a PMHx bilateral breast cancer (s/p initial right sided BCS followed by adjuvant TC and RT (2006), thereafter with left sided cancer s/p bilateral mastectomy and adjuvant endocrine therapy). 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 She was found to have evidence of brain metastases. On 8/31/22 she underwent SRS to four sites of disease each to 2,000 cGy in 1 fraction. On 2/1/23 she underwent SRS to six additional sites of intracranial progression, each to 2,000cGy in 1 fraction. She returns today for follow-up.      Radiation Summary:  Right breast post-lumpectomy RT (2006)  SRS to four PTVs (L Frontal, L Parietal, R, Parietal, R Precentral Gyrus) on 8/31/22  SRS to six PTVs (L Cerebellum inf, L Cerebellum Med, L Front Parasag, L Frontal Sup x2, R Temp) on 2/1/23    Interval History:  The patient was last seen in follow-up on 9/14/23, at that time  with MRI Brain showing stable untreated intracranial metastases. Since then she has continued to systemic therapy with Dr. Lamas on capecitabine.     MRI Brain (12/15/23) demonstrated:   Numerous bilateral enhancing lesions again noted in the supratentorial and infratentorial brain. Interval increase in size of multiple lesions as noted with examples detailed above.  There are additional new lesions noted with a 7 mm right occipital lobe lesion associated with mild surrounding vasogenic edema.    Currently she is doing well overall and has no apparent symptoms from her brain metastases.     Historical Information   Oncology History   Ovarian cancer (HCC)   1/13/2020 Initial Diagnosis    Ovarian cancer on left (HCC)     1/13/2020 -  Cancer Staged    Staging form: Ovary, Fallopian Tube, Primary Peritoneal, AJCC 8th Edition  - Pathologic stage from 1/13/2020: FIGO Stage IC3 (pN0, cM0) - Signed by Justin Leal MD on 1/28/2020  Histologic grade (G): G3  Histologic grading system: 4 grade system  Residual tumor (R): R0 - None  Histopathologic type: Serous cystadenocarcinoma, NOS  Diagnostic confirmation: Positive histology PLUS positive immunophenotyping and/or positive genetic studies  Specimen type: Excision  Staged by: Managing physician  Cancer antigen 125 () (U/mL): 4,000  Gross residual tumor after primary cyto-reductive surgery: Absent  Residual tumor volume after primary cytoreductive surgery: No gross tumor  National guidelines used in treatment planning: Yes  Type of national guideline used in treatment planning: NCCN       1/13/2020 Surgery    Diagnostic laparoscopy, laparotomy, total hysterectomy, bilateral salpingo-oophorectomy with resection of pelvic mass, bilateral pelvic and periaortic lymphadenectomy, staging peritoneal biopsies, omentectomy, appendectomy.  Final pathology consistent with 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.     2/11/2020 - 5/26/2020 Chemotherapy    Six  cycles of carboplatin/paclitaxel administered by Dr. Lamas.  Tolerated well.  CT scan at completion of treatment demonstrates no evidence of measurable disease.   amarilis near completion of chemotherapy 5.7 units/milliliter     8/9/2021 Recurrence     Elevated  triggered imaging  which demonstrated suspicious mediastinal lymphadenopathy.  Endobronchial ultrasound-guided fine-needle aspiration on August 9, 2021 demonstrates metastatic carcinoma with immuno phenotype consistent with metastatic ovarian primary.     9/1/2021 - 1/24/2022 Chemotherapy    Carboplatin + Doxil x 6 cycles (Dr. Bob). Complete response. Amarilis Ca125 8.5 IU/mL.     3/2022 - 8/2022 Chemotherapy    Niraparib.     9/14/2022 -  Chemotherapy    Started on gemcitabine and bevacizumab.  On November 29, 2022 after 3 cycles CT scan demonstrates no evidence of measurable disease.   reportedly decreasing somewhat.  Patient tolerating well.     5/30/2023 - 5/30/2023 Chemotherapy    bevacizumab (AVASTIN) IVPB, 15 mg/kg = 1,665 mg, Intravenous, Once, 0 of 4 cycles  pembrolizumab (KEYTRUDA) IVPB, 200 mg, Intravenous, Once, 0 of 4 cycles     Brain metastases   8/17/2022 Initial Diagnosis    Brain metastases (HCC)     8/31/2022 - 8/31/2022 Radiation      Plan ID Energy Fractions Dose per Fraction (cGy) Dose Correction (cGy) Total Dose Delivered (cGy) Elapsed Days   SRS 4 PTVs 6X-FFF 1 / 1 2,000 0 2,000 0      Treatment dates:  C1 SRS: 8/31/2022 - 8/31/2022 2/1/2023 - 2/1/2023 Radiation    Plan ID Energy Fractions Dose per Fraction (cGy) Dose Correction (cGy) Total Dose Delivered (cGy) Elapsed Days   SRS Inf 3PTVs 6X-FFF 1 / 1 2,000 0 2,000 0   SRS Sup 3PTVs 6X-FFF 1 / 1 2,000 0 2,000 0            Past Medical History:   Diagnosis Date    Anemia 3/23/2020    During chemo    Brain cancer (HCC)     Breast cancer (HCC)     2004 and then 2009    Cancer (HCC) 2005,2009    breast    Cancer (HCC) 2020    ovarian    Chicken pox     Colon polyp      CPAP (continuous positive airway pressure) dependence     Frequent UTI     GERD (gastroesophageal reflux disease)     Heart disease     High cholesterol     History of blood transfusion     2013 with Bilat Knee replacement    Hypertension     Irregular heart beat     PVC's    Ovarian cancer (HCC) 9/13/2020    PONV (postoperative nausea and vomiting)     Sleep apnea      Past Surgical History:   Procedure Laterality Date    APPENDECTOMY      BREAST CYST EXCISION      BREAST LUMPECTOMY      CHOLECYSTECTOMY      COLONOSCOPY      CYSTOSCOPY Bilateral 01/13/2020    Procedure: CYSTOSCOPY: CYSTOSCOPY WITH PERIOPERATIVE URETERAL CATHETERS  PLACEMENT;  Surgeon: Justin Leal MD;  Location: [facility];  Service: Gynecology Oncology    CYSTOSCOPY      HERNIA REPAIR      HYSTERECTOMY N/A 01/13/2020    Procedure: EXPLORATORY LAPAROTOMY, OVARIAN CANCER STAGING WITH TOTAL HYSTERECTOMY, BILATERAL SALPINGO-OOPHORECTOMY, BILATERAL PELVIC AND PERIAORTIC LYMPHADENECTOMY, INDICATED APPENDECTOMY, OMENTECTOMY, STAGING PERITONEAL BIOPSIES.;  Surgeon: Justin Leal MD;  Location: [facility];  Service: Gynecology Oncology    JOINT REPLACEMENT      Bilat Knees    MASTECTOMY Bilateral 2009    WI BRNCHSC INCL FLUOR GDNCE DX W/CELL WASHG SPX N/A 08/09/2021    Procedure: BRONCHOSCOPY FLEXIBLE;  Surgeon: Shiv Taylor MD;  Location: [facility];  Service: Thoracic    WI BRONCHOSCOPY NEEDLE BX TRACHEA MAIN STEM&/BRON N/A 08/09/2021    Procedure: ENDOBRONCHIAL ULTRASOUND (EBUS);  Surgeon: Shiv Taylor MD;  Location: [facility];  Service: Thoracic    WI LAP RPR HRNA XCPT INCAL/INGUN NCRC8/STRANGULATED N/A 04/08/2021    Procedure: REPAIR HERNIA INCISIONAL LAPAROSCOPIC;  Surgeon: Dariel Wang MD;  Location: [facility];  Service: General    WI LAPS ABD PRTM&OMENTUM DX W/WO SPEC BR/WA SPX N/A 01/13/2020    Procedure: LAPAROSCOPY DIAGNOSTIC;  Surgeon: Justin Leal MD;  Location: [facility];  Service: Gynecology Oncology     REPAIR KNEE LIGAMENT Bilateral     ROTATOR CUFF REPAIR         Social History   Social History     Substance and Sexual Activity   Alcohol Use Yes    Comment: socially      Social History     Substance and Sexual Activity   Drug Use Never     Social History     Tobacco Use   Smoking Status Former    Current packs/day: 0.00    Average packs/day: 1 pack/day for 15.0 years (15.0 ttl pk-yrs)    Types: Cigarettes    Start date: 10/31/1972    Quit date: 10/31/1987    Years since quittin.1   Smokeless Tobacco Former         Meds/Allergies     Current Outpatient Medications:     carvedilol (COREG) 6.25 mg tablet, take 1 tablet by mouth twice a day with meals, Disp: 180 tablet, Rfl: 3    clonazePAM (KlonoPIN) 0.5 mg tablet, Take 0.5 mg by mouth 2 (two) times a day as needed, Disp: , Rfl:     gabapentin (NEURONTIN) 100 mg capsule, Take 100 mg by mouth 3 (three) times a day She is taking once daily at HS, Disp: , Rfl:     losartan-hydrochlorothiazide (HYZAAR) 50-12.5 mg per tablet, Take 1 tablet by mouth daily , Disp: , Rfl: 0    prochlorperazine (COMPAZINE) 10 mg tablet, Take 10 mg by mouth every 6 (six) hours as needed, Disp: , Rfl:     simvastatin (ZOCOR) 20 mg tablet, Take 20 mg by mouth daily , Disp: , Rfl: 0    trimethoprim (PROLOPRIM) 100 mg tablet, Take 100 mg by mouth daily , Disp: , Rfl: 0    venlafaxine 150 MG TB24, Take 150 mg by mouth daily with breakfast, Disp: , Rfl: 0    zolpidem (AMBIEN CR) 12.5 MG CR tablet, Take 12.5 mg by mouth daily at bedtime , Disp: , Rfl: 0    estradiol (ESTRACE) 0.1 mg/g vaginal cream, insert 1 gram vaginally two times a week (Patient not taking: Reported on 2023), Disp: , Rfl: 0    LETROZOLE PO, Take by mouth (Patient not taking: Reported on 2023), Disp: , Rfl:     sulfamethoxazole-trimethoprim (BACTRIM DS) 800-160 mg per tablet, , Disp: , Rfl:   No Known Allergies    Review of Systems   Constitutional:  Positive for fatigue.   HENT:  Positive for hearing loss.     Eyes:         Burning sensation in eyes end of day after wearing contacts   Respiratory:  Positive for cough (dry) and shortness of breath (occasional).    Cardiovascular: Negative.    Gastrointestinal:  Positive for abdominal pain (intermittent) and nausea (occasional).   Genitourinary:  Positive for frequency and urgency. Negative for hematuria, pelvic pain, vaginal bleeding, vaginal discharge and vaginal pain.   Musculoskeletal:  Positive for back pain.   Skin: Negative.    Allergic/Immunologic: Positive for immunocompromised state.   Neurological:  Positive for dizziness, syncope (1 week ago), numbness (B/L feet) and headaches. Negative for tremors and seizures.   Hematological:  Bruises/bleeds easily.   Psychiatric/Behavioral:  Positive for sleep disturbance. The patient is nervous/anxious (sometimes).         OBJECTIVE:   /78   Pulse 85   Temp 97.7 °F (36.5 °C)   Resp 16   LMP  (LMP Unknown)   SpO2 95%   Pain Assessment:  0  ECOG/Zubrod/WHO: 1 - Symptomatic but completely ambulatory    Physical Exam   Well appearing. NAD.   No increased work of breathing.   Extremities warm and well perfused.   No overt neurologic deficits.     RESULTS    Lab Results: No results found for this or any previous visit (from the past 672 hour(s)).    Imaging Studies:MRI brain w wo contrast    Result Date: 12/22/2023  Narrative: MRI BRAIN WITH AND WITHOUT CONTRAST INDICATION: C79.31: Secondary malignant neoplasm of brain.   History of metastatic ovarian cancer. COMPARISON: MRI brain with and without contrast 9/8/2023, 7/7/2023 TECHNIQUE: Multiplanar, multisequence imaging of the brain was performed before and after gadolinium administration. IV Contrast:  11 mL of Gadobutrol injection (SINGLE-DOSE) IMAGE QUALITY:   Diagnostic. FINDINGS: BRAIN PARENCHYMA: Numerous small enhancing lesions in the bilateral supratentorial and infratentorial brain in keeping with history of metastases. Multiple new lesions are noted, with  examples as follows: -Right frontal lobe location (series 7, image 13) measuring 5 mm -Right occipital lobe location (series 7, image 11) measuring 7 mm associated with surrounding T2/FLAIR hyperintense signal consistent with vasogenic edema. Multiple previously noted lesions have increased in size, with examples as follows: -Right parietal lobe location (series 10, image 18) now measuring 5 mm, previously 2 mm -Right parietal lobe location (series 10, image 21) measuring 5 mm, previously 2 mm - Left parietal occipital junction (series 11, image 86) measuring 6 mm, previously 3 mm. There is minimal perilesional edema. -Left cerebellar location (series 10, image 6) measuring 5 mm, previously 2 mm - Pineal enhancing lesion (series 11, image 70) measuring 7 mm, previously 3 mm. There is no mass effect or midline shift. There is no intracranial hemorrhage.  Diffusion imaging is unremarkable. Small scattered hyperintensities on T2/FLAIR imaging are noted in the periventricular and subcortical white matter demonstrating an appearance that is statistically most likely to represent mild microangiopathic change. VENTRICLES:  Normal for the patient's age. SELLA AND PITUITARY GLAND:  Normal. ORBITS:  Normal. PARANASAL SINUSES:  Normal. VASCULATURE: Abnormal flow void noted in the left sigmoid sinus on T2 sequence which demonstrates normal enhancement on the T1 postcontrast series suggesting slow flow artifact. Evaluation of the remaining major intracranial vasculature demonstrates appropriate flow voids. CALVARIUM AND SKULL BASE:  Normal. EXTRACRANIAL SOFT TISSUES:  Normal.     Impression: Numerous bilateral enhancing lesions again noted in the supratentorial and infratentorial brain. Interval increase in size of multiple lesions as noted with examples detailed above. There are additional new lesions noted with a 7 mm right occipital lobe lesion associated with mild surrounding vasogenic edema. Resident: MONICO JASSO I, the  "attending radiologist, have reviewed the images and agree with the final report above. Workstation performed: [id number]           Assessment/Plan:  No orders of the defined types were placed in this encounter.     We reviewed the patient's repeat MRI brain in detail in comparison to prior imaging studies.  On my review this shows multifocal progression of disease both within her previously visualized untreated intracranial metastases as well as development of multiple new intracranial metastases.  Notable among these include a 7 mm right occipital lobe lesion, which is the largest, as well as a small, right anterior octavio metastasis. In total her metastases number approximately 15-20 in total.     We discussed options for further treatment, noting that while her capecitabine will hopefully show benefit systemically, it is unlikely to produce significant intracranial effect. We discussed the relative advantages and disadvantages of hippocampal sparing whole brain RT vs selective SRS. While WBRT is more comprehensive and would likely offer better intracranial control, it is associate with far more substantial side effects. Alternatively, selective SRS to her most problematic/largest metastases would hopefully control her most substantial disease, but would leave her at high risk for progression \"distantly\" within the brain.     The patient will consider her options before deciding how to proceed. She will additionally discuss these options with her primary medical oncologist for additional input. I will await her decision next week before deciding how to proceed.     Jefferson Farris MD  12/29/2023,9:28 AM    Portions of the record may have been created with voice recognition software.  Occasional wrong word or \"sound a like\" substitutions may have occurred due to the inherent limitations of voice recognition software.  Read the chart carefully and recognize, using context, where substitutions have " occurred.

## 2024-01-02 DIAGNOSIS — C79.31 MALIGNANT NEOPLASM METASTATIC TO BRAIN (HCC): Primary | ICD-10-CM

## 2024-01-02 PROCEDURE — 77263 THER RADIOLOGY TX PLNG CPLX: CPT | Performed by: STUDENT IN AN ORGANIZED HEALTH CARE EDUCATION/TRAINING PROGRAM

## 2024-01-09 ENCOUNTER — APPOINTMENT (OUTPATIENT)
Dept: RADIATION ONCOLOGY | Facility: HOSPITAL | Age: 74
End: 2024-01-09
Attending: STUDENT IN AN ORGANIZED HEALTH CARE EDUCATION/TRAINING PROGRAM
Payer: MEDICARE

## 2024-01-09 ENCOUNTER — RADIATION THERAPY TREATMENT (OUTPATIENT)
Dept: RADIATION ONCOLOGY | Facility: CLINIC | Age: 74
End: 2024-01-09
Attending: STUDENT IN AN ORGANIZED HEALTH CARE EDUCATION/TRAINING PROGRAM
Payer: MEDICARE

## 2024-01-09 PROCEDURE — 77290 THER RAD SIMULAJ FIELD CPLX: CPT | Performed by: STUDENT IN AN ORGANIZED HEALTH CARE EDUCATION/TRAINING PROGRAM

## 2024-01-09 PROCEDURE — 77334 RADIATION TREATMENT AID(S): CPT | Performed by: STUDENT IN AN ORGANIZED HEALTH CARE EDUCATION/TRAINING PROGRAM

## 2024-01-10 ENCOUNTER — HOSPITAL ENCOUNTER (OUTPATIENT)
Dept: MRI IMAGING | Facility: HOSPITAL | Age: 74
Discharge: HOME | End: 2024-01-10
Attending: STUDENT IN AN ORGANIZED HEALTH CARE EDUCATION/TRAINING PROGRAM
Payer: MEDICARE

## 2024-01-10 DIAGNOSIS — C79.31 MALIGNANT NEOPLASM METASTATIC TO BRAIN (HCC): ICD-10-CM

## 2024-01-10 PROCEDURE — 70553 MRI BRAIN STEM W/O & W/DYE: CPT

## 2024-01-10 PROCEDURE — G1004 CDSM NDSC: HCPCS

## 2024-01-10 PROCEDURE — A9585 GADOBUTROL INJECTION: HCPCS | Performed by: STUDENT IN AN ORGANIZED HEALTH CARE EDUCATION/TRAINING PROGRAM

## 2024-01-10 RX ORDER — GADOBUTROL 604.72 MG/ML
10 INJECTION INTRAVENOUS
Status: COMPLETED | OUTPATIENT
Start: 2024-01-10 | End: 2024-01-10

## 2024-01-10 RX ADMIN — GADOBUTROL 10 ML: 604.72 INJECTION INTRAVENOUS at 19:18

## 2024-01-16 PROCEDURE — 77334 RADIATION TREATMENT AID(S): CPT | Performed by: STUDENT IN AN ORGANIZED HEALTH CARE EDUCATION/TRAINING PROGRAM

## 2024-01-16 PROCEDURE — 77300 RADIATION THERAPY DOSE PLAN: CPT | Performed by: STUDENT IN AN ORGANIZED HEALTH CARE EDUCATION/TRAINING PROGRAM

## 2024-01-16 PROCEDURE — 77295 3-D RADIOTHERAPY PLAN: CPT | Performed by: STUDENT IN AN ORGANIZED HEALTH CARE EDUCATION/TRAINING PROGRAM

## 2024-01-16 PROCEDURE — 77470 SPECIAL RADIATION TREATMENT: CPT | Performed by: STUDENT IN AN ORGANIZED HEALTH CARE EDUCATION/TRAINING PROGRAM

## 2024-01-16 PROCEDURE — 77370 RADIATION PHYSICS CONSULT: CPT | Performed by: STUDENT IN AN ORGANIZED HEALTH CARE EDUCATION/TRAINING PROGRAM

## 2024-01-17 ENCOUNTER — TELEPHONE (OUTPATIENT)
Dept: RADIATION ONCOLOGY | Facility: HOSPITAL | Age: 74
End: 2024-01-17

## 2024-01-17 ENCOUNTER — APPOINTMENT (OUTPATIENT)
Dept: RADIATION ONCOLOGY | Facility: HOSPITAL | Age: 74
End: 2024-01-17
Payer: MEDICARE

## 2024-01-17 ENCOUNTER — APPOINTMENT (OUTPATIENT)
Dept: RADIATION ONCOLOGY | Facility: HOSPITAL | Age: 74
End: 2024-01-17
Attending: STUDENT IN AN ORGANIZED HEALTH CARE EDUCATION/TRAINING PROGRAM
Payer: MEDICARE

## 2024-01-17 ENCOUNTER — PROCEDURE VISIT (OUTPATIENT)
Dept: NEUROSURGERY | Facility: CLINIC | Age: 74
End: 2024-01-17
Payer: MEDICARE

## 2024-01-17 DIAGNOSIS — G95.9 DISEASE OF SPINAL CORD (HCC): Primary | ICD-10-CM

## 2024-01-17 PROCEDURE — 61799 SRS CRAN LES COMPLEX ADDL: CPT | Performed by: SPECIALIST

## 2024-01-17 PROCEDURE — 77373 STRTCTC BDY RAD THER TX DLVR: CPT | Performed by: STUDENT IN AN ORGANIZED HEALTH CARE EDUCATION/TRAINING PROGRAM

## 2024-01-17 PROCEDURE — 77432 STEREOTACTIC RADIATION TRMT: CPT | Performed by: STUDENT IN AN ORGANIZED HEALTH CARE EDUCATION/TRAINING PROGRAM

## 2024-01-17 PROCEDURE — 61798 SRS CRANIAL LESION COMPLEX: CPT | Performed by: SPECIALIST

## 2024-01-17 NOTE — PROGRESS NOTES
PATIENT NAME: Lydia Squires  : 1950  MRN: [id number]  PROCEDURE DATE: 2024    Stereotactic Radiosurgery (SRS) and (SRT) Operative Note    Preop Diagnosis: Multiple brain mets (23 in total)    Postop Diagnosis: Same    Procedure Details:   Frameless Stereotactic Radiotherapyfor for 20 brain mets involving both supratentorial and infratentorial space to include, but not limited to, left occipital x 2, left temporal, right frontal parasagittal, right occipital, left cerebellum x 2, left frontal x 2, left parietotemporal, right cerebellum x 3, right frontal x 2, right parietal x 4.    2.   Frameless Stereotactic Radiotherapy for for 3 brain mets involving both  right octavio, pineal region and right thalamus    Surgeon: Constance Moscoso MD    Assistants: none    No qualified resident was available to assist with this case.     Radiation Oncologist(s): Josue Farris MD    Estimated Blood Loss:  None           Specimens: None    Drains: None           Total IV Fluids: None              Findings: As above.    Complications:  None    Anesthesia: None      DETAILS OF PROCEDURE    The patient presented to the outpatient area of the Department of Radiation Oncology where an open faced immobilization mask was created. The patient then underwent a stereotactic head CT while immobilized in the mask. The patient was then released from the Department.    The patient’s stereotactic CT and previous stereotactic MRI scans were fused in the SRS planning software, which was used to develop the radiosurgical plan.    The radiosurgical prescription called for a dose of 18 Camarena to be delivered in one fraction to the PTV (SRS) for the 20 small lesions. Patient also received the 1st of 5 fractions to the right octavio, pineal region, and right thalamic met at a dose of 5 Gy to the PTV (SRT). The total dose to these 3 lesions after the 5th fraction will be 25 Gy. The PTV's were created by expanding the GTV, as contoured by myself  and/or Radiation Oncologist. The radiosurgical plan utilized the mini-multileaf columnator on the TrueBeam machine at the [facility].     When the final treatment plan had been developed and approved, the patient returned to the Department for their frameless SRS treatment.      The patient was positioned on the treatment couch. The Optic Surface Monitoring System (OSMS) was used initially to align the patient. The patient was then immobilized in their open faced mask. kV and then cone beam CT imaging was used to further align the patient. Once the radiation oncologist, physicist and I agreed the patient was in correct position, the fields were treated sequentially without complications. The OSMS was used during the treatment to assure continued correct positioning of the patient, and if it detected patient motion, interrupted the treatment beam until the patient was back in alignment.      When the SRS treatment had been delivered, the patient was recovered from the treatment room, and discharged from the department. There was no blood loss and no specimen.      SIGNATURE: Constance Moscoso  DATE: 1/17/2024   TIME: 1:12 PM

## 2024-01-17 NOTE — TELEPHONE ENCOUNTER
Please call Lydia on thurs 1/18 to see how she is doing one day s/p SRS. She will return on fri 1/19 for SRT

## 2024-01-18 ENCOUNTER — TELEPHONE (OUTPATIENT)
Dept: RADIATION ONCOLOGY | Facility: HOSPITAL | Age: 74
End: 2024-01-18

## 2024-01-18 NOTE — TELEPHONE ENCOUNTER
Called Lydia today to see how she is feeling s/p SRS yesterday. She is feeling pretty good, she had some fatigue/weakness earlier and canceled her plans for today but is feeling better as the day goes on. Denies any headaches, vision changes, new neurological symptoms. She had mild nausea but not needing to take her anti-emetic as it resolved.   She is concerned about making it in tomorrow for her SRT treatment with the weather. She will call in the morning if she won't make it in.

## 2024-01-19 ENCOUNTER — APPOINTMENT (OUTPATIENT)
Dept: RADIATION ONCOLOGY | Facility: HOSPITAL | Age: 74
End: 2024-01-19
Attending: STUDENT IN AN ORGANIZED HEALTH CARE EDUCATION/TRAINING PROGRAM
Payer: MEDICARE

## 2024-01-22 ENCOUNTER — APPOINTMENT (OUTPATIENT)
Dept: RADIATION ONCOLOGY | Facility: HOSPITAL | Age: 74
End: 2024-01-22
Attending: STUDENT IN AN ORGANIZED HEALTH CARE EDUCATION/TRAINING PROGRAM
Payer: MEDICARE

## 2024-01-22 PROCEDURE — 77373 STRTCTC BDY RAD THER TX DLVR: CPT | Performed by: STUDENT IN AN ORGANIZED HEALTH CARE EDUCATION/TRAINING PROGRAM

## 2024-01-24 ENCOUNTER — APPOINTMENT (OUTPATIENT)
Dept: RADIATION ONCOLOGY | Facility: HOSPITAL | Age: 74
End: 2024-01-24
Attending: STUDENT IN AN ORGANIZED HEALTH CARE EDUCATION/TRAINING PROGRAM
Payer: MEDICARE

## 2024-01-24 PROCEDURE — 77373 STRTCTC BDY RAD THER TX DLVR: CPT | Performed by: RADIOLOGY

## 2024-01-24 NOTE — NURSING NOTE
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 No    Do you have a family history of heart disease? Yes, early death    Have you ever experienced chest pain? No    Have you had a CABG, angioplasty, cardiac cath, stent placement? No, cath 3/21  No obstruction    Do you have a pacemaker or defibrillator? No    Have you ever been diagnosed with an irregular heart beat? Freq PVC    Do you have a history of having COPD or asthma? No    Do you have high blood pressure? Yes, takes medication    Do you have diabetes? no    Do you have high cholesterol? Yes, takes medication    Do you smoke? no    General Information    Do you take any male enhancement medications such as Viagra, Levitra, or Cialis (PDE5 inhibitors) ? No          Due to nitrate given during test, this needs to be held for 3 days prior to testing and may be resumed 24 hrs after testing to prevent a life threatening drop in BP  (Women may take for pulmonary hyertension)    Do you have an allergy to intravenous contrast or dye? No  Do you have kidney disease? No                  GFR:  63 3/18/21    Height:               6'                Weight:  251    Questionnaire completed with patient via phone  Test/medications explained, all questions answered  Medications/allergy verified  Pt recently started increased dose of Coreg, is currently wearing a holter monitor  Pt verbalizes understanding of education and instructions 
Patient arrived for cardiac cta accompanied by spouse  Test and medications reviewed  Patient states BP, IV can be done in either arm  Pt verifies she took extra dose of coreg this am as instructed  HR above target rate for test, additional 5 mg IV metoprolol x 5 doses given to complete test  Pt tolerated test well, remained asymptomatic during and post test  See vitals flowsheet  Remained in unit to be monitored per protocol  Encouraged increased fluids  remainder of day  Pt asymptomatic upon departure with spouse 
No assistance needed

## 2024-01-26 ENCOUNTER — APPOINTMENT (OUTPATIENT)
Dept: RADIATION ONCOLOGY | Facility: HOSPITAL | Age: 74
End: 2024-01-26
Attending: STUDENT IN AN ORGANIZED HEALTH CARE EDUCATION/TRAINING PROGRAM
Payer: MEDICARE

## 2024-01-26 PROCEDURE — 77373 STRTCTC BDY RAD THER TX DLVR: CPT | Performed by: STUDENT IN AN ORGANIZED HEALTH CARE EDUCATION/TRAINING PROGRAM

## 2024-01-29 ENCOUNTER — APPOINTMENT (OUTPATIENT)
Dept: RADIATION ONCOLOGY | Facility: HOSPITAL | Age: 74
End: 2024-01-29
Attending: STUDENT IN AN ORGANIZED HEALTH CARE EDUCATION/TRAINING PROGRAM
Payer: MEDICARE

## 2024-01-29 DIAGNOSIS — C79.31 MALIGNANT NEOPLASM METASTATIC TO BRAIN (HCC): Primary | ICD-10-CM

## 2024-01-29 PROCEDURE — 77336 RADIATION PHYSICS CONSULT: CPT | Performed by: STUDENT IN AN ORGANIZED HEALTH CARE EDUCATION/TRAINING PROGRAM

## 2024-01-29 PROCEDURE — 77373 STRTCTC BDY RAD THER TX DLVR: CPT | Performed by: STUDENT IN AN ORGANIZED HEALTH CARE EDUCATION/TRAINING PROGRAM

## 2024-01-29 PROCEDURE — 77435 SBRT MANAGEMENT: CPT | Performed by: STUDENT IN AN ORGANIZED HEALTH CARE EDUCATION/TRAINING PROGRAM

## 2024-02-21 PROBLEM — Z01.419 ENCOUNTER FOR GYNECOLOGICAL EXAMINATION WITHOUT ABNORMAL FINDING: Status: RESOLVED | Noted: 2018-10-31 | Resolved: 2024-02-21

## 2024-02-22 ENCOUNTER — OFFICE VISIT (OUTPATIENT)
Dept: PALLIATIVE MEDICINE | Facility: CLINIC | Age: 74
End: 2024-02-22
Payer: MEDICARE

## 2024-02-22 VITALS
OXYGEN SATURATION: 98 % | SYSTOLIC BLOOD PRESSURE: 128 MMHG | DIASTOLIC BLOOD PRESSURE: 82 MMHG | HEIGHT: 72 IN | RESPIRATION RATE: 16 BRPM | TEMPERATURE: 98.1 F | HEART RATE: 79 BPM | BODY MASS INDEX: 32.12 KG/M2

## 2024-02-22 DIAGNOSIS — Z51.5 PALLIATIVE CARE PATIENT: ICD-10-CM

## 2024-02-22 DIAGNOSIS — C56.9 MALIGNANT NEOPLASM OF OVARY, UNSPECIFIED LATERALITY (HCC): Primary | ICD-10-CM

## 2024-02-22 DIAGNOSIS — C79.31 MALIGNANT NEOPLASM METASTATIC TO BRAIN (HCC): ICD-10-CM

## 2024-02-22 PROCEDURE — 99214 OFFICE O/P EST MOD 30 MIN: CPT | Performed by: STUDENT IN AN ORGANIZED HEALTH CARE EDUCATION/TRAINING PROGRAM

## 2024-02-22 NOTE — PROGRESS NOTES
Outpatient Follow-Up - Palliative and Supportive Care   Lydia Squires 73 y.o. female [id number]    Assessment & Plan  Problem List Items Addressed This Visit          Endocrine    Ovarian cancer (HCC) - Primary       Nervous and Auditory    Brain metastases       Other    Palliative care patient     #symptom management  #neuropathic pain   - venlafaxine 150 mg PO QDaily     #insomnia/anxiety   - continue clonazepam 0.5 mg PO BID PRN   - zolpidem 12.5 mg PO QHS     #nausea   - continue prochlorperazine 10 mg PO Q6H PRN    #goals of care   - MRI Brain 01/10/2024 with re-demonstrated innumerable metastatic lesions; new 3 mm lesions w/I the R anterior temporal lobe.   - completed 5 sessions SRS, last session reported 3 weeks ago   - treatment focused care with no limitations at this time   - continued discussion regarding overall plan of care as clinical condition evolves    #psychosocial support   - emotional support provided   - Dayron [spouse,  37 years] [phone]   - two adult children              - Nisha [daughter] [phone]              - Joel [son]   - resides with spouse   - [employer] background      Next [facility] Follow up in 4 weeks.      Cont[place] Sub[place] Review    [place]MP or [place]  reviewed: No red flags were identified; safe to proceed with prescription..    PDMP Review         Value Time User    PDMP Reviewed  Yes (P)  2/22/2024 12:00 PM Tico Farris MD            Medications adjusted this encounter:  Requested Prescriptions      No prescriptions requested or ordered in this encounter     No orders of the defined types were placed in this encounter.      Medications Discontinued During This Encounter   Medication Reason    gabapentin (NEURONTIN) 100 mg capsule Therapy completed         Lydia Squires was seen today for symptoms and planning cares related to above illnesses.  I have reviewed the patient's controlled substance dispensing history in the Prescription Drug  Monitoring Program in compliance with the [place] regulations before prescribing any controlled substances.    They are invited to continue to follow with us.  If there are questions or concerns, please contact us through our clinic/answering service 24 hours a day, seven days a week.    Tico Farris MD  [facility]  [phone]      Visit Information    Accompanied By: Spouse    Source of History: Self, Spouse, Medical record    History Limitations: None      History of Present Illness    Lydia Squires is a 73 y.o. female who presents in follow up of symptoms related to metastatic ovarian cancer. Pertinent issues include: symptom management, depression or anxiety, fatigue, assessment of goals of care, disease process education and discussion of prognosis.    Patient reports increasing fatigue, otherwise, no other symptoms of distress reported. No reported pain. Denies nausea, vomiting. Appetite intact, weight stable. BM regular, baseline. Adequate sleep.      Past medical, surgical, social, and family histories are reviewed and pertinent updates are made.    Review of Systems   Constitutional: Positive for malaise/fatigue. Negative for chills, decreased appetite, fever and weight loss.   HENT:  Negative for congestion.    Eyes:  Negative for visual disturbance.   Cardiovascular:  Negative for chest pain.   Respiratory:  Negative for shortness of breath.    Musculoskeletal:  Negative for falls and neck pain.   Gastrointestinal:  Negative for abdominal pain, constipation, nausea and vomiting.   Genitourinary:  Negative for frequency.   Neurological:  Negative for headaches.   Psychiatric/Behavioral:  The patient does not have insomnia.    All other systems reviewed and are negative.        Vital Signs    /82 (BP Location: Right arm, Patient Position: Sitting, Cuff Size: Standard)   Pulse 79   Temp 98.1 °F (36.7 °C) (Tympanic)   Resp 16   Ht 6' (1.829 m)   LMP  (LMP Unknown)    SpO2 98%   BMI 32.12 kg/m²     Physical Exam and Objective Data  Physical Exam  Vitals and nursing note reviewed.   Constitutional:       General: She is awake.      Appearance: She is not diaphoretic.      Comments: Sitting up in NAD  BMI 32.1  Non-toxic appearing   HENT:      Head: Normocephalic and atraumatic.      Right Ear: External ear normal.      Left Ear: External ear normal.      Nose: No rhinorrhea.   Eyes:      Comments: No gaze preference   Cardiovascular:      Rate and Rhythm: Normal rate.   Pulmonary:      Effort: No tachypnea, accessory muscle usage or respiratory distress.      Comments: Completes full sentences without difficulty  Musculoskeletal:      Cervical back: Normal range of motion.   Neurological:      General: No focal deficit present.      Mental Status: She is alert and oriented to person, place, and time.   Psychiatric:         Attention and Perception: Attention normal.         Mood and Affect: Mood and affect normal.         Speech: Speech normal.         Cognition and Memory: Cognition and memory normal.           Radiology and Laboratory:  I personally reviewed and interpreted the following results:    Most Recent COVID-19 Results:  Lab Results   Component Value Date/Time    SARSCOV2 Negative 03/23/2021 12:00 AM    SARSCOV2 NOT DETECTED 12/14/2020 11:24 AM       Most Recent Lab Work:  Lab Results   Component Value Date/Time    SODIUM 137 01/19/2020 12:51 PM    K 3.4 (L) 01/19/2020 12:51 PM    BUN 18 03/18/2021 12:51 PM    CREATININE 0.94 03/18/2021 12:51 PM    GLUC 133 01/19/2020 12:51 PM     Lab Results   Component Value Date/Time    AST 25 01/19/2020 12:51 PM    ALT 27 01/19/2020 12:51 PM    ALB 2.9 (L) 01/19/2020 12:51 PM     Lab Results   Component Value Date/Time    HGB 10.5 (L) 01/19/2020 12:51 PM    WBC 6.94 01/19/2020 12:51 PM     01/19/2020 12:51 PM    INR 1.05 01/13/2020 09:22 AM       Most Recent Imaging [last 30 days]:  No results found.    35 minutes was  spent face to face with Lydia Squires and her spouse with greater than 50% of the time spent in counseling or coordination of care including discussions of provided medical updates, discussed palliative care, determined goals of care, determined social/family support, discussed plans of care, discussed symptom management, provided psychosocial support. PDMP Reviewed. All of the patient's or agent's questions were answered during this discussion.

## 2024-03-29 ENCOUNTER — HOSPITAL ENCOUNTER (OUTPATIENT)
Dept: MRI IMAGING | Facility: HOSPITAL | Age: 74
End: 2024-03-29
Attending: STUDENT IN AN ORGANIZED HEALTH CARE EDUCATION/TRAINING PROGRAM
Payer: MEDICARE

## 2024-03-29 DIAGNOSIS — C79.31 MALIGNANT NEOPLASM METASTATIC TO BRAIN (HCC): ICD-10-CM

## 2024-03-29 PROCEDURE — A9585 GADOBUTROL INJECTION: HCPCS | Performed by: STUDENT IN AN ORGANIZED HEALTH CARE EDUCATION/TRAINING PROGRAM

## 2024-03-29 PROCEDURE — 70553 MRI BRAIN STEM W/O & W/DYE: CPT

## 2024-03-29 RX ORDER — GADOBUTROL 604.72 MG/ML
10 INJECTION INTRAVENOUS
Status: COMPLETED | OUTPATIENT
Start: 2024-03-29 | End: 2024-03-29

## 2024-03-29 RX ADMIN — GADOBUTROL 10 ML: 604.72 INJECTION INTRAVENOUS at 12:34

## 2024-04-03 ENCOUNTER — RADIATION ONCOLOGY FOLLOW-UP (OUTPATIENT)
Dept: RADIATION ONCOLOGY | Facility: HOSPITAL | Age: 74
End: 2024-04-03
Attending: STUDENT IN AN ORGANIZED HEALTH CARE EDUCATION/TRAINING PROGRAM
Payer: MEDICARE

## 2024-04-03 VITALS
HEART RATE: 70 BPM | DIASTOLIC BLOOD PRESSURE: 82 MMHG | SYSTOLIC BLOOD PRESSURE: 112 MMHG | TEMPERATURE: 97.6 F | RESPIRATION RATE: 18 BRPM | OXYGEN SATURATION: 98 %

## 2024-04-03 DIAGNOSIS — C79.31 MALIGNANT NEOPLASM METASTATIC TO BRAIN (HCC): Primary | ICD-10-CM

## 2024-04-03 PROCEDURE — 99024 POSTOP FOLLOW-UP VISIT: CPT | Performed by: STUDENT IN AN ORGANIZED HEALTH CARE EDUCATION/TRAINING PROGRAM

## 2024-04-03 PROCEDURE — 99211 OFF/OP EST MAY X REQ PHY/QHP: CPT | Performed by: STUDENT IN AN ORGANIZED HEALTH CARE EDUCATION/TRAINING PROGRAM

## 2024-04-03 NOTE — PROGRESS NOTES
Lydia Squires 1950 is a 73 y.o. female with prior bilateral breast cancers s/p initial BCS and adjuvant TC and RT (left, 2006), thereafter with right breast cancer s/p bilateral mastectomies. 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 She is s/p prior SRS x2 (8.31.22 and 2.1.23), now with further POD. After extensive discussion she was hoping to avoid WBRT and on 1/29/24 completed a course of SRT to three central/brainstem PTVs, each to 2500cGy in 5 fractions. She additionally underwent SRS to 20 cerebellar/cerebral metastases to a dose of 1800cGy in 1 fraction (on 1/17/24).     The patient tolerated SRS/SRT well overall without any appreciable acute side effects. Following completion of SRS/SRT she will continue on capecitabine under the care of her medical oncologist. She returns for 2 month follow-up with repeat MRI Brain.       2/22/24 Palliative care follow-up    2/29/24 [facility] -  Dr. Wilburn  Continue single agent Navelbine      3/29/24 MRI brain BT  IMPRESSION:  Overall positive treatment response since 1/10/2024.  - Interval improvement of multiple enhancing intracranial metastatic lesions which have decreased in size, number, and degree of enhancement.  Single left occipital lobe lesion has slightly increased in size. Attention on follow-up.  - Persistent mild perilesional vasogenic edema in bilateral frontal lobes but overall improved since the prior examination.  - No acute intracranial abnormality.      Upcoming appts:  4/17/24 Palliative care      Oncology History   Ovarian cancer (HCC)   1/13/2020 Initial Diagnosis    Ovarian cancer on left (HCC)     1/13/2020 -  Cancer Staged    Staging form: Ovary, Fallopian Tube, Primary Peritoneal, AJCC 8th Edition  - Pathologic stage from 1/13/2020: FIGO Stage IC3 (pN0, cM0) - Signed by Justin Leal MD on 1/28/2020  Histologic grade (G): G3  Histologic grading system: 4 grade  system  Residual tumor (R): R0 - None  Histopathologic type: Serous cystadenocarcinoma, NOS  Diagnostic confirmation: Positive histology PLUS positive immunophenotyping and/or positive genetic studies  Specimen type: Excision  Staged by: Managing physician  Cancer antigen 125 () (U/mL): 4,000  Gross residual tumor after primary cyto-reductive surgery: Absent  Residual tumor volume after primary cytoreductive surgery: No gross tumor  National guidelines used in treatment planning: Yes  Type of national guideline used in treatment planning: NCCN       1/13/2020 Surgery    Diagnostic laparoscopy, laparotomy, total hysterectomy, bilateral salpingo-oophorectomy with resection of pelvic mass, bilateral pelvic and periaortic lymphadenectomy, staging peritoneal biopsies, omentectomy, appendectomy.  Final pathology consistent with 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.     2/11/2020 - 5/26/2020 Chemotherapy    Six cycles of carboplatin/paclitaxel administered by Dr. Lamas.  Tolerated well.  CT scan at completion of treatment demonstrates no evidence of measurable disease.   amarilis near completion of chemotherapy 5.7 units/milliliter     8/9/2021 Recurrence     Elevated  triggered imaging  which demonstrated suspicious mediastinal lymphadenopathy.  Endobronchial ultrasound-guided fine-needle aspiration on August 9, 2021 demonstrates metastatic carcinoma with immuno phenotype consistent with metastatic ovarian primary.     9/1/2021 - 1/24/2022 Chemotherapy    Carboplatin + Doxil x 6 cycles (Dr. Bbo). Complete response. Amarilis Ca125 8.5 IU/mL.     3/2022 - 8/2022 Chemotherapy    Niraparib.     9/14/2022 -  Chemotherapy    Started on gemcitabine and bevacizumab.  On November 29, 2022 after 3 cycles CT scan demonstrates no evidence of measurable disease.   reportedly decreasing somewhat.  Patient tolerating well.     5/30/2023 - 5/30/2023 Chemotherapy    bevacizumab (AVASTIN) IVPB, 15  mg/kg = 1,665 mg, Intravenous, Once, 0 of 4 cycles  pembrolizumab (KEYTRUDA) IVPB, 200 mg, Intravenous, Once, 0 of 4 cycles     1/17/2024 - 1/29/2024 Radiation    Course: C3 SRS_SRT    Plan ID Energy Fractions Dose per Fraction (cGy) Dose Correction (cGy) Total Dose Delivered (cGy) Elapsed Days   [id number] 6X-FFF 1 / 1 1,800 0 1,800 0   [id number] 6X-FFF 5 / 5 500 0 2,500 12      Treatment Dates:  1/17/2024 - 1/29/2024.      Brain metastases   8/17/2022 Initial Diagnosis    Brain metastases (HCC)     8/31/2022 - 8/31/2022 Radiation      Plan ID Energy Fractions Dose per Fraction (cGy) Dose Correction (cGy) Total Dose Delivered (cGy) Elapsed Days   SRS 4 PTVs 6X-FFF 1 / 1 2,000 0 2,000 0      Treatment dates:  C1 SRS: 8/31/2022 - 8/31/2022 2/1/2023 - 2/1/2023 Radiation    Plan ID Energy Fractions Dose per Fraction (cGy) Dose Correction (cGy) Total Dose Delivered (cGy) Elapsed Days   SRS Inf 3PTVs 6X-FFF 1 / 1 2,000 0 2,000 0   SRS Sup 3PTVs 6X-FFF 1 / 1 2,000 0 2,000 0        1/17/2024 - 1/29/2024 Radiation    Course: C3 SRS_SRT    Plan ID Energy Fractions Dose per Fraction (cGy) Dose Correction (cGy) Total Dose Delivered (cGy) Elapsed Days   [id number] 6X-FFF 1 / 1 1,800 0 1,800 0   [id number] 6X-FFF 5 / 5 500 0 2,500 12      Treatment Dates:  1/17/2024 - 1/29/2024.          Review of Systems:  Review of Systems   Constitutional:  Positive for fatigue.   HENT: Negative.     Eyes: Negative.    Respiratory:  Positive for shortness of breath (On exertion, present since initiation of chemo, worse since March).    Cardiovascular: Negative.    Gastrointestinal:  Positive for nausea and vomiting (1-2x per week).   Endocrine: Negative.    Genitourinary: Negative.    Musculoskeletal: Negative.    Skin: Negative.    Allergic/Immunologic: Negative.    Hematological: Negative.    Psychiatric/Behavioral: Negative.         Clinical Trial: no        Health Maintenance   Topic Date Due    Medicare Annual Wellness Visit (AWV)   Never done    BMI: Followup Plan  Never done    Zoster Vaccine (1 of 2) Never done    Colorectal Cancer Screening  Never done    Osteoporosis Screening  Never done    Fall Risk  Never done    Urinary Incontinence Screening  Never done    Pneumococcal Vaccine: 65+ Years (2 of 2 - PCV) 12/17/2019    Influenza Vaccine (1) 09/01/2023    COVID-19 Vaccine (6 - 2023-24 season) 02/02/2024    BMI: Adult  09/20/2024    Depression Screening  04/03/2025    Hepatitis C Screening  Completed    HIB Vaccine  Aged Out    IPV Vaccine  Aged Out    Hepatitis A Vaccine  Aged Out    Meningococcal ACWY Vaccine  Aged Out    HPV Vaccine  Aged Out     Patient Active Problem List   Diagnosis    GERD (gastroesophageal reflux disease)    HTN (hypertension), benign    Obesity, Class I, BMI 30.0-34.9 (see actual BMI)    Obstructive sleep apnea    Myopia, bilateral    Elevated CA-125    History of breast cancer    Ovarian cancer (HCC)    Anomalous coronary artery origin    Frequent unifocal PVCs    Liver lesion    Mediastinal lymphadenopathy    PONV (postoperative nausea and vomiting)    Aneurysm of thoracic aorta (HCC)    Bradycardia    Cardiomyopathy (HCC)    Cardiovascular stress test abnormal    Cervical arthritis    Congenital anomaly of coronary artery    Hyperlipidemia    Chronic insomnia    Myopia    Ventricular arrhythmia    Brain metastases    Nocturia    Vulvar irritation    Palliative care patient     Past Medical History:   Diagnosis Date    Anemia 3/23/2020    During chemo    Brain cancer (HCC)     Breast cancer (HCC)     2004 and then 2009    Cancer (HCC) 2005,2009    breast    Cancer (HCC) 2020    ovarian    Chicken pox     Colon polyp     CPAP (continuous positive airway pressure) dependence     Frequent UTI     GERD (gastroesophageal reflux disease)     Heart disease     High cholesterol     History of blood transfusion     2013 with Bilat Knee replacement    Hypertension     Irregular heart beat     PVC's    Ovarian cancer (HCC)  9/13/2020    PONV (postoperative nausea and vomiting)     Sleep apnea      Past Surgical History:   Procedure Laterality Date    APPENDECTOMY      BREAST CYST EXCISION      BREAST LUMPECTOMY      CHOLECYSTECTOMY      COLONOSCOPY      CYSTOSCOPY Bilateral 01/13/2020    Procedure: CYSTOSCOPY: CYSTOSCOPY WITH PERIOPERATIVE URETERAL CATHETERS  PLACEMENT;  Surgeon: Justin Leal MD;  Location: [facility];  Service: Gynecology Oncology    CYSTOSCOPY      HERNIA REPAIR      HYSTERECTOMY N/A 01/13/2020    Procedure: EXPLORATORY LAPAROTOMY, OVARIAN CANCER STAGING WITH TOTAL HYSTERECTOMY, BILATERAL SALPINGO-OOPHORECTOMY, BILATERAL PELVIC AND PERIAORTIC LYMPHADENECTOMY, INDICATED APPENDECTOMY, OMENTECTOMY, STAGING PERITONEAL BIOPSIES.;  Surgeon: Justin Leal MD;  Location: [facility];  Service: Gynecology Oncology    JOINT REPLACEMENT      Bilat Knees    MASTECTOMY Bilateral 2009    NY BRNCHSC INCL FLUOR GDNCE DX W/CELL WASHG SPX N/A 08/09/2021    Procedure: BRONCHOSCOPY FLEXIBLE;  Surgeon: Shiv Taylor MD;  [facility]: [facility];  Service: Thoracic    NY BRONCHOSCOPY NEEDLE BX TRACHEA MAIN STEM&/BRON N/A 08/09/2021    Procedure: ENDOBRONCHIAL ULTRASOUND (EBUS);  Surgeon: Shiv Taylor MD;  [facility]: [facility];  Service: Thoracic    NY LAP RPR HRNA XCPT INCAL/INGUN NCRC8/STRANGULATED N/A 04/08/2021    Procedure: REPAIR HERNIA INCISIONAL LAPAROSCOPIC;  Surgeon: Dariel Wang MD;  Location: [facility];  Service: General    NY LAPS ABD PRTM&OMENTUM DX W/WO SPEC BR/WA SPX N/A 01/13/2020    Procedure: LAPAROSCOPY DIAGNOSTIC;  Surgeon: Justin Leal MD;  [facility]: [facility];  Service: Gynecology Oncology    REPAIR KNEE LIGAMENT Bilateral     ROTATOR CUFF REPAIR       Family History   Problem Relation Age of Onset    Breast cancer Mother 48    Cancer Mother     Lung cancer Father     Hypertension Father     Prostate cancer Brother      Social History     Socioeconomic History    Marital status:  /Civil Union     Spouse name: Not on file    Number of children: Not on file    Years of education: Not on file    Highest education level: Not on file   Occupational History    Not on file   Tobacco Use    Smoking status: Former     Current packs/day: 0.00     Average packs/day: 1 pack/day for 15.0 years (15.0 ttl pk-yrs)     Types: Cigarettes     Start date: 10/31/1972     Quit date: 10/31/1987     Years since quittin.4    Smokeless tobacco: Former   Vaping Use    Vaping status: Never Used   Substance and Sexual Activity    Alcohol use: Yes     Comment: socially     Drug use: Never    Sexual activity: Not Currently     Partners: Male     Birth control/protection: Post-menopausal     Comment: same partner-few times a year   Other Topics Concern    Not on file   Social History Narrative    Not on file     Social Determinants of Health     Financial Resource Strain: Not on file   Food Insecurity: Not on file   Transportation Needs: Not on file   Physical Activity: Not on file   Stress: Not on file   Social Connections: Not on file   Intimate Partner Violence: Not on file   Housing Stability: Not on file       Current Outpatient Medications:     carvedilol (COREG) 6.25 mg tablet, take 1 tablet by mouth twice a day with meals, Disp: 180 tablet, Rfl: 3    clonazePAM (KlonoPIN) 0.5 mg tablet, Take 0.5 mg by mouth 2 (two) times a day as needed, Disp: , Rfl:     losartan-hydrochlorothiazide (HYZAAR) 50-12.5 mg per tablet, Take 1 tablet by mouth daily , Disp: , Rfl: 0    prochlorperazine (COMPAZINE) 10 mg tablet, Take 10 mg by mouth every 6 (six) hours as needed, Disp: , Rfl:     simvastatin (ZOCOR) 20 mg tablet, Take 20 mg by mouth daily , Disp: , Rfl: 0    trimethoprim (PROLOPRIM) 100 mg tablet, Take 100 mg by mouth daily , Disp: , Rfl: 0    venlafaxine 150 MG TB24, Take 150 mg by mouth daily with breakfast, Disp: , Rfl: 0    zolpidem (AMBIEN CR) 12.5 MG CR tablet, Take 12.5 mg by mouth daily at bedtime ,  Disp: , Rfl: 0    estradiol (ESTRACE) 0.1 mg/g vaginal cream, insert 1 gram vaginally two times a week (Patient not taking: Reported on 9/14/2023), Disp: , Rfl: 0    LETROZOLE PO, Take by mouth (Patient not taking: Reported on 12/28/2023), Disp: , Rfl:     oseltamivir (TAMIFLU) 75 mg capsule, take 1 capsule by mouth once daily for 10 days (Patient not taking: Reported on 2/22/2024), Disp: , Rfl:     sulfamethoxazole-trimethoprim (BACTRIM DS) 800-160 mg per tablet, , Disp: , Rfl:   No Known Allergies  Vitals:    04/03/24 1502   BP: 112/82   BP Location: Left arm   Patient Position: Sitting   Cuff Size: Standard   Pulse: 70   Resp: 18   Temp: 97.6 °F (36.4 °C)   TempSrc: Temporal   SpO2: 98%      Pain Score: 0-No pain

## 2024-04-04 NOTE — PROGRESS NOTES
Follow-up - Radiation Oncology   Lydia Squires 1950 73 y.o. female [id number]      History of Present Illness   Cancer Staging   Ovarian cancer (HCC)  Staging form: Ovary, Fallopian Tube, Primary Peritoneal, AJCC 8th Edition  - Pathologic stage from 1/13/2020: FIGO Stage IC3 (pN0, cM0) - Signed by Justin Leal MD on 1/28/2020  Histopathologic type: Serous cystadenocarcinoma, NOS  Histologic grade (G): G3  Histologic grading system: 4 grade system  Residual tumor (R): R0 - None  Diagnostic confirmation: Positive histology PLUS positive immunophenotyping and/or positive genetic studies  Specimen type: Excision  Staged by: Managing physician  Cancer antigen 125 () (U/mL): 4,000  Gross residual tumor after primary cyto-reductive surgery: Absent  Residual tumor volume after primary cytoreductive surgery: No gross tumor  National guidelines used in treatment planning: Yes  Type of national guideline used in treatment planning: NCCN    Ms. Lydia Squires is a 73 year old woman with a PMHx bilateral breast cancer (s/p initial right sided BCS followed by adjuvant TC and RT (2006), thereafter with left sided cancer s/p bilateral mastectomy and adjuvant endocrine therapy). 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 She was found to have evidence of brain metastases and has undergone multiple courses of SRS.  She returns today for follow-up.      Radiation Summary:  Right breast post-lumpectomy RT (2006)  SRS to four PTVs (L Frontal, L Parietal, R, Parietal, R Precentral Gyrus) on 8/31/22  SRS to six PTVs (L Cerebellum inf, L Cerebellum Med, L Front Parasag, L Frontal Sup x2, R Temp) on 2/1/23  SRS to 20 PTVs, each to 1800cGy, on 1/17/24.   SRT to 3 PTVs to 2500cGy in 5 fractions (1/27/24-1/29/24)    Interval History:  The patient was last seen in follow-up on 12/28/23, since then she has continued to follow with Dr. Lamas, now on  Navelbine (initiated 2/2024).     MRI Brain (12/15/23) demonstrated:   Numerous bilateral enhancing lesions again noted in the supratentorial and infratentorial brain. Interval increase in size of multiple lesions as noted with examples detailed above.  There are additional new lesions noted with a 7 mm right occipital lobe lesion associated with mild surrounding vasogenic edema.    Currently she is doing well overall and has no apparent symptoms from her brain metastases.     Historical Information   Oncology History   Ovarian cancer (HCC)   1/13/2020 Initial Diagnosis    Ovarian cancer on left (HCC)     1/13/2020 -  Cancer Staged    Staging form: Ovary, Fallopian Tube, Primary Peritoneal, AJCC 8th Edition  - Pathologic stage from 1/13/2020: FIGO Stage IC3 (pN0, cM0) - Signed by Justin Leal MD on 1/28/2020  Histologic grade (G): G3  Histologic grading system: 4 grade system  Residual tumor (R): R0 - None  Histopathologic type: Serous cystadenocarcinoma, NOS  Diagnostic confirmation: Positive histology PLUS positive immunophenotyping and/or positive genetic studies  Specimen type: Excision  Staged by: Managing physician  Cancer antigen 125 () (U/mL): 4,000  Gross residual tumor after primary cyto-reductive surgery: Absent  Residual tumor volume after primary cytoreductive surgery: No gross tumor  National guidelines used in treatment planning: Yes  Type of national guideline used in treatment planning: NCCN       1/13/2020 Surgery    Diagnostic laparoscopy, laparotomy, total hysterectomy, bilateral salpingo-oophorectomy with resection of pelvic mass, bilateral pelvic and periaortic lymphadenectomy, staging peritoneal biopsies, omentectomy, appendectomy.  Final pathology consistent with 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.     2/11/2020 - 5/26/2020 Chemotherapy    Six cycles of carboplatin/paclitaxel administered by Dr. Lamas.  Tolerated well.  CT scan at completion of treatment  demonstrates no evidence of measurable disease.   amarilis near completion of chemotherapy 5.7 units/milliliter     8/9/2021 Recurrence     Elevated  triggered imaging  which demonstrated suspicious mediastinal lymphadenopathy.  Endobronchial ultrasound-guided fine-needle aspiration on August 9, 2021 demonstrates metastatic carcinoma with immuno phenotype consistent with metastatic ovarian primary.     9/1/2021 - 1/24/2022 Chemotherapy    Carboplatin + Doxil x 6 cycles (Dr. Bob). Complete response. Amarilis Ca125 8.5 IU/mL.     3/2022 - 8/2022 Chemotherapy    Niraparib.     9/14/2022 -  Chemotherapy    Started on gemcitabine and bevacizumab.  On November 29, 2022 after 3 cycles CT scan demonstrates no evidence of measurable disease.   reportedly decreasing somewhat.  Patient tolerating well.     5/30/2023 - 5/30/2023 Chemotherapy    bevacizumab (AVASTIN) IVPB, 15 mg/kg = 1,665 mg, Intravenous, Once, 0 of 4 cycles  pembrolizumab (KEYTRUDA) IVPB, 200 mg, Intravenous, Once, 0 of 4 cycles     1/17/2024 - 1/29/2024 Radiation    Course: C3 SRS_SRT    Plan ID Energy Fractions Dose per Fraction (cGy) Dose Correction (cGy) Total Dose Delivered (cGy) Elapsed Days   [id number] 6X-FFF 1 / 1 1,800 0 1,800 0   [id number] 6X-FFF 5 / 5 500 0 2,500 12      Treatment Dates:  1/17/2024 - 1/29/2024.      Brain metastases   8/17/2022 Initial Diagnosis    Brain metastases (HCC)     8/31/2022 - 8/31/2022 Radiation      Plan ID Energy Fractions Dose per Fraction (cGy) Dose Correction (cGy) Total Dose Delivered (cGy) Elapsed Days   SRS 4 PTVs 6X-FFF 1 / 1 2,000 0 2,000 0      Treatment dates:  C1 SRS: 8/31/2022 - 8/31/2022 2/1/2023 - 2/1/2023 Radiation    Plan ID Energy Fractions Dose per Fraction (cGy) Dose Correction (cGy) Total Dose Delivered (cGy) Elapsed Days   SRS Inf 3PTVs 6X-FFF 1 / 1 2,000 0 2,000 0   SRS Sup 3PTVs 6X-FFF 1 / 1 2,000 0 2,000 0        1/17/2024 - 1/29/2024 Radiation    Course: C3 SRS_SRT    Plan  ID Energy Fractions Dose per Fraction (cGy) Dose Correction (cGy) Total Dose Delivered (cGy) Elapsed Days   [id number] 6X-FFF 1 / 1 1,800 0 1,800 0   [id number] 6X-FFF 5 / 5 500 0 2,500 12      Treatment Dates:  1/17/2024 - 1/29/2024.          Past Medical History:   Diagnosis Date    Anemia 3/23/2020    During chemo    Brain cancer (HCC)     Breast cancer (HCC)     2004 and then 2009    Cancer (HCC) 2005,2009    breast    Cancer (HCC) 2020    ovarian    Chicken pox     Colon polyp     CPAP (continuous positive airway pressure) dependence     Frequent UTI     GERD (gastroesophageal reflux disease)     Heart disease     High cholesterol     History of blood transfusion     2013 with Bilat Knee replacement    Hypertension     Irregular heart beat     PVC's    Ovarian cancer (HCC) 9/13/2020    PONV (postoperative nausea and vomiting)     Sleep apnea      Past Surgical History:   Procedure Laterality Date    APPENDECTOMY      BREAST CYST EXCISION      BREAST LUMPECTOMY      CHOLECYSTECTOMY      COLONOSCOPY      CYSTOSCOPY Bilateral 01/13/2020    Procedure: CYSTOSCOPY: CYSTOSCOPY WITH PERIOPERATIVE URETERAL CATHETERS  PLACEMENT;  Surgeon: Justin Leal MD;  Location: [facility];  Service: Gynecology Oncology    CYSTOSCOPY      HERNIA REPAIR      HYSTERECTOMY N/A 01/13/2020    Procedure: EXPLORATORY LAPAROTOMY, OVARIAN CANCER STAGING WITH TOTAL HYSTERECTOMY, BILATERAL SALPINGO-OOPHORECTOMY, BILATERAL PELVIC AND PERIAORTIC LYMPHADENECTOMY, INDICATED APPENDECTOMY, OMENTECTOMY, STAGING PERITONEAL BIOPSIES.;  Surgeon: Justin Leal MD;  Location: [facility];  Service: Gynecology Oncology    JOINT REPLACEMENT      Bilat Knees    MASTECTOMY Bilateral 2009    [facility] W/CELL WASHG SPX N/A 08/09/2021    Procedure: BRONCHOSCOPY FLEXIBLE;  Surgeon: Shiv Taylor MD;  [facility]: [facility];  Service: Thoracic    FL BRONCHOSCOPY NEEDLE BX TRACHEA MAIN STEM&/BRON N/A 08/09/2021    Procedure:  ENDOBRONCHIAL ULTRASOUND (EBUS);  Surgeon: Shiv Taylor MD;  [facility]: [facility];  Service: Thoracic    AL LAP RPR HRNA XCPT INCAL/INGUN NCRC8/STRANGULATED N/A 2021    Procedure: REPAIR HERNIA INCISIONAL LAPAROSCOPIC;  Surgeon: Dariel Wang MD;  Location: [facility];  Service: General    AL LAPS ABD PRTM&OMENTUM DX W/WO SPEC BR/WA SPX N/A 2020    Procedure: LAPAROSCOPY DIAGNOSTIC;  Surgeon: Justin Leal MD;  Location[facility];  Service: Gynecology Oncology    REPAIR KNEE LIGAMENT Bilateral     ROTATOR CUFF REPAIR         Social History   Social History     Substance and Sexual Activity   Alcohol Use Yes    Comment: socially      Social History     Substance and Sexual Activity   Drug Use Never     Social History     Tobacco Use   Smoking Status Former    Current packs/day: 0.00    Average packs/day: 1 pack/day for 15.0 years (15.0 ttl pk-yrs)    Types: Cigarettes    Start date: 10/31/1972    Quit date: 10/31/1987    Years since quittin.4   Smokeless Tobacco Former         Meds/Allergies     Current Outpatient Medications:     carvedilol (COREG) 6.25 mg tablet, take 1 tablet by mouth twice a day with meals, Disp: 180 tablet, Rfl: 3    clonazePAM (KlonoPIN) 0.5 mg tablet, Take 0.5 mg by mouth 2 (two) times a day as needed, Disp: , Rfl:     losartan-hydrochlorothiazide (HYZAAR) 50-12.5 mg per tablet, Take 1 tablet by mouth daily , Disp: , Rfl: 0    prochlorperazine (COMPAZINE) 10 mg tablet, Take 10 mg by mouth every 6 (six) hours as needed, Disp: , Rfl:     simvastatin (ZOCOR) 20 mg tablet, Take 20 mg by mouth daily , Disp: , Rfl: 0    trimethoprim (PROLOPRIM) 100 mg tablet, Take 100 mg by mouth daily , Disp: , Rfl: 0    venlafaxine 150 MG TB24, Take 150 mg by mouth daily with breakfast, Disp: , Rfl: 0    zolpidem (AMBIEN CR) 12.5 MG CR tablet, Take 12.5 mg by mouth daily at bedtime , Disp: , Rfl: 0    estradiol (ESTRACE) 0.1 mg/g vaginal cream, insert 1 gram vaginally two times a  week (Patient not taking: Reported on 9/14/2023), Disp: , Rfl: 0    LETROZOLE PO, Take by mouth (Patient not taking: Reported on 12/28/2023), Disp: , Rfl:     oseltamivir (TAMIFLU) 75 mg capsule, take 1 capsule by mouth once daily for 10 days (Patient not taking: Reported on 2/22/2024), Disp: , Rfl:     sulfamethoxazole-trimethoprim (BACTRIM DS) 800-160 mg per tablet, , Disp: , Rfl:   No Known Allergies    Review of Systems   Constitutional:  Positive for fatigue.   HENT: Negative.     Eyes: Negative.    Respiratory:  Positive for shortness of breath (On exertion, present since initiation of chemo, worse since March).    Cardiovascular: Negative.    Gastrointestinal:  Positive for nausea and vomiting (1-2x per week).   Endocrine: Negative.    Genitourinary: Negative.    Musculoskeletal: Negative.    Skin: Negative.    Allergic/Immunologic: Negative.    Hematological: Negative.    Psychiatric/Behavioral: Negative.             OBJECTIVE:   /82 (BP Location: Left arm, Patient Position: Sitting, Cuff Size: Standard)   Pulse 70   Temp 97.6 °F (36.4 °C) (Temporal)   Resp 18   LMP  (LMP Unknown)   SpO2 98%   Pain Assessment:  0  ECOG/Zubrod/WHO: 1 - Symptomatic but completely ambulatory    Physical Exam   Well appearing. NAD.   No increased work of breathing.   Extremities warm and well perfused.   No overt neurologic deficits.     Assessment/Plan:  Orders Placed This Encounter   Procedures    MRI Brain BT w wo Contrast    BUN/Creatinine Ratio      We reviewed the patient's repeat MRI brain in detail comparison multiple prior imaging studies.  On my review all of her recently treated brain metastases have either improved or resolved following SRS/SRT.  She has had slight progression of a left occipital lesion, which has not been previously treated, and persistence of several other small brain metastases which have not enlarged.  She did suffer increased fatigue and side effects after her last course of  "treatment, which is likely secondary to the increased integral dose of multiple PTV targets irradiated simultaneously and the accumulative effect of her multiple courses of systemic/radiation therapy.  Given the small amount of growth seen in the left occipital lesion I will recommend short interval follow-up with a repeat MRI brain in 2 months.  If continued growth is seen there I will recommend repeat SRS to the site and any other sites amenable to treatment at that time.    Jefferson Farris MD  4/4/2024,12:42 PM    Portions of the record may have been created with voice recognition software.  Occasional wrong word or \"sound a like\" substitutions may have occurred due to the inherent limitations of voice recognition software.  Read the chart carefully and recognize, using context, where substitutions have occurred.        "

## 2024-06-03 ENCOUNTER — HOSPITAL ENCOUNTER (OUTPATIENT)
Dept: MRI IMAGING | Facility: HOSPITAL | Age: 74
Discharge: HOME | End: 2024-06-03
Attending: STUDENT IN AN ORGANIZED HEALTH CARE EDUCATION/TRAINING PROGRAM
Payer: MEDICARE

## 2024-06-03 DIAGNOSIS — C79.31 MALIGNANT NEOPLASM METASTATIC TO BRAIN (HCC): ICD-10-CM

## 2024-06-03 PROCEDURE — 70553 MRI BRAIN STEM W/O & W/DYE: CPT

## 2024-06-03 PROCEDURE — A9585 GADOBUTROL INJECTION: HCPCS | Performed by: STUDENT IN AN ORGANIZED HEALTH CARE EDUCATION/TRAINING PROGRAM

## 2024-06-03 RX ORDER — GADOBUTROL 604.72 MG/ML
10 INJECTION INTRAVENOUS
Status: COMPLETED | OUTPATIENT
Start: 2024-06-03 | End: 2024-06-03

## 2024-06-03 RX ADMIN — GADOBUTROL 10 ML: 604.72 INJECTION INTRAVENOUS at 15:57

## 2024-06-12 ENCOUNTER — RADIATION ONCOLOGY FOLLOW-UP (OUTPATIENT)
Dept: RADIATION ONCOLOGY | Facility: HOSPITAL | Age: 74
End: 2024-06-12
Attending: STUDENT IN AN ORGANIZED HEALTH CARE EDUCATION/TRAINING PROGRAM
Payer: MEDICARE

## 2024-06-12 VITALS
SYSTOLIC BLOOD PRESSURE: 155 MMHG | TEMPERATURE: 98.2 F | HEART RATE: 78 BPM | OXYGEN SATURATION: 98 % | RESPIRATION RATE: 16 BRPM | DIASTOLIC BLOOD PRESSURE: 95 MMHG

## 2024-06-12 DIAGNOSIS — C79.31 MALIGNANT NEOPLASM METASTATIC TO BRAIN (HCC): Primary | ICD-10-CM

## 2024-06-12 PROCEDURE — G2211 COMPLEX E/M VISIT ADD ON: HCPCS | Performed by: STUDENT IN AN ORGANIZED HEALTH CARE EDUCATION/TRAINING PROGRAM

## 2024-06-12 PROCEDURE — 99214 OFFICE O/P EST MOD 30 MIN: CPT | Performed by: STUDENT IN AN ORGANIZED HEALTH CARE EDUCATION/TRAINING PROGRAM

## 2024-06-12 PROCEDURE — 99211 OFF/OP EST MAY X REQ PHY/QHP: CPT | Performed by: STUDENT IN AN ORGANIZED HEALTH CARE EDUCATION/TRAINING PROGRAM

## 2024-06-12 NOTE — PROGRESS NOTES
Lydia Squires 1950 is a 73 y.o. female with a PMHx bilateral breast cancer (s/p initial right sided BCS followed by adjuvant TC and RT (2006), thereafter with left sided cancer s/p bilateral mastectomy and adjuvant endocrine therapy). 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 She was found to have evidence of brain metastases and has undergone multiple courses of SRS.  Last seen for follow-up on 4/3/24. She returns today for follow-up with repeat MRI brain.       Radiation Summary:  Right breast post-lumpectomy RT (2006)  SRS to four PTVs (L Frontal, L Parietal, R, Parietal, R Precentral Gyrus) on 8/31/22  SRS to six PTVs (L Cerebellum inf, L Cerebellum Med, L Front Parasag, L Frontal Sup x2, R Temp) on 2/1/23  SRS to 20 PTVs, each to 1800cGy, on 1/17/24.   SRT to 3 PTVs to 2500cGy in 5 fractions (1/27/24-1/29/24)      6/3/24 MRI brain  Progression of disease with a total of approximately 25 enhancing lesions scattered within the brain parenchyma both supratentorial and infratentorial measuring up to 9 mm in size. The vast majority of lesions have increased in size compared to the prior examination.    Dr. Lamas, now on Navelbine (initiated 2/2024).       Follow up visit     Oncology History Overview Note   with a PMHx bilateral breast cancer (s/p initial right sided BCS followed by adjuvant TC and RT (2006), thereafter with left sided cancer s/p bilateral mastectomy and adjuvant endocrine therapy). 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 She was found to have evidence of brain metastases and has undergone multiple courses of SRS.  Last seen for follow-up on 4/3/24. She returns today for follow-up with repeat MRI brain.       Radiation Summary:  Right breast post-lumpectomy RT (2006)  SRS to four PTVs (L Frontal, L Parietal, R, Parietal, R  Precentral Gyrus) on 8/31/22  SRS to six PTVs (L Cerebellum inf, L Cerebellum Med, L Front Parasag, L Frontal Sup x2, R Temp) on 2/1/23  SRS to 20 PTVs, each to 1800cGy, on 1/17/24.   SRT to 3 PTVs to 2500cGy in 5 fractions (1/27/24-1/29/24)      6/3/24 MRI brain  Progression of disease with a total of approximately 25 enhancing lesions scattered within the brain parenchyma both supratentorial and infratentorial measuring up to 9 mm in size. The vast majority of lesions have increased in size compared to the prior examination.       Ovarian cancer (HCC)   1/13/2020 Initial Diagnosis    Ovarian cancer on left (HCC)     1/13/2020 -  Cancer Staged    Staging form: Ovary, Fallopian Tube, Primary Peritoneal, AJCC 8th Edition  - Pathologic stage from 1/13/2020: FIGO Stage IC3 (pN0, cM0) - Signed by Justin Leal MD on 1/28/2020  Histologic grade (G): G3  Histologic grading system: 4 grade system  Residual tumor (R): R0 - None  Histopathologic type: Serous cystadenocarcinoma, NOS  Diagnostic confirmation: Positive histology PLUS positive immunophenotyping and/or positive genetic studies  Specimen type: Excision  Staged by: Managing physician  Cancer antigen 125 () (U/mL): 4,000  Gross residual tumor after primary cyto-reductive surgery: Absent  Residual tumor volume after primary cytoreductive surgery: No gross tumor  National guidelines used in treatment planning: Yes  Type of national guideline used in treatment planning: NCCN       1/13/2020 Surgery    Diagnostic laparoscopy, laparotomy, total hysterectomy, bilateral salpingo-oophorectomy with resection of pelvic mass, bilateral pelvic and periaortic lymphadenectomy, staging peritoneal biopsies, omentectomy, appendectomy.  Final pathology consistent with 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.     2/11/2020 - 5/26/2020 Chemotherapy    Six cycles of carboplatin/paclitaxel administered by Dr. Lamas.  Tolerated well.  CT scan at completion of  treatment demonstrates no evidence of measurable disease.   amarilis near completion of chemotherapy 5.7 units/milliliter     8/9/2021 Recurrence     Elevated  triggered imaging  which demonstrated suspicious mediastinal lymphadenopathy.  Endobronchial ultrasound-guided fine-needle aspiration on August 9, 2021 demonstrates metastatic carcinoma with immuno phenotype consistent with metastatic ovarian primary.     9/1/2021 - 1/24/2022 Chemotherapy    Carboplatin + Doxil x 6 cycles (Dr. Bob). Complete response. Amarilis Ca125 8.5 IU/mL.     3/2022 - 8/2022 Chemotherapy    Niraparib.     9/14/2022 -  Chemotherapy    Started on gemcitabine and bevacizumab.  On November 29, 2022 after 3 cycles CT scan demonstrates no evidence of measurable disease.   reportedly decreasing somewhat.  Patient tolerating well.     5/30/2023 - 5/30/2023 Chemotherapy    bevacizumab (AVASTIN) IVPB, 15 mg/kg = 1,665 mg, Intravenous, Once, 0 of 4 cycles  pembrolizumab (KEYTRUDA) IVPB, 200 mg, Intravenous, Once, 0 of 4 cycles     1/17/2024 - 1/29/2024 Radiation    Course: C3 SRS_SRT    Plan ID Energy Fractions Dose per Fraction (cGy) Dose Correction (cGy) Total Dose Delivered (cGy) Elapsed Days   [id number] 6X-FFF 1 / 1 1,800 0 1,800 0   [id number] 6X-FFF 5 / 5 500 0 2,500 12      Treatment Dates:  1/17/2024 - 1/29/2024.      Brain metastases   8/17/2022 Initial Diagnosis    Brain metastases (HCC)     8/31/2022 - 8/31/2022 Radiation      Plan ID Energy Fractions Dose per Fraction (cGy) Dose Correction (cGy) Total Dose Delivered (cGy) Elapsed Days   SRS 4 PTVs 6X-FFF 1 / 1 2,000 0 2,000 0      Treatment dates:  C1 SRS: 8/31/2022 - 8/31/2022 2/1/2023 - 2/1/2023 Radiation    Plan ID Energy Fractions Dose per Fraction (cGy) Dose Correction (cGy) Total Dose Delivered (cGy) Elapsed Days   SRS Inf 3PTVs 6X-FFF 1 / 1 2,000 0 2,000 0   SRS Sup 3PTVs 6X-FFF 1 / 1 2,000 0 2,000 0        1/17/2024 - 1/29/2024 Radiation    Course: C3  SRS_SRT    Plan ID Energy Fractions Dose per Fraction (cGy) Dose Correction (cGy) Total Dose Delivered (cGy) Elapsed Days   [id number] 6X-FFF 1 / 1 1,800 0 1,800 0   [id number] 6X-FFF 5 / 5 500 0 2,500 12      Treatment Dates:  1/17/2024 - 1/29/2024.          Review of Systems:  Review of Systems   Constitutional:  Positive for fatigue.   HENT: Negative.     Eyes: Negative.    Respiratory:  Positive for shortness of breath.    Cardiovascular: Negative.    Gastrointestinal:  Positive for nausea. Negative for vomiting.   Musculoskeletal:  Positive for gait problem.        Right leg pain   Neurological:  Positive for dizziness.   Psychiatric/Behavioral: Negative.         Clinical Trial: no    Pregnancy test needed:  no    Teaching  Health Maintenance   Topic Date Due    Medicare Annual Wellness Visit (AWV)  Never done    BMI: Followup Plan  Never done    Zoster Vaccine (1 of 2) Never done    Colorectal Cancer Screening  Never done    Osteoporosis Screening  Never done    Fall Risk  Never done    Urinary Incontinence Screening  Never done    Pneumococcal Vaccine: 65+ Years (2 of 2 - PCV) 12/17/2019    COVID-19 Vaccine (6 - 2023-24 season) 09/01/2023    Influenza Vaccine (Season Ended) 09/01/2024    BMI: Adult  09/20/2024    Depression Screening  04/03/2025    Hepatitis C Screening  Completed    RSV Vaccine Age 60+ Years  Completed    RSV Vaccine age 0-20 Months  Aged Out    HIB Vaccine  Aged Out    IPV Vaccine  Aged Out    Hepatitis A Vaccine  Aged Out    Meningococcal ACWY Vaccine  Aged Out    HPV Vaccine  Aged Out     Patient Active Problem List   Diagnosis    GERD (gastroesophageal reflux disease)    HTN (hypertension), benign    Obesity, Class I, BMI 30.0-34.9 (see actual BMI)    Obstructive sleep apnea    Myopia, bilateral    Elevated CA-125    History of breast cancer    Ovarian cancer (HCC)    Anomalous coronary artery origin    Frequent unifocal PVCs    Liver lesion    Mediastinal lymphadenopathy    PONV  (postoperative nausea and vomiting)    Aneurysm of thoracic aorta (HCC)    Bradycardia    Cardiomyopathy (HCC)    Cardiovascular stress test abnormal    Cervical arthritis    Congenital anomaly of coronary artery    Hyperlipidemia    Chronic insomnia    Myopia    Ventricular arrhythmia    Brain metastases    Nocturia    Vulvar irritation    Palliative care patient     Past Medical History:   Diagnosis Date    Anemia 3/23/2020    During chemo    Brain cancer (HCC)     Breast cancer (HCC)     2004 and then 2009    Cancer (HCC) 2005,2009    breast    Cancer (HCC) 2020    ovarian    Chicken pox     Colon polyp     CPAP (continuous positive airway pressure) dependence     Frequent UTI     GERD (gastroesophageal reflux disease)     Heart disease     High cholesterol     History of blood transfusion     2013 with Bilat Knee replacement    Hypertension     Irregular heart beat     PVC's    Ovarian cancer (HCC) 9/13/2020    PONV (postoperative nausea and vomiting)     Sleep apnea      Past Surgical History:   Procedure Laterality Date    APPENDECTOMY      BREAST CYST EXCISION      BREAST LUMPECTOMY      CHOLECYSTECTOMY      COLONOSCOPY      CYSTOSCOPY Bilateral 01/13/2020    Procedure: CYSTOSCOPY: CYSTOSCOPY WITH PERIOPERATIVE URETERAL CATHETERS  PLACEMENT;  Surgeon: Justin Leal MD;  Location: [facility];  Service: Gynecology Oncology    CYSTOSCOPY      HERNIA REPAIR      HYSTERECTOMY N/A 01/13/2020    Procedure: EXPLORATORY LAPAROTOMY, OVARIAN CANCER STAGING WITH TOTAL HYSTERECTOMY, BILATERAL SALPINGO-OOPHORECTOMY, BILATERAL PELVIC AND PERIAORTIC LYMPHADENECTOMY, INDICATED APPENDECTOMY, OMENTECTOMY, STAGING PERITONEAL BIOPSIES.;  Surgeon: Justin Leal MD;  Location: [facility];  Service: Gynecology Oncology    JOINT REPLACEMENT      Bilat Knees    MASTECTOMY Bilateral 2009    TN BRNCC INCL FLUOR GDNCE DX W/CELL WASHG SPX N/A 08/09/2021    Procedure: BRONCHOSCOPY FLEXIBLE;  Surgeon: Shiv Taylor MD;   Location: [facility];  Service: Thoracic    WI BRONCHOSCOPY NEEDLE BX TRACHEA MAIN STEM&/BRON N/A 2021    Procedure: ENDOBRONCHIAL ULTRASOUND (EBUS);  Surgeon: Shiv Taylor MD;  Location: [facility];  Service: Thoracic    WI LAP RPR HRNA XCPT INCAL/INGUN NCRC8/STRANGULATED N/A 2021    Procedure: REPAIR HERNIA INCISIONAL LAPAROSCOPIC;  Surgeon: Dariel Wang MD;  Location: [facility];  Service: General    WI LAPS ABD PRTM&OMENTUM DX W/WO SPEC BR/WA SPX N/A 2020    Procedure: LAPAROSCOPY DIAGNOSTIC;  Surgeon: Justin Leal MD;  Location: [facility];  Service: Gynecology Oncology    REPAIR KNEE LIGAMENT Bilateral     ROTATOR CUFF REPAIR       Family History   Problem Relation Age of Onset    Breast cancer Mother 48    Cancer Mother     Lung cancer Father     Hypertension Father     Prostate cancer Brother      Social History     Socioeconomic History    Marital status: /Civil Union     Spouse name: Not on file    Number of children: Not on file    Years of education: Not on file    Highest education level: Not on file   Occupational History    Not on file   Tobacco Use    Smoking status: Former     Current packs/day: 0.00     Average packs/day: 1 pack/day for 15.0 years (15.0 ttl pk-yrs)     Types: Cigarettes     Start date: 10/31/1972     Quit date: 10/31/1987     Years since quittin.6    Smokeless tobacco: Former   Vaping Use    Vaping status: Never Used   Substance and Sexual Activity    Alcohol use: Yes     Comment: socially     Drug use: Never    Sexual activity: Not Currently     Partners: Male     Birth control/protection: Post-menopausal     Comment: same partner-few times a year   Other Topics Concern    Not on file   Social History Narrative    Not on file     Social Determinants of Health     Financial Resource Strain: Not on file   Food Insecurity: Not on file   Transportation Needs: Not on file   Physical Activity: Not on file   Stress: Not on file   Social  Connections: Not on file   Intimate Partner Violence: Not on file   Housing Stability: Not on file       Current Outpatient Medications:     carvedilol (COREG) 6.25 mg tablet, take 1 tablet by mouth twice a day with meals, Disp: 180 tablet, Rfl: 3    cefdinir (OMNICEF) 300 mg capsule, Take 1 capsule twice a day by oral route for 7 days., Disp: , Rfl:     clonazePAM (KlonoPIN) 0.5 mg tablet, Take 0.5 mg by mouth 2 (two) times a day as needed, Disp: , Rfl:     losartan-hydrochlorothiazide (HYZAAR) 50-12.5 mg per tablet, Take 1 tablet by mouth daily , Disp: , Rfl: 0    prochlorperazine (COMPAZINE) 10 mg tablet, Take 10 mg by mouth every 6 (six) hours as needed, Disp: , Rfl:     simvastatin (ZOCOR) 20 mg tablet, Take 20 mg by mouth daily , Disp: , Rfl: 0    trimethoprim (PROLOPRIM) 100 mg tablet, Take 100 mg by mouth daily , Disp: , Rfl: 0    venlafaxine 150 MG TB24, Take 150 mg by mouth daily with breakfast, Disp: , Rfl: 0    zolpidem (AMBIEN CR) 12.5 MG CR tablet, Take 12.5 mg by mouth daily at bedtime , Disp: , Rfl: 0    estradiol (ESTRACE) 0.1 mg/g vaginal cream, insert 1 gram vaginally two times a week (Patient not taking: Reported on 9/14/2023), Disp: , Rfl: 0    LETROZOLE PO, Take by mouth (Patient not taking: Reported on 12/28/2023), Disp: , Rfl:     oseltamivir (TAMIFLU) 75 mg capsule, take 1 capsule by mouth once daily for 10 days (Patient not taking: Reported on 2/22/2024), Disp: , Rfl:     sulfamethoxazole-trimethoprim (BACTRIM DS) 800-160 mg per tablet, , Disp: , Rfl:   No Known Allergies  Vitals:    06/12/24 1446   BP: 155/95   Pulse: 78   Resp: 16   Temp: 98.2 °F (36.8 °C)   SpO2: 98%      Pain Score: 0-No pain

## 2024-06-14 NOTE — PROGRESS NOTES
Follow-up - Radiation Oncology   Lydia Squires 1950 73 y.o. female [id number]      History of Present Illness   Cancer Staging   Ovarian cancer (HCC)  Staging form: Ovary, Fallopian Tube, Primary Peritoneal, AJCC 8th Edition  - Pathologic stage from 1/13/2020: FIGO Stage IC3 (pN0, cM0) - Signed by Justin Leal MD on 1/28/2020  Histopathologic type: Serous cystadenocarcinoma, NOS  Histologic grade (G): G3  Histologic grading system: 4 grade system  Residual tumor (R): R0 - None  Diagnostic confirmation: Positive histology PLUS positive immunophenotyping and/or positive genetic studies  Specimen type: Excision  Staged by: Managing physician  Cancer antigen 125 () (U/mL): 4,000  Gross residual tumor after primary cyto-reductive surgery: Absent  Residual tumor volume after primary cytoreductive surgery: No gross tumor  National guidelines used in treatment planning: Yes  Type of national guideline used in treatment planning: NCCN    Ms. Lydia Squires is a 73 year old woman with a PMHx bilateral breast cancer s/p initial right sided BCS followed by adjuvant TC and RT (2006), thereafter with left sided cancer s/p bilateral mastectomy and adjuvant endocrine therapy. 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 She was found to have evidence of brain metastases and has undergone multiple courses of SRS.  She returns today for follow-up.      Radiation Summary:  Right breast post-lumpectomy RT (2006)  SRS to four PTVs (L Frontal, L Parietal, R, Parietal, R Precentral Gyrus) on 8/31/22  SRS to six PTVs (L Cerebellum inf, L Cerebellum Med, L Front Parasag, L Frontal Sup x2, R Temp) on 2/1/23  SRS to 20 PTVs, each to 1800cGy, on 1/17/24.   SRT to 3 PTVs to 2500cGy in 5 fractions (1/27/24-1/29/24)    Interval History:  The patient was last seen in follow-up on 4/3/24. Since then she has continued to follow with Dr. Lamas and remains on  Navelbine (initiated 2/2024). She has had rising  to 2208.     MRI Brain (6/3/24) demonstrated:   Progression of disease with a total of approximately 25 enhancing lesions scattered within the brain parenchyma both supratentorial and infratentorial measuring up to 9 mm in size. The vast majority of lesions have increased in size compared to the prior examination.      Historical Information   Oncology History   Ovarian cancer (HCC)   1/13/2020 Initial Diagnosis    Ovarian cancer on left (HCC)     1/13/2020 -  Cancer Staged    Staging form: Ovary, Fallopian Tube, Primary Peritoneal, AJCC 8th Edition  - Pathologic stage from 1/13/2020: FIGO Stage IC3 (pN0, cM0) - Signed by Justin Leal MD on 1/28/2020  Histologic grade (G): G3  Histologic grading system: 4 grade system  Residual tumor (R): R0 - None  Histopathologic type: Serous cystadenocarcinoma, NOS  Diagnostic confirmation: Positive histology PLUS positive immunophenotyping and/or positive genetic studies  Specimen type: Excision  Staged by: Managing physician  Cancer antigen 125 () (U/mL): 4,000  Gross residual tumor after primary cyto-reductive surgery: Absent  Residual tumor volume after primary cytoreductive surgery: No gross tumor  National guidelines used in treatment planning: Yes  Type of national guideline used in treatment planning: NCCN       1/13/2020 Surgery    Diagnostic laparoscopy, laparotomy, total hysterectomy, bilateral salpingo-oophorectomy with resection of pelvic mass, bilateral pelvic and periaortic lymphadenectomy, staging peritoneal biopsies, omentectomy, appendectomy.  Final pathology consistent with 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.     2/11/2020 - 5/26/2020 Chemotherapy    Six cycles of carboplatin/paclitaxel administered by Dr. Lamas.  Tolerated well.  CT scan at completion of treatment demonstrates no evidence of measurable disease.   amarilis near completion of chemotherapy 5.7  units/milliliter     8/9/2021 Recurrence     Elevated  triggered imaging  which demonstrated suspicious mediastinal lymphadenopathy.  Endobronchial ultrasound-guided fine-needle aspiration on August 9, 2021 demonstrates metastatic carcinoma with immuno phenotype consistent with metastatic ovarian primary.     9/1/2021 - 1/24/2022 Chemotherapy    Carboplatin + Doxil x 6 cycles (Dr. Bob). Complete response. Fadi Ca125 8.5 IU/mL.     3/2022 - 8/2022 Chemotherapy    Niraparib.     9/14/2022 -  Chemotherapy    Started on gemcitabine and bevacizumab.  On November 29, 2022 after 3 cycles CT scan demonstrates no evidence of measurable disease.   reportedly decreasing somewhat.  Patient tolerating well.     5/30/2023 - 5/30/2023 Chemotherapy    bevacizumab (AVASTIN) IVPB, 15 mg/kg = 1,665 mg, Intravenous, Once, 0 of 4 cycles  pembrolizumab (KEYTRUDA) IVPB, 200 mg, Intravenous, Once, 0 of 4 cycles     1/17/2024 - 1/29/2024 Radiation    Course: C3 SRS_SRT    Plan ID Energy Fractions Dose per Fraction (cGy) Dose Correction (cGy) Total Dose Delivered (cGy) Elapsed Days   [id number] 6X-FFF 1 / 1 1,800 0 1,800 0   [id number] 6X-FFF 5 / 5 500 0 2,500 12      Treatment Dates:  1/17/2024 - 1/29/2024.      Brain metastases   8/17/2022 Initial Diagnosis    Brain metastases (HCC)     8/31/2022 - 8/31/2022 Radiation      Plan ID Energy Fractions Dose per Fraction (cGy) Dose Correction (cGy) Total Dose Delivered (cGy) Elapsed Days   SRS 4 PTVs 6X-FFF 1 / 1 2,000 0 2,000 0      Treatment dates:  C1 SRS: 8/31/2022 - 8/31/2022 2/1/2023 - 2/1/2023 Radiation    Plan ID Energy Fractions Dose per Fraction (cGy) Dose Correction (cGy) Total Dose Delivered (cGy) Elapsed Days   SRS Inf 3PTVs 6X-FFF 1 / 1 2,000 0 2,000 0   SRS Sup 3PTVs 6X-FFF 1 / 1 2,000 0 2,000 0        1/17/2024 - 1/29/2024 Radiation    Course: C3 SRS_SRT    Plan ID Energy Fractions Dose per Fraction (cGy) Dose Correction (cGy) Total Dose Delivered (cGy)  Elapsed Days   [id number] 6X-FFF 1 / 1 1,800 0 1,800 0   [id number] 6X-FFF 5 / 5 500 0 2,500 12      Treatment Dates:  1/17/2024 - 1/29/2024.          Past Medical History:   Diagnosis Date    Anemia 3/23/2020    During chemo    Brain cancer (HCC)     Breast cancer (HCC)     2004 and then 2009    Cancer (HCC) 2005,2009    breast    Cancer (HCC) 2020    ovarian    Chicken pox     Colon polyp     CPAP (continuous positive airway pressure) dependence     Frequent UTI     GERD (gastroesophageal reflux disease)     Heart disease     High cholesterol     History of blood transfusion     2013 with Bilat Knee replacement    Hypertension     Irregular heart beat     PVC's    Ovarian cancer (HCC) 9/13/2020    PONV (postoperative nausea and vomiting)     Sleep apnea      Past Surgical History:   Procedure Laterality Date    APPENDECTOMY      BREAST CYST EXCISION      BREAST LUMPECTOMY      CHOLECYSTECTOMY      COLONOSCOPY      CYSTOSCOPY Bilateral 01/13/2020    Procedure: CYSTOSCOPY: CYSTOSCOPY WITH PERIOPERATIVE URETERAL CATHETERS  PLACEMENT;  Surgeon: Justin Leal MD;  Location: [facility];  Service: Gynecology Oncology    CYSTOSCOPY      HERNIA REPAIR      HYSTERECTOMY N/A 01/13/2020    Procedure: EXPLORATORY LAPAROTOMY, OVARIAN CANCER STAGING WITH TOTAL HYSTERECTOMY, BILATERAL SALPINGO-OOPHORECTOMY, BILATERAL PELVIC AND PERIAORTIC LYMPHADENECTOMY, INDICATED APPENDECTOMY, OMENTECTOMY, STAGING PERITONEAL BIOPSIES.;  Surgeon: Justin Leal MD;  Location: [facility];  Service: Gynecology Oncology    JOINT REPLACEMENT      Bilat Knees    MASTECTOMY Bilateral 2009    [facility]/CELL WASHG SPX N/A 08/09/2021    Procedure: BRONCHOSCOPY FLEXIBLE;  Surgeon: Shiv Taylor MD;  [facility];  Service: Thoracic    TX BRONCHOSCOPY NEEDLE BX TRACHEA MAIN STEM&/BRON N/A 08/09/2021    Procedure: ENDOBRONCHIAL ULTRASOUND (EBUS);  Surgeon: Shiv Taylor MD;  [facility];  Service:  Thoracic    IL LAP RPR HRNA XCPT INCAL/INGUN NCRC8/STRANGULATED N/A 2021    Procedure: REPAIR HERNIA INCISIONAL LAPAROSCOPIC;  Surgeon: Dariel Wang MD;  [facility]: [facility];  Service: General    IL LAPS ABD PRTM&OMENTUM DX W/WO SPEC BR/WA SPX N/A 2020    Procedure: LAPAROSCOPY DIAGNOSTIC;  Surgeon: Justin Leal MD;  [facility];  Service: Gynecology Oncology    REPAIR KNEE LIGAMENT Bilateral     ROTATOR CUFF REPAIR         Social History   Social History     Substance and Sexual Activity   Alcohol Use Yes    Comment: socially      Social History     Substance and Sexual Activity   Drug Use Never     Social History     Tobacco Use   Smoking Status Former    Current packs/day: 0.00    Average packs/day: 1 pack/day for 15.0 years (15.0 ttl pk-yrs)    Types: Cigarettes    Start date: 10/31/1972    Quit date: 10/31/1987    Years since quittin.6   Smokeless Tobacco Former         Meds/Allergies     Current Outpatient Medications:     carvedilol (COREG) 6.25 mg tablet, take 1 tablet by mouth twice a day with meals, Disp: 180 tablet, Rfl: 3    cefdinir (OMNICEF) 300 mg capsule, Take 1 capsule twice a day by oral route for 7 days., Disp: , Rfl:     clonazePAM (KlonoPIN) 0.5 mg tablet, Take 0.5 mg by mouth 2 (two) times a day as needed, Disp: , Rfl:     losartan-hydrochlorothiazide (HYZAAR) 50-12.5 mg per tablet, Take 1 tablet by mouth daily , Disp: , Rfl: 0    prochlorperazine (COMPAZINE) 10 mg tablet, Take 10 mg by mouth every 6 (six) hours as needed, Disp: , Rfl:     simvastatin (ZOCOR) 20 mg tablet, Take 20 mg by mouth daily , Disp: , Rfl: 0    trimethoprim (PROLOPRIM) 100 mg tablet, Take 100 mg by mouth daily , Disp: , Rfl: 0    venlafaxine 150 MG TB24, Take 150 mg by mouth daily with breakfast, Disp: , Rfl: 0    zolpidem (AMBIEN CR) 12.5 MG CR tablet, Take 12.5 mg by mouth daily at bedtime , Disp: , Rfl: 0    estradiol (ESTRACE) 0.1 mg/g vaginal cream, insert 1 gram vaginally two  times a week (Patient not taking: Reported on 9/14/2023), Disp: , Rfl: 0    LETROZOLE PO, Take by mouth (Patient not taking: Reported on 12/28/2023), Disp: , Rfl:     oseltamivir (TAMIFLU) 75 mg capsule, take 1 capsule by mouth once daily for 10 days (Patient not taking: Reported on 2/22/2024), Disp: , Rfl:     sulfamethoxazole-trimethoprim (BACTRIM DS) 800-160 mg per tablet, , Disp: , Rfl:   No Known Allergies    Review of Systems   Constitutional:  Positive for fatigue.   HENT: Negative.     Eyes: Negative.    Respiratory:  Positive for shortness of breath.    Cardiovascular: Negative.    Gastrointestinal:  Positive for nausea. Negative for vomiting.   Musculoskeletal:  Positive for gait problem.        Right leg pain   Neurological:  Positive for dizziness.   Psychiatric/Behavioral: Negative.           OBJECTIVE:   /95   Pulse 78   Temp 98.2 °F (36.8 °C)   Resp 16   LMP  (LMP Unknown)   SpO2 98%   Pain Assessment:  0  ECOG/Zubrod/WHO: 1 - Symptomatic but completely ambulatory    Physical Exam   Well appearing. NAD.   No increased work of breathing.   Extremities warm and well perfused.   No overt neurologic deficits.     Assessment/Plan:  No orders of the defined types were placed in this encounter.     We reviewed the patient's repeat MRI brain in detail comparison multiple prior imaging studies. On my review her recent MRI shows progression of disease with enlargement of multiple previously seen, untreated metastases. Careful review of current and prior imaging studies in comparison to prior SRS plans suggest that all sites of current progression are previously untreated.     We again discussed options for management to include WBRT vs SRS vs continued observation. While repeat SRS is feasible, at this time her current systemic disease status is unclear in the absence of any recent imaging and a rising . We reviewed that the role of SRS might be unclear in the absence of available systemic  "therapy options. The patient will be following up with Dr. Lamas in the coming weeks and will let us know how she wishes to proceed thereafter.     Jefferson Farris MD  6/13/2024,9:22 PM    Portions of the record may have been created with voice recognition software.  Occasional wrong word or \"sound a like\" substitutions may have occurred due to the inherent limitations of voice recognition software.  Read the chart carefully and recognize, using context, where substitutions have occurred.        "

## 2024-06-17 DIAGNOSIS — C79.31 MALIGNANT NEOPLASM METASTATIC TO BRAIN (HCC): Primary | ICD-10-CM

## 2024-06-18 ENCOUNTER — TELEPHONE (OUTPATIENT)
Dept: RADIATION ONCOLOGY | Facility: CLINIC | Age: 74
End: 2024-06-18

## 2024-06-18 DIAGNOSIS — C79.31 MALIGNANT NEOPLASM METASTATIC TO BRAIN (HCC): Primary | ICD-10-CM

## 2024-06-18 NOTE — TELEPHONE ENCOUNTER
Called Lydia. Made her aware to have BUN/ creatinine done prior to simulation. IV contrast is ordered. Lab order emailed to her @ [email or web address].

## 2024-06-24 PROCEDURE — 77263 THER RADIOLOGY TX PLNG CPLX: CPT | Performed by: STUDENT IN AN ORGANIZED HEALTH CARE EDUCATION/TRAINING PROGRAM

## 2024-06-27 ENCOUNTER — APPOINTMENT (OUTPATIENT)
Dept: RADIATION ONCOLOGY | Facility: CLINIC | Age: 74
End: 2024-06-27
Attending: RADIOLOGY
Payer: MEDICARE

## 2024-06-27 ENCOUNTER — APPOINTMENT (OUTPATIENT)
Dept: RADIATION ONCOLOGY | Facility: CLINIC | Age: 74
End: 2024-06-27
Payer: MEDICARE

## 2024-06-27 ENCOUNTER — OFFICE VISIT (OUTPATIENT)
Dept: RADIATION ONCOLOGY | Facility: CLINIC | Age: 74
End: 2024-06-27
Attending: STUDENT IN AN ORGANIZED HEALTH CARE EDUCATION/TRAINING PROGRAM
Payer: MEDICARE

## 2024-06-27 DIAGNOSIS — C79.31 MALIGNANT NEOPLASM METASTATIC TO BRAIN (HCC): Primary | ICD-10-CM

## 2024-06-27 PROCEDURE — 77290 THER RAD SIMULAJ FIELD CPLX: CPT | Performed by: STUDENT IN AN ORGANIZED HEALTH CARE EDUCATION/TRAINING PROGRAM

## 2024-06-27 PROCEDURE — 77334 RADIATION TREATMENT AID(S): CPT | Performed by: STUDENT IN AN ORGANIZED HEALTH CARE EDUCATION/TRAINING PROGRAM

## 2024-06-27 PROCEDURE — 77470 SPECIAL RADIATION TREATMENT: CPT | Performed by: STUDENT IN AN ORGANIZED HEALTH CARE EDUCATION/TRAINING PROGRAM

## 2024-06-28 ENCOUNTER — HOSPITAL ENCOUNTER (OUTPATIENT)
Dept: MRI IMAGING | Facility: HOSPITAL | Age: 74
End: 2024-06-28
Attending: RADIOLOGY
Payer: MEDICARE

## 2024-06-28 DIAGNOSIS — C79.31 MALIGNANT NEOPLASM METASTATIC TO BRAIN (HCC): ICD-10-CM

## 2024-06-28 PROCEDURE — A9585 GADOBUTROL INJECTION: HCPCS | Performed by: RADIOLOGY

## 2024-06-28 PROCEDURE — 70553 MRI BRAIN STEM W/O & W/DYE: CPT

## 2024-06-28 RX ORDER — GADOBUTROL 604.72 MG/ML
10 INJECTION INTRAVENOUS
Status: COMPLETED | OUTPATIENT
Start: 2024-06-28 | End: 2024-06-28

## 2024-06-28 RX ADMIN — GADOBUTROL 10 ML: 604.72 INJECTION INTRAVENOUS at 10:31

## 2024-07-01 ENCOUNTER — APPOINTMENT (OUTPATIENT)
Dept: RADIATION ONCOLOGY | Facility: HOSPITAL | Age: 74
End: 2024-07-01
Attending: STUDENT IN AN ORGANIZED HEALTH CARE EDUCATION/TRAINING PROGRAM
Payer: MEDICARE

## 2024-07-03 PROCEDURE — 77300 RADIATION THERAPY DOSE PLAN: CPT | Performed by: STUDENT IN AN ORGANIZED HEALTH CARE EDUCATION/TRAINING PROGRAM

## 2024-07-03 PROCEDURE — 77295 3-D RADIOTHERAPY PLAN: CPT | Performed by: STUDENT IN AN ORGANIZED HEALTH CARE EDUCATION/TRAINING PROGRAM

## 2024-07-03 PROCEDURE — 77334 RADIATION TREATMENT AID(S): CPT | Performed by: STUDENT IN AN ORGANIZED HEALTH CARE EDUCATION/TRAINING PROGRAM

## 2024-07-05 ENCOUNTER — APPOINTMENT (OUTPATIENT)
Dept: RADIATION ONCOLOGY | Facility: HOSPITAL | Age: 74
End: 2024-07-05
Attending: STUDENT IN AN ORGANIZED HEALTH CARE EDUCATION/TRAINING PROGRAM
Payer: MEDICARE

## 2024-07-05 ENCOUNTER — PROCEDURE VISIT (OUTPATIENT)
Dept: NEUROSURGERY | Facility: CLINIC | Age: 74
End: 2024-07-05
Payer: MEDICARE

## 2024-07-05 ENCOUNTER — TRANSCRIBE ORDERS (OUTPATIENT)
Dept: RADIATION ONCOLOGY | Facility: HOSPITAL | Age: 74
End: 2024-07-05

## 2024-07-05 DIAGNOSIS — C79.31 METASTASIS TO BRAIN (HCC): ICD-10-CM

## 2024-07-05 DIAGNOSIS — C79.31 MALIGNANT NEOPLASM METASTATIC TO BRAIN (HCC): Primary | ICD-10-CM

## 2024-07-05 DIAGNOSIS — Z85.3 HISTORY OF BREAST CANCER: Primary | ICD-10-CM

## 2024-07-05 PROCEDURE — 61797 SRS CRAN LES SIMPLE ADDL: CPT | Performed by: NEUROLOGICAL SURGERY

## 2024-07-05 PROCEDURE — 77432 STEREOTACTIC RADIATION TRMT: CPT | Performed by: RADIOLOGY

## 2024-07-05 PROCEDURE — 61796 SRS CRANIAL LESION SIMPLE: CPT | Performed by: NEUROLOGICAL SURGERY

## 2024-07-05 PROCEDURE — 77336 RADIATION PHYSICS CONSULT: CPT | Performed by: RADIOLOGY

## 2024-07-05 PROCEDURE — 77372 SRS LINEAR BASED: CPT | Performed by: RADIOLOGY

## 2024-07-05 NOTE — PROGRESS NOTES
PATIENT NAME: Lydia Squires  : 1950  MRN: [id number]  PROCEDURE DATE: 2024     Stereotactic Radiotherapy (SRT) Operative Note     Preop Diagnosis: Multiple (18) brain metastases     Postop Diagnosis: same     Procedure Details: Frameless Stereotactic Radiotherapy for multiple (18) brain metastases     Surgeon: Nathanael Francis M.D.     Assistants: None, no qualified resident was available to assist with this case     Radiation Oncologist(s): Dr. Sukhdeep De Jesus     Estimated Blood Loss:  None           Specimens: None     Drains: None           Total IV Fluids: None              Findings: As above     Complications:  None     Anesthesia: None        DETAILS OF PROCEDURE     The patient presented to the outpatient area of the Department of Radiation Oncology where an open faced immobilization mask was created. The patient then underwent a stereotactic head CT while immobilized in the mask. The patient was then released from the Department.     The patient’s stereotactic CT and previous stereotactic MRI scans were fused in the SRT planning software, which was used to develop the SRT plan.       The SRT prescription called for a dose of 18 Gray to be delivered to the PTV in 1 fraction. The PTV was created by expanding the GTV, as contoured by myself and the Radiation Oncologist. The SRT plan utilized the mini-multileaf columnator on the TrueBeam machine at the [facility].      When the final treatment plan had been developed and approved by myself, the radiation oncologist and physicist, the patient returned to the Department for the frameless SRT treatment.      The patient was positioned on the treatment couch. The Optic Surface Monitoring System (OSMS) was used initially to align the patient. The patient was immobilized in their open faced mask. kV and then cone beam CT imaging was used to align the patient.     Once the radiation oncologist, physicist and I agreed the patient was in correct  position, the fields were treated sequentially without complications. The OSMS was used during the treatment to assure correct positioning of the patient, and if it detected patient motion, interrupted the treatment beam until the patient was back in alignment.     There was no blood loss and no specimen.      After the SRT treatment was delivered, the patient was recovered in the treatment room and discharged from the Department when neurologically at baseline and stable.        SIGNATURE: Nathanael Francis M.D.

## 2024-07-06 ENCOUNTER — DOCUMENTATION (OUTPATIENT)
Dept: RADIATION ONCOLOGY | Facility: HOSPITAL | Age: 74
End: 2024-07-06

## 2024-07-06 ENCOUNTER — HOSPITAL ENCOUNTER (INPATIENT)
Facility: HOSPITAL | Age: 74
LOS: 1 days | Discharge: HOME | DRG: 081 | End: 2024-07-07
Attending: EMERGENCY MEDICINE | Admitting: HOSPITALIST
Payer: MEDICARE

## 2024-07-06 ENCOUNTER — APPOINTMENT (EMERGENCY)
Dept: CT IMAGING | Facility: HOSPITAL | Age: 74
DRG: 081 | End: 2024-07-06
Payer: MEDICARE

## 2024-07-06 DIAGNOSIS — R29.90 STROKE-LIKE SYMPTOMS: Primary | ICD-10-CM

## 2024-07-06 DIAGNOSIS — G93.6 VASOGENIC EDEMA (HCC): ICD-10-CM

## 2024-07-06 DIAGNOSIS — C79.31 MALIGNANT NEOPLASM METASTATIC TO BRAIN (HCC): ICD-10-CM

## 2024-07-06 DIAGNOSIS — C56.9 MALIGNANT NEOPLASM OF OVARY, UNSPECIFIED LATERALITY (HCC): ICD-10-CM

## 2024-07-06 DIAGNOSIS — R93.0 ABNORMAL MRI OF HEAD: ICD-10-CM

## 2024-07-06 LAB
2HR DELTA HS TROPONIN: 0 NG/L
4HR DELTA HS TROPONIN: 1 NG/L
ANION GAP SERPL CALCULATED.3IONS-SCNC: 11 MMOL/L (ref 4–13)
APTT PPP: 23 SECONDS (ref 23–37)
BUN SERPL-MCNC: 19 MG/DL (ref 5–25)
CALCIUM SERPL-MCNC: 10.1 MG/DL (ref 8.4–10.2)
CARDIAC TROPONIN I PNL SERPL HS: 5 NG/L
CARDIAC TROPONIN I PNL SERPL HS: 5 NG/L
CARDIAC TROPONIN I PNL SERPL HS: 6 NG/L
CHLORIDE SERPL-SCNC: 103 MMOL/L (ref 96–108)
CO2 SERPL-SCNC: 25 MMOL/L (ref 21–32)
CREAT SERPL-MCNC: 0.83 MG/DL (ref 0.6–1.3)
ERYTHROCYTE [DISTWIDTH] IN BLOOD BY AUTOMATED COUNT: 15.8 % (ref 11.6–15.1)
FLUAV RNA RESP QL NAA+PROBE: NEGATIVE
FLUBV RNA RESP QL NAA+PROBE: NEGATIVE
GFR SERPL CREATININE-BSD FRML MDRD: 71 ML/MIN/1.73SQ M
GLUCOSE SERPL-MCNC: 140 MG/DL (ref 65–140)
GLUCOSE SERPL-MCNC: 147 MG/DL (ref 65–140)
HCT VFR BLD AUTO: 38 % (ref 34.8–46.1)
HGB BLD-MCNC: 12.3 G/DL (ref 11.5–15.4)
INR PPP: 0.97 (ref 0.84–1.19)
MCH RBC QN AUTO: 30.1 PG (ref 26.8–34.3)
MCHC RBC AUTO-ENTMCNC: 32.4 G/DL (ref 31.4–37.4)
MCV RBC AUTO: 93 FL (ref 82–98)
PLATELET # BLD AUTO: 230 THOUSANDS/UL (ref 149–390)
PMV BLD AUTO: 9.6 FL (ref 8.9–12.7)
POTASSIUM SERPL-SCNC: 3.9 MMOL/L (ref 3.5–5.3)
PROTHROMBIN TIME: 13.5 SECONDS (ref 11.6–14.5)
RBC # BLD AUTO: 4.09 MILLION/UL (ref 3.81–5.12)
RSV RNA RESP QL NAA+PROBE: NEGATIVE
SARS-COV-2 RNA RESP QL NAA+PROBE: NEGATIVE
SODIUM SERPL-SCNC: 139 MMOL/L (ref 135–147)
WBC # BLD AUTO: 6.83 THOUSAND/UL (ref 4.31–10.16)

## 2024-07-06 PROCEDURE — 36415 COLL VENOUS BLD VENIPUNCTURE: CPT

## 2024-07-06 PROCEDURE — 99285 EMERGENCY DEPT VISIT HI MDM: CPT | Performed by: EMERGENCY MEDICINE

## 2024-07-06 PROCEDURE — 82948 REAGENT STRIP/BLOOD GLUCOSE: CPT

## 2024-07-06 PROCEDURE — 70498 CT ANGIOGRAPHY NECK: CPT

## 2024-07-06 PROCEDURE — 80048 BASIC METABOLIC PNL TOTAL CA: CPT

## 2024-07-06 PROCEDURE — 96375 TX/PRO/DX INJ NEW DRUG ADDON: CPT

## 2024-07-06 PROCEDURE — 99223 1ST HOSP IP/OBS HIGH 75: CPT | Performed by: INTERNAL MEDICINE

## 2024-07-06 PROCEDURE — 0241U HB NFCT DS VIR RESP RNA 4 TRGT: CPT

## 2024-07-06 PROCEDURE — 99285 EMERGENCY DEPT VISIT HI MDM: CPT | Performed by: PSYCHIATRY & NEUROLOGY

## 2024-07-06 PROCEDURE — 85610 PROTHROMBIN TIME: CPT

## 2024-07-06 PROCEDURE — 70496 CT ANGIOGRAPHY HEAD: CPT

## 2024-07-06 PROCEDURE — 99285 EMERGENCY DEPT VISIT HI MDM: CPT

## 2024-07-06 PROCEDURE — 96374 THER/PROPH/DIAG INJ IV PUSH: CPT

## 2024-07-06 PROCEDURE — 93005 ELECTROCARDIOGRAM TRACING: CPT

## 2024-07-06 PROCEDURE — 85027 COMPLETE CBC AUTOMATED: CPT

## 2024-07-06 PROCEDURE — 84484 ASSAY OF TROPONIN QUANT: CPT

## 2024-07-06 PROCEDURE — 85730 THROMBOPLASTIN TIME PARTIAL: CPT

## 2024-07-06 RX ORDER — DEXAMETHASONE 2 MG/1
1 TABLET ORAL DAILY
Status: DISCONTINUED | OUTPATIENT
Start: 2024-07-22 | End: 2024-07-07 | Stop reason: HOSPADM

## 2024-07-06 RX ORDER — INSULIN LISPRO 100 [IU]/ML
1-5 INJECTION, SOLUTION INTRAVENOUS; SUBCUTANEOUS EVERY 6 HOURS SCHEDULED
Status: DISCONTINUED | OUTPATIENT
Start: 2024-07-07 | End: 2024-07-07

## 2024-07-06 RX ORDER — DIPHENHYDRAMINE HYDROCHLORIDE 50 MG/ML
12.5 INJECTION INTRAMUSCULAR; INTRAVENOUS ONCE
Status: COMPLETED | OUTPATIENT
Start: 2024-07-06 | End: 2024-07-06

## 2024-07-06 RX ORDER — ENOXAPARIN SODIUM 100 MG/ML
40 INJECTION SUBCUTANEOUS
Status: DISCONTINUED | OUTPATIENT
Start: 2024-07-07 | End: 2024-07-07 | Stop reason: HOSPADM

## 2024-07-06 RX ORDER — DEXAMETHASONE 4 MG/1
4 TABLET ORAL EVERY 12 HOURS SCHEDULED
Status: DISCONTINUED | OUTPATIENT
Start: 2024-07-07 | End: 2024-07-07 | Stop reason: HOSPADM

## 2024-07-06 RX ORDER — DEXAMETHASONE 2 MG/1
2 TABLET ORAL DAILY
Status: DISCONTINUED | OUTPATIENT
Start: 2024-07-17 | End: 2024-07-07 | Stop reason: HOSPADM

## 2024-07-06 RX ORDER — DEXAMETHASONE SODIUM PHOSPHATE 4 MG/ML
4 INJECTION, SOLUTION INTRA-ARTICULAR; INTRALESIONAL; INTRAMUSCULAR; INTRAVENOUS; SOFT TISSUE ONCE
Status: COMPLETED | OUTPATIENT
Start: 2024-07-06 | End: 2024-07-06

## 2024-07-06 RX ORDER — CLONAZEPAM 0.5 MG/1
0.5 TABLET ORAL 2 TIMES DAILY
Status: DISCONTINUED | OUTPATIENT
Start: 2024-07-06 | End: 2024-07-07 | Stop reason: HOSPADM

## 2024-07-06 RX ORDER — METOCLOPRAMIDE HYDROCHLORIDE 5 MG/ML
10 INJECTION INTRAMUSCULAR; INTRAVENOUS ONCE
Status: COMPLETED | OUTPATIENT
Start: 2024-07-06 | End: 2024-07-06

## 2024-07-06 RX ORDER — KETOROLAC TROMETHAMINE 30 MG/ML
15 INJECTION, SOLUTION INTRAMUSCULAR; INTRAVENOUS ONCE
Status: COMPLETED | OUTPATIENT
Start: 2024-07-06 | End: 2024-07-06

## 2024-07-06 RX ORDER — PANTOPRAZOLE SODIUM 40 MG/1
40 TABLET, DELAYED RELEASE ORAL
Status: DISCONTINUED | OUTPATIENT
Start: 2024-07-07 | End: 2024-07-07 | Stop reason: HOSPADM

## 2024-07-06 RX ORDER — DEXAMETHASONE SODIUM PHOSPHATE 4 MG/ML
4 INJECTION, SOLUTION INTRA-ARTICULAR; INTRALESIONAL; INTRAMUSCULAR; INTRAVENOUS; SOFT TISSUE EVERY 6 HOURS
Status: DISCONTINUED | OUTPATIENT
Start: 2024-07-06 | End: 2024-07-06

## 2024-07-06 RX ORDER — DEXAMETHASONE 2 MG/1
2 TABLET ORAL EVERY 12 HOURS SCHEDULED
Status: DISCONTINUED | OUTPATIENT
Start: 2024-07-12 | End: 2024-07-07 | Stop reason: HOSPADM

## 2024-07-06 RX ORDER — MAGNESIUM SULFATE HEPTAHYDRATE 40 MG/ML
2 INJECTION, SOLUTION INTRAVENOUS ONCE
Status: COMPLETED | OUTPATIENT
Start: 2024-07-06 | End: 2024-07-06

## 2024-07-06 RX ORDER — DEXAMETHASONE SODIUM PHOSPHATE 10 MG/ML
10 INJECTION, SOLUTION INTRAMUSCULAR; INTRAVENOUS ONCE
Status: DISCONTINUED | OUTPATIENT
Start: 2024-07-06 | End: 2024-07-06

## 2024-07-06 RX ORDER — ZOLPIDEM TARTRATE 5 MG/1
10 TABLET ORAL
Status: DISCONTINUED | OUTPATIENT
Start: 2024-07-06 | End: 2024-07-07 | Stop reason: HOSPADM

## 2024-07-06 RX ADMIN — ZOLPIDEM TARTRATE 10 MG: 5 TABLET ORAL at 21:14

## 2024-07-06 RX ADMIN — KETOROLAC TROMETHAMINE 15 MG: 30 INJECTION, SOLUTION INTRAMUSCULAR; INTRAVENOUS at 19:50

## 2024-07-06 RX ADMIN — SODIUM CHLORIDE 500 ML: 0.9 INJECTION, SOLUTION INTRAVENOUS at 14:55

## 2024-07-06 RX ADMIN — METOCLOPRAMIDE 10 MG: 5 INJECTION, SOLUTION INTRAMUSCULAR; INTRAVENOUS at 14:55

## 2024-07-06 RX ADMIN — MAGNESIUM SULFATE HEPTAHYDRATE 2 G: 40 INJECTION, SOLUTION INTRAVENOUS at 14:55

## 2024-07-06 RX ADMIN — RIMEGEPANT SULFATE 75 MG: 75 TABLET, ORALLY DISINTEGRATING ORAL at 15:17

## 2024-07-06 RX ADMIN — DIPHENHYDRAMINE HYDROCHLORIDE 12.5 MG: 50 INJECTION INTRAMUSCULAR; INTRAVENOUS at 14:54

## 2024-07-06 RX ADMIN — CLONAZEPAM 0.5 MG: 0.5 TABLET ORAL at 21:14

## 2024-07-06 RX ADMIN — DEXAMETHASONE SODIUM PHOSPHATE 4 MG: 4 INJECTION INTRA-ARTICULAR; INTRALESIONAL; INTRAMUSCULAR; INTRAVENOUS; SOFT TISSUE at 14:54

## 2024-07-06 RX ADMIN — IOHEXOL 75 ML: 350 INJECTION, SOLUTION INTRAVENOUS at 13:46

## 2024-07-06 NOTE — ASSESSMENT & PLAN NOTE
History of bilateral breast cancer/ovarian cancer with metastases to the brain presented with left upper extremity weakness/headache   Stroke alert, admission   CT head multiple intra-axial metastasis with surrounding vasogenic edema however this is grossly unchanged   Patient currently on XRT last cycle Friday PTA   Evaluated by neurology at the emergency department thought symptoms were related to known vasogenic edema in the setting of brain metastasis for which stroke alert canceled   Radiation oncology consulted recommend start steroids  Symptoms significantly improved following medications.     Plan  Continue with Decadron 4 mg PO BID x 5 days, then 2 mg PO BID x 5 days, then 2 mg PO daily x 5 days, then 1 mg PO daily x 5 days.  Continue with omeprazole 20 mg once a day on steroids  Follow-up with outpatient radiation oncologist, oncology on the outpatient setting

## 2024-07-06 NOTE — ASSESSMENT & PLAN NOTE
ECG and edema in the setting of known brain metastases status post XRT   Plan      Decadon 4 mg PO BID x 5 days, then 2 mg PO BID x 5 days, then 2 mg PO daily x 5 days, then 1 mg PO daily x 5 days.

## 2024-07-06 NOTE — ASSESSMENT & PLAN NOTE
74 yo female with PMHx bilateral breast cancer and known ovarian CA metastatic to brain who presents with headache and LUE weakness. Pt undergoes SRS for brain mets- fourth treatment occurred yesterday. Headache presented last night, a few hours after SRS session. Headache 9/10 intensity, located in left frontal region without radiation. Pt also noted LUE weakness, generalized weakness, and had an episode of emesis while being driven to the hospital. Patient also expresses trouble with her gait but stated this is not a new onset issue for her. Denies blurry vision, photophobia or lightheadedness.     On arrival to ED, stroke alert was called for patient.   CT Head showed multiple intra-axial metastases with surrounding vasogenic edema. Compared to MRI brain on 6/28/24, vasogenic edema are grossly unchanged. Several lesions mildly hyperintense corresponding with hemorrhage seen on prior study. No acute hemorrhage. No shift or herniation. No acute infarct.  CTA Head showed no significant stenosis of the cervical carotid or vertebral arteries. No significant intracranial stenosis, large vessel occlusion or aneurysm.    Neurology was consulted and reviewed imaging.   Per neuro, suspect symptomatic from vasogenic edema worsening in known metastatic lesions. Unlikely ischemic stroke.   Thus stroke alert was cancelled    Plan:  Per neuro recs:  Begin Decadron 4 mg IVQ6 until can switch to PO  F/up radiation oncology consult  Reglan, Benadryl, Mg, Nurtec x1 for pain control

## 2024-07-06 NOTE — ASSESSMENT & PLAN NOTE
Known ovarian CA metastatic to brain. Has undergone multiple rounds of SRS.  Continue steroid taper as above  Follow-up with radiation oncology on the patient setting

## 2024-07-06 NOTE — ASSESSMENT & PLAN NOTE
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     Inpatient radiation oncology on board

## 2024-07-06 NOTE — ED PROVIDER NOTES
"History  Chief Complaint   Patient presents with    CVA/TIA-like Symptoms     Reports that  noticed speech change and difficulty walking this morning. Had radiation tx to brain yesterday and was advised to report any side effects. Started with a headache last night, interrupting sleep. Had an episode of emesis on the way in, urinary incontinence at that time. Was able to get out of car and into wheelchair with minimal assistance.     HPI    Lydia Squires is a 73 y.o. 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    Per  at bedside, patient began having difficulty with ambulating and speech changes which he reports to be \"slower\" and somewhat slurred, which he reports was confirmed at home with his daughter, he believes LNW between 7-10 am this morning. Patient herself is A&Ox4, and reports she does notice a slowing of her speech but reports it is because she feels more tired than usual. She does not notice slurred speech. She also reports headache starting last night which has not fully responded to tylenol. Of note patient received radiation therapy yesterday, and reports feeling \"off\" all over but cannot exactly explain what she means by this, reports the best she can describe it is tired. No chest pain, palpitations, shortness of breath, abdominal pain, back pain.    Prior to Admission Medications   Prescriptions Last Dose Informant Patient Reported? Taking?   LETROZOLE PO  Self Yes No   Sig: Take by mouth   Patient not taking: Reported on 12/28/2023   carvedilol (COREG) 6.25 mg tablet 7/6/2024 at 1000 Self No Yes   Sig: take 1 tablet by mouth twice a day with meals   cefdinir (OMNICEF) 300 mg capsule 7/6/2024 at 1000  Yes Yes   Sig: Take 1 capsule twice a day by oral route for 7 days.   clonazePAM (KlonoPIN) 0.5 mg tablet 7/5/2024 at 2000 Self Yes Yes   Sig: Take 0.5 mg by mouth 2 (two) times a day as needed "   estradiol (ESTRACE) 0.1 mg/g vaginal cream Not Taking Self Yes No   Sig: insert 1 gram vaginally two times a week   Patient not taking: Reported on 9/14/2023   losartan-hydrochlorothiazide (HYZAAR) 50-12.5 mg per tablet 7/5/2024 at 1000 Self Yes Yes   Sig: Take 1 tablet by mouth daily    oseltamivir (TAMIFLU) 75 mg capsule Not Taking  Yes No   Sig: take 1 capsule by mouth once daily for 10 days   Patient not taking: Reported on 2/22/2024   prochlorperazine (COMPAZINE) 10 mg tablet 7/5/2024 Self Yes Yes   Sig: Take 10 mg by mouth every 6 (six) hours as needed   simvastatin (ZOCOR) 20 mg tablet 7/6/2024 at 1000 Self Yes Yes   Sig: Take 20 mg by mouth daily    sulfamethoxazole-trimethoprim (BACTRIM DS) 800-160 mg per tablet Not Taking Self Yes No   Patient not taking: Reported on 12/28/2023   trimethoprim (PROLOPRIM) 100 mg tablet 7/6/2024 at 1000 Self Yes Yes   Sig: Take 100 mg by mouth daily    venlafaxine 150 MG TB24 7/6/2024 at 1000 Self Yes Yes   Sig: Take 150 mg by mouth daily with breakfast   zolpidem (AMBIEN CR) 12.5 MG CR tablet 7/5/2024 at 2200 Self Yes Yes   Sig: Take 12.5 mg by mouth daily at bedtime       Facility-Administered Medications: None       Past Medical History:   Diagnosis Date    Anemia 3/23/2020    During chemo    Brain cancer (HCC)     Breast cancer (HCC)     2004 and then 2009    Cancer (HCC) 2005,2009    breast    Cancer (HCC) 2020    ovarian    Chicken pox     Colon polyp     CPAP (continuous positive airway pressure) dependence     Frequent UTI     GERD (gastroesophageal reflux disease)     Heart disease     High cholesterol     History of blood transfusion     2013 with Bilat Knee replacement    Hypertension     Irregular heart beat     PVC's    Ovarian cancer (HCC) 9/13/2020    PONV (postoperative nausea and vomiting)     Sleep apnea        Past Surgical History:   Procedure Laterality Date    APPENDECTOMY      BREAST CYST EXCISION      BREAST LUMPECTOMY      CHOLECYSTECTOMY       COLONOSCOPY      CYSTOSCOPY Bilateral 01/13/2020    Procedure: CYSTOSCOPY: CYSTOSCOPY WITH PERIOPERATIVE URETERAL CATHETERS  PLACEMENT;  Surgeon: Justin Lela MD;  Location: [facility];  Service: Gynecology Oncology    CYSTOSCOPY      HERNIA REPAIR      HYSTERECTOMY N/A 01/13/2020    Procedure: EXPLORATORY LAPAROTOMY, OVARIAN CANCER STAGING WITH TOTAL HYSTERECTOMY, BILATERAL SALPINGO-OOPHORECTOMY, BILATERAL PELVIC AND PERIAORTIC LYMPHADENECTOMY, INDICATED APPENDECTOMY, OMENTECTOMY, STAGING PERITONEAL BIOPSIES.;  Surgeon: Justin Leal MD;  Location: [facility];  Service: Gynecology Oncology    JOINT REPLACEMENT      Bilat Knees    MASTECTOMY Bilateral 2009    CO BRNCHSC INCL FLUOR GDNCE DX W/CELL WASHG SPX N/A 08/09/2021    Procedure: BRONCHOSCOPY FLEXIBLE;  Surgeon: Shiv Taylor MD;  [facility]: [facility];  Service: Thoracic    CO BRONCHOSCOPY NEEDLE BX TRACHEA MAIN STEM&/BRON N/A 08/09/2021    Procedure: ENDOBRONCHIAL ULTRASOUND (EBUS);  Surgeon: Shiv Taylor MD;  [facility];  Service: Thoracic    CO LAP RPR HRNA XCPT INCAL/INGUN NCRC8/STRANGULATED N/A 04/08/2021    Procedure: REPAIR HERNIA INCISIONAL LAPAROSCOPIC;  Surgeon: Dariel Wang MD;  [facility];  Service: General    CO LAPS ABD PRTM&OMENTUM DX W/WO SPEC BR/WA SPX N/A 01/13/2020    Procedure: LAPAROSCOPY DIAGNOSTIC;  Surgeon: Justin Leal MD;  [facility];  Service: Gynecology Oncology    REPAIR KNEE LIGAMENT Bilateral     ROTATOR CUFF REPAIR         Family History   Problem Relation Age of Onset    Breast cancer Mother 48    Cancer Mother     Lung cancer Father     Hypertension Father     Prostate cancer Brother      I have reviewed and agree with the history as documented.    E-Cigarette/Vaping    E-Cigarette Use Never User      E-Cigarette/Vaping Substances    Nicotine No     THC No     CBD No     Flavoring No     Other No     Unknown No      Social History     Tobacco Use    Smoking status:  Former     Current packs/day: 0.00     Average packs/day: 1 pack/day for 15.0 years (15.0 ttl pk-yrs)     Types: Cigarettes     Start date: 10/31/1972     Quit date: 10/31/1987     Years since quittin.7    Smokeless tobacco: Former   Vaping Use    Vaping status: Never Used   Substance Use Topics    Alcohol use: Yes     Comment: socially     Drug use: Never        Review of Systems   Constitutional:  Negative for chills and fever.   Eyes:  Negative for pain and visual disturbance.   Respiratory:  Negative for cough and shortness of breath.    Cardiovascular:  Negative for chest pain, palpitations and leg swelling.   Gastrointestinal:  Negative for abdominal pain, constipation, diarrhea, nausea and vomiting.   Genitourinary:  Negative for dysuria, frequency and hematuria.   Musculoskeletal:  Positive for myalgias. Negative for arthralgias and back pain.   Skin:  Negative for color change and rash.   Neurological:  Positive for weakness (generalized, denies LUE weakness) and headaches. Negative for dizziness, seizures, syncope and light-headedness.   Psychiatric/Behavioral:  Negative for dysphoric mood.    All other systems reviewed and are negative.      Physical Exam  ED Triage Vitals   Temperature Pulse Respirations Blood Pressure SpO2   24 1303 24 1303 24 1303 24 1303 24 1303   98.5 °F (36.9 °C) (!) 109 20 145/96 96 %      Temp Source Heart Rate Source Patient Position - Orthostatic VS BP Location FiO2 (%)   24 1303 24 1303 24 1303 24 1303 --   Oral Monitor Sitting Right arm       Pain Score       24 1700       9             Orthostatic Vital Signs  Vitals:    24 1500 24 1600 24 1700 24 1955   BP: 158/99 163/82 148/80 151/90   Pulse: 97 97 97 96   Patient Position - Orthostatic VS:   Lying Lying       Physical Exam  Vitals and nursing note reviewed.   Constitutional:       Appearance: She is well-developed. She is  ill-appearing.   HENT:      Head: Normocephalic and atraumatic.      Mouth/Throat:      Mouth: Mucous membranes are moist.      Pharynx: Oropharynx is clear.   Eyes:      General: No visual field deficit.     Extraocular Movements: Extraocular movements intact.      Conjunctiva/sclera: Conjunctivae normal.      Pupils: Pupils are equal, round, and reactive to light.   Cardiovascular:      Rate and Rhythm: Normal rate and regular rhythm.      Pulses: Normal pulses.      Heart sounds: Normal heart sounds.   Pulmonary:      Effort: Pulmonary effort is normal. No respiratory distress.      Breath sounds: Normal breath sounds. No wheezing, rhonchi or rales.   Abdominal:      General: Abdomen is flat.      Palpations: Abdomen is soft.      Tenderness: There is no abdominal tenderness.   Musculoskeletal:      Cervical back: Normal range of motion.      Right lower leg: No edema.      Left lower leg: No edema.   Skin:     General: Skin is warm and dry.      Capillary Refill: Capillary refill takes less than 2 seconds.   Neurological:      General: No focal deficit present.      Mental Status: She is alert and oriented to person, place, and time.      Cranial Nerves: Cranial nerves 2-12 are intact. No cranial nerve deficit, dysarthria or facial asymmetry.      Sensory: Sensation is intact. No sensory deficit.      Motor: Weakness (5-/5 in LUE with drift) present. No tremor or seizure activity.      Coordination: Coordination is intact. Finger-Nose-Finger Test and Heel to Shin Test normal. Rapid alternating movements normal.   Psychiatric:         Mood and Affect: Mood normal.         Behavior: Behavior normal.         ED Medications  Medications   zolpidem (AMBIEN) tablet 10 mg (10 mg Oral Given 7/6/24 2114)   clonazePAM (KlonoPIN) tablet 0.5 mg (0.5 mg Oral Given 7/6/24 2114)   enoxaparin (LOVENOX) subcutaneous injection 40 mg (has no administration in time range)   dexamethasone (DECADRON) tablet 4 mg (has no  administration in time range)   dexamethasone (DECADRON) tablet 2 mg (has no administration in time range)   dexamethasone (DECADRON) tablet 2 mg (has no administration in time range)   dexamethasone (DECADRON) tablet 1 mg (has no administration in time range)   pantoprazole (PROTONIX) EC tablet 40 mg (has no administration in time range)   insulin lispro (HumALOG/ADMELOG) 100 units/mL subcutaneous injection 1-5 Units (has no administration in time range)   iohexol (OMNIPAQUE) 350 MG/ML injection (MULTI-DOSE) 75 mL (75 mL Intravenous Given 7/6/24 1346)   metoclopramide (REGLAN) injection 10 mg (10 mg Intravenous Given 7/6/24 1455)   diphenhydrAMINE (BENADRYL) injection 12.5 mg (12.5 mg Intravenous Given 7/6/24 1454)   magnesium sulfate 2 g/50 mL IVPB (premix) 2 g (0 g Intravenous Stopped 7/6/24 1633)   rimegepant sulfate (NURTEC) disintegrating tablet 75 mg (75 mg Oral Given 7/6/24 1517)   sodium chloride 0.9 % bolus 500 mL (0 mL Intravenous Stopped 7/6/24 1633)   dexamethasone (DECADRON) injection 4 mg (4 mg Intravenous Given 7/6/24 1454)   ketorolac (TORADOL) injection 15 mg (15 mg Intravenous Given 7/6/24 1950)       Diagnostic Studies  Results Reviewed       Procedure Component Value Units Date/Time    HS Troponin I 4hr [[id number]]  (Normal) Collected: 07/06/24 1751    Lab Status: Final result Specimen: Blood from Arm, Right Updated: 07/06/24 1823     hs TnI 4hr 6 ng/L      Delta 4hr hsTnI 1 ng/L     HS Troponin I 2hr [[id number]]  (Normal) Collected: 07/06/24 1634    Lab Status: Final result Specimen: Blood from Arm, Left Updated: 07/06/24 1703     hs TnI 2hr 5 ng/L      Delta 2hr hsTnI 0 ng/L     FLU/RSV/COVID - if FLU/RSV clinically relevant [[id number]]  (Normal) Collected: 07/06/24 1458    Lab Status: Final result Specimen: Nares from Nose Updated: 07/06/24 1553     SARS-CoV-2 Negative     INFLUENZA A PCR Negative     INFLUENZA B PCR Negative     RSV PCR Negative    Narrative:      FOR PEDIATRIC PATIENTS -  copy/paste COVID Guidelines URL to browser: [email or web address]    SARS-CoV-2 assay is a Nucleic Acid Amplification assay intended for the  qualitative detection of nucleic acid from SARS-CoV-2 in nasopharyngeal  swabs. Results are for the presumptive identification of SARS-CoV-2 RNA.    Positive results are indicative of infection with SARS-CoV-2, the virus  causing COVID-19, but do not rule out bacterial infection or co-infection  with other viruses. Laboratories within the [place] and its  territories are required to report all positive results to the appropriate  public health authorities. Negative results do not preclude SARS-CoV-2  infection and should not be used as the sole basis for treatment or other  patient management decisions. Negative results must be combined with  clinical observations, patient history, and epidemiological information.  This test has not been FDA cleared or approved.    This test has been authorized by FDA under an Emergency Use Authorization  (EUA). This test is only authorized for the duration of time the  declaration that circumstances exist justifying the authorization of the  emergency use of an in vitro diagnostic tests for detection of SARS-CoV-2  virus and/or diagnosis of COVID-19 infection under section 564(b)(1) of  the Act, 21 U.S.C. 360bbb-3(b)(1), unless the authorization is terminated  or revoked sooner. The test has been validated but independent review by FDA  and CLIA is pending.    Test performed using [employer] GeneXpert: This RT-PCR assay targets N2,  a region unique to SARS-CoV-2. A conserved region in the E-gene was chosen  for pan-Sarbecovirus detection which includes SARS-CoV-2.    According to CMS-2020-01-R, this platform meets the definition of high-throughput technology.    HS Troponin 0hr (reflex protocol) [[id number]]  (Normal) Collected: 07/06/24 1334    Lab Status: Final result Specimen: Blood  from Arm, Left Updated: 07/06/24 1403     hs TnI 0hr 5 ng/L     Basic metabolic panel [[id number]] Collected: 07/06/24 1334    Lab Status: Final result Specimen: Blood from Arm, Left Updated: 07/06/24 1401     Sodium 139 mmol/L      Potassium 3.9 mmol/L      Chloride 103 mmol/L      CO2 25 mmol/L      ANION GAP 11 mmol/L      BUN 19 mg/dL      Creatinine 0.83 mg/dL      Glucose 140 mg/dL      Calcium 10.1 mg/dL      eGFR 71 ml/min/1.73sq m     Narrative:      National Kidney Disease Foundation guidelines for Chronic Kidney Disease (CKD):     Stage 1 with normal or high GFR (GFR > 90 mL/min/1.73 square meters)    Stage 2 Mild CKD (GFR = 60-89 mL/min/1.73 square meters)    Stage 3A Moderate CKD (GFR = 45-59 mL/min/1.73 square meters)    Stage 3B Moderate CKD (GFR = 30-44 mL/min/1.73 square meters)    Stage 4 Severe CKD (GFR = 15-29 mL/min/1.73 square meters)    Stage 5 End Stage CKD (GFR <15 mL/min/1.73 square meters)  Note: GFR calculation is accurate only with a steady state creatinine    Protime-INR [[id number]]  (Normal) Collected: 07/06/24 1334    Lab Status: Final result Specimen: Blood from Arm, Left Updated: 07/06/24 1358     Protime 13.5 seconds      INR 0.97    APTT [[id number]]  (Normal) Collected: 07/06/24 1334    Lab Status: Final result Specimen: Blood from Arm, Left Updated: 07/06/24 1358     PTT 23 seconds     CBC and Platelet [[id number]]  (Abnormal) Collected: 07/06/24 1334    Lab Status: Final result Specimen: Blood from Arm, Left Updated: 07/06/24 1342     WBC 6.83 Thousand/uL      RBC 4.09 Million/uL      Hemoglobin 12.3 g/dL      Hematocrit 38.0 %      MCV 93 fL      MCH 30.1 pg      MCHC 32.4 g/dL      RDW 15.8 %      Platelets 230 Thousands/uL      MPV 9.6 fL     Fingerstick Glucose (POCT) [[id number]]  (Abnormal) Collected: 07/06/24 1337    Lab Status: Final result Specimen: Blood Updated: 07/06/24 1339     POC Glucose 147 mg/dl                    CT stroke alert brain   Final Result by JEANNETTE Richardson  Schoenberger, MD (07/06 3163)      Redemonstration of multiple intracranial metastases with vasogenic edema. No significant change  but lesions are better delineated on recent MRI.            Findings were reported to Blossom Padilla via HIPAA compliant secure electronic messaging at 1:56 p.m.      Workstation performed: [id number]         CTA stroke alert (head/neck)   Final Result by E. Alec Schoenberger, MD (07/06 3361)   No significant stenosis of the cervical carotid or vertebral arteries   No significant intracranial stenosis, large vessel occlusion or aneurysm.         Findings were reported to Blossom Padilla via HIPAA compliant secure electronic messaging at 1:56 p.m.               Workstation performed: [id number]               Procedures  ECG 12 Lead Documentation Only    Date/Time: 7/6/2024 2:01 PM    Performed by: Laxmi Lewis MD  Authorized by: Laxmi Lewis MD    Indications / Diagnosis:  Stroke alert  ECG reviewed by me, the ED Provider: yes    Patient location:  ED  Previous ECG:     Previous ECG:  Compared to current    Comparison ECG info:  3/24/2021    Similarity:  No change    Comparison to cardiac monitor: Yes    Interpretation:     Interpretation: normal    Rate:     ECG rate:  97    ECG rate assessment: normal    Rhythm:     Rhythm: sinus rhythm    Ectopy:     Ectopy: none    QRS:     QRS axis:  Normal    QRS intervals:  Normal  Conduction:     Conduction: normal    ST segments:     ST segments:  Normal  T waves:     T waves: normal    Comments:      QTc 459        ED Course  ED Course as of 07/06/24 2121   Sat Jul 06, 2024   1353 Per neurology at bedside can start on decadron for edema   1354 Per MRI 6/28, Multiple enhancing intracranial metastatic lesions, increased in size compared to 6/3/2024. Few of the lesions demonstrate surrounding vasogenic edema, progressed since the prior study.  There is no acute territorial infarct, midline  shift, or mass effect.   1407 Per neurology, hold decadron as patient has had adverse reactions in the past. They recommend reaching out to rad/onc for further recommendation.   1413 POC Glucose(!): 147   1413 Basic metabolic panel  Normal   1413 CBC and Platelet(!)  Grossly normal   1413 Protime-INR  Normal   1413 PTT: 23  Normal   1413 hs TnI 0hr: 5   1414 CTA stroke alert (head/neck)  IMPRESSION:  No significant stenosis of the cervical carotid or vertebral arteries  No significant intracranial stenosis, large vessel occlusion or aneurysm.   1414 CT stroke alert brain  IMPRESSION:     Redemonstration of multiple intracranial metastases with vasogenic edema. No significant change  but lesions are better delineated on recent MRI.   1418 Discussing with rad/onc   1451 Discussed with Dr. Murray from rad/onc who recommends steroids if patient is able. Patient reports headaches, sleep disturbances, jitteriness.  Per chart review she has been on steroid tapers previously.     Rad/onc recommending gentle taper.  Decadron  4mg PO BID x 5 days, then  2mg PO BID x 5 days, then  2mg PO Daily x 5 days, then  1mg PO Daily x 5 days    Reports she should be getting a call from rad/onc department for post-SRS check-in on Monday.    If there are further neurologic concerns unimproved with steroids, then MRI brain and NSGY consult would be indicated.             HEART Risk Score      Flowsheet Row Most Recent Value   Heart Score Risk Calculator    History 0 Filed at: 07/06/2024 1413   ECG 0 Filed at: 07/06/2024 1413   Age 2 Filed at: 07/06/2024 1413   Risk Factors 2 Filed at: 07/06/2024 1413   Troponin 0 Filed at: 07/06/2024 1413   HEART Score 4 Filed at: 07/06/2024 1413             Stroke Assessment       Row Name 07/06/24 1324             NIH Stroke Scale    Interval Baseline      Level of Consciousness (1a.) 0      LOC Questions (1b.) 0      LOC Commands (1c.) 0      Best Gaze (2.) 0      Visual (3.) 0      Facial Palsy (4.) 0   Baseline per       Motor Arm, Left (5a.) 1      Motor Arm, Right (5b.) 0      Motor Leg, Left (6a.) 0      Motor Leg, Right (6b.) 0      Limb Ataxia (7.) 0      Sensory (8.) 0      Best Language (9.) 0      Dysarthria (10.) 0      Extinction and Inattention (11.) (Formerly Neglect) 0      Total 1                     Stroke Assessment       Row Name 07/06/24 1324             NIH Stroke Scale    Interval Baseline      Level of Consciousness (1a.) 0      LOC Questions (1b.) 0      LOC Commands (1c.) 0      Best Gaze (2.) 0      Visual (3.) 0      Facial Palsy (4.) 0  Baseline per       Motor Arm, Left (5a.) 1      Motor Arm, Right (5b.) 0      Motor Leg, Left (6a.) 0      Motor Leg, Right (6b.) 0      Limb Ataxia (7.) 0      Sensory (8.) 0      Best Language (9.) 0      Dysarthria (10.) 0      Extinction and Inattention (11.) (Formerly Neglect) 0      Total 1                              SBIRT 22yo+      Flowsheet Row Most Recent Value   Initial Alcohol Screen: US AUDIT-C     1. How often do you have a drink containing alcohol? 0 Filed at: 07/06/2024 1304   2. How many drinks containing alcohol do you have on a typical day you are drinking?  0 Filed at: 07/06/2024 1304   3a. Male UNDER 65: How often do you have five or more drinks on one occasion? 0 Filed at: 07/06/2024 1304   3b. FEMALE Any Age, or MALE 65+: How often do you have 4 or more drinks on one occassion? 0 Filed at: 07/06/2024 1304   Audit-C Score 0 Filed at: 07/06/2024 1304   OSWALD: How many times in the past year have you...    Used an illegal drug or used a prescription medication for non-medical reasons? Never Filed at: 07/06/2024 1304                  Medical Decision Making  Lydia Squires is a 73 y.o. female presenting with stroke-like symptoms of slurred speech and gait unsteadiness starting between 7-10. Associated symptoms: headache. Vitals unremarkable. Exam remarkable for left upper extremity weakness with drift, no other focal  deficits. Initial NIHSS 1 for LUE drift.    DDX including but not limited to: TIA, CVA, intracranial abnormality secondary to metastasis or vasogenic edema, radiation effect, metabolic abnormality, cardiac etiology, atypical migraine, seizure, tumor, thyroid disease.     Based on results available while the patient was in the ED:  Stroke alert called as patient had new onset. Neurology at bedside, patient not candidate for thrombolytics. CT obtained which did not show acute infarct, though did show vasogenic edema which was read as stable by radiology. Case was discussed with radiation/oncology who recommended decadron taper starting at 4 mg which was given to the patient, starting lower as patient had had side effects of treatment before. Also given migraine cocktail for headache.  Lab work grossly unremarkable. ECG without acute ischemic changes.    See ED course for further updates.    Based on these results and H&P, suspect effect of radiation therapy vs worsening vasogenic edema. Plan to [facility] to [facility] for further management of symptoms.    Discussed all results with patient including lab work, imaging, and evaluation. All questions answered. After evaluation and workup in the emergency department Discussed patient's case with [facility] regarding admission who accepted the patient for further evaluation and management under Dr. Figueroa ([facility]).    Amount and/or Complexity of Data Reviewed  Independent Historian: spouse  External Data Reviewed: labs, radiology, ECG and notes.  Labs: ordered. Decision-making details documented in ED Course.  Radiology: ordered. Decision-making details documented in ED Course.  ECG/medicine tests: ordered and independent interpretation performed.    Risk  Prescription drug management.  Decision regarding hospitalization.          Disposition  Final diagnoses:   Stroke-like symptoms   Abnormal MRI of head     Time reflects when diagnosis was documented in both MDM as applicable and the  Disposition within this note       Time User Action Codes Description Comment    7/6/2024  1:26 PM Brian Lewisve Add [R29.90] Stroke-like symptoms     7/6/2024  2:49 PM LewisLaxmi cantu Add [R93.0] Abnormal MRI of head     7/6/2024  5:48 PM Rosamaria Rincon Add [G93.6] Vasogenic edema (HCC)     7/6/2024  5:49 PM Rosamaria Rincon Add [C79.31] Brain metastases     7/6/2024  5:55 PM Rosamaria Rincon Add [C56.9] Malignant neoplasm of ovary, unspecified laterality (HCC)           ED Disposition       ED Disposition   Admit    Condition   Stable    Date/Time   Sat Jul 6, 2024 [id number]    Comment   Case was discussed with SHANAE and the patient's admission status was agreed to be Admission Status: inpatient status to the service of Dr. nieves . PENDING               Follow-up Information    None         Current Discharge Medication List        CONTINUE these medications which have NOT CHANGED    Details   carvedilol (COREG) 6.25 mg tablet take 1 tablet by mouth twice a day with meals  Qty: 180 tablet, Refills: 3    Associated Diagnoses: Frequent unifocal PVCs; Dilated cardiomyopathy (HCC)      cefdinir (OMNICEF) 300 mg capsule Take 1 capsule twice a day by oral route for 7 days.      clonazePAM (KlonoPIN) 0.5 mg tablet Take 0.5 mg by mouth 2 (two) times a day as needed      losartan-hydrochlorothiazide (HYZAAR) 50-12.5 mg per tablet Take 1 tablet by mouth daily   Refills: 0      prochlorperazine (COMPAZINE) 10 mg tablet Take 10 mg by mouth every 6 (six) hours as needed      simvastatin (ZOCOR) 20 mg tablet Take 20 mg by mouth daily   Refills: 0      trimethoprim (PROLOPRIM) 100 mg tablet Take 100 mg by mouth daily   Refills: 0      venlafaxine 150 MG TB24 Take 150 mg by mouth daily with breakfast  Refills: 0      zolpidem (AMBIEN CR) 12.5 MG CR tablet Take 12.5 mg by mouth daily at bedtime   Refills: 0      estradiol (ESTRACE) 0.1 mg/g vaginal cream insert 1 gram vaginally two times a week  Refills: 0      LETROZOLE PO Take  by mouth      oseltamivir (TAMIFLU) 75 mg capsule take 1 capsule by mouth once daily for 10 days      sulfamethoxazole-trimethoprim (BACTRIM DS) 800-160 mg per tablet            No discharge procedures on file.    PDMP Review         Value Time User    PDMP Reviewed  Yes 2/22/2024  4:09 PM Tico Farris MD             ED Provider  Attending physically available and evaluated Lydia Squires. I managed the patient along with the ED Attending.    Electronically Signed by           Laxmi Lewis MD  07/06/24 4846

## 2024-07-06 NOTE — ASSESSMENT & PLAN NOTE
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     Follow-up with oncology and radiation oncology on the patient setting

## 2024-07-06 NOTE — ASSESSMENT & PLAN NOTE
Assessment:  Patient stated that approximately 6 PM last night she started feeling weakness on the left side.  She continued to have worsening left-sided weakness.  Along with that, patient complaining about headache which is an 8/10.  Patient did state that she took 2 doses of Tylenol already today but the headache is still not improved.  She denied any numbness, tingling, speech difficulties, facial asymmetry, syncope, or tremors.    Initial NIHSS 1  Presenting deficits were: Left-sided weakness  Interventions: Patient not a candidate. Unclear time of onset outside appropriate time window. and metastatic disease      Workup:  Lab Results   Component Value Date    HGBA1C 5.7 08/08/2022    INR 0.97 07/06/2024      MRI Brain w wo contrast 6/28/24: Multiple enhancing intracranial metastatic lesions, increased in size compared to 6/3/2024. Few of the lesions demonstrate surrounding vasogenic edema, progressed since the prior study. There is no acute territorial infarct, midline shift, or mass effect.  CTH 7/6/24: No acute intracranial abnormalities. Metastatic disease consistent with prior MRI.  CTA 7/6/24: No LVO, aneurysm, or significant stenosis.  TTE/WHITNEY: Deferred    Pertinent scores as of 07/06/24:  - NIHSS: 1    Impression: Patient is a 73-year-old female who presented to the ED as result of left sided weakness. CTH showed metastatic disease consistent with her prior MRI from 6/28/2024.  CTA H/N showed no LVO, aneurysm, or significant stenosis.  No stroke pathway needed at this time given that patient's left-sided weakness most likely in the setting of right-sided metastatic disease with significant vasogenic edema.      Plan:  Patient case was discussed with attending, Dr. Ji  Stroke pathway not needed in the setting of metastatic disease  MRI inpatient deferred at this time since it was just done earlier  Patient has a MRI Brain BT protocol scheduled for 9/5/24  Recommend headache control with Reglan,  Benadryl, Magnesium, and Nurtec x1  Defer symptomatic control of the metastatic disease to RadOnc   RadOnc recommended doing a short course of steroids  Neurosurgery can be consulted if RadOnc recommends  Rest of care as per primary care team

## 2024-07-06 NOTE — H&P
[facility]  H&P  Name: Lydia Squires 73 y.o. female I MRN: [id number]  Unit/Bed#: S -01 I Date of Admission: 7/6/2024   Date of Service: 7/6/2024 I Hospital Day: 0      Assessment & Plan   * Stroke-like symptoms  Assessment & Plan  72 yo female with PMHx bilateral breast cancer and known ovarian CA metastatic to brain who presents with headache and LUE weakness. Pt undergoes SRS for brain mets- fourth treatment occurred yesterday. Headache presented last night, a few hours after SRS session. Headache 9/10 intensity, located in left frontal region without radiation. Pt also noted LUE weakness, generalized weakness, and had an episode of emesis while being driven to the hospital. Patient also expresses trouble with her gait but stated this is not a new onset issue for her. Denies blurry vision, photophobia or lightheadedness.     On arrival to ED, stroke alert was called for patient.   CT Head showed multiple intra-axial metastases with surrounding vasogenic edema. Compared to MRI brain on 6/28/24, vasogenic edema are grossly unchanged. Several lesions mildly hyperintense corresponding with hemorrhage seen on prior study. No acute hemorrhage. No shift or herniation. No acute infarct.  CTA Head showed no significant stenosis of the cervical carotid or vertebral arteries. No significant intracranial stenosis, large vessel occlusion or aneurysm.    Neurology was consulted and reviewed imaging.   Per neuro, suspect symptomatic from vasogenic edema worsening in known metastatic lesions. Unlikely ischemic stroke.   Thus stroke alert was cancelled    Plan:  Spoke with radiation oncology, who reviewed case and believe swelling from treatment as indicated on patient's CT. Placed formal inpatient consult with radiation oncology. Radiation oncology reached out to let us know they already spoke to ED physician regarding the case and asked to kindly cancel the consult order. Consult order for  radiation oncology consult cancelled.    IV Decadron given today. Will start PO tomorrow as per radiation oncology.    Per radiation oncology, begin Decadron 4 mg PO BID x 5 days, then 2 mg PO BID x 5 days, then 2 mg PO daily x 5 days, then 1 mg PO daily x 5 days.    PPI prescribed for steroid taper. POC glucose Q6 as patient is currently n.p.o. can change frequency once diet is advanced    Per radiation oncology, if there are any further concerns unimproved with steroids, then MRI brain and NSGY consult would be indicated.    NPO waiting on nursing dysphagia screen.     Vasogenic edema (HCC)  Assessment & Plan  72 yo female with PMHx bilateral breast cancer and known ovarian CA metastatic to brain who presents with headache and LUE weakness. Pt undergoes SRS for brain mets- fourth treatment occurred yesterday. Headache presented last night, a few hours after SRS session. Headache 9/10 intensity, located in left frontal region without radiation. Pt also noted LUE weakness, generalized weakness, and had an episode of emesis while being driven to the hospital. Patient also expresses trouble with her gait but stated this is not a new onset issue for her. Denies blurry vision, photophobia or lightheadedness.     Neuro consulted and received CT H and recent MRI brain 6/28/24 showing vasogenic edema worsening in known metastatic lesions- thus suspect symptomatic from this. Neuro rec Decadron and discussion of above findings with radiation oncology to see if any further immediate recommendations from them.    Plan:  Spoke with radiation oncology, who reviewed case and believe swelling from treatment as indicated on patient's CT. Placed formal inpatient consult with radiation oncology. Radiation oncology reached out to let us know they already spoke to ED physician regarding the case and asked to kindly cancel the consult order. Consult order for radiation oncology consult cancelled.    IV Decadron given today. Will start  PO tomorrow as per radiation oncology.    Per radiation oncology, begin Decadron 4 mg PO BID x 5 days, then 2 mg PO BID x 5 days, then 2 mg PO daily x 5 days, then 1 mg PO daily x 5 days.     Per radiation oncology, if there are any further concerns unimproved with steroids, then MRI brain and NSGY consult would be indicated.  Per radiation oncology, begin Decadon 4 mg PO BID x 5 days, then 2 mg PO BID x 5 days, then 2 mg PO daily x 5 days, then 1 mg PO daily x 5 days.    Brain metastases  Assessment & Plan  Known ovarian CA metastatic to brain. Has undergone multiple rounds of SRS.    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     Inpatient radiation oncology on board       VTE Pharmacologic Prophylaxis: VTE Score: 4 Moderate Risk (Score 3-4) - Pharmacological DVT Prophylaxis Ordered: enoxaparin (Lovenox).  Code Status: Prior 3  Discussion with family: Updated  () at bedside.    Anticipated Length of Stay: Patient will be admitted on an inpatient basis with an anticipated length of stay of greater than 2 midnights secondary to stroke-like symptoms.    Chief Complaint: headache    History of Present Illness:  Lydia Squires is a 73 y.o. female with a bilateral breast cancer and known ovarian CA metastatic to brain who presents with headache and LUE weakness. Pt undergoes SRS for brain mets- fourth treatment occurred yesterday. Headache presented last night, a few hours after SRS session. Headache 9/10 intensity, located in left frontal region without radiation. Pt also noted LUE weakness, generalized weakness, and had an episode of emesis while being driven to the hospital. Patient also expresses trouble with her gait but stated this is not a new onset issue for her. Denies blurry vision, photophobia or lightheadedness.     Review  of Systems:  Review of Systems   Constitutional:  Positive for fatigue. Negative for chills.   HENT:  Negative for sneezing.    Eyes:  Negative for photophobia and visual disturbance.   Cardiovascular:  Negative for chest pain and palpitations.   Gastrointestinal:  Positive for vomiting.   Neurological:  Positive for weakness and headaches.         Past Medical and Surgical History:   Past Medical History:   Diagnosis Date    Anemia 3/23/2020    During chemo    Brain cancer (HCC)     Breast cancer (HCC)     2004 and then 2009    Cancer (HCC) 2005,2009    breast    Cancer (HCC) 2020    ovarian    Chicken pox     Colon polyp     CPAP (continuous positive airway pressure) dependence     Frequent UTI     GERD (gastroesophageal reflux disease)     Heart disease     High cholesterol     History of blood transfusion     2013 with Bilat Knee replacement    Hypertension     Irregular heart beat     PVC's    Ovarian cancer (HCC) 9/13/2020    PONV (postoperative nausea and vomiting)     Sleep apnea        Past Surgical History:   Procedure Laterality Date    APPENDECTOMY      BREAST CYST EXCISION      BREAST LUMPECTOMY      CHOLECYSTECTOMY      COLONOSCOPY      CYSTOSCOPY Bilateral 01/13/2020    Procedure: CYSTOSCOPY: CYSTOSCOPY WITH PERIOPERATIVE URETERAL CATHETERS  PLACEMENT;  Surgeon: Justin Leal MD;  Location: [facility];  Service: Gynecology Oncology    CYSTOSCOPY      HERNIA REPAIR      HYSTERECTOMY N/A 01/13/2020    Procedure: EXPLORATORY LAPAROTOMY, OVARIAN CANCER STAGING WITH TOTAL HYSTERECTOMY, BILATERAL SALPINGO-OOPHORECTOMY, BILATERAL PELVIC AND PERIAORTIC LYMPHADENECTOMY, INDICATED APPENDECTOMY, OMENTECTOMY, STAGING PERITONEAL BIOPSIES.;  Surgeon: Justin Leal MD;  Location: [facility];  Service: Gynecology Oncology    JOINT REPLACEMENT      Bilat Knees    MASTECTOMY Bilateral 2009    [facility] INCL FLUOR GDNCE DX W/CELL WASHG SPX N/A 08/09/2021    Procedure: BRONCHOSCOPY FLEXIBLE;  Surgeon: Shiv  MD Claudia;  Location: [facility];  Service: Thoracic    NV BRONCHOSCOPY NEEDLE BX TRACHEA MAIN STEM&/BRON N/A 08/09/2021    Procedure: ENDOBRONCHIAL ULTRASOUND (EBUS);  Surgeon: Shiv Taylor MD;  [facility]: [facility];  Service: Thoracic    NV LAP RPR HRNA XCPT INCAL/INGUN NCRC8/STRANGULATED N/A 04/08/2021    Procedure: REPAIR HERNIA INCISIONAL LAPAROSCOPIC;  Surgeon: Dariel Wang MD;  Location: [facility];  Service: General    NV LAPS ABD PRTM&OMENTUM DX W/WO SPEC BR/WA SPX N/A 01/13/2020    Procedure: LAPAROSCOPY DIAGNOSTIC;  Surgeon: Justin Leal MD;  [facility]: [facility];  Service: Gynecology Oncology    REPAIR KNEE LIGAMENT Bilateral     ROTATOR CUFF REPAIR         Meds/Allergies:  Prior to Admission medications    Medication Sig Start Date End Date Taking? Authorizing Provider   carvedilol (COREG) 6.25 mg tablet take 1 tablet by mouth twice a day with meals 6/12/23  Yes Sukhdeep Maldonado,    cefdinir (OMNICEF) 300 mg capsule Take 1 capsule twice a day by oral route for 7 days. 3/18/24  Yes Historical Provider, MD   clonazePAM (KlonoPIN) 0.5 mg tablet Take 0.5 mg by mouth 2 (two) times a day as needed 3/20/23  Yes Historical Provider, MD   losartan-hydrochlorothiazide (HYZAAR) 50-12.5 mg per tablet Take 1 tablet by mouth daily  10/27/18  Yes Historical Provider, MD   prochlorperazine (COMPAZINE) 10 mg tablet Take 10 mg by mouth every 6 (six) hours as needed 8/26/21  Yes Historical Provider, MD   simvastatin (ZOCOR) 20 mg tablet Take 20 mg by mouth daily  10/23/18  Yes Historical Provider, MD   trimethoprim (PROLOPRIM) 100 mg tablet Take 100 mg by mouth daily  10/7/18  Yes Historical Provider, MD   venlafaxine 150 MG TB24 Take 150 mg by mouth daily with breakfast 10/16/18  Yes Historical Provider, MD   zolpidem (AMBIEN CR) 12.5 MG CR tablet Take 12.5 mg by mouth daily at bedtime  10/9/18  Yes Historical Provider, MD   estradiol (ESTRACE) 0.1 mg/g vaginal cream insert 1 gram vaginally two times a  week  Patient not taking: Reported on 2023   Historical Provider, MD   LETROZOLE PO Take by mouth  Patient not taking: Reported on 2023    Historical Provider, MD   oseltamivir (TAMIFLU) 75 mg capsule take 1 capsule by mouth once daily for 10 days  Patient not taking: Reported on 2024   Historical Provider, MD   sulfamethoxazole-trimethoprim (BACTRIM DS) 800-160 mg per tablet  23   Historical Provider, MD     I have reviewed home medications with patient personally.    Allergies: No Known Allergies    Social History:  Marital Status: /Civil Union   Occupation: retired  Patient Pre-hospital Living Situation: Home  Patient Pre-hospital Level of Mobility: walks  Patient Pre-hospital Diet Restrictions: none  Substance Use History:   Social History     Substance and Sexual Activity   Alcohol Use Yes    Comment: socially      Social History     Tobacco Use   Smoking Status Former    Current packs/day: 0.00    Average packs/day: 1 pack/day for 15.0 years (15.0 ttl pk-yrs)    Types: Cigarettes    Start date: 10/31/1972    Quit date: 10/31/1987    Years since quittin.7   Smokeless Tobacco Former     Social History     Substance and Sexual Activity   Drug Use Never       Family History:  Family History   Problem Relation Age of Onset    Breast cancer Mother 48    Cancer Mother     Lung cancer Father     Hypertension Father     Prostate cancer Brother        Physical Exam:     Vitals:   Blood Pressure: 151/90 (24)  Pulse: 96 (24)  Temperature: 98.4 °F (36.9 °C) (24)  Temp Source: Oral (24)  Respirations: 18 (24)  Weight - Scale: 110 kg (241 lb 10 oz) (24 1305)  SpO2: 95 % (24)    Physical Exam  Eyes:      Extraocular Movements: Extraocular movements intact.   Cardiovascular:      Rate and Rhythm: Normal rate and regular rhythm.      Heart sounds: No murmur heard.     No friction rub. No gallop.   Pulmonary:       Effort: Pulmonary effort is normal.      Breath sounds: Normal breath sounds. No wheezing, rhonchi or rales.   Abdominal:      Tenderness: There is no abdominal tenderness.   Neurological:      Mental Status: She is alert and oriented to person, place, and time.      Cranial Nerves: No cranial nerve deficit.      Comments: Positive pronator drift on left. Strength 4+/5 left and 5+/5 right upper extremities. Strength 5+/5 left and 5+/5 right lower extremities.           Additional Data:     Lab Results:  Results from last 7 days   Lab Units 07/06/24  1334   WBC Thousand/uL 6.83   HEMOGLOBIN g/dL 12.3   HEMATOCRIT % 38.0   PLATELETS Thousands/uL 230     Results from last 7 days   Lab Units 07/06/24  1334   SODIUM mmol/L 139   POTASSIUM mmol/L 3.9   CHLORIDE mmol/L 103   CO2 mmol/L 25   BUN mg/dL 19   CREATININE mg/dL 0.83   ANION GAP mmol/L 11   CALCIUM mg/dL 10.1   GLUCOSE RANDOM mg/dL 140     Results from last 7 days   Lab Units 07/06/24  1334   INR  0.97     Results from last 7 days   Lab Units 07/06/24  1337   POC GLUCOSE mg/dl 147*     Lab Results   Component Value Date    HGBA1C 5.7 08/08/2022    HGBA1C 6.0 07/07/2021    HGBA1C 5.3 01/10/2020           Lines/Drains:  Invasive Devices       Peripheral Intravenous Line  Duration             Peripheral IV 07/06/24 Left Antecubital <1 day    Peripheral IV 07/06/24 Left;Ventral (anterior) Forearm <1 day                        Imaging: Reviewed radiology reports from this admission including: CT head and CTA head/neck  CT stroke alert brain   Final Result by E. Alec Schoenberger, MD (07/06 2926)      Redemonstration of multiple intracranial metastases with vasogenic edema. No significant change  but lesions are better delineated on recent MRI.            Findings were reported to Blossom Ji   and Yesy Padilla via HIPAA compliant secure electronic messaging at 1:56 p.m.      Workstation performed: [id number]         CTA stroke alert (head/neck)   Final  Result by E. Alec Schoenberger, MD (07/06 1923)   No significant stenosis of the cervical carotid or vertebral arteries   No significant intracranial stenosis, large vessel occlusion or aneurysm.         Findings were reported to Blossom Ji   and Yesy Padilla via HIPAA compliant secure electronic messaging at 1:56 p.m.               Workstation performed: [id number]             EKG and Other Studies Reviewed on Admission:   EKG: NSR. HR 97.    ** Please Note: This note has been constructed using a voice recognition system. **

## 2024-07-06 NOTE — ASSESSMENT & PLAN NOTE
72 yo female with PMHx bilateral breast cancer and known ovarian CA metastatic to brain who presents with headache and LUE weakness. Pt undergoes SRS for brain mets- fourth treatment occurred yesterday. Headache presented last night, a few hours after SRS session. Headache 9/10 intensity, located in left frontal region without radiation. Pt also noted LUE weakness, generalized weakness, and had an episode of emesis while being driven to the hospital. Patient also expresses trouble with her gait but stated this is not a new onset issue for her. Denies blurry vision, photophobia or lightheadedness.     Neuro consulted and received CT H and recent MRI brain 6/28/24 showing vasogenic edema worsening in known metastatic lesions- thus suspect symptomatic from this. Neuro rec Decadron and discussion of above findings with radiation oncology to see if any further immediate recommendations from them.    Plan:  Begin Decadron 4 mg IVQ6 until can switch to PO  F/up radiation oncology consult

## 2024-07-06 NOTE — ASSESSMENT & PLAN NOTE
Known ovarian CA metastatic to brain. Has undergone multiple rounds of SRS.    Inpatient radiation oncology on board

## 2024-07-06 NOTE — CONSULTS
Consultation - Stroke   Lydia Squires 73 y.o. female MRN: [id number]  Unit/Bed#: ED-01 Encounter: [id number]      Assessment & Plan     Stroke-like symptoms  Assessment & Plan  Assessment:  Patient stated that approximately 6 PM last night she started feeling weakness on the left side.  She continued to have worsening left-sided weakness.  Along with that, patient complaining about headache which is an 8/10.  Patient did state that she took 2 doses of Tylenol already today but the headache is still not improved.  She denied any numbness, tingling, speech difficulties, facial asymmetry, syncope, or tremors.    Initial NIHSS 1  Presenting deficits were: Left-sided weakness  Interventions: Patient not a candidate. Unclear time of onset outside appropriate time window. and metastatic disease      Workup:  Lab Results   Component Value Date    HGBA1C 5.7 08/08/2022    INR 0.97 07/06/2024      MRI Brain w wo contrast 6/28/24: Multiple enhancing intracranial metastatic lesions, increased in size compared to 6/3/2024. Few of the lesions demonstrate surrounding vasogenic edema, progressed since the prior study. There is no acute territorial infarct, midline shift, or mass effect.  CTH 7/6/24: No acute intracranial abnormalities. Metastatic disease consistent with prior MRI.  CTA 7/6/24: No LVO, aneurysm, or significant stenosis.  TTE/WHITNEY: Deferred    Pertinent scores as of 07/06/24:  - NIHSS: 1    Impression: Patient is a 73-year-old female who presented to the ED as result of left sided weakness. CTH showed metastatic disease consistent with her prior MRI from 6/28/2024.  CTA H/N showed no LVO, aneurysm, or significant stenosis.  No stroke pathway needed at this time given that patient's left-sided weakness most likely in the setting of right-sided metastatic disease with significant vasogenic edema.      Plan:  Patient case was discussed with attending, Dr. Ji  Stroke pathway not needed in the setting of metastatic  disease  MRI inpatient deferred at this time since it was just done earlier  Patient has a MRI Brain BT protocol scheduled for 9/5/24  Recommend headache control with Reglan, Benadryl, Magnesium, and Nurtec x1  [facility]   [facility] a [facility]  Neurosurgery can be consulted if [facility] recommends  Rest of care as per primary care team        Thrombolytic Decision: Patient not a candidate. Unclear time of onset outside appropriate time window. and metastatic cancer.      Recommendations for outpatient neurological follow up have yet to be determined.    History of Present Illness     Reason for Consult / Principal Problem: Stroke-like symptoms  Hx and PE limited by: None  Patient last known well: 6 PM on 7/5/24  Stroke alert called: 1:32 AM  Neurology time of arrival: Immediately    HPI: Lydia Squires is a 73 y.o. female who presented to the ED as result of left sided weakness.  Patient has a past medical history of anemia, ovarian cancer with metastatic disease to brain with ongoing radiation therapy, GERD, hyperlipidemia, hypertension, and sleep apnea.     Patient stated that approximately 6 PM last night she started feeling weakness on the left side.  She continued to have worsening left-sided weakness.  Along with that, patient complaining about headache which is an 8/10.  Patient did state that she took 2 doses of Tylenol already today but the headache is still not improved.  She denied any numbness, tingling, speech difficulties, facial asymmetry, syncope, or tremors.    Given patient's symptoms, a stroke alert was initiated.  NIHSS of 1 for left-sided drift.  CTH showed metastatic disease consistent with her MRI done from 6/28/2024.  CTA H/N with contrast showed no LVO, aneurysm, or significant stenosis.  Given that patient's symptomatology is most likely secondary to her metastatic disease with significant edema and no LVO, the  stroke alert was canceled.     Of note, patient just had her fourth stereotactic brain radiation treatment done yesterday (7/5/24).  Patient is not currently on any steroids because she had frequent headaches when she was on steroids.    Patient to be admitted for symptomatic control of her metastatic disease.      Inpatient consult to Neurology  Consult performed by: Alana Maguire DO  Consult ordered by: Yesy Padilla MD        Review of Systems   Neurological:  Positive for weakness and headaches. Negative for dizziness, tremors, seizures, syncope, facial asymmetry, speech difficulty, light-headedness and numbness.       Historical Information   Past Medical History:   Diagnosis Date    Anemia 3/23/2020    During chemo    Brain cancer (HCC)     Breast cancer (HCC)     2004 and then 2009    Cancer (HCC) 2005,2009    breast    Cancer (HCC) 2020    ovarian    Chicken pox     Colon polyp     CPAP (continuous positive airway pressure) dependence     Frequent UTI     GERD (gastroesophageal reflux disease)     Heart disease     High cholesterol     History of blood transfusion     2013 with Bilat Knee replacement    Hypertension     Irregular heart beat     PVC's    Ovarian cancer (HCC) 9/13/2020    PONV (postoperative nausea and vomiting)     Sleep apnea      Past Surgical History:   Procedure Laterality Date    APPENDECTOMY      BREAST CYST EXCISION      BREAST LUMPECTOMY      CHOLECYSTECTOMY      COLONOSCOPY      CYSTOSCOPY Bilateral 01/13/2020    Procedure: CYSTOSCOPY: CYSTOSCOPY WITH PERIOPERATIVE URETERAL CATHETERS  PLACEMENT;  Surgeon: Justin Leal MD;  [facility]: [facility];  Service: Gynecology Oncology    CYSTOSCOPY      HERNIA REPAIR      HYSTERECTOMY N/A 01/13/2020    Procedure: EXPLORATORY LAPAROTOMY, OVARIAN CANCER STAGING WITH TOTAL HYSTERECTOMY, BILATERAL SALPINGO-OOPHORECTOMY, BILATERAL PELVIC AND PERIAORTIC LYMPHADENECTOMY, INDICATED APPENDECTOMY, OMENTECTOMY, STAGING PERITONEAL BIOPSIES.;   Surgeon: Justin Leal MD;  Location: [facility];  Service: Gynecology Oncology    JOINT REPLACEMENT      Bilat Knees    MASTECTOMY Bilateral 2009    CO BRNCHSC INCL FLUOR GDNCE DX W/CELL WASHG SPX N/A 2021    Procedure: BRONCHOSCOPY FLEXIBLE;  Surgeon: Shiv Taylor MD;  Location: [facility];  Service: Thoracic    CO BRONCHOSCOPY NEEDLE BX TRACHEA MAIN STEM&/BRON N/A 2021    Procedure: ENDOBRONCHIAL ULTRASOUND (EBUS);  Surgeon: Shiv Taylor MD;  [facility];  Service: Thoracic    CO LAP RPR HRNA XCPT INCAL/INGUN NCRC8/STRANGULATED N/A 2021    Procedure: REPAIR HERNIA INCISIONAL LAPAROSCOPIC;  Surgeon: Dariel Wang MD;  [facility];  Service: General    CO LAPS ABD PRTM&OMENTUM DX W/WO SPEC BR/WA SPX N/A 2020    Procedure: LAPAROSCOPY DIAGNOSTIC;  Surgeon: Justin Leal MD;  [facility];  Service: Gynecology Oncology    REPAIR KNEE LIGAMENT Bilateral     ROTATOR CUFF REPAIR       Social History   Social History     Substance and Sexual Activity   Alcohol Use Yes    Comment: socially      Social History     Substance and Sexual Activity   Drug Use Never     E-Cigarette/Vaping    E-Cigarette Use Never User      E-Cigarette/Vaping Substances    Nicotine No     THC No     CBD No     Flavoring No     Other No     Unknown No      Social History     Tobacco Use   Smoking Status Former    Current packs/day: 0.00    Average packs/day: 1 pack/day for 15.0 years (15.0 ttl pk-yrs)    Types: Cigarettes    Start date: 10/31/1972    Quit date: 10/31/1987    Years since quittin.7   Smokeless Tobacco Former     Family History:   Family History   Problem Relation Age of Onset    Breast cancer Mother 48    Cancer Mother     Lung cancer Father     Hypertension Father     Prostate cancer Brother        Review of previous medical records was completed.     Meds/Allergies   all current active meds have been reviewed    No Known Allergies    Objective    Vitals:Blood pressure 158/99, pulse 97, temperature 98.5 °F (36.9 °C), temperature source Oral, resp. rate 18, weight 110 kg (241 lb 10 oz), SpO2 93%, not currently breastfeeding.,Body mass index is 32.77 kg/m².  No intake or output data in the 24 hours ending 07/06/24 1545    Invasive Devices:   Invasive Devices       Peripheral Intravenous Line  Duration             Peripheral IV 07/06/24 Left Antecubital <1 day    Peripheral IV 07/06/24 Left;Ventral (anterior) Forearm <1 day                    Physical Exam  Vitals reviewed.   Constitutional:       Appearance: Normal appearance.   HENT:      Head: Normocephalic and atraumatic.   Eyes:      Extraocular Movements: Extraocular movements intact and EOM normal.      Conjunctiva/sclera: Conjunctivae normal.      Pupils: Pupils are equal, round, and reactive to light.   Cardiovascular:      Rate and Rhythm: Normal rate.   Pulmonary:      Effort: Pulmonary effort is normal.   Musculoskeletal:         General: Normal range of motion.   Skin:     General: Skin is warm.   Neurological:      Mental Status: She is alert and oriented to person, place, and time.      Coordination: Finger-Nose-Finger Test and Heel to Shin Test normal.      Deep Tendon Reflexes:      Reflex Scores:       Tricep reflexes are 1+ on the right side and 1+ on the left side.       Bicep reflexes are 1+ on the right side and 1+ on the left side.       Brachioradialis reflexes are 1+ on the right side and 1+ on the left side.       Patellar reflexes are 1+ on the right side and 1+ on the left side.       Achilles reflexes are 1+ on the right side and 1+ on the left side.  Psychiatric:         Mood and Affect: Mood normal.         Speech: Speech normal.         Behavior: Behavior normal.       Neurologic Exam     Mental Status   Oriented to person, place, and time.   Attention: normal. Concentration: normal.   Speech: speech is normal   Level of consciousness: alert  Able to name object. Able to read.  Able to repeat. Normal comprehension.     Cranial Nerves     CN II   Visual fields full to confrontation.     CN III, IV, VI   Pupils are equal, round, and reactive to light.  Extraocular motions are normal.     CN V   Facial sensation intact.     CN VII   Facial expression full, symmetric.     CN VIII   CN VIII normal.     CN IX, X   CN IX normal.   CN X normal.     CN XI   CN XI normal.     CN XII   CN XII normal.     Motor Exam   Right arm pronator drift: absent  Left arm pronator drift: present    Strength   Strength 5/5 except as noted.   Left strength: LUE: 5-/5  LLE: 5-/5    Sensory Exam   Light touch normal.   Pinprick normal.     Gait, Coordination, and Reflexes     Coordination   Finger to nose coordination: normal  Heel to shin coordination: normal    Reflexes   Right brachioradialis: 1+  Left brachioradialis: 1+  Right biceps: 1+  Left biceps: 1+  Right triceps: 1+  Left triceps: 1+  Right patellar: 1+  Left patellar: 1+  Right achilles: 1+  Left achilles: 1+      NIHSS:  1a.Level of Consciousness: 0 = Alert   1b. LOC Questions: 0 = Answers both correctly   1c. LOC Commands: 0 = Obeys both correctly   2. Best Gaze: 0 = Normal   3. Visual: 0 = No visual field loss   4. Facial Palsy: 0=Normal symmetric movement   5a. Motor Right Arm: 0=No drift, limb holds 90 (or 45) degrees for full 10 seconds   5b. Motor Left Arm: 1=Drift, limb holds 90 (or 45) degrees but drifts down before full 10 seconds: does not hit bed   6a. Motor Right Le=No drift, limb holds 90 (or 45) degrees for full 10 seconds   6b. Motor Left Le=No drift, limb holds 90 (or 45) degrees for full 10 seconds   7. Limb Ataxia:  0=Absent   8. Sensory: 0=Normal; no sensory loss   9. Best Language:  0=No aphasia, normal   10. Dysarthria: 0=Normal articulation   11. Extinction and Inattention (formerly Neglect): 0=No abnormality   Total Score: 1     Time NIHSS was completed: Immediately    Modified [place] Score:  1 (No significant disability.  Able to carry out all usual activities, despite some symptoms)    Lab Results: I have personally reviewed pertinent reports.    Imaging Studies: I have personally reviewed pertinent reports.   and I have personally reviewed pertinent films in PACS  EKG, Pathology, and Other Studies: I have personally reviewed pertinent reports.    VTE Prophylaxis: Sequential compression device (Venodyne)     Code Status: Prior  Advance Directive and Living Will:      Power of :    POLST:      Counseling / Coordination of Care  Total time spent today 45 minutes. Greater than 50% of total time was spent with the patient and / or family counseling and / or coordination of care. A description of the counseling / coordination of care: Stroke Alert and further recommendations.

## 2024-07-07 VITALS
SYSTOLIC BLOOD PRESSURE: 143 MMHG | DIASTOLIC BLOOD PRESSURE: 91 MMHG | HEART RATE: 59 BPM | OXYGEN SATURATION: 89 % | BODY MASS INDEX: 31.98 KG/M2 | RESPIRATION RATE: 17 BRPM | WEIGHT: 235.8 LBS | TEMPERATURE: 98.6 F

## 2024-07-07 LAB
ATRIAL RATE: 97 BPM
ERYTHROCYTE [DISTWIDTH] IN BLOOD BY AUTOMATED COUNT: 15.7 % (ref 11.6–15.1)
GLUCOSE SERPL-MCNC: 128 MG/DL (ref 65–140)
GLUCOSE SERPL-MCNC: 132 MG/DL (ref 65–140)
GLUCOSE SERPL-MCNC: 153 MG/DL (ref 65–140)
HCT VFR BLD AUTO: 33.4 % (ref 34.8–46.1)
HGB BLD-MCNC: 10.9 G/DL (ref 11.5–15.4)
MCH RBC QN AUTO: 30.3 PG (ref 26.8–34.3)
MCHC RBC AUTO-ENTMCNC: 32.6 G/DL (ref 31.4–37.4)
MCV RBC AUTO: 93 FL (ref 82–98)
P AXIS: 59 DEGREES
PLATELET # BLD AUTO: 201 THOUSANDS/UL (ref 149–390)
PMV BLD AUTO: 8.9 FL (ref 8.9–12.7)
PR INTERVAL: 184 MS
QRS AXIS: -12 DEGREES
QRSD INTERVAL: 80 MS
QT INTERVAL: 362 MS
QTC INTERVAL: 459 MS
RBC # BLD AUTO: 3.6 MILLION/UL (ref 3.81–5.12)
T WAVE AXIS: 60 DEGREES
VENTRICULAR RATE: 97 BPM
WBC # BLD AUTO: 6.04 THOUSAND/UL (ref 4.31–10.16)

## 2024-07-07 PROCEDURE — 85027 COMPLETE CBC AUTOMATED: CPT

## 2024-07-07 PROCEDURE — 82948 REAGENT STRIP/BLOOD GLUCOSE: CPT

## 2024-07-07 PROCEDURE — 99239 HOSP IP/OBS DSCHRG MGMT >30: CPT | Performed by: INTERNAL MEDICINE

## 2024-07-07 PROCEDURE — 93010 ELECTROCARDIOGRAM REPORT: CPT | Performed by: INTERNAL MEDICINE

## 2024-07-07 RX ORDER — LOSARTAN POTASSIUM 50 MG/1
50 TABLET ORAL DAILY
Status: DISCONTINUED | OUTPATIENT
Start: 2024-07-07 | End: 2024-07-07 | Stop reason: HOSPADM

## 2024-07-07 RX ORDER — INSULIN LISPRO 100 [IU]/ML
1-5 INJECTION, SOLUTION INTRAVENOUS; SUBCUTANEOUS
Status: DISCONTINUED | OUTPATIENT
Start: 2024-07-07 | End: 2024-07-07 | Stop reason: HOSPADM

## 2024-07-07 RX ORDER — DEXAMETHASONE 1 MG
TABLET ORAL
Qty: 75 TABLET | Refills: 0 | Status: SHIPPED | OUTPATIENT
Start: 2024-07-07 | End: 2024-07-11 | Stop reason: SDUPTHER

## 2024-07-07 RX ORDER — PRAVASTATIN SODIUM 40 MG
40 TABLET ORAL
Status: DISCONTINUED | OUTPATIENT
Start: 2024-07-07 | End: 2024-07-07 | Stop reason: HOSPADM

## 2024-07-07 RX ORDER — CARVEDILOL 6.25 MG/1
6.25 TABLET ORAL 2 TIMES DAILY WITH MEALS
Status: DISCONTINUED | OUTPATIENT
Start: 2024-07-07 | End: 2024-07-07 | Stop reason: HOSPADM

## 2024-07-07 RX ORDER — PROCHLORPERAZINE MALEATE 10 MG
10 TABLET ORAL EVERY 6 HOURS PRN
Status: DISCONTINUED | OUTPATIENT
Start: 2024-07-07 | End: 2024-07-07 | Stop reason: HOSPADM

## 2024-07-07 RX ORDER — OXYCODONE HYDROCHLORIDE AND ACETAMINOPHEN 5; 325 MG/1; MG/1
1 TABLET ORAL EVERY 6 HOURS PRN
Qty: 10 TABLET | Refills: 0 | Status: SHIPPED | OUTPATIENT
Start: 2024-07-07 | End: 2024-07-17

## 2024-07-07 RX ORDER — HYDROCHLOROTHIAZIDE 12.5 MG/1
12.5 TABLET ORAL DAILY
Status: DISCONTINUED | OUTPATIENT
Start: 2024-07-07 | End: 2024-07-07 | Stop reason: HOSPADM

## 2024-07-07 RX ORDER — OMEPRAZOLE 20 MG/1
20 CAPSULE, DELAYED RELEASE ORAL DAILY
COMMUNITY
Start: 2024-06-29 | End: 2024-07-15

## 2024-07-07 RX ORDER — VENLAFAXINE HYDROCHLORIDE 150 MG/1
150 CAPSULE, EXTENDED RELEASE ORAL DAILY
Status: DISCONTINUED | OUTPATIENT
Start: 2024-07-07 | End: 2024-07-07 | Stop reason: HOSPADM

## 2024-07-07 RX ADMIN — VENLAFAXINE HYDROCHLORIDE 150 MG: 150 CAPSULE, EXTENDED RELEASE ORAL at 11:07

## 2024-07-07 RX ADMIN — HYDROCHLOROTHIAZIDE 12.5 MG: 12.5 TABLET ORAL at 11:07

## 2024-07-07 RX ADMIN — INSULIN LISPRO 1 UNITS: 100 INJECTION, SOLUTION INTRAVENOUS; SUBCUTANEOUS at 12:07

## 2024-07-07 RX ADMIN — CLONAZEPAM 0.5 MG: 0.5 TABLET ORAL at 08:20

## 2024-07-07 RX ADMIN — DEXAMETHASONE 4 MG: 4 TABLET ORAL at 08:20

## 2024-07-07 RX ADMIN — LOSARTAN POTASSIUM 50 MG: 50 TABLET, FILM COATED ORAL at 11:07

## 2024-07-07 RX ADMIN — PANTOPRAZOLE SODIUM 40 MG: 40 TABLET, DELAYED RELEASE ORAL at 05:21

## 2024-07-07 RX ADMIN — ENOXAPARIN SODIUM 40 MG: 40 INJECTION SUBCUTANEOUS at 08:20

## 2024-07-07 NOTE — PLAN OF CARE
Problem: NEUROSENSORY - ADULT  Goal: Achieves stable or improved neurological status  Description: INTERVENTIONS  - Monitor and report changes in neurological status  - Monitor vital signs such as temperature, blood pressure, glucose, and any other labs ordered   - Initiate measures to prevent increased intracranial pressure  - Monitor for seizure activity and implement precautions if appropriate      7/7/2024 1334 by Nicole Mishra RN  Outcome: Adequate for Discharge  7/7/2024 0932 by Nicole Mishra RN  Outcome: Progressing  Goal: Remains free of injury related to seizures activity  Description: INTERVENTIONS  - Maintain airway, patient safety  and administer oxygen as ordered  - Monitor patient for seizure activity, document and report duration and description of seizure to physician/advanced practitioner  - If seizure occurs,  ensure patient safety during seizure  - Reorient patient post seizure  - Seizure pads on all 4 side rails  - Instruct patient/family to notify RN of any seizure activity including if an aura is experienced  - Instruct patient/family to call for assistance with activity based on nursing assessment  - Administer anti-seizure medications if ordered    7/7/2024 1334 by Nicole Mishra RN  Outcome: Adequate for Discharge  7/7/2024 0932 by Nicole Mishra RN  Outcome: Progressing  Goal: Achieves maximal functionality and self care  Description: INTERVENTIONS  - Monitor swallowing and airway patency with patient fatigue and changes in neurological status  - Encourage and assist patient to increase activity and self care.   - Encourage visually impaired, hearing impaired and aphasic patients to use assistive/communication devices  7/7/2024 1334 by Nicole Mishra RN  Outcome: Adequate for Discharge  7/7/2024 0932 by Nicole Mishra RN  Outcome: Progressing

## 2024-07-07 NOTE — PROGRESS NOTES
Contacted by ED physician regarding this patient who underwent SRS on Friday and presented to ED on Saturday with some neurologic symptoms concerning for stroke. Neurology consulted and obtained CT head which didn't have acute findings, though some edema was noted. Radiation oncology was contacted re: decadron, ED physician reports patient has had a poor reaction to decadron in the past.     I advised a mild taper starting at 4mg po bid and advised that she would get her post-SRS phone call on Monday for subsequent titration.     This was communicated with patients primary radiation oncologist and nursing team.     Adeline Murray MD  Department of Radiation Oncology  [facility]

## 2024-07-07 NOTE — DISCHARGE SUMMARY
[facility]  Discharge- Lydia Squires 1950, 73 y.o. female MRN: [id number]  Unit/Bed#: S -01 Encounter: [id number]  Primary Care Provider: Sreedhar Maguire MD   Date and time admitted to hospital: 7/6/2024 12:59 PM    * Stroke-like symptoms  Assessment & Plan  History of bilateral breast cancer/ovarian cancer with metastases to the brain presented with left upper extremity weakness/headache   Stroke alert, admission   CT head multiple intra-axial metastasis with surrounding vasogenic edema however this is grossly unchanged   Patient currently on XRT last cycle Friday PTA   Evaluated by neurology at the emergency department thought symptoms were related to known vasogenic edema in the setting of brain metastasis for which stroke alert canceled   Radiation oncology consulted recommend start steroids  Symptoms significantly improved following medications.     Plan  Continue with Decadron 4 mg PO BID x 5 days, then 2 mg PO BID x 5 days, then 2 mg PO daily x 5 days, then 1 mg PO daily x 5 days.  Continue with omeprazole 20 mg once a day on steroids  Follow-up with outpatient radiation oncologist, oncology on the outpatient setting    Brain metastases  Assessment & Plan  Known ovarian CA metastatic to brain. Has undergone multiple rounds of SRS.  Continue steroid taper as above  Follow-up with radiation oncology on the patient setting    Vasogenic edema (HCC)  Assessment & Plan  ECG and edema in the setting of known brain metastases status post XRT   Plan      Decadon 4 mg PO BID x 5 days, then 2 mg PO BID x 5 days, then 2 mg PO daily x 5 days, then 1 mg PO daily x 5 days.    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     Follow-up with oncology and radiation oncology on the patient setting      Medical Problems       Resolved Problems  Date Reviewed:  7/7/2024   None       Discharging Resident: No Hubbard MD  Discharging Attending: Blas Zamora MD  PCP: Sreedhar Maguire MD  Admission Date:   Admission Orders (From admission, onward)       Ordered        07/06/24 1502  INPATIENT ADMISSION  Once                          Discharge Date: 07/07/24    Consultations During Hospital Stay:  Radiation oncology    Procedures Performed:   None    Significant Findings / Test Results:   CT stroke alert brain   Final Result by E. Alec Schoenberger, MD (07/06 5027)      Redemonstration of multiple intracranial metastases with vasogenic edema. No significant change  but lesions are better delineated on recent MRI.            Findings were reported to Blossom Padilla via HIPAA compliant secure electronic messaging at 1:56 p.m.      Workstation performed: [id number]         CTA stroke alert (head/neck)   Final Result by E. Alec Schoenberger, MD (07/06 1228)   No significant stenosis of the cervical carotid or vertebral arteries   No significant intracranial stenosis, large vessel occlusion or aneurysm.         Findings were reported to Blossom Padilla via HIPAA compliant secure electronic messaging at 1:56 p.m.               Workstation performed: [id number]              Incidental Findings:   None      Test Results Pending at Discharge (will require follow up):  None     Outpatient Tests Requested:  None    Complications: None    Reason for Admission: Strokelike symptoms    Hospital Course:   Lydia Squires is a 73 y.o. female patient who originally presented to the hospital on 7/6/2024 due to headache/generalized weakness/nonbloody emesis strokelike symptoms.  Upon evaluation stroke alert was called given new onset headache with left upper extremity weakness.  Upon evaluation by neurology CT head CTA head and neck was ordered which did reveal known brain metastasis with surrounding vasogenic edema however this was  unchanged from outpatient MRI.  Patient was started on IV Decadron subsequently radiation oncology consulted recommendations to continue steroid taper and follow-up on the outpatient setting.    Today patient was seen at bedside deemed stable for discharge recommended to continue steroid taper as prescribed.  Continue with conservative management with Tylenol/oxycodone if needed for recurrence of headaches and follow-up with oncology/radiation oncology on the patient setting for further management evaluation.        Please see above list of diagnoses and related plan for additional information.     Condition at Discharge: stable    Discharge Day Visit / Exam:   Subjective: No acute overnight events reported by nursing team patient seen at bedside with  endorse improvement in symptoms headache is 1/10 no blurry vision.  Left upper extremity seems to be resolved but improved.  Able to ambulate.  Tolerating diet.  No other issues at this point in time.  Vitals: Blood Pressure: 143/91 (07/07/24 0705)  Pulse: 86 (07/07/24 0705)  Temperature: 98.6 °F (37 °C) (07/07/24 0705)  Temp Source: Oral (07/07/24 0705)  Respirations: 17 (07/07/24 0705)  Weight - Scale: 107 kg (235 lb 12.8 oz) (07/07/24 0345)  SpO2: 96 % (07/07/24 0705)  Exam:   Physical Exam  Vitals and nursing note reviewed.   Constitutional:       General: She is not in acute distress.     Appearance: She is well-developed. She is not ill-appearing.   HENT:      Head: Normocephalic and atraumatic.      Right Ear: External ear normal.      Left Ear: External ear normal.      Mouth/Throat:      Mouth: Mucous membranes are moist.   Eyes:      Conjunctiva/sclera: Conjunctivae normal.   Cardiovascular:      Rate and Rhythm: Normal rate and regular rhythm.      Pulses: Normal pulses.      Heart sounds: Normal heart sounds. No murmur heard.  Pulmonary:      Effort: Pulmonary effort is normal. No respiratory distress.      Breath sounds: Normal breath sounds.    Abdominal:      Palpations: Abdomen is soft.      Tenderness: There is no abdominal tenderness.   Musculoskeletal:         General: No swelling.      Cervical back: Neck supple.      Right lower leg: No edema.      Left lower leg: No edema.   Skin:     General: Skin is warm and dry.      Capillary Refill: Capillary refill takes less than 2 seconds.   Neurological:      Mental Status: She is alert.   Psychiatric:         Mood and Affect: Mood normal.          Discussion with Family: Updated  () at bedside.    Discharge instructions/Information to patient and family:   See after visit summary for information provided to patient and family.      Provisions for Follow-Up Care:  See after visit summary for information related to follow-up care and any pertinent home health orders.      Mobility at time of Discharge:   Basic Mobility Inpatient Raw Score: 23  JH-HLM Goal: 7: Walk 25 feet or more  JH-HLM Achieved: 7: Walk 25 feet or more  HLM Goal achieved. Continue to encourage appropriate mobility.     Disposition:   Home    Planned Readmission: None    Discharge Medications:  See after visit summary for reconciled discharge medications provided to patient and/or family.      **Please Note: This note may have been constructed using a voice recognition system**

## 2024-07-07 NOTE — DISCHARGE INSTR - AVS FIRST PAGE
Dear Lydia Squires,     It was our pleasure to care for you here at [facility].  It is our hope that we were always able to exceed the expected standards for your care during your stay.  You were hospitalized due to strokelike symptoms related to known brain metastases secondary to vasogenic edema.  You were cared for on the third floor by No Hubbard MD under the service of Blas Zamora MD with the [facility] who covers for your primary care physician (PCP), Sreedhar Maguire MD, while you were hospitalized.  If you have any questions or concerns related to this hospitalization, you may contact us at .  For follow up as well as any medication refills, we recommend that you follow up with your primary care physician.  A registered nurse will reach out to you by phone within a few days after your discharge to answer any additional questions that you may have after going home.  However, at this time we provide for you here, the most important instructions / recommendations at discharge:     Notable Medication Adjustments -   Starting this evening at 2100 Take Decadron 1 mg tablet take 4 tablets (4 mg total) by mouth 2 times a day with meals for 5 days until 07/11/2024, THEN take 2 tablets (2 mg total) by mouth 2 times a day with meals for 5 days until 07/16/2024, THEN take 2 tablets (2 mg total) daily with breakfast for 5 days till 07/21/2024, THEN take 1 tablet by mouth of 1 mg total daily with breakfast for 5 days then stop.    Testing Required after Discharge -   None  ** Please contact your PCP to request testing orders for any of the testing recommended here **  Important follow up information -   Please follow-up with radiation oncologist on the outpatient setting make an appointment  Please follow-up with oncology on the patient setting make an appointment  Other Instructions -   Please ensure to continue omeprazole 20  mg once a day while taking prednisone.  Please return to the emergency department if worsening symptoms or inability to tolerate oral intake  Recommend avoid any surgical procedures at this point in time until seen or cleared by oncology/agrees oncologist.  Please review this entire after visit summary as additional general instructions including medication list, appointments, activity, diet, any pertinent wound care, and other additional recommendations from your care team that may be provided for you.      Sincerely,     No Hubbard MD

## 2024-07-08 ENCOUNTER — TELEPHONE (OUTPATIENT)
Dept: RADIATION ONCOLOGY | Facility: CLINIC | Age: 74
End: 2024-07-08

## 2024-07-08 NOTE — TELEPHONE ENCOUNTER
Post SRS call: [facility] 7/5/24    Spoke to Lydia. She went to ED Saturday 7/6/24 for severe headache and difficulty with speech. She stated her headache is much better and described it as mild. She said she has not noticed any problems with speech today. She  has mild tremors in her left hand. She denies weakness, gait difficulties, N/V, and visual changes. We reviewed Decadron taper that was prescribed: Take 4 tablets (4 mg total) by mouth 2 (two) times a day with meals for 5 days, THEN 2 tablets (2 mg total) 2 (two) times a day with meals for 5 days, THEN 2 tablets (2 mg total) daily with breakfast for 5 days, THEN 1 tablet (1 mg total) daily with breakfast for 5 days.     She requested a call back from Dr. Farris for additional questions she has about her treatment.

## 2024-07-11 ENCOUNTER — NURSE TRIAGE (OUTPATIENT)
Age: 74
End: 2024-07-11

## 2024-07-11 DIAGNOSIS — R29.90 STROKE-LIKE SYMPTOMS: ICD-10-CM

## 2024-07-11 DIAGNOSIS — C79.31 MALIGNANT NEOPLASM METASTATIC TO BRAIN (HCC): ICD-10-CM

## 2024-07-11 DIAGNOSIS — G93.6 VASOGENIC EDEMA (HCC): ICD-10-CM

## 2024-07-11 NOTE — TELEPHONE ENCOUNTER
"Pt calling as she was in the hospital on 7/6/24 with a severe headache.    She no longer has a headache.      She is currently taking decadron 4 - one mg tabs am and pm.  Dose changes to 2 - one mg tabs am and pm 7/12/24, 1 - one mg tabs am and pm on 7/13/24.    Lydia is having company from [place] to \"Say Goodby\".      Pt is asking for possible increase in her steroid dose as she is having trouble getting dressed and have her hand function without help of others.    Please call her to discuss options for care.    Thank you,  Jaz Sorensen, RN   Answer Assessment - Initial Assessment Questions  1. MECHANISM: \"How did the injury happen?\"      Has brain cancer, Left arm has been week.  Went to the hospital on 7/6/24  2. ONSET: \"When did the injury happen?\" (Minutes or hours ago)       Pt has brain cancer  3. LOCATION: \"Where is the injury located?\" \"Which arm?\"      L arm.  Unable to do fine motor skills    4.  SEVERITY: \"Can you use the arm normally?\"       Continues to have weakness.     5. OTHER SYMPTOMS: No longer has a headache.  Also continues to have slurred speech.    Protocols used: Arm Injury-ADULT-[place]    "

## 2024-07-11 NOTE — TELEPHONE ENCOUNTER
Called Lydia. She C/O weakness in right arm and hand. She continues with tremors in right hand and needs assistance with dressing. Dr. Farris aware and recommended she stay on Decadron 4 mg BID. She was instructed to call if symptoms improve so taper can be started at that time. She verbalized understanding and requested a refill.

## 2024-07-12 RX ORDER — DEXAMETHASONE 1 MG
TABLET ORAL
Qty: 75 TABLET | Refills: 0 | Status: SHIPPED | OUTPATIENT
Start: 2024-07-12 | End: 2024-07-15

## 2024-07-15 ENCOUNTER — NURSE TRIAGE (OUTPATIENT)
Age: 74
End: 2024-07-15

## 2024-07-15 ENCOUNTER — TELEPHONE (OUTPATIENT)
Age: 74
End: 2024-07-15

## 2024-07-15 DIAGNOSIS — C79.31 MALIGNANT NEOPLASM METASTATIC TO BRAIN (HCC): Primary | ICD-10-CM

## 2024-07-15 RX ORDER — DEXAMETHASONE 2 MG/1
TABLET ORAL
Qty: 238 TABLET | Refills: 0 | Status: SHIPPED | OUTPATIENT
Start: 2024-07-15 | End: 2024-09-23

## 2024-07-15 RX ORDER — OMEPRAZOLE 40 MG/1
40 CAPSULE, DELAYED RELEASE ORAL DAILY
Qty: 90 CAPSULE | Refills: 1 | Status: SHIPPED | OUTPATIENT
Start: 2024-07-15

## 2024-07-15 NOTE — TELEPHONE ENCOUNTER
"Pt called to report her concerns about events that recently happened. Pt reported that she had radiation to the brain on 07/5 and ended up in the hospital for severe HA,vomiting, change in speech, left side weakness, and feeling off balance. She reported that she was then discharge on placed on steriods. Pt reports that she feels better today, denies headache, no vomiting, still has some left sided weakness and speech changes. She reports feeling better but would like to know once she is done with the steroids, will her symptoms get worst, how long should she be on steroids, and would like to discuss if her left side weakness will be her new baseline . Pt would like to discuss concerns with Dr. Farris.     Pt educated on what s/s will need immediate medical attention and what s/s to report.       Reason for Disposition   Nursing judgment    Answer Assessment - Initial Assessment Questions  1. REASON FOR CALL or QUESTION: \"What is your reason for calling today?\" or \"How can I best help you?\" or \"What question do you have that I can help answer?\"      Pt would like Dr. Farris to call back to address patient concerns .    Protocols used: Information Only Call - No Triage-ADULT-[place]    "

## 2024-07-15 NOTE — PROGRESS NOTES
Received a call from the patient regarding recent emergency department visit secondary to left-sided hand.  We discussed that her pre-SRS MRI demonstrated significant right frontal swelling, which combined with SRS treatment is likely the cause of her neurologic symptoms.  She is currently been taking dexamethasone 4 mg twice daily with mild improvement in symptoms.  I suggested she increase this to 4 mg 4 times daily and taper thereafter.  The patient will call in the next month to inform me of how she is doing.    Jefferson Farris MD  Dept of Radiation Oncology

## 2024-07-15 NOTE — TELEPHONE ENCOUNTER
----- Message from Bia LIRA sent at 7/15/2024  9:50 AM EDT -----  Pt Lydia called in looking to speak with Dr. Farris but then stated that she does have a brain tumor and is currently having symptoms. She is weak on her left side, her speech is a little off, also she is off balance and gets dizzy when she gets up..

## 2024-08-22 ENCOUNTER — TELEPHONE (OUTPATIENT)
Age: 74
End: 2024-08-22

## 2024-08-22 NOTE — TELEPHONE ENCOUNTER
Spoke to Dr. Rai about patient's symptoms. She stated she is currently taking  dexamethasone 2 mg TID for > 1 week. She noticed worsening SOB today along with decreased mobility. Dr. Rai advised her to be evaluated in the ED. Patient aware and will go to [facility] since it is closer to her home.

## 2024-08-22 NOTE — TELEPHONE ENCOUNTER
Pt called to report that she has been noticing increased known symptoms since now she is on a lower dose of her steroids, dexamethasone. Pt reports to be currently taking 1 (2 mg) tablet tid. Pt expressed that she has dificulty getting up and walking around with this lower dose. Also expressed that she has dizziness upon getting up from sitting position in which her BP medication losartan/HCTZ was d/c last week due to lower BP readings. Also expressed sometimes having SOB since her dx but noticed earlier today it felt a little worst with SPO2 reading 87% RA and wonders if this could be related to her steroid dose. Pt currently feels better. Denies chest pain and no leg edema, so current sob.  Pt educated on s/s that would require her to go to the ER for immediate medical attention. Please advise.

## 2024-09-05 ENCOUNTER — HOSPITAL ENCOUNTER (OUTPATIENT)
Dept: MRI IMAGING | Facility: HOSPITAL | Age: 74
End: 2024-09-05
Attending: STUDENT IN AN ORGANIZED HEALTH CARE EDUCATION/TRAINING PROGRAM
Payer: MEDICARE

## 2024-09-05 DIAGNOSIS — C79.31 MALIGNANT NEOPLASM METASTATIC TO BRAIN (HCC): ICD-10-CM

## 2024-09-05 PROCEDURE — A9585 GADOBUTROL INJECTION: HCPCS | Performed by: STUDENT IN AN ORGANIZED HEALTH CARE EDUCATION/TRAINING PROGRAM

## 2024-09-05 PROCEDURE — 70553 MRI BRAIN STEM W/O & W/DYE: CPT

## 2024-09-05 RX ORDER — GADOBUTROL 604.72 MG/ML
10 INJECTION INTRAVENOUS
Status: COMPLETED | OUTPATIENT
Start: 2024-09-05 | End: 2024-09-05

## 2024-09-05 RX ADMIN — GADOBUTROL 10 ML: 604.72 INJECTION INTRAVENOUS at 12:23

## 2024-09-18 ENCOUNTER — TELEPHONE (OUTPATIENT)
Age: 74
End: 2024-09-18

## 2024-09-18 NOTE — TELEPHONE ENCOUNTER
Patient would like to know if she could have her appointment with Dr. Lu tomorrow at 2:30 Rad On[facility]. Patient states she is having trouble walking and is feeling unwell, would like to do a phone call appointment since she does not know how to do or join a virtual visit or send her a link in messages   Please call [phone] when changed or if there are any concerns

## 2024-09-18 NOTE — TELEPHONE ENCOUNTER
Please change visit tomorrow to phone follow up per Dr. Farris. I will call her to make her aware.

## 2024-09-18 NOTE — TELEPHONE ENCOUNTER
RAD ONC - MRI Brain 9/5, F/U 9/19 - Forwarding to [facility] Clinical for Nursing Triage.  Will defer to Dr. Farris for appointment adjustment...KD

## 2024-09-19 ENCOUNTER — TELEMEDICINE (OUTPATIENT)
Dept: RADIATION ONCOLOGY | Facility: CLINIC | Age: 74
End: 2024-09-19
Attending: STUDENT IN AN ORGANIZED HEALTH CARE EDUCATION/TRAINING PROGRAM

## 2024-09-19 DIAGNOSIS — C79.31 MALIGNANT NEOPLASM METASTATIC TO BRAIN (HCC): Primary | ICD-10-CM

## 2024-09-19 PROCEDURE — 99024 POSTOP FOLLOW-UP VISIT: CPT | Performed by: STUDENT IN AN ORGANIZED HEALTH CARE EDUCATION/TRAINING PROGRAM

## 2024-09-19 NOTE — PROGRESS NOTES
Telephone Follow-up - Radiation Oncology   Lydia Squires 1950 73 y.o. female [id number]      History of Present Illness   Cancer Staging   Ovarian cancer (HCC)  Staging form: Ovary, Fallopian Tube, Primary Peritoneal, AJCC 8th Edition  - Pathologic stage from 1/13/2020: FIGO Stage IC3 (pN0, cM0) - Signed by Justin Leal MD on 1/28/2020  Histopathologic type: Serous cystadenocarcinoma, NOS  Histologic grade (G): G3  Histologic grading system: 4 grade system  Residual tumor (R): R0 - None  Diagnostic confirmation: Positive histology PLUS positive immunophenotyping and/or positive genetic studies  Specimen type: Excision  Staged by: Managing physician  Cancer antigen 125 () (U/mL): 4,000  Gross residual tumor after primary cyto-reductive surgery: Absent  Residual tumor volume after primary cytoreductive surgery: No gross tumor  National guidelines used in treatment planning: Yes  Type of national guideline used in treatment planning: NCCN    Ms. Lydia Squires is a 73 year old woman with a PMHx bilateral breast cancer s/p initial right sided BCS followed by adjuvant TC and RT (2006), thereafter with left sided cancer s/p bilateral mastectomy and adjuvant endocrine therapy. 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 She was found to have evidence of brain metastases and has undergone multiple courses of SRS (as below).  She returns today for follow-up.       Radiation Summary:  Right breast post-lumpectomy RT (2006)  SRS to four PTVs (L Frontal, L Parietal, R, Parietal, R Precentral Gyrus) on 8/31/22  SRS to six PTVs (L Cerebellum inf, L Cerebellum Med, L Front Parasag, L Frontal Sup x2, R Temp) on 2/1/23  SRS to 20 PTVs, each to 1800cGy, on 1/17/24.   SRT to 3 PTVs to 2500cGy in 5 fractions (1/27/24-1/29/24)  SRS to 18 PTVs, each to 1800cGy, on 7/5/24    Interval History:  The patient was last seen in clinic on 7/5/2024 at the  time of most recent SRS.  Since then, the patient had continue to follow with her medical oncologist Dr. Lamas. However due to accumulating toxicity from multiple rounds of chemotherapy and poor disease control she opted to transition off drug therapy to observation. She has been following with palliative medicine closer to her home.     MRI Brain (9/5/24) demonstrated:  Overall, findings suggest a preponderance of positive treatment response, noting decreased size and vasogenic edema/mass effect associated with multiple supratentorial/infratentorial brain metastases. A few lesions are more conspicuous than on the prior study, as detailed above, but there are no convincing large new intracranial metastases, noting evaluation is somewhat confounded by the sheer number of lesions.    Currently she is doing poor overall.  She has had some worsening weakness, and difficulty walking.  She previously was having left hand weakness following SRS, and was started on steroids, with subsequent improvement in the symptoms.  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 She is seen today by telephone due to difficulty with transport and ambulation.       Historical Information   Oncology History Overview Note   with prior bilateral breast cancers s/p initial BCS and adjuvant TC and RT (left, 2006), thereafter with right breast cancer s/p bilateral mastectomies. 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 She is s/p prior SRS x3 (8/31/22, 2/1/23, 1/29/24), now with further widespread intracranial POD. Per discussion she is hoping to avoid WBRT. On 7/5/24 she underwent SRS to 18 PTVs, each to a dose of 1800cGy in 1 fraction.     The patient tolerated SRS well though did develop post treatment headaches and dysarthria in the days following treatment. She was started on steroids with initial plan for taper, thereafter with plan for  continuation on higher dose pending possible withdrawal from cancer directed care.      9/5/24 MRI brain BT w wo contrast  Overall, findings suggest a preponderance of positive treatment response, noting decreased size and vasogenic edema/mass effect associated with multiple supratentorial/infratentorial brain metastases. A few lesions are more conspicuous than on the prior   study, as detailed above, but there are no convincing large new intracranial metastases, noting evaluation is somewhat confounded by the sheer number of lesions.           Ovarian cancer (HCC)   1/13/2020 Initial Diagnosis    Ovarian cancer on left (HCC)     1/13/2020 -  Cancer Staged    Staging form: Ovary, Fallopian Tube, Primary Peritoneal, AJCC 8th Edition  - Pathologic stage from 1/13/2020: FIGO Stage IC3 (pN0, cM0) - Signed by Justin Leal MD on 1/28/2020  Histologic grade (G): G3  Histologic grading system: 4 grade system  Residual tumor (R): R0 - None  Histopathologic type: Serous cystadenocarcinoma, NOS  Diagnostic confirmation: Positive histology PLUS positive immunophenotyping and/or positive genetic studies  Specimen type: Excision  Staged by: Managing physician  Cancer antigen 125 () (U/mL): 4,000  Gross residual tumor after primary cyto-reductive surgery: Absent  Residual tumor volume after primary cytoreductive surgery: No gross tumor  National guidelines used in treatment planning: Yes  Type of national guideline used in treatment planning: NCCN       1/13/2020 Surgery    Diagnostic laparoscopy, laparotomy, total hysterectomy, bilateral salpingo-oophorectomy with resection of pelvic mass, bilateral pelvic and periaortic lymphadenectomy, staging peritoneal biopsies, omentectomy, appendectomy.  Final pathology consistent with 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.     2/11/2020 - 5/26/2020 Chemotherapy    Six cycles of carboplatin/paclitaxel administered by Dr. Lamas.  Tolerated well.  CT scan at  completion of treatment demonstrates no evidence of measurable disease.   amarilis near completion of chemotherapy 5.7 units/milliliter     8/9/2021 Recurrence     Elevated  triggered imaging  which demonstrated suspicious mediastinal lymphadenopathy.  Endobronchial ultrasound-guided fine-needle aspiration on August 9, 2021 demonstrates metastatic carcinoma with immuno phenotype consistent with metastatic ovarian primary.     9/1/2021 - 1/24/2022 Chemotherapy    Carboplatin + Doxil x 6 cycles (Dr. Bob). Complete response. Amarilis Ca125 8.5 IU/mL.     3/2022 - 8/2022 Chemotherapy    Niraparib.     9/14/2022 -  Chemotherapy    Started on gemcitabine and bevacizumab.  On November 29, 2022 after 3 cycles CT scan demonstrates no evidence of measurable disease.   reportedly decreasing somewhat.  Patient tolerating well.     5/30/2023 - 5/30/2023 Chemotherapy    bevacizumab (AVASTIN) IVPB, 15 mg/kg = 1,665 mg, Intravenous, Once, 0 of 4 cycles  pembrolizumab (KEYTRUDA) IVPB, 200 mg, Intravenous, Once, 0 of 4 cycles     1/17/2024 - 1/29/2024 Radiation    Course: C3 SRS_SRT    Plan ID Energy Fractions Dose per Fraction (cGy) Dose Correction (cGy) Total Dose Delivered (cGy) Elapsed Days   [id number] 6X-FFF 1 / 1 1,800 0 1,800 0   [id number] 6X-FFF 5 / 5 500 0 2,500 12      Treatment Dates:  1/17/2024 - 1/29/2024.      7/5/2024 -  Radiation    Treatments:  Course: C4 SRS    Plan ID Energy Fractions Dose per Fraction (cGy) Dose Correction (cGy) Total Dose Delivered (cGy) Elapsed Days   [id number] 6X-FFF 1 / 1 1,800 0 1,800 0      Treatment Dates:  7/5/2024 - 7/5/2024.      Brain metastases   8/17/2022 Initial Diagnosis    Brain metastases (HCC)     8/31/2022 - 8/31/2022 Radiation      Plan ID Energy Fractions Dose per Fraction (cGy) Dose Correction (cGy) Total Dose Delivered (cGy) Elapsed Days   SRS 4 PTVs 6X-FFF 1 / 1 2,000 0 2,000 0      Treatment dates:  C1 SRS: 8/31/2022 - 8/31/2022 2/1/2023 - 2/1/2023  Radiation    Plan ID Energy Fractions Dose per Fraction (cGy) Dose Correction (cGy) Total Dose Delivered (cGy) Elapsed Days   SRS Inf 3PTVs 6X-FFF 1 / 1 2,000 0 2,000 0   SRS Sup 3PTVs 6X-FFF 1 / 1 2,000 0 2,000 0        1/17/2024 - 1/29/2024 Radiation    Course: C3 SRS_SRT    Plan ID Energy Fractions Dose per Fraction (cGy) Dose Correction (cGy) Total Dose Delivered (cGy) Elapsed Days   [id number] 6X-FFF 1 / 1 1,800 0 1,800 0   [id number] 6X-FFF 5 / 5 500 0 2,500 12      Treatment Dates:  1/17/2024 - 1/29/2024.      7/5/2024 -  Radiation    Treatments:  Course: C4 SRS    Plan ID Energy Fractions Dose per Fraction (cGy) Dose Correction (cGy) Total Dose Delivered (cGy) Elapsed Days   [id number] 6X-FFF 1 / 1 1,800 0 1,800 0      Treatment Dates:  7/5/2024 - 7/5/2024.            OBJECTIVE:   LMP  (LMP Unknown)   No exam performed.       Assessment/Plan:  No orders of the defined types were placed in this encounter.     We reviewed the patient's repeat MRI brain in detail comparison multiple prior imaging studies.  On my review, she has had substantial improvement in the vast majority of her treated intracranial lesions.  She does still have several residual enhancing areas of disease, however none of these appear to be overtly progressive as compared to prior imaging studies.  Additionally her prior edema has resolved at this point with use of steroids.  I have suggested a more rapid steroid taper for the remaining doses, she is currently on 1 mg daily, with recommendation to attempt gradual strengthening exercises as the high doses of steroids wear off.  Given the patient's decision to hold off on further systemic therapy, I will also hold off on further imaging at this time and plan for no further RT.  I will nonetheless remain available should questions or concerns arise in the coming weeks/months.    Jefferson Farris MD  9/19/2024,2:38 PM    Portions of the record may have been created with voice recognition  "software.  Occasional wrong word or \"sound a like\" substitutions may have occurred due to the inherent limitations of voice recognition software.  Read the chart carefully and recognize, using context, where substitutions have occurred.      "

## (undated) DEVICE — GLOVE INDICATOR PI UNDERGLOVE SZ 8 BLUE

## (undated) DEVICE — PACK PBDS STERILE LAP LITHOTOMY RF

## (undated) DEVICE — CHLORAPREP HI-LITE 26ML ORANGE

## (undated) DEVICE — SINGLE USE BIOPSY VALVE MAJ-210: Brand: SINGLE USE BIOPSY VALVE (STERILE)

## (undated) DEVICE — PACK PBDS LAP CHOLE RF

## (undated) DEVICE — CATH URETERAL 5FR X 70 CM FLEX TIP POLYUR BARD

## (undated) DEVICE — TOWEL SET X-RAY

## (undated) DEVICE — SINGLE USE SUCTION VALVE MAJ-209: Brand: SINGLE USE SUCTION VALVE (STERILE)

## (undated) DEVICE — SUT MONOCRYL 4-0 PS-2 18 IN Y496G

## (undated) DEVICE — SUT STRATAFIX SPIRAL 3-0 PGA/PCL 30 X 30 CM SXMD2B408

## (undated) DEVICE — 3000CC GUARDIAN II: Brand: GUARDIAN

## (undated) DEVICE — ENDOPATH PNEUMONEEDLE INSUFFLATION NEEDLES WITH LUER LOCK CONNECTORS 120MM: Brand: ENDOPATH

## (undated) DEVICE — IV EXTENSION TUBING 33 IN

## (undated) DEVICE — ADHESIVE SKIN HIGH VISCOSITY EXOFIN 1ML

## (undated) DEVICE — SUT VICRYL 3-0 SH 27 IN J416H

## (undated) DEVICE — Device: Brand: MEDEX

## (undated) DEVICE — INTENDED FOR TISSUE SEPARATION, AND OTHER PROCEDURES THAT REQUIRE A SHARP SURGICAL BLADE TO PUNCTURE OR CUT.: Brand: BARD-PARKER SAFETY BLADES SIZE 15, STERILE

## (undated) DEVICE — SUT VICRYL PLUS 0 UR-6 27IN VCP603H

## (undated) DEVICE — WOUND RETRACTOR AND PROTECTOR: Brand: ALEXIS O WOUND PROTECTOR-RETRACTOR

## (undated) DEVICE — ABDOMINAL PAD: Brand: DERMACEA

## (undated) DEVICE — PAD GROUNDING ADULT

## (undated) DEVICE — ELECTRODE BLADE MOD  E-Z CLEAN 6.5IN -0014M

## (undated) DEVICE — SPECIMEN CONTAINER STERILE PEEL PACK

## (undated) DEVICE — GAUZE SPONGES,16 PLY: Brand: CURITY

## (undated) DEVICE — STOPCOCK 4-WAY

## (undated) DEVICE — TRAY FOLEY 16FR URIMETER SILICONE SURESTEP

## (undated) DEVICE — SYRINGE 20ML LL

## (undated) DEVICE — ENSEAL 20 CM SHAFT, LARGE JAW: Brand: ENSEAL X1

## (undated) DEVICE — TUBING SMOKE EVAC W/FILTRATION DEVICE PLUMEPORT ACTIV

## (undated) DEVICE — 3M™ IOBAN™ 2 ANTIMICROBIAL INCISE DRAPE 6650EZ: Brand: IOBAN™ 2

## (undated) DEVICE — SUT PDS II 1 XLH 96 IN LOOPED Z881G

## (undated) DEVICE — CATH URETHERAL CONNECTOR

## (undated) DEVICE — TELFA NON-ADHERENT ABSORBENT DRESSING: Brand: TELFA

## (undated) DEVICE — NEEDLE 25G X 1 1/2

## (undated) DEVICE — SUT VICRYL 0 CT-1 CR/8 27 IN JJ41G

## (undated) DEVICE — 2000CC GUARDIAN II: Brand: GUARDIAN

## (undated) DEVICE — GLOVE SRG BIOGEL ORTHOPEDIC 7.5

## (undated) DEVICE — SUT PLAIN 2-0 CTX 27 IN 872H

## (undated) DEVICE — SYRINGE 10ML LL

## (undated) DEVICE — ASTOUND STANDARD SURGICAL GOWN, XL: Brand: CONVERTORS

## (undated) DEVICE — Device: Brand: BALLOON

## (undated) DEVICE — GLOVE PI ULTRA TOUCH SZ.8.5

## (undated) DEVICE — STERILE EMESIS BASIN                 070: Brand: CARDINAL HEALTH

## (undated) DEVICE — SUT PROLENE 5-0 C-1/C-1 24 IN 8725H

## (undated) DEVICE — ADAPTOR TRACH SWIVEL

## (undated) DEVICE — NEEDLE TBNA EBUS 19G VIZISHOT2 FLEX

## (undated) DEVICE — 40595 XL TRENDELENBURG POSITIONING KIT: Brand: 40595 XL TRENDELENBURG POSITIONING KIT

## (undated) DEVICE — SCD SEQUENTIAL COMPRESSION COMFORT SLEEVE MEDIUM KNEE LENGTH: Brand: KENDALL SCD

## (undated) DEVICE — TUBING SUCTION 5MM X 12 FT

## (undated) DEVICE — HEAVY DUTY TABLE COVER: Brand: CONVERTORS

## (undated) DEVICE — DRAPE SURGIKIT SADDLE BAG

## (undated) DEVICE — SYRINGE 50ML LL

## (undated) DEVICE — PREMIUM DRY TRAY LF: Brand: MEDLINE INDUSTRIES, INC.

## (undated) DEVICE — UTILITY MARKER,BLACK WITH LABELS: Brand: DEVON

## (undated) DEVICE — INTENDED FOR TISSUE SEPARATION, AND OTHER PROCEDURES THAT REQUIRE A SHARP SURGICAL BLADE TO PUNCTURE OR CUT.: Brand: BARD-PARKER SAFETY BLADES SIZE 11, STERILE

## (undated) DEVICE — STERILE CYSTO PACK: Brand: CARDINAL HEALTH

## (undated) DEVICE — CHLORHEXIDINE 4PCT 4 OZ

## (undated) DEVICE — LAPAROSCOPIC TROCAR SLEEVE/SINGLE USE: Brand: KII® OPTICAL ACCESS SYSTEM

## (undated) DEVICE — SYRINGE 10ML SLIP TIP LF

## (undated) DEVICE — FIRST STEP BEDSIDE KIT - STAND-UP POUCH, ENDOSCOPIC CLEANING PAD - 1 POUCH: Brand: FIRST STEP BEDSIDE KIT - STAND-UP POUCH, ENDOSCOPIC CLEANING PAD

## (undated) DEVICE — SUT VICRYL 0 REEL 54 IN J287G

## (undated) DEVICE — TROCAR: Brand: KII FIOS FIRST ENTRY

## (undated) DEVICE — GLOVE INDICATOR PI UNDERGLOVE SZ 8.5 BLUE

## (undated) DEVICE — MEDI-VAC YANK SUCT HNDL W/TPRD BULBOUS TIP: Brand: CARDINAL HEALTH

## (undated) DEVICE — TROCAR: Brand: KII® SLEEVE

## (undated) DEVICE — TRAY FOLEY 16FR URIMETER SURESTEP